# Patient Record
Sex: MALE | Race: BLACK OR AFRICAN AMERICAN | Employment: FULL TIME | URBAN - METROPOLITAN AREA
[De-identification: names, ages, dates, MRNs, and addresses within clinical notes are randomized per-mention and may not be internally consistent; named-entity substitution may affect disease eponyms.]

---

## 2015-04-02 LAB — LDL-C, EXTERNAL: 60

## 2016-08-09 LAB — HBA1C MFR BLD HPLC: 8.4 %

## 2017-01-10 ENCOUNTER — TELEPHONE (OUTPATIENT)
Dept: FAMILY MEDICINE CLINIC | Age: 47
End: 2017-01-10

## 2017-01-10 ENCOUNTER — OFFICE VISIT (OUTPATIENT)
Dept: FAMILY MEDICINE CLINIC | Age: 47
End: 2017-01-10

## 2017-01-10 VITALS
WEIGHT: 315 LBS | BODY MASS INDEX: 40.43 KG/M2 | DIASTOLIC BLOOD PRESSURE: 78 MMHG | HEIGHT: 74 IN | OXYGEN SATURATION: 98 % | HEART RATE: 87 BPM | RESPIRATION RATE: 16 BRPM | SYSTOLIC BLOOD PRESSURE: 130 MMHG | TEMPERATURE: 98.1 F

## 2017-01-10 DIAGNOSIS — Z79.4 TYPE 2 DIABETES MELLITUS WITHOUT COMPLICATION, WITH LONG-TERM CURRENT USE OF INSULIN (HCC): ICD-10-CM

## 2017-01-10 DIAGNOSIS — Z79.01 WARFARIN ANTICOAGULATION: ICD-10-CM

## 2017-01-10 DIAGNOSIS — I10 HTN (HYPERTENSION), BENIGN: Primary | ICD-10-CM

## 2017-01-10 DIAGNOSIS — E11.9 TYPE 2 DIABETES MELLITUS WITHOUT COMPLICATION, WITH LONG-TERM CURRENT USE OF INSULIN (HCC): ICD-10-CM

## 2017-01-10 DIAGNOSIS — I27.82 OTHER CHRONIC PULMONARY EMBOLISM: ICD-10-CM

## 2017-01-10 PROBLEM — J81.1 CHRONIC PULMONARY EDEMA: Status: ACTIVE | Noted: 2017-01-10

## 2017-01-10 LAB
INR PPP: 2.4 (ref 0.89–1.29)
PROTHROMBIN TIME: 24.8 SEC (ref 9–13)

## 2017-01-10 RX ORDER — CLONIDINE HYDROCHLORIDE 0.1 MG/1
TABLET ORAL
Refills: 0 | COMMUNITY
Start: 2016-12-17 | End: 2017-01-10 | Stop reason: SDUPTHER

## 2017-01-10 RX ORDER — ATENOLOL 100 MG/1
100 TABLET ORAL DAILY
Qty: 30 TAB | Refills: 4 | Status: SHIPPED | OUTPATIENT
Start: 2017-01-10 | End: 2017-06-15 | Stop reason: SDUPTHER

## 2017-01-10 RX ORDER — FUROSEMIDE 20 MG/1
20 TABLET ORAL DAILY
Qty: 30 TAB | Refills: 4 | Status: SHIPPED | OUTPATIENT
Start: 2017-01-10 | End: 2017-07-14 | Stop reason: SDUPTHER

## 2017-01-10 RX ORDER — AMLODIPINE BESYLATE 10 MG/1
10 TABLET ORAL DAILY
Qty: 30 TAB | Refills: 4 | Status: SHIPPED | OUTPATIENT
Start: 2017-01-10 | End: 2017-06-15 | Stop reason: SDUPTHER

## 2017-01-10 RX ORDER — POTASSIUM CHLORIDE 20 MEQ/1
TABLET, EXTENDED RELEASE ORAL
Qty: 30 TAB | Refills: 4 | Status: SHIPPED | OUTPATIENT
Start: 2017-01-10 | End: 2017-06-15 | Stop reason: SDUPTHER

## 2017-01-10 RX ORDER — INSULIN ASPART 100 [IU]/ML
INJECTION, SOLUTION INTRAVENOUS; SUBCUTANEOUS
Refills: 3 | COMMUNITY
Start: 2016-12-17

## 2017-01-10 RX ORDER — WARFARIN 1 MG/1
TABLET ORAL
Qty: 30 TAB | Refills: 4 | Status: SHIPPED | OUTPATIENT
Start: 2017-01-10 | End: 2017-07-14 | Stop reason: SDUPTHER

## 2017-01-10 RX ORDER — LIRAGLUTIDE 6 MG/ML
INJECTION SUBCUTANEOUS
Refills: 6 | COMMUNITY
Start: 2016-12-17 | End: 2020-08-06 | Stop reason: ALTCHOICE

## 2017-01-10 RX ORDER — LISINOPRIL AND HYDROCHLOROTHIAZIDE 12.5; 2 MG/1; MG/1
TABLET ORAL
Qty: 60 TAB | Refills: 4 | Status: SHIPPED | OUTPATIENT
Start: 2017-01-10 | End: 2017-06-15 | Stop reason: SDUPTHER

## 2017-01-10 RX ORDER — BUPRENORPHINE 10 UG/H
PATCH, EXTENDED RELEASE TRANSDERMAL
Refills: 0 | COMMUNITY
Start: 2016-12-21 | End: 2020-03-26 | Stop reason: ALTCHOICE

## 2017-01-10 RX ORDER — WARFARIN 10 MG/1
TABLET ORAL
Qty: 30 TAB | Refills: 4 | Status: SHIPPED | OUTPATIENT
Start: 2017-01-10 | End: 2017-07-14 | Stop reason: SDUPTHER

## 2017-01-10 RX ORDER — CLONIDINE HYDROCHLORIDE 0.1 MG/1
TABLET ORAL
Qty: 60 TAB | Refills: 4 | Status: SHIPPED | OUTPATIENT
Start: 2017-01-10 | End: 2017-06-15 | Stop reason: SDUPTHER

## 2017-01-10 NOTE — PROGRESS NOTES
Subjective:   Luis Rae is a 42 y.o. male with hypertension new patient to practice (former patient of Dr. Estes Husbands - physician relocated)  Patient has dm type 2 insulin long term and under care of Dr. El Cho. Patient problem list reviewed  Patient on long term warfarin therapy for past medical history of pulmonary emboli and has had a cristobal filter placed, full hematologic work up for provocation none found. He is currently  On 12 mg warfarin daily. Patient has appointment with Dr. El Cho today and this provider asked about signing release of records today and patient commented that he was not sure he was going to stay with this practice because of a scheduling confusion yesterday. This provider asked is he wanted to proceed with visit and he reports for now and that he would like refills for all hypertension medications. BMI discussed and he reports that he is dieting and has lost over 110 pounds  . bspraised  ROS: denies chest pain, dyspnea, denies complaints today, reports taking all prescribed medications, not sure of INR  SH: ,   Nonsmoker  No alcohol   Current Outpatient Prescriptions   Medication Sig Dispense Refill    BUTRANS 10 mcg/hour LUCA 1 PATCH EVERY WEEK  0    cloNIDine HCl (CATAPRES) 0.1 mg tablet TK 1 T PO  BID  0    NOVOLOG FLEXPEN 100 unit/mL inpn INJECT 20 UNITS SUBCUTANEOUSLY 3 TIMES A DAY BEFORE MEALS  3    VICTOZA 3-ZANDRA 0.6 mg/0.1 mL (18 mg/3 mL) sub-q pen INJ 1.8 MG SC QD  6    CETIRIZINE HCL (ZYRTEC PO) Take  by mouth daily.  potassium chloride (K-DUR, KLOR-CON) 20 mEq tablet TAKE 1 TABLET BY MOUTH DAILY (GENERIC FOR KLOR-CON) 30 Tab 6    atenolol (TENORMIN) 100 mg tablet Take 1 Tab by mouth daily. 30 Tab 3    insulin syringe-needle U-100 by Does Not Apply route.  Dispense ultrafine 1 mL syringes with 29 gauge needle 100 Syringe 11    lisinopril-hydrochlorothiazide (PRINZIDE, ZESTORETIC) 20-12.5 mg per tablet 2QD - TAKE TWO TABLETS BY MOUTH EVERY DAY 60 Each 3    amlodipine (NORVASC) 10 mg tablet Take 10 mg by mouth daily.  furosemide (LASIX) 20 mg tablet Take 20 mg by mouth daily.  aspirin delayed-release 81 mg tablet Take 81 mg by mouth daily.  WARFARIN SODIUM (COUMADIN PO) Take  by mouth. Dose as instructed by Dr. Fozia Lopez insulin glargine (LANTUS) 100 unit/mL injection 80 Units by SubCUTAneous route daily.  fenofibrate nanocrystallized (TRICOR) 145 mg tablet TAKE 1 TABLET BY MOUTH EVERY DAY 30 Each 3    triamcinolone acetonide (KENALOG) 0.1 % ointment Apply  to affected area two (2) times a day. use thin layer 30 g 0    valsartan (DIOVAN) 160 mg tablet Take 1 Tab by mouth daily. 3    insulin regular human CONCENTRATED (HUMULIN R) 500 unit/mL Soln 10 Units by SubCUTAneous route three (3) times daily as needed. Wt Readings from Last 3 Encounters:   01/10/17 (!) 350 lb 12.8 oz (159.1 kg)   04/19/10 (!) 358 lb (162.4 kg)     BP Readings from Last 3 Encounters:   01/10/17 130/78   04/19/10 156/81     PMH: reviewed medications and allergy lists and medical and family history. OBJECTIVE:  Awake and alert in no acute distress  Obese  Well dressed  Neck supple without lymphadenopathy, no carotid artery bruits auscultated bilaterally. Lungs clear throughout  S1 S2 RRR without ectopy or murmur auscultated. Extremities: no clubbing, cyanosis, peripheral edema  Visit Vitals    /78 (BP 1 Location: Right arm, BP Patient Position: Sitting)    Pulse 87    Temp 98.1 °F (36.7 °C) (Oral)    Resp 16    Ht 6' 2\" (1.88 m)    Wt (!) 350 lb 12.8 oz (159.1 kg)    SpO2 98%    BMI 45.04 kg/m2     Attempted POC INR maching not operational  Ely Bobby was seen today for hypertension, diabetes and other.     Diagnoses and all orders for this visit:    HTN (hypertension), benign  -     cloNIDine HCl (CATAPRES) 0.1 mg tablet; TK 1 T PO  BID  -     potassium chloride (K-DUR, KLOR-CON) 20 mEq tablet; TAKE 1 TABLET BY MOUTH DAILY (GENERIC FOR KLOR-CON)  -     atenolol (TENORMIN) 100 mg tablet; Take 1 Tab by mouth daily. -     lisinopril-hydroCHLOROthiazide (PRINZIDE, ZESTORETIC) 20-12.5 mg per tablet; 2QD - TAKE TWO TABLETS BY MOUTH EVERY DAY  -     amLODIPine (NORVASC) 10 mg tablet; Take 1 Tab by mouth daily. -     furosemide (LASIX) 20 mg tablet; Take 1 Tab by mouth daily. Type 2 diabetes mellitus without complication, with long-term current use of insulin (HCC)    Warfarin anticoagulation  -     AMB POC PT/INR  -     warfarin (COUMADIN) 10 mg tablet; Take by mouth daily as instructed  by health care provider  -     warfarin (COUMADIN) 1 mg tablet; Take by mouth daily as directed by health care provider  -     PROTHROMBIN TIME + INR; Future  -     PROTHROMBIN TIME + INR    Other chronic pulmonary embolism (HCC)  -     AMB POC PT/INR  -     warfarin (COUMADIN) 10 mg tablet; Take by mouth daily as instructed  by health care provider  -     warfarin (COUMADIN) 1 mg tablet; Take by mouth daily as directed by health care provider  -     PROTHROMBIN TIME + INR; Future  -     PROTHROMBIN TIME + INR      Will telephone patient of INR when available  Patient verbalizes understanding. I have discussed the diagnosis with the patient and the intended plan as seen in the above orders. The patient has received an after-visit summary and questions were answered concerning future plans. I have discussed medication side effects and warnings with the patient as well. Follow-up Disposition:  Return in about 1 month (around 2/10/2017) for INR.

## 2017-01-10 NOTE — TELEPHONE ENCOUNTER
Spoke with patient to inform that as per [facility], patient's INR is therapeutic 2.40 and to continue current dose of warfarin 12 mg daily. Patient verbalized understanding.

## 2017-01-10 NOTE — PATIENT INSTRUCTIONS
Warfarin (By mouth)   Warfarin (WAR-far-in)  Prevents and treats blood clots. May lower the risk of serious complications after a heart attack. This medicine is a blood thinner. Brand Name(s):Joi Parsons   There may be other brand names for this medicine. When This Medicine Should Not Be Used: This medicine is not right for everyone. Do not use it if you had an allergic reaction to warfarin, if you are pregnant, or if you have health problems that could cause bleeding. How to Use This Medicine:   Tablet  · Take your medicine as directed. Your dose may need to be changed several times to find what works best for you. · This medicine should come with a Medication Guide. Ask your pharmacist for a copy if you do not have one. · Missed dose: Take a dose as soon as you remember. If it is almost time for your next dose, wait until then and take a regular dose. Do not take extra medicine to make up for a missed dose. · Store the medicine in a closed container at room temperature, away from heat, moisture, and direct light. Drugs and Foods to Avoid:   Ask your doctor or pharmacist before using any other medicine, including over-the-counter medicines, vitamins, and herbal products. · Many medicines and foods can affect how warfarin works and may affect the PT/INR test results.  Tell your doctor before you start or stop any medicine, especially the following:   ¨ Ginkgo, echinacea, goldenseal, or Coreen's wort  ¨ Another blood thinner, including apixaban, argatroban, bivalirudin, cilostazol, clopidogrel, dabigatran, desirudin, dipyridamole, heparin, lepirudin, prasugrel, rivaroxaban, ticlopidine  ¨ Medicine to treat depression or anxiety, including citalopram, desvenlafaxine, duloxetine, escitalopram, fluoxetine, fluvoxamine, milnacipran, paroxetine, sertraline, venlafaxine, vilazodone  ¨ Medicine to treat an infection  ¨ NSAID pain or arthritis medicine, including aspirin, celecoxib, diclofenac, diflunisal, fenoprofen, ibuprofen, indomethacin, ketoprofen, ketorolac, mefenamic acid, naproxen, oxaprozin, piroxicam, sulindac. Check labels for over-the-counter medicines to find out if they contain an NSAID. ¨ Steroid medicine, including dexamethasone, hydrocortisone, methylprednisolone, prednisolone, prednisone  · Warfarin works best if you eat about the same amount of vitamin K every day. Foods high in vitamin K include asparagus, broccoli, brussels sprouts, cabbage, green leafy vegetables, plums, rhubarb, and canola oil. Talk to your doctor before you make changes to your normal diet. Warnings While Using This Medicine:   · It is not safe to take this medicine during pregnancy. It could harm an unborn baby. Tell your doctor right away if you become pregnant. Use an effective form of birth control to keep from getting pregnant during treatment and for at least 1 month after your last dose. · Tell your doctor if you are breastfeeding, or if you have kidney disease, liver disease, diabetes, heart disease, heart failure, high blood pressure, an infection, a stomach ulcer, or protein C deficiency. Also tell your doctor if you had recent surgery or an injury, or a history of stroke, anemia, severe bleeding or bruising, or problems caused by heparin use. · This medicine may cause the following problems:   ¨ Bleeding, which may be life-threatening  ¨ Gangrene (skin or tissue damage)  ¨ Purple toes syndrome  · You must have a PT/INR blood test while you use this medicine to check how well your blood is clotting. Your doctor will use the test results to make sure the medicine is working properly. Keep all appointments for the PT/INR blood tests. · You may bleed or bruise more easily with warfarin. To prevent injury or cuts, do not play rough sports, be careful with sharp objects, and brush and floss your teeth gently. [place] your nose gently, and do not pick your nose.   · Carry an ID card or wear a medical alert bracelet to let emergency caregivers know that you use warfarin. · Tell any doctor or dentist who treats you that you are using this medicine. You may need to stop using this medicine several days before you have surgery or medical tests. · Keep all medicine out of the reach of children. Never share your medicine with anyone. Possible Side Effects While Using This Medicine:   Call your doctor right away if you notice any of these side effects:  · Allergic reaction: Itching or hives, swelling in your face or hands, swelling or tingling in your mouth or throat, chest tightness, trouble breathing  · Bleeding from your gums or nose, coughing up blood, heavy monthly periods  · Bleeding that does not stop, bruising, or weakness  · Dizziness, fainting, or lightheadedness  · Pain, brown or black skin, or skin that is cool to the touch  · Purple toes or feet, or new pain in your leg, foot, or toes  · Red or dark brown urine, or red or black, tarry stools  · Vomiting blood or material that looks like coffee grounds  If you notice other side effects that you think are caused by this medicine, tell your doctor. Call your doctor for medical advice about side effects. You may report side effects to FDA at 3-546-FDA-2651  © 2016 [address] is for End User's use only and may not be sold, redistributed or otherwise used for commercial purposes. The above information is an  only. It is not intended as medical advice for individual conditions or treatments. Talk to your doctor, nurse or pharmacist before following any medical regimen to see if it is safe and effective for you. Body Mass Index: Care Instructions  Your Care Instructions    Body mass index (BMI) can help you see if your weight is raising your risk for health problems. It uses a formula to compare how much you weigh with how tall you are. A BMI between 18.5 and 24.9 is considered healthy.  A BMI between 25 and 29.9 is considered overweight. A BMI of 30 or higher is considered obese. If your BMI is in the normal range, it means that you have a lower risk for weight-related health problems. If your BMI is in the overweight or obese range, you may be at increased risk for weight-related health problems, such as high blood pressure, heart disease, stroke, arthritis or joint pain, and diabetes. BMI is just one measure of your risk for weight-related health problems. You may be at higher risk for health problems if you are not active, you eat an unhealthy diet, or you drink too much alcohol or use tobacco products. Follow-up care is a key part of your treatment and safety. Be sure to make and go to all appointments, and call your doctor if you are having problems. It's also a good idea to know your test results and keep a list of the medicines you take. How can you care for yourself at home? · Practice healthy eating habits. This includes eating plenty of fruits, vegetables, whole grains, lean protein, and low-fat dairy. · Get at least 30 minutes of exercise 5 days a week or more. Brisk walking is a good choice. You also may want to do other activities, such as running, swimming, cycling, or playing tennis or team sports. · Do not smoke. Smoking can increase your risk for health problems. If you need help quitting, talk to your doctor about stop-smoking programs and medicines. These can increase your chances of quitting for good. · Limit alcohol to 2 drinks a day for men and 1 drink a day for women. Too much alcohol can cause health problems. If you have a BMI higher than 25  · Your doctor may do other tests to check your risk for weight-related health problems. This may include measuring the distance around your waist. A waist measurement of more than 40 inches in men or 35 inches in women can increase the risk of weight-related health problems. · Talk with your doctor about steps you can take to stay healthy or improve your health. You may need to make lifestyle changes to lose weight and stay healthy, such as changing your diet and getting regular exercise. Where can you learn more? Go to http://anayeli-ever.info/. Enter S176 in the search box to learn more about \"Body Mass Index: Care Instructions. \"  Current as of: February 16, 2016  Content Version: 11.1  © 5913-6491 [employer]. Care instructions adapted under license by [employer] (which disclaims liability or warranty for this information). If you have questions about a medical condition or this instruction, always ask your healthcare professional. [place] [address] any warranty or liability for your use of this information.

## 2017-01-10 NOTE — PROGRESS NOTES
Please call patient and advise that his INR is therapeutic 2.40 and continue current dose of warfarin 12 mg daily.    Troy RENTERIANP

## 2017-01-10 NOTE — PROGRESS NOTES
1. Have you been to the ER, urgent care clinic since your last visit? Hospitalized since your last visit? NO    2. Have you seen or consulted any other health care providers outside of the [address] [place] [address] since your last visit? Include any pap smears or colon screening. Yes, Dr. Madeleine Peña, Dr. Mitchell Castaneda    3. Would you like to have a Flu Shot Today?  NO already had one

## 2017-01-10 NOTE — MR AVS SNAPSHOT
Visit Information Date & Time Provider Department Dept. Phone Encounter #  
 1/10/2017  8:20 AM Car Lomas, [address] [id number] Follow-up Instructions Return in about 1 month (around 2/10/2017) for INR. Upcoming Health Maintenance Date Due DTaP/Tdap/Td series (1 - Tdap) 1/15/1993 INFLUENZA AGE 9 TO ADULT 8/1/2016 EYE EXAM RETINAL OR DILATED Q1 5/10/2017* HEMOGLOBIN A1C Q6M 7/10/2017 FOOT EXAM Q1 1/10/2018 MICROALBUMIN Q1 1/10/2018 LIPID PANEL Q1 1/10/2018 *Topic was postponed. The date shown is not the original due date. Allergies as of 1/10/2017  Review Complete On: 1/10/2017 By: Car Lomas NP No Known Allergies Current Immunizations  Reviewed on 4/19/2010 Name Date Pneumococcal Vaccine (Unspecified Type) 4/19/2010 Not reviewed this visit You Were Diagnosed With   
  
 Codes Comments HTN (hypertension), benign    -  Primary ICD-10-CM: I10 
ICD-9-CM: 401.1 Type 2 diabetes mellitus without complication, with long-term current use of insulin (HCC)     ICD-10-CM: E11.9, Z79.4 ICD-9-CM: 250.00, V58.67 Warfarin anticoagulation     ICD-10-CM: Z79.01 
ICD-9-CM: V58.61 Other chronic pulmonary embolism ([facility]     ICD-10-CM: I27.82 Vitals BP Pulse Temp Resp Height(growth percentile) Weight(growth percentile) 130/78 (BP 1 Location: Right arm, BP Patient Position: Sitting) 87 98.1 °F (36.7 °C) (Oral) 16 6' 2\" (1.88 m) (!) 350 lb 12.8 oz (159.1 kg) SpO2 BMI Smoking Status 98% 45.04 kg/m2 Never Smoker Vitals History BMI and BSA Data Body Mass Index Body Surface Area 45.04 kg/m 2 2.88 m 2 Preferred Pharmacy Pharmacy Name Phone [facility] [address] Your Updated Medication List  
  
   
 This list is accurate as of: 1/10/17  9:47 AM.  Always use your most recent med list. amLODIPine 10 mg tablet Commonly known as:  Anam Alstrom Take 1 Tab by mouth daily. aspirin delayed-release 81 mg tablet Take 81 mg by mouth daily. atenolol 100 mg tablet Commonly known as:  TENORMIN Take 1 Tab by mouth daily. BUTRANS 10 mcg/hour Generic drug:  buprenorphine LUCA 1 PATCH EVERY WEEK  
  
 cloNIDine HCl 0.1 mg tablet Commonly known as:  CATAPRES TK 1 T PO  BID  
  
 furosemide 20 mg tablet Commonly known as:  LASIX Take 1 Tab by mouth daily. HumuLIN R 500 unit/mL Soln Generic drug:  insulin CONCENTRATED regular 10 Units by SubCUTAneous route three (3) times daily as needed. insulin syringe-needle U-100  
by Does Not Apply route. Dispense ultrafine 1 mL syringes with 29 gauge needle LANTUS 100 unit/mL injection Generic drug:  insulin glargine 80 Units by SubCUTAneous route daily. lisinopril-hydroCHLOROthiazide 20-12.5 mg per tablet Commonly known as:  PRINZIDE, ZESTORETIC  
2QD - TAKE TWO TABLETS BY MOUTH EVERY DAY NovoLOG Flexpen 100 unit/mL Inpn Generic drug:  insulin aspart INJECT 20 UNITS SUBCUTANEOUSLY 3 TIMES A DAY BEFORE MEALS  
  
 potassium chloride 20 mEq tablet Commonly known as:  K-DUR, KLOR-CON  
TAKE 1 TABLET BY MOUTH DAILY (GENERIC FOR KLOR-CON)  
  
 triamcinolone acetonide 0.1 % ointment Commonly known as:  KENALOG Apply  to affected area two (2) times a day. use thin layer VICTOZA 3-ZANDRA 0.6 mg/0.1 mL (18 mg/3 mL) sub-q pen Generic drug:  Liraglutide INJ 1.8 MG SC QD  
  
 * warfarin 10 mg tablet Commonly known as:  COUMADIN Take by mouth daily as instructed  by health care provider * warfarin 1 mg tablet Commonly known as:  COUMADIN Take by mouth daily as directed by health care provider ZYRTEC PO Take  by mouth daily. * Notice: This list has 2 medication(s) that are the same as other medications prescribed for you. Read the directions carefully, and ask your doctor or other care provider to review them with you. Prescriptions Sent to Pharmacy Refills  
 cloNIDine HCl (CATAPRES) 0.1 mg tablet 4 Sig: TK 1 T PO  BID Class: Normal  
 Pharmacy: [facility],[facility] Ph #: [phone]  
 potassium chloride (K-DUR, KLOR-CON) 20 mEq tablet 4 Sig: TAKE 1 TABLET BY MOUTH DAILY ([address]) Class: Normal  
 Pharmacy: [facility] [address],[facility] Ph #: [phone]  
 atenolol (TENORMIN) 100 mg tablet 4 Sig: Take 1 Tab by mouth daily. Class: Normal  
 Pharmacy: [facility] [address] [place] [address] Ph #: [phone] Route: Oral  
 lisinopril-hydroCHLOROthiazide (PRINZIDE, ZESTORETIC) 20-12.5 mg per tablet 4 SiQD - TAKE TWO TABLETS BY MOUTH EVERY DAY Class: Normal  
 Pharmacy: [facility] [address],[facility] Ph #: [phone]  
 amLODIPine (NORVASC) 10 mg tablet 4 Sig: Take 1 Tab by mouth daily. Class: Normal  
 Pharmacy: [facility] [address] [place] [address] Ph #: [phone] Route: Oral  
 furosemide (LASIX) 20 mg tablet 4 Sig: Take 1 Tab by mouth daily. Class: Normal  
 Pharmacy: [facility] [address] [place] [address] Ph #: [phone] Route: Oral  
 warfarin (COUMADIN) 10 mg tablet 4 Sig: Take by mouth daily as instructed  by health care provider  Class: Normal  
 Pharmacy: [email or web address] [address] Ph #: [phone]  
 warfarin (COUMADIN) 1 mg tablet 4 Sig: Take by mouth daily as directed by health care provider Class: Normal  
 Pharmacy: [email or web address] [address] [facility] [address] Ph #: [phone] We Performed the Following AMB POC PT/INR [63830 CPT(R)] Follow-up Instructions Return in about 1 month (around 2/10/2017) for INR. To-Do List   
 01/10/2017 Lab:  PROTHROMBIN TIME + INR Patient Instructions Warfarin (By mouth) Warfarin (WAR-far-in) Prevents and treats blood clots. May lower the risk of serious complications after a heart attack. This medicine is a blood thinner. Brand Name(s):Coumadin, [employer] There may be other brand names for this medicine. When This Medicine Should Not Be Used: This medicine is not right for everyone. Do not use it if you had an allergic reaction to warfarin, if you are pregnant, or if you have health problems that could cause bleeding. How to Use This Medicine:  
Tablet · Take your medicine as directed. Your dose may need to be changed several times to find what works best for you. · This medicine should come with a Medication Guide. Ask your pharmacist for a copy if you do not have one. · Missed dose: Take a dose as soon as you remember. If it is almost time for your next dose, wait until then and take a regular dose. Do not take extra medicine to make up for a missed dose. · Store the medicine in a closed container at room temperature, away from heat, moisture, and direct light. Drugs and Foods to Avoid: Ask your doctor or pharmacist before using any other medicine, including over-the-counter medicines, vitamins, and herbal products.  
· Many medicines and foods can affect how warfarin works and may affect the PT/INR test results. Tell your doctor before you start or stop any medicine, especially the following: ¨ Ginkgo, echinacea, goldenseal, or Coreen's wort ¨ Another blood thinner, including apixaban, argatroban, bivalirudin, cilostazol, clopidogrel, dabigatran, desirudin, dipyridamole, heparin, lepirudin, prasugrel, rivaroxaban, ticlopidine ¨ Medicine to treat depression or anxiety, including citalopram, desvenlafaxine, duloxetine, escitalopram, fluoxetine, fluvoxamine, milnacipran, paroxetine, sertraline, venlafaxine, vilazodone ¨ Medicine to treat an infection ¨ NSAID pain or arthritis medicine, including aspirin, celecoxib, diclofenac, diflunisal, fenoprofen, ibuprofen, indomethacin, ketoprofen, ketorolac, mefenamic acid, naproxen, oxaprozin, piroxicam, sulindac. Check labels for over-the-counter medicines to find out if they contain an NSAID. ¨ Steroid medicine, including dexamethasone, hydrocortisone, methylprednisolone, prednisolone, prednisone · Warfarin works best if you eat about the same amount of vitamin K every day. Foods high in vitamin K include asparagus, broccoli, brussels sprouts, cabbage, green leafy vegetables, plums, rhubarb, and canola oil. Talk to your doctor before you make changes to your normal diet. Warnings While Using This Medicine: · It is not safe to take this medicine during pregnancy. It could harm an unborn baby. Tell your doctor right away if you become pregnant. Use an effective form of birth control to keep from getting pregnant during treatment and for at least 1 month after your last dose. · Tell your doctor if you are breastfeeding, or if you have kidney disease, liver disease, diabetes, heart disease, heart failure, high blood pressure, an infection, a stomach ulcer, or protein C deficiency. Also tell your doctor if you had recent surgery or an injury, or a history of stroke, anemia, severe bleeding or bruising, or problems caused by heparin use. · This medicine may cause the following problems: ¨ Bleeding, which may be life-threatening ¨ Gangrene (skin or tissue damage) ¨ Purple toes syndrome · You must have a PT/INR blood test while you use this medicine to check how well your blood is clotting. Your doctor will use the test results to make sure the medicine is working properly. Keep all appointments for the PT/INR blood tests. · You may bleed or bruise more easily with warfarin. To prevent injury or cuts, do not play rough sports, be careful with sharp objects, and brush and floss your teeth gently. Clifton Alvine your nose gently, and do not pick your nose. · Carry an ID card or wear a medical alert bracelet to let emergency caregivers know that you use warfarin. · Tell any doctor or dentist who treats you that you are using this medicine. You may need to stop using this medicine several days before you have surgery or medical tests. · Keep all medicine out of the reach of children. Never share your medicine with anyone. Possible Side Effects While Using This Medicine:  
Call your doctor right away if you notice any of these side effects: · Allergic reaction: Itching or hives, swelling in your face or hands, swelling or tingling in your mouth or throat, chest tightness, trouble breathing · Bleeding from your gums or nose, coughing up blood, heavy monthly periods · Bleeding that does not stop, bruising, or weakness · Dizziness, fainting, or lightheadedness · Pain, brown or black skin, or skin that is cool to the touch · Purple toes or feet, or new pain in your leg, foot, or toes · Red or dark brown urine, or red or black, tarry stools · Vomiting blood or material that looks like coffee grounds If you notice other side effects that you think are caused by this medicine, tell your doctor. Call your doctor for medical advice about side effects. You may report side effects to FDA at 7-068-WID-4297 © 2016 [address] is for End User's use only and may not be sold, redistributed or otherwise used for commercial purposes. The above information is an  only. It is not intended as medical advice for individual conditions or treatments. Talk to your doctor, nurse or pharmacist before following any medical regimen to see if it is safe and effective for you. Body Mass Index: Care Instructions Your Care Instructions Body mass index (BMI) can help you see if your weight is raising your risk for health problems. It uses a formula to compare how much you weigh with how tall you are. A BMI between 18.5 and 24.9 is considered healthy. A BMI between 25 and 29.9 is considered overweight. A BMI of 30 or higher is considered obese. If your BMI is in the normal range, it means that you have a lower risk for weight-related health problems. If your BMI is in the overweight or obese range, you may be at increased risk for weight-related health problems, such as high blood pressure, heart disease, stroke, arthritis or joint pain, and diabetes. BMI is just one measure of your risk for weight-related health problems. You may be at higher risk for health problems if you are not active, you eat an unhealthy diet, or you drink too much alcohol or use tobacco products. Follow-up care is a key part of your treatment and safety. Be sure to make and go to all appointments, and call your doctor if you are having problems. It's also a good idea to know your test results and keep a list of the medicines you take. How can you care for yourself at home? · Practice healthy eating habits. This includes eating plenty of fruits, vegetables, whole grains, lean protein, and low-fat dairy. · Get at least 30 minutes of exercise 5 days a week or more. Brisk walking is a good choice. You also may want to do other activities, such as running, swimming, cycling, or playing tennis or team sports. · Do not smoke. Smoking can increase your risk for health problems. If you need help quitting, talk to your doctor about stop-smoking programs and medicines. These can increase your chances of quitting for good. · Limit alcohol to 2 drinks a day for men and 1 drink a day for women. Too much alcohol can cause health problems. If you have a BMI higher than 25 · Your doctor may do other tests to check your risk for weight-related health problems. This may include measuring the distance around your waist. A waist measurement of more than 40 inches in men or 35 inches in women can increase the risk of weight-related health problems. · Talk with your doctor about steps you can take to stay healthy or improve your health. You may need to make lifestyle changes to lose weight and stay healthy, such as changing your diet and getting regular exercise. Where can you learn more? Go to http://anayeli-ever.info/. Enter S176 in the search box to learn more about \"Body Mass Index: Care Instructions. \" Current as of: February 16, 2016 Content Version: 11.1 © 3027-7604 [employer]. Care instructions adapted under license by [employer] (which disclaims liability or warranty for this information). If you have questions about a medical condition or this instruction, always ask your healthcare professional. [address] any warranty or liability for your use of this information. Introducing [facility]! Jeri Dial introduces [email or web address] patient portal. Now you can access parts of your medical record, email your doctor's office, and request medication refills online. 1. In your internet browser, go to [email or web address] 2. Click on the First Time User? Click Here link in the Sign In box. You will see the New Member Sign Up page. 3. Enter your [email or web address] Access Code exactly as it appears below.  You will not need to use this code after youve completed the sign-up process. If you do not sign up before the expiration date, you must request a new code. · [employer] Access Code: [email or web address] Expires: 4/10/2017  9:47 AM 
 
4. Enter the last four digits of your Social Security Number (xxxx) and Date of Birth (mm/dd/yyyy) as indicated and click Submit. You will be taken to the next sign-up page. 5. Create a [employer] ID. This will be your [employer] login ID and cannot be changed, so think of one that is secure and easy to remember. 6. Create a [employer] password. You can change your password at any time. 7. Enter your Password Reset Question and Answer. This can be used at a later time if you forget your password. 8. Enter your e-mail address. You will receive e-mail notification when new information is available in [address] 9. Click Sign Up. You can now view and download portions of your medical record. 10. Click the Download Summary menu link to download a portable copy of your medical information. If you have questions, please visit the Frequently Asked Questions section of the [employer] website. Remember, [employer] is NOT to be used for urgent needs. For medical emergencies, dial 911. Now available from your iPhone and Android! Please provide this summary of care documentation to your next provider. Your primary care clinician is listed as [address]. If you have any questions after today's visit, please call [phone].

## 2017-01-10 NOTE — TELEPHONE ENCOUNTER
----- Message from Colten Swanson LPN sent at 6/05/5489  1:46 PM EST -----      ----- Message -----     From: Hussain Archibald NP     Sent: 1/10/2017   1:32 PM       To: Colten Swanson LPN    Please call patient and advise that his INR is therapeutic 2.40 and continue current dose of warfarin 12 mg daily.    Hussain RENTERIANP

## 2017-01-11 PROBLEM — Z79.01 ON WARFARIN THERAPY: Status: ACTIVE | Noted: 2017-01-11

## 2017-03-13 ENCOUNTER — CLINICAL SUPPORT (OUTPATIENT)
Dept: FAMILY MEDICINE CLINIC | Age: 47
End: 2017-03-13

## 2017-03-13 DIAGNOSIS — Z79.01 ON WARFARIN THERAPY: Primary | ICD-10-CM

## 2017-03-13 LAB
INR BLD: 2.2
PT POC: 26.2 SEC
VALID INTERNAL CONTROL?: YES

## 2017-03-13 NOTE — PATIENT INSTRUCTIONS
Taking Warfarin Safely: Care Instructions  Your Care Instructions  Warfarin is a medicine that you take to prevent blood clots. It is often called a blood thinner. Doctors give warfarin (such as Coumadin) to reduce the risk of blood clots. You may be at risk for blood clots if you have atrial fibrillation or deep vein thrombosis. Some other health problems may also put you at risk. Warfarin slows the amount of time it takes for your blood to clot. It can cause bleeding problems. Even if you've been taking warfarin for a while, it's important to know how to take it safely. Foods and other medicines can affect the way warfarin works. Some can make warfarin work too well. This can cause bleeding problems. And some can make it work poorly, so that it does not prevent blood clots very well. You will need regular blood tests to check how long it takes for your blood to form a clot. This test is called a PT or prothrombin time test. The result of the test is called an INR level. Depending on the test results, your doctor or anticoagulation clinic may adjust your dose of warfarin. Follow-up care is a key part of your treatment and safety. Be sure to make and go to all appointments, and call your doctor if you are having problems. It's also a good idea to know your test results and keep a list of the medicines you take. How can you care for yourself at home? Take warfarin safely  · Take your warfarin at the same time each day. · If you miss a dose of warfarin, don't take an extra dose to make up for it. Your doctor can tell you exactly what to do so you don't take too much or too little. · Wear medical alert jewelry that lets others know that you take warfarin. You can buy this at most drugstores. · Don't take warfarin if you are pregnant or planning to get pregnant. Talk to your doctor about how you can prevent getting pregnant while you are taking it.   · Don't change your dose or stop taking warfarin unless your doctor tells you to. Effects of medicines and food on warfarin  · Don't start or stop taking any medicines, vitamins, or natural remedies unless you first talk to your doctor. Many medicines can affect how warfarin works. These include aspirin and other pain relievers, over-the-counter medicines, multivitamins, dietary supplements, and herbal products. · Tell all of your doctors and pharmacists that you take warfarin. Some prescription medicines can affect how warfarin works. · Keep the amount of vitamin K in your diet about the same from day to day. Do not suddenly eat a lot more or a lot less food that is rich in vitamin K than you usually do. Vitamin K affects how warfarin works and how your blood clots. Talk with your doctor before making big changes in your diet. Vitamin K is in many foods, such as:  ¨ Leafy greens, such as kale, cabbage, spinach, Swiss chard, and lettuce. ¨ Canola and soybean oils. ¨ Green vegetables, such as asparagus, broccoli, and Laurel sprouts. ¨ Vegetable drinks, green tea leaves, and some dietary supplement drinks. · Avoid cranberry juice and other cranberry products. They can increase the effects of warfarin. · Limit your use of alcohol. Avoid bleeding by preventing falls and injuries  · Wear slippers or shoes with nonskid soles. · Remove throw rugs and clutter. · Rearrange furniture and electrical cords to keep them out of walking paths. · Keep stairways, porches, and outside walkways well lit. Use night-lights in hallways and bathrooms. · Be extra careful when you work with sharp tools or knives. When should you call for help? Call 911 anytime you think you may need emergency care. For example, call if:  · You have a sudden, severe headache that is different from past headaches. Call your doctor now or seek immediate medical care if:  · You have any abnormal bleeding, such as:  ¨ Nosebleeds.   ¨ Vaginal bleeding that is different (heavier, more frequent, at a different time of the month) than what you are used to. ¨ Bloody or black stools, or rectal bleeding. ¨ Bloody or pink urine. Watch closely for changes in your health, and be sure to contact your doctor if you have any problems. Where can you learn more? Go to http://anayeli-ever.info/. Enter G186 in the search box to learn more about \"Taking Warfarin Safely: Care Instructions. \"  Current as of: January 27, 2016  Content Version: 11.1  © 2612-8522 [employer]. Care instructions adapted under license by [employer] (which disclaims liability or warranty for this information). If you have questions about a medical condition or this instruction, always ask your healthcare professional. [address] any warranty or liability for your use of this information.

## 2017-03-13 NOTE — PROGRESS NOTES
SUBJECTIVE:    Gorge Treadwell is a 41y.o. year old male who presents today for Anticoagulation monitoring. Indication: PE  INR Goal: 2.0-3.0. Current dose:  Coumadin 12mg daily. Missed Coumadin Doses:  Over one week ago -  1 dosage  Medication Changes:  no  Dietary Changes:  no  Symptoms: taking coumadin appropriately without any bleeding. Past Medical History:   Diagnosis Date    Diabetes ([address]     Hematuria     right renal cyst    Hypercholesterolemia     Hypertension     Liver disease     Fatty Liver    Pulmonary emboli (HCC)     chronic coumadin therapy managed by Dr. Anisha Mendoza Renal failure acute     following surgery     Current Outpatient Prescriptions   Medication Sig Dispense Refill    BUTRANS 10 mcg/hour LUCA 1 PATCH EVERY WEEK  0    NOVOLOG FLEXPEN 100 unit/mL inpn INJECT 20 UNITS SUBCUTANEOUSLY 3 TIMES A DAY BEFORE MEALS  3    VICTOZA 3-ZANDRA 0.6 mg/0.1 mL (18 mg/3 mL) sub-q pen INJ 1.8 MG SC QD  6    CETIRIZINE HCL (ZYRTEC PO) Take  by mouth daily.  cloNIDine HCl (CATAPRES) 0.1 mg tablet TK 1 T PO  BID 60 Tab 4    potassium chloride (K-DUR, KLOR-CON) 20 mEq tablet TAKE 1 TABLET BY MOUTH DAILY (GENERIC FOR KLOR-CON) 30 Tab 4    atenolol (TENORMIN) 100 mg tablet Take 1 Tab by mouth daily. 30 Tab 4    lisinopril-hydroCHLOROthiazide (PRINZIDE, ZESTORETIC) 20-12.5 mg per tablet 2QD - TAKE TWO TABLETS BY MOUTH EVERY DAY 60 Tab 4    amLODIPine (NORVASC) 10 mg tablet Take 1 Tab by mouth daily. 30 Tab 4    furosemide (LASIX) 20 mg tablet Take 1 Tab by mouth daily. 30 Tab 4    warfarin (COUMADIN) 10 mg tablet Take by mouth daily as instructed  by health care provider 30 Tab 4    warfarin (COUMADIN) 1 mg tablet Take by mouth daily as directed by health care provider 30 Tab 4    insulin syringe-needle U-100 by Does Not Apply route.  Dispense ultrafine 1 mL syringes with 29 gauge needle 100 Syringe 11    triamcinolone acetonide (KENALOG) 0.1 % ointment Apply  to affected area two (2) times a day. use thin layer 30 g 0    aspirin delayed-release 81 mg tablet Take 81 mg by mouth daily.  insulin glargine (LANTUS) 100 unit/mL injection 80 Units by SubCUTAneous route daily.  insulin regular human CONCENTRATED (HUMULIN R) 500 unit/mL Soln 10 Units by SubCUTAneous route three (3) times daily as needed. Lab Results   Component Value Date/Time    INR 2.40 01/10/2017 09:51 AM    INR 1.3 04/09/2014 07:10 AM    INR 1.9 03/24/2014 01:56 PM    INR POC 2.2 03/13/2017 11:29 AM    Prothrombin time 24.8 01/10/2017 09:51 AM    Prothrombin time 15.5 04/09/2014 07:10 AM    Prothrombin time 21.4 03/24/2014 01:56 PM         Assessment/Plan:  New Coumadin dose:.current treatment plan is effective, no change in therapy. Next check to be scheduled for  1 Month. Called left patient message (per patient okay to leave message) secure line that per provider Rasheeda Tejeda he is to continue with current dosage of 12 mg daily and he is to return to the office in one month for his recheck.

## 2017-06-15 DIAGNOSIS — I10 HTN (HYPERTENSION), BENIGN: ICD-10-CM

## 2017-06-15 RX ORDER — ATENOLOL 100 MG/1
TABLET ORAL
Qty: 30 TAB | Refills: 1 | Status: SHIPPED | OUTPATIENT
Start: 2017-06-15 | End: 2017-07-14 | Stop reason: SDUPTHER

## 2017-06-15 RX ORDER — AMLODIPINE BESYLATE 10 MG/1
TABLET ORAL
Qty: 30 TAB | Refills: 1 | Status: SHIPPED | OUTPATIENT
Start: 2017-06-15 | End: 2017-07-14 | Stop reason: SDUPTHER

## 2017-06-15 RX ORDER — LISINOPRIL AND HYDROCHLOROTHIAZIDE 12.5; 2 MG/1; MG/1
TABLET ORAL
Qty: 60 TAB | Refills: 1 | Status: SHIPPED | OUTPATIENT
Start: 2017-06-15 | End: 2017-07-14 | Stop reason: SDUPTHER

## 2017-06-15 RX ORDER — CLONIDINE HYDROCHLORIDE 0.1 MG/1
TABLET ORAL
Qty: 60 TAB | Refills: 1 | Status: SHIPPED | OUTPATIENT
Start: 2017-06-15 | End: 2017-07-14 | Stop reason: SDUPTHER

## 2017-06-15 RX ORDER — POTASSIUM CHLORIDE 20 MEQ/1
TABLET, EXTENDED RELEASE ORAL
Qty: 30 TAB | Refills: 1 | Status: SHIPPED | OUTPATIENT
Start: 2017-06-15 | End: 2017-07-14 | Stop reason: SDUPTHER

## 2017-06-15 NOTE — TELEPHONE ENCOUNTER
Please advise patient that prescriptions are ready with one refill. He has not been seen since January 2017. Moreover, he is on coumadin and no recent INR.   Please advise appointment and ask about INR  Saleem Juarez. Juan Pablo MENDES

## 2017-07-14 ENCOUNTER — OFFICE VISIT (OUTPATIENT)
Dept: FAMILY MEDICINE CLINIC | Age: 47
End: 2017-07-14

## 2017-07-14 VITALS
OXYGEN SATURATION: 97 % | SYSTOLIC BLOOD PRESSURE: 128 MMHG | TEMPERATURE: 98.1 F | DIASTOLIC BLOOD PRESSURE: 88 MMHG | WEIGHT: 315 LBS | RESPIRATION RATE: 16 BRPM | HEIGHT: 74 IN | BODY MASS INDEX: 40.43 KG/M2 | HEART RATE: 78 BPM

## 2017-07-14 DIAGNOSIS — I10 HTN (HYPERTENSION), BENIGN: ICD-10-CM

## 2017-07-14 DIAGNOSIS — Z12.11 SCREENING FOR COLON CANCER: ICD-10-CM

## 2017-07-14 DIAGNOSIS — Z79.01 WARFARIN ANTICOAGULATION: ICD-10-CM

## 2017-07-14 DIAGNOSIS — Z79.01 CHRONIC ANTICOAGULATION: ICD-10-CM

## 2017-07-14 DIAGNOSIS — Z00.00 ROUTINE MEDICAL EXAM: ICD-10-CM

## 2017-07-14 DIAGNOSIS — Z80.0 FAMILY HISTORY OF COLON CANCER: ICD-10-CM

## 2017-07-14 DIAGNOSIS — Z23 ENCOUNTER FOR IMMUNIZATION: Primary | ICD-10-CM

## 2017-07-14 DIAGNOSIS — I27.82 OTHER CHRONIC PULMONARY EMBOLISM: ICD-10-CM

## 2017-07-14 LAB
INR BLD: 2.2
PT POC: NORMAL SECONDS
VALID INTERNAL CONTROL?: YES

## 2017-07-14 RX ORDER — GABAPENTIN 300 MG/1
300 CAPSULE ORAL 2 TIMES DAILY
Refills: 2 | COMMUNITY
Start: 2017-06-15 | End: 2020-02-26 | Stop reason: SDUPTHER

## 2017-07-14 RX ORDER — ATENOLOL 100 MG/1
TABLET ORAL
Qty: 30 TAB | Refills: 1 | Status: SHIPPED | OUTPATIENT
Start: 2017-07-14 | End: 2017-08-16 | Stop reason: SDUPTHER

## 2017-07-14 RX ORDER — CLONIDINE HYDROCHLORIDE 0.1 MG/1
TABLET ORAL
Qty: 60 TAB | Refills: 1 | Status: SHIPPED | OUTPATIENT
Start: 2017-07-14 | End: 2017-09-11 | Stop reason: SDUPTHER

## 2017-07-14 RX ORDER — INSULIN GLARGINE 100 [IU]/ML
INJECTION, SOLUTION SUBCUTANEOUS
Refills: 6 | COMMUNITY
Start: 2017-06-15 | End: 2017-11-08

## 2017-07-14 RX ORDER — PEN NEEDLE, DIABETIC 31 GX5/16"
NEEDLE, DISPOSABLE MISCELLANEOUS
Refills: 3 | COMMUNITY
Start: 2017-07-04

## 2017-07-14 RX ORDER — CYCLOSPORINE 0.5 MG/ML
EMULSION OPHTHALMIC
Refills: 1 | COMMUNITY
Start: 2017-06-22 | End: 2019-04-30 | Stop reason: ALTCHOICE

## 2017-07-14 RX ORDER — WARFARIN 1 MG/1
TABLET ORAL
Qty: 30 TAB | Refills: 4 | Status: SHIPPED | OUTPATIENT
Start: 2017-07-14 | End: 2017-08-10 | Stop reason: SDUPTHER

## 2017-07-14 RX ORDER — LISINOPRIL AND HYDROCHLOROTHIAZIDE 12.5; 2 MG/1; MG/1
TABLET ORAL
Qty: 60 TAB | Refills: 1 | Status: SHIPPED | OUTPATIENT
Start: 2017-07-14 | End: 2017-09-11 | Stop reason: SDUPTHER

## 2017-07-14 RX ORDER — POTASSIUM CHLORIDE 20 MEQ/1
TABLET, EXTENDED RELEASE ORAL
Qty: 30 TAB | Refills: 1 | Status: SHIPPED | OUTPATIENT
Start: 2017-07-14 | End: 2017-09-11 | Stop reason: SDUPTHER

## 2017-07-14 RX ORDER — FUROSEMIDE 20 MG/1
20 TABLET ORAL DAILY
Qty: 30 TAB | Refills: 4 | Status: SHIPPED | OUTPATIENT
Start: 2017-07-14 | End: 2017-12-05 | Stop reason: SDUPTHER

## 2017-07-14 RX ORDER — WARFARIN 10 MG/1
TABLET ORAL
Qty: 30 TAB | Refills: 4 | Status: SHIPPED | OUTPATIENT
Start: 2017-07-14 | End: 2017-10-30 | Stop reason: SDUPTHER

## 2017-07-14 RX ORDER — AMLODIPINE BESYLATE 10 MG/1
TABLET ORAL
Qty: 30 TAB | Refills: 1 | Status: SHIPPED | OUTPATIENT
Start: 2017-07-14 | End: 2017-09-11 | Stop reason: SDUPTHER

## 2017-07-14 NOTE — MR AVS SNAPSHOT
Visit Information Date & Time Provider Department Dept. Phone Encounter #  
 7/14/2017 11:00 AM Екатерина Mills  [facility] [phone] [id number] Follow-up Instructions Return in about 1 year (around 7/14/2018) for annual wellness. Upcoming Health Maintenance Date Due  
 EYE EXAM RETINAL OR DILATED Q1 1/15/1982 INFLUENZA AGE 9 TO ADULT 8/1/2017 FOOT EXAM Q1 1/10/2018 MICROALBUMIN Q1 1/10/2018 LIPID PANEL Q1 1/10/2018 HEMOGLOBIN A1C Q6M 1/14/2018 DTaP/Tdap/Td series (2 - Td) 7/14/2027 Allergies as of 7/14/2017  Review Complete On: 7/14/2017 By: Екатерина Mills NP No Known Allergies Current Immunizations  Reviewed on 7/14/2017 Name Date Pneumococcal Vaccine (Unspecified Type) 4/19/2010 Tdap  Incomplete Reviewed by Екатерина Mills NP on 7/14/2017 at 11:16 AM  
You Were Diagnosed With   
  
 Codes Comments Encounter for immunization    -  Primary ICD-10-CM: X14 ICD-9-CM: V03.89 HTN (hypertension), benign     ICD-10-CM: I10 
ICD-9-CM: 401.1 Warfarin anticoagulation     ICD-10-CM: Z79.01 
ICD-9-CM: V58.61 Other chronic pulmonary embolism (HCC)     ICD-10-CM: I27.82 
ICD-9-CM: 416.2 Routine medical exam     ICD-10-CM: Z00.00 ICD-9-CM: V70.0 Screening for colon cancer     ICD-10-CM: Z12.11 ICD-9-CM: V76.51 Chronic anticoagulation     ICD-10-CM: Z79.01 
ICD-9-CM: V58.61 Family history of colon cancer     ICD-10-CM: Z80.0 ICD-9-CM: V16.0 Vitals BP Pulse Temp Resp Height(growth percentile) Weight(growth percentile) 128/88 (BP 1 Location: Left arm, BP Patient Position: Sitting) 78 98.1 °F (36.7 °C) (Oral) 16 6' 2\" (1.88 m) 340 lb 9.6 oz (154.5 kg) SpO2 BMI Smoking Status 97% 43.73 kg/m2 Never Smoker BMI and BSA Data Body Mass Index Body Surface Area 43.73 kg/m 2 2.84 m 2 Preferred Pharmacy Pharmacy Name Phone [facility] [address] [facility] Your Updated Medication List  
  
   
This list is accurate as of: 7/14/17 11:34 AM.  Always use your most recent med list. amLODIPine 10 mg tablet Commonly known as:  Remonia Grates TAKE 1 TABLET BY MOUTH DAILY  
  
 aspirin delayed-release 81 mg tablet Take 81 mg by mouth daily. atenolol 100 mg tablet Commonly known as:  TENORMIN  
TAKE 1 TABLET BY MOUTH DAILY  
  
 BD INSULIN PEN NEEDLE UF SHORT 31 gauge x 5/16\" Ndle Generic drug:  Insulin Needles (Disposable) USE 1 PEN UTD QID BUTRANS 10 mcg/hour Generic drug:  buprenorphine LUCA 1 PATCH EVERY WEEK  
  
 cloNIDine HCl 0.1 mg tablet Commonly known as:  CATAPRES  
TAKE 1 TABLET BY MOUTH TWICE DAILY  
  
 furosemide 20 mg tablet Commonly known as:  LASIX Take 1 Tab by mouth daily. gabapentin 300 mg capsule Commonly known as:  NEURONTIN HumuLIN R 500 unit/mL Soln Generic drug:  insulin CONCENTRATED regular 10 Units by SubCUTAneous route three (3) times daily as needed. insulin syringe-needle U-100  
by Does Not Apply route. Dispense ultrafine 1 mL syringes with 29 gauge needle * LANTUS 100 unit/mL injection Generic drug:  insulin glargine 80 Units by SubCUTAneous route daily. * BASAGLAR KWIKPEN 100 unit/mL (3 mL) Inpn Generic drug:  insulin glargine INJECT 80  UNITS UNDER THE SKIN QHS  
  
 lisinopril-hydroCHLOROthiazide 20-12.5 mg per tablet Commonly known as:  PRINZIDE, ZESTORETIC  
TAKE 2 TABLETS BY MOUTH EVERY DAY NovoLOG Flexpen 100 unit/mL Inpn Generic drug:  insulin aspart INJECT 20 UNITS SUBCUTANEOUSLY 3 TIMES A DAY BEFORE MEALS  
  
 potassium chloride 20 mEq tablet Commonly known as:  K-DUR, KLOR-CON  
TAKE 1 TABLET BY MOUTH DAILY RESTASIS 0.05 % ophthalmic emulsion Generic drug:  cycloSPORINE  
 INSTILL 1 TO 2 DROPS INTO OU BID  
  
 triamcinolone acetonide 0.1 % ointment Commonly known as:  KENALOG Apply  to affected area two (2) times a day. use thin layer VICTOZA 3-ZANDRA 0.6 mg/0.1 mL (18 mg/3 mL) sub-q pen Generic drug:  Liraglutide INJ 1.8 MG SC QD  
  
 * warfarin 10 mg tablet Commonly known as:  COUMADIN Take by mouth daily as instructed  by health care provider * warfarin 1 mg tablet Commonly known as:  COUMADIN Take by mouth daily as directed by health care provider ZYRTEC PO Take  by mouth daily. * Notice: This list has 4 medication(s) that are the same as other medications prescribed for you. Read the directions carefully, and ask your doctor or other care provider to review them with you. Prescriptions Sent to Pharmacy Refills  
 atenolol (TENORMIN) 100 mg tablet 1 Sig: TAKE 1 TABLET BY MOUTH DAILY Class: Normal  
 Pharmacy: [facility] [address] [place] [address] Ph #: [phone]  
 cloNIDine HCl (CATAPRES) 0.1 mg tablet 1 Sig: TAKE 1 TABLET BY MOUTH TWICE DAILY Class: Normal  
 Pharmacy: [facility] [address] [place] [address] Ph #: [phone]  
 lisinopril-hydroCHLOROthiazide (PRINZIDE, ZESTORETIC) 20-12.5 mg per tablet 1 Sig: TAKE 2 TABLETS BY MOUTH EVERY DAY Class: Normal  
 Pharmacy: [facility] [address] [place] [address] Ph #: [phone]  
 potassium chloride (K-DUR, KLOR-CON) 20 mEq tablet 1 Sig: TAKE 1 TABLET BY MOUTH DAILY Class: Normal  
 Pharmacy: [facility] [address] [place] [address] Ph #: [phone]  
 amLODIPine (NORVASC) 10 mg tablet 1 Sig: TAKE 1 TABLET BY MOUTH DAILY  Class: Normal  
 Pharmacy: [facility] [address] Ph #: [phone]  
 furosemide (LASIX) 20 mg tablet 4 Sig: Take 1 Tab by mouth daily. Class: Normal  
 Pharmacy: [facility] [address] [place] [address] Ph #: [phone] Route: Oral  
 warfarin (COUMADIN) 10 mg tablet 4 Sig: Take by mouth daily as instructed  by health care provider Class: Normal  
 Pharmacy: [facility] [place] [address] [address] Ph #: [phone]  
 warfarin (COUMADIN) 1 mg tablet 4 Sig: Take by mouth daily as directed by health care provider Class: Normal  
 Pharmacy: [facility] [place] [address] Ph #: [phone] We Performed the Following AMB POC BLOOD OCCULT QUAL FECAL HEMGLBN 1-3 [id number] CPT(R)] AMB POC PT/INR [97524 CPT(R)] CBC W/O DIFF [85762 CPT(R)] LIPID PANEL [93772 CPT(R)] METABOLIC PANEL, COMPREHENSIVE [26677 CPT(R)] REFERRAL TO GASTROENTEROLOGY [CSC40 Custom] Comments:  
 Please evaluate patient for colon cancer screening, PGF--colon cancer. TETANUS, DIPHTHERIA TOXOIDS AND ACELLULAR PERTUSSIS VACCINE (TDAP), IN INDIVIDS. >=7, IM [id number] CPT(R)] Follow-up Instructions Return in about 1 year (around 7/14/2018) for annual wellness. Referral Information Referral ID Referred By Referred To  
  
 [id number] Angel Baez [place] [address] [facility], [address] Phone: [phone] Fax: [phone] Visits Status Start Date End Date 1 New Request 7/14/17 7/14/18  If your referral has a status of pending review or denied, additional information will be sent to support the outcome of this decision. Patient Instructions Well Visit, Ages 25 to 48: Care Instructions Your Care Instructions Physical exams can help you stay healthy. Your doctor has checked your overall health and may have suggested ways to take good care of yourself. He or she also may have recommended tests. At home, you can help prevent illness with healthy eating, regular exercise, and other steps. Follow-up care is a key part of your treatment and safety. Be sure to make and go to all appointments, and call your doctor if you are having problems. It's also a good idea to know your test results and keep a list of the medicines you take. How can you care for yourself at home? · Reach and stay at a healthy weight. This will lower your risk for many problems, such as obesity, diabetes, heart disease, and high blood pressure. · Get at least 30 minutes of physical activity on most days of the week. Walking is a good choice. You also may want to do other activities, such as running, swimming, cycling, or playing tennis or team sports. Discuss any changes in your exercise program with your doctor. · Do not smoke or allow others to smoke around you. If you need help quitting, talk to your doctor about stop-smoking programs and medicines. These can increase your chances of quitting for good. · Talk to your doctor about whether you have any risk factors for sexually transmitted infections (STIs). Having one sex partner (who does not have STIs and does not have sex with anyone else) is a good way to avoid these infections. · Use birth control if you do not want to have children at this time. Talk with your doctor about the choices available and what might be best for you. · Protect your skin from too much sun.  When you're outdoors from 10 a.m. to 4 p.m., stay in the shade or cover up with clothing and a hat with a wide brim. Wear sunglasses that block UV rays. Even when it's cloudy, put broad-spectrum sunscreen (SPF 30 or higher) on any exposed skin. · See a dentist one or two times a year for checkups and to have your teeth cleaned. · Wear a seat belt in the car. · Drink alcohol in moderation, if at all. That means no more than 2 drinks a day for men and 1 drink a day for women. Follow your doctor's advice about when to have certain tests. These tests can spot problems early. For everyone · Cholesterol. Have the fat (cholesterol) in your blood tested after age 21. Your doctor will tell you how often to have this done based on your age, family history, or other things that can increase your risk for heart disease. · Blood pressure. Have your blood pressure checked during a routine doctor visit. Your doctor will tell you how often to check your blood pressure based on your age, your blood pressure results, and other factors. · Vision. Talk with your doctor about how often to have a glaucoma test. 
· Diabetes. Ask your doctor whether you should have tests for diabetes. · Colon cancer. Have a test for colon cancer at age 48. You may have one of several tests. If you are younger than 48, you may need a test earlier if you have any risk factors. Risk factors include whether you already had a precancerous polyp removed from your colon or whether your parent, brother, sister, or child has had colon cancer. For women · Breast exam and mammogram. Talk to your doctor about when you should have a clinical breast exam and a mammogram. Medical experts differ on whether and how often women under 50 should have these tests. Your doctor can help you decide what is right for you. · Pap test and pelvic exam. Begin Pap tests at age 24. A Pap test is the best way to find cervical cancer.  The test often is part of a pelvic exam. Ask how often to have this test. 
 · Tests for sexually transmitted infections (STIs). Ask whether you should have tests for STIs. You may be at risk if you have sex with more than one person, especially if your partners do not wear condoms. For men · Tests for sexually transmitted infections (STIs). Ask whether you should have tests for STIs. You may be at risk if you have sex with more than one person, especially if you do not wear a condom. · Testicular cancer exam. Ask your doctor whether you should check your testicles regularly. · Prostate exam. Talk to your doctor about whether you should have a blood test (called a PSA test) for prostate cancer. Experts differ on whether and when men should have this test. Some experts suggest it if you are older than 39 and are -American or have a father or brother who got prostate cancer when he was younger than 72. When should you call for help? Watch closely for changes in your health, and be sure to contact your doctor if you have any problems or symptoms that concern you. Where can you learn more? Go to http://anayeli-ever.info/. Enter P072 in the search box to learn more about \"Well Visit, Ages 25 to 48: Care Instructions. \" Current as of: July 19, 2016 Content Version: 11.3 © 4580-2163 [employer]. Care instructions adapted under license by [employer] (which disclaims liability or warranty for this information). If you have questions about a medical condition or this instruction, always ask your healthcare professional. [place] [address] any warranty or liability for your use of this information. Prothrombin Time: About This Test 
What is it? Prothrombin time (PT) is a blood test that measures how long it takes for blood to clot. Prothrombin is one of several clotting factors your body makes to help your blood form clots when a blood vessel is damaged. A PT test may also be called an INR test (for international normalized ratio). Why is this test done? The test can be used to check for bleeding problems. It is also used to check how well warfarin, a medicine to prevent blood clots, is working. How can you prepare for the test? 
· In general, you don't need to prepare before having this test. Your doctor may give you some specific instructions. What happens during the test? 
· A health professional takes a sample of your blood. What else should you know about the test? 
· Your results will include an explanation of what a \"normal\" result is. This is called a \"reference range. \" It is just a guide. Your doctor will evaluate your results based on your health and other factors. This means that a value that falls outside the normal values listed may still be normal for you. · If you're taking warfarin, regular testing helps your doctor make sure you're taking the right amount. How long does the test take? · The test will take a few minutes. What happens after the test? 
· You will probably be able to go home right away. · You can go back to your usual activities right away. Follow-up care is a key part of your treatment and safety. Be sure to make and go to all appointments, and call your doctor if you are having problems. It's also a good idea to keep a list of the medicines you take. Ask your doctor when you can expect to have your test results. Where can you learn more? Go to http://anayeli-ever.info/. Enter S247 in the search box to learn more about \"Prothrombin Time: About This Test.\" Current as of: July 28, 2016 Content Version: 11.3 © 6544-0166 [employer]. Care instructions adapted under license by [employer] (which disclaims liability or warranty for this information).  If you have questions about a medical condition or this instruction, always ask your healthcare professional. [employer] disclaims any warranty or liability for your use of this information. Body Mass Index: Care Instructions Your Care Instructions Body mass index (BMI) can help you see if your weight is raising your risk for health problems. It uses a formula to compare how much you weigh with how tall you are. · A BMI lower than 18.5 is considered underweight. · A BMI between 18.5 and 24.9 is considered healthy. · A BMI between 25 and 29.9 is considered overweight. A BMI of 30 or higher is considered obese. If your BMI is in the normal range, it means that you have a lower risk for weight-related health problems. If your BMI is in the overweight or obese range, you may be at increased risk for weight-related health problems, such as high blood pressure, heart disease, stroke, arthritis or joint pain, and diabetes. If your BMI is in the underweight range, you may be at increased risk for health problems such as fatigue, lower protection (immunity) against illness, muscle loss, bone loss, hair loss, and hormone problems. BMI is just one measure of your risk for weight-related health problems. You may be at higher risk for health problems if you are not active, you eat an unhealthy diet, or you drink too much alcohol or use tobacco products. Follow-up care is a key part of your treatment and safety. Be sure to make and go to all appointments, and call your doctor if you are having problems. It's also a good idea to know your test results and keep a list of the medicines you take. How can you care for yourself at home? · Practice healthy eating habits. This includes eating plenty of fruits, vegetables, whole grains, lean protein, and low-fat dairy. · If your doctor recommends it, get more exercise. Walking is a good choice. Bit by bit, increase the amount you walk every day. Try for at least 30 minutes on most days of the week. · Do not smoke. Smoking can increase your risk for health problems.  If you need help quitting, talk to your doctor about stop-smoking programs and medicines. These can increase your chances of quitting for good. · Limit alcohol to 2 drinks a day for men and 1 drink a day for women. Too much alcohol can cause health problems. If you have a BMI higher than 25 · Your doctor may do other tests to check your risk for weight-related health problems. This may include measuring the distance around your waist. A waist measurement of more than 40 inches in men or 35 inches in women can increase the risk of weight-related health problems. · Talk with your doctor about steps you can take to stay healthy or improve your health. You may need to make lifestyle changes to lose weight and stay healthy, such as changing your diet and getting regular exercise. If you have a BMI lower than 18.5 · Your doctor may do other tests to check your risk for health problems. · Talk with your doctor about steps you can take to stay healthy or improve your health. You may need to make lifestyle changes to gain or maintain weight and stay healthy, such as getting more healthy foods in your diet and doing exercises to build muscle. Where can you learn more? Go to http://anayeli-ever.info/. Enter S176 in the search box to learn more about \"Body Mass Index: Care Instructions. \" Current as of: January 23, 2017 Content Version: 11.3 © 6936-6581 [employer]igo[employer], [employer]. Care instructions adapted under license by [employer] (which disclaims liability or warranty for this information). If you have questions about a medical condition or this instruction, always ask your healthcare professional. [place] [address] any warranty or liability for your use of this information. Tdap (Tetanus, Diphtheria, Pertussis) Vaccine: What You Need to Know Why get vaccinated? Tetanus, diphtheria, and pertussis are very serious diseases.  Tdap vaccine can protect us from these diseases. And Tdap vaccine given to pregnant women can protect  babies against pertussis. Tetanus (lockjaw) is rare in the [facility] today. It causes painful muscle tightening and stiffness, usually all over the body. · It can lead to tightening of muscles in the head and neck so you can't open your mouth, swallow, or sometimes even breathe. Tetanus kills about 1 out of 10 people who are infected even after receiving the best medical care. Diphtheria is also rare in the [place] today. It can cause a thick coating to form in the back of the throat. · It can lead to breathing problems, heart failure, paralysis, and death. Pertussis (whooping cough) causes severe coughing spells, which can cause difficulty breathing, vomiting, and disturbed sleep. · It can also lead to weight loss, incontinence, and rib fractures. Up to 2 in 100 adolescents and 5 in 100 adults with pertussis are hospitalized or have complications, which could include pneumonia or death. These diseases are caused by bacteria. Diphtheria and pertussis are spread from person to person through secretions from coughing or sneezing. Tetanus enters the body through cuts, scratches, or wounds. Before vaccines, as many as 200,000 cases of diphtheria, 200,000 cases of pertussis, and hundreds of cases of tetanus were reported in the [place] each year. Since vaccination began, reports of cases for tetanus and diphtheria have dropped by about 99% and for pertussis by about 80%. Tdap vaccine The Tdap vaccine can protect adolescents and adults from tetanus, diphtheria, and pertussis. One dose of Tdap is routinely given at age 6 or 15. People who did not get Tdap at that age should get it as soon as possible. Tdap is especially important for health care professionals and anyone having close contact with a baby younger than 12 months.  
Pregnant women should get a dose of Tdap during every pregnancy, to protect the  from pertussis. Infants are most at risk for severe, life-threatening complications from pertussis. Another vaccine, called Td, protects against tetanus and diphtheria, but not pertussis. A Td booster should be given every 10 years. Tdap may be given as one of these boosters if you have never gotten Tdap before. Tdap may also be given after a severe cut or burn to prevent tetanus infection. Your doctor or the person giving you the vaccine can give you more information. Tdap may safely be given at the same time as other vaccines. Some people should not get this vaccine · A person who has ever had a life-threatening allergic reaction after a previous dose of any diphtheria-, tetanus-, or pertussis-containing vaccine, OR has a severe allergy to any part of this vaccine, should not get Tdap vaccine. Tell the person giving the vaccine about any severe allergies. · Anyone who had coma or long repeated seizures within 7 days after a childhood dose of DTP or DTaP, or a previous dose of Tdap, should not get Tdap, unless a cause other than the vaccine was found. They can still get Td. · Talk to your doctor if you: 
¨ Have seizures or another nervous system problem. ¨ Had severe pain or swelling after any vaccine containing diphtheria, tetanus, or pertussis. ¨ Ever had a condition called Guillain-Barré Syndrome (GBS). ¨ Aren't feeling well on the day the shot is scheduled. Risks With any medicine, including vaccines, there is a chance of side effects. These are usually mild and go away on their own. Serious reactions are also possible but are rare. Most people who get Tdap vaccine do not have any problems with it. Mild problems following Tdap 
(Did not interfere with activities) · Pain where the shot was given (about 3 in 4 adolescents or 2 in 3 adults) · Redness or swelling where the shot was given (about 1 person in 5) · Mild fever of at least 100.4°F (up to about 1 in 25 adolescents or 1 in 100 adults) · Headache (about 3 or 4 people in 10) · Tiredness (about 1 person in 3 or 4) · Nausea, vomiting, diarrhea, stomachache (up to 1 in 4 adolescents or 1 in 10 adults) · Chills, sore joints (about 1 person in 10) · Body aches (about 1 person in 3 or 4) · Rash, swollen glands (uncommon) Moderate problems following Tdap (Interfered with activities, but did not require medical attention) · Pain where the shot was given (up to 1 in 5 or 6) · Redness or swelling where the shot was given (up to about 1 in 16 adolescents or 1 in 12 adults) · Fever over 102°F (about 1 in 100 adolescents or 1 in 250 adults) · Headache (about 1 in 7 adolescents or 1 in 10 adults) · Nausea, vomiting, diarrhea, stomachache (up to 1 to 3 people in 100) · Swelling of the entire arm where the shot was given (up to about 1 in 500) Severe problems following Tdap 
(Unable to perform usual activities; required medical attention) · Swelling, severe pain, bleeding and redness in the arm where the shot was given (rare) Problems that could happen after any vaccine: · People sometimes faint after a medical procedure, including vaccination. Sitting or lying down for about 15 minutes can help prevent fainting, and injuries caused by a fall. Tell your doctor if you feel dizzy or have vision changes or ringing in the ears. · Some people get severe pain in the shoulder and have difficulty moving the arm where a shot was given. This happens very rarely. · Any medication can cause a severe allergic reaction. Such reactions from a vaccine are very rare, estimated at fewer than 1 in a million doses, and would happen within a few minutes to a few hours after the vaccination. As with any medicine, there is a very remote chance of a vaccine causing a serious injury or death. The safety of vaccines is always being monitored.  For more information, visit: www.cdc.gov/vaccinesafety. What if there is a serious problem? What should I look for? · Look for anything that concerns you, such as signs of a severe allergic reaction, very high fever, or unusual behavior. Signs of a severe allergic reaction can include hives, swelling of the face and throat, difficulty breathing, a fast heartbeat, dizziness, and weakness. These would usually start a few minutes to a few hours after the vaccination. What should I do? · If you think it is a severe allergic reaction or other emergency that can't wait, call 9-1-1 or get the person to the nearest hospital. Otherwise, call your doctor. · Afterward, the reaction should be reported to the Vaccine Adverse Event Reporting System (VAERS). Your doctor might file this report, or you can do it yourself through the VAERS web site at www.vaers. Bit[employer].gov, or by calling 2-524.621.7326. VAERS does not give medical advice. The National Vaccine Injury Compensation Program 
The National Vaccine Injury Compensation Program (VICP) is a federal program that was created to compensate people who may have been injured by certain vaccines. Persons who believe they may have been injured by a vaccine can learn about the program and about filing a claim by calling 5-852.150.9670 or visiting the [employer] website at www.[facility].gov/vaccinecompensation. There is a time limit to file a claim for compensation. How can I learn more? · Ask your doctor. He or she can give you the vaccine package insert or suggest other sources of information. · Call your local or state health department. · Contact the Centers for Disease Control and Prevention (CDC): 
¨ Call 9-411.791.1086 (1-800-CDC-INFO) or ¨ Visit CDC's website at www.cdc.gov/vaccines Vaccine Information Statement (Interim) Tdap Vaccine 
(2/24/15) [address]. Dinah Apley 585PU-60 Department of Health and [place] Centers for Disease Control and Prevention Many Vaccine Information Statements are available in [place] and other languages. See www.immunize.org/vis. Muchas hojas de información sobre vacunas están disponibles en español y en otros idiomas. Visite www.immunize.org/vis. Care instructions adapted under license by [employer] (which disclaims liability or warranty for this information). If you have questions about a medical condition or this instruction, always ask your healthcare professional. [address] any warranty or liability for your use of this information. Introducing [facility]! Omayra Bailey introduces [employer] patient portal. Now you can access parts of your medical record, email your doctor's office, and request medication refills online. 1. In your internet browser, go to [email or web address] 2. Click on the First Time User? Click Here link in the Sign In box. You will see the New Member Sign Up page. 3. Enter your [employer] Access Code exactly as it appears below. You will not need to use this code after youve completed the sign-up process. If you do not sign up before the expiration date, you must request a new code. · [employer] Access Code: [email or web address] Expires: 10/12/2017 11:34 AM 
 
4. Enter the last four digits of your Social Security Number (xxxx) and Date of Birth (mm/dd/yyyy) as indicated and click Submit. You will be taken to the next sign-up page. 5. Create a [employer] ID. This will be your [employer] login ID and cannot be changed, so think of one that is secure and easy to remember. 6. Create a [employer] password. You can change your password at any time. 7. Enter your Password Reset Question and Answer. This can be used at a later time if you forget your password. 8. Enter your e-mail address. You will receive e-mail notification when new information is available in [address] 9. Click Sign Up. You can now view and download portions of your medical record. 10. Click the Download Summary menu link to download a portable copy of your medical information. If you have questions, please visit the Frequently Asked Questions section of the [employer] website. Remember, [employer] is NOT to be used for urgent needs. For medical emergencies, dial 911. Now available from your iPhone and Android! Please provide this summary of care documentation to your next provider. Your primary care clinician is listed as John Paul Zhang. If you have any questions after today's visit, please call [phone].

## 2017-07-14 NOTE — PROGRESS NOTES
1. Have you been to the ER, urgent care clinic since your last visit? Hospitalized since your last visit? no    2. Have you seen or consulted any other health care providers outside of the [address] [place] [address] since your last visit? Include any pap smears or colon screening. YES, Dr. Kevin Zaman    3. Vaccines? tdap    4. Rx updated yes      Subjective:   Sridevi Gatica is a 41 y.o. male presenting for his annual checkup. ROS:  Feeling well. No dyspnea or chest pain on exertion. No abdominal pain, change in bowel habits, black or bloody stools. No urinary tract or prostatic symptoms. No neurological complaints. Specific concerns today: needs coumadin refilled takes 12 mg daily by mouth---discussed with patient a home INR unit and will set up today  Patient sees endocrine Dr. Tarik Chaves for DM type 2  Sees Dr Janell Rosa podiatry for foot care.     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    Pulmonary hypertension (HCC) I27.2    Neuropathy in diabetes ([address] E11.40    HTN (hypertension), benign I10    DIETER (obstructive sleep apnea) G47.33    CVD (cardiovascular disease) I25.10    Fatty liver K76.0    Morbid obesity (HCC) E66.01    Microscopic hematuria R31.29    Renal mass N28.89    Tendon tear, foot S96.919A    Chronic pulmonary edema J81.1    On warfarin therapy Z79.01     Patient Active Problem List    Diagnosis Date Noted    On warfarin therapy 01/11/2017    Chronic pulmonary edema 01/10/2017    Renal mass 04/19/2010    Tendon tear, foot 04/19/2010    Diabetes mellitus ([address] 04/16/2010    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    Pulmonary hypertension ([address] 04/16/2010    Neuropathy in diabetes ([address] 04/16/2010    HTN (hypertension), benign 04/16/2010    DIETER (obstructive sleep apnea) 04/16/2010    CVD (cardiovascular disease) 04/16/2010    Fatty liver 04/16/2010    Morbid obesity ([facility] [address] 04/16/2010    Microscopic hematuria 04/16/2010     Current Outpatient Prescriptions   Medication Sig Dispense Refill    RESTASIS 0.05 % ophthalmic emulsion INSTILL 1 TO 2 DROPS INTO OU BID  1    gabapentin (NEURONTIN) 300 mg capsule   2    BASAGLAR KWIKPEN 100 unit/mL (3 mL) inpn INJECT 80  UNITS UNDER THE SKIN QHS  6    BD INSULIN PEN NEEDLE UF SHORT 31 gauge x 5/16\" ndle USE 1 PEN UTD QID  3    atenolol (TENORMIN) 100 mg tablet TAKE 1 TABLET BY MOUTH DAILY 30 Tab 1    cloNIDine HCl (CATAPRES) 0.1 mg tablet TAKE 1 TABLET BY MOUTH TWICE DAILY 60 Tab 1    lisinopril-hydroCHLOROthiazide (PRINZIDE, ZESTORETIC) 20-12.5 mg per tablet TAKE 2 TABLETS BY MOUTH EVERY DAY 60 Tab 1    potassium chloride (K-DUR, KLOR-CON) 20 mEq tablet TAKE 1 TABLET BY MOUTH DAILY 30 Tab 1    amLODIPine (NORVASC) 10 mg tablet TAKE 1 TABLET BY MOUTH DAILY 30 Tab 1    furosemide (LASIX) 20 mg tablet Take 1 Tab by mouth daily. 30 Tab 4    warfarin (COUMADIN) 10 mg tablet Take by mouth daily as instructed  by health care provider 30 Tab 4    warfarin (COUMADIN) 1 mg tablet Take by mouth daily as directed by health care provider 30 Tab 4    BUTRANS 10 mcg/hour LUCA 1 PATCH EVERY WEEK  0    NOVOLOG FLEXPEN 100 unit/mL inpn INJECT 20 UNITS SUBCUTANEOUSLY 3 TIMES A DAY BEFORE MEALS  3    VICTOZA 3-ZANDRA 0.6 mg/0.1 mL (18 mg/3 mL) sub-q pen INJ 1.8 MG SC QD  6    CETIRIZINE HCL (ZYRTEC PO) Take  by mouth daily.  insulin syringe-needle U-100 by Does Not Apply route. Dispense ultrafine 1 mL syringes with 29 gauge needle 100 Syringe 11    aspirin delayed-release 81 mg tablet Take 81 mg by mouth daily.  triamcinolone acetonide (KENALOG) 0.1 % ointment Apply  to affected area two (2) times a day. use thin layer 30 g 0    insulin glargine (LANTUS) 100 unit/mL injection 80 Units by SubCUTAneous route daily.       insulin regular human CONCENTRATED (HUMULIN R) 500 unit/mL Soln 10 Units by SubCUTAneous route three (3) times daily as needed. No Known Allergies  Past Medical History:   Diagnosis Date    Diabetes ([address]     Hematuria     right renal cyst    Hypercholesterolemia     Hypertension     Liver disease     Fatty Liver    Pulmonary emboli (HCC)     chronic coumadin therapy managed by Dr. Francisca Ornelas Renal failure acute     following surgery     Past Surgical History:   Procedure Laterality Date    HX APPENDECTOMY  2006    HX ORTHOPAEDIC      foot, Left knee replacement, Right hip replacement    VASCULAR SURGERY PROCEDURE UNLIST      [place] filter     Family History   Problem Relation Age of Onset    Diabetes Father     Hypertension Father     Heart Disease Maternal Grandmother     Cancer Maternal Grandmother     Depression Mother     Cancer Paternal Grandfather      Social History   Substance Use Topics    Smoking status: Never Smoker    Smokeless tobacco: Never Used    Alcohol use No             Objective:     Visit Vitals    /88 (BP 1 Location: Left arm, BP Patient Position: Sitting)    Pulse 78    Temp 98.1 °F (36.7 °C) (Oral)    Resp 16    Ht 6' 2\" (1.88 m)    Wt 340 lb 9.6 oz (154.5 kg)    SpO2 97%    BMI 43.73 kg/m2     The patient appears well, alert, oriented x 3, in no distress. obese  ENT normal.  Neck supple. No adenopathy or thyromegaly. SOMMER. Lungs are clear, good air entry, no wheezes, rhonchi or rales. S1 and S2 normal, no murmurs, regular rate and rhythm. Abdomen is soft without tenderness, guarding, mass or organomegaly.  exam: no penile lesions or discharge, no testicular masses or tenderness, no hernias, RECTAL EXAM: negative without mass, lesions or tenderness, sphincter tone normal, stool guaiac negative, HERNIA EXAM: no hernias found on exam.  Extremities show no edema, normal peripheral pulses. Neurological is normal without focal findings.   Foot examination: right great toe missing (amputation, second toe leaning over callous formation) dry skin both feet No open skin lesions no sensory changes  Gait normal  Musculoskeletal: normal examination upper and lower extremities head and neck, no warmth to joints, no edema to joints, no gait abnormalities, motor strength +5/5 upper and lower extremities head and neck. DTRs brisk +2 bilaterally. Integumentary: no suspicious skin lesions normal skin turgor no skin rashes  Results for orders placed or performed in visit on 07/14/17   AMB POC PT/INR   Result Value Ref Range    VALID INTERNAL CONTROL POC Yes     Prothrombin time (POC)  seconds    INR POC 2.2        Abel Dean was seen today for well male. Diagnoses and all orders for this visit:    Encounter for immunization  -     TETANUS, DIPHTHERIA TOXOIDS AND ACELLULAR PERTUSSIS VACCINE (TDAP), IN INDIVIDS. >=7, IM    HTN (hypertension), benign  -     atenolol (TENORMIN) 100 mg tablet; TAKE 1 TABLET BY MOUTH DAILY  -     cloNIDine HCl (CATAPRES) 0.1 mg tablet; TAKE 1 TABLET BY MOUTH TWICE DAILY  -     lisinopril-hydroCHLOROthiazide (PRINZIDE, ZESTORETIC) 20-12.5 mg per tablet; TAKE 2 TABLETS BY MOUTH EVERY DAY  -     potassium chloride (K-DUR, KLOR-CON) 20 mEq tablet; TAKE 1 TABLET BY MOUTH DAILY  -     amLODIPine (NORVASC) 10 mg tablet; TAKE 1 TABLET BY MOUTH DAILY  -     furosemide (LASIX) 20 mg tablet; Take 1 Tab by mouth daily. Warfarin anticoagulation  -     warfarin (COUMADIN) 10 mg tablet; Take by mouth daily as instructed  by health care provider  -     warfarin (COUMADIN) 1 mg tablet; Take by mouth daily as directed by health care provider    Other chronic pulmonary embolism (HCC)  -     warfarin (COUMADIN) 10 mg tablet; Take by mouth daily as instructed  by health care provider  -     warfarin (COUMADIN) 1 mg tablet;  Take by mouth daily as directed by health care provider    Routine medical exam  -     CBC  -     LIPID PANEL  -     COMP METABOLIC PANEL    Screening for colon cancer  -     AMB POC BLOOD OCCULT QUAL FECAL HEMGLBN 1-3  -     REFERRAL TO GASTROENTEROLOGY    Chronic anticoagulation  -     AMB POC PT/INR    Family history of colon cancer  -     REFERRAL TO GASTROENTEROLOGY    Reviewed risks and benefits and common side effects of immunization reviewed. Patient verbalizes understanding and consents. Written prescription for compression stockings 20-30 compression  Anticipatory guidance regarding health promotion for this age range and patient verbalizes understanding. Portion control and exercise healthy eating general guidelines reviewed with patient to get closer to normal BMI  Will start process for home INR for consistency  Patient verbalizes understanding. I have discussed the diagnosis with the patient and the intended plan as seen in the above orders. The patient has received an after-visit summary and questions were answered concerning future plans. I have discussed medication side effects and warnings with the patient as well. Follow-up Disposition:  Return in about 1 year (around 7/14/2018) for annual wellness.

## 2017-07-14 NOTE — PATIENT INSTRUCTIONS
Well Visit, Ages 25 to 48: Care Instructions  Your Care Instructions  Physical exams can help you stay healthy. Your doctor has checked your overall health and may have suggested ways to take good care of yourself. He or she also may have recommended tests. At home, you can help prevent illness with healthy eating, regular exercise, and other steps. Follow-up care is a key part of your treatment and safety. Be sure to make and go to all appointments, and call your doctor if you are having problems. It's also a good idea to know your test results and keep a list of the medicines you take. How can you care for yourself at home? · Reach and stay at a healthy weight. This will lower your risk for many problems, such as obesity, diabetes, heart disease, and high blood pressure. · Get at least 30 minutes of physical activity on most days of the week. Walking is a good choice. You also may want to do other activities, such as running, swimming, cycling, or playing tennis or team sports. Discuss any changes in your exercise program with your doctor. · Do not smoke or allow others to smoke around you. If you need help quitting, talk to your doctor about stop-smoking programs and medicines. These can increase your chances of quitting for good. · Talk to your doctor about whether you have any risk factors for sexually transmitted infections (STIs). Having one sex partner (who does not have STIs and does not have sex with anyone else) is a good way to avoid these infections. · Use birth control if you do not want to have children at this time. Talk with your doctor about the choices available and what might be best for you. · Protect your skin from too much sun. When you're outdoors from 10 a.m. to 4 p.m., stay in the shade or cover up with clothing and a hat with a wide brim. Wear sunglasses that block UV rays. Even when it's cloudy, put broad-spectrum sunscreen (SPF 30 or higher) on any exposed skin.   · See a dentist one or two times a year for checkups and to have your teeth cleaned. · Wear a seat belt in the car. · Drink alcohol in moderation, if at all. That means no more than 2 drinks a day for men and 1 drink a day for women. Follow your doctor's advice about when to have certain tests. These tests can spot problems early. For everyone  · Cholesterol. Have the fat (cholesterol) in your blood tested after age 21. Your doctor will tell you how often to have this done based on your age, family history, or other things that can increase your risk for heart disease. · Blood pressure. Have your blood pressure checked during a routine doctor visit. Your doctor will tell you how often to check your blood pressure based on your age, your blood pressure results, and other factors. · Vision. Talk with your doctor about how often to have a glaucoma test.  · Diabetes. Ask your doctor whether you should have tests for diabetes. · Colon cancer. Have a test for colon cancer at age 48. You may have one of several tests. If you are younger than 48, you may need a test earlier if you have any risk factors. Risk factors include whether you already had a precancerous polyp removed from your colon or whether your parent, brother, sister, or child has had colon cancer. For women  · Breast exam and mammogram. Talk to your doctor about when you should have a clinical breast exam and a mammogram. Medical experts differ on whether and how often women under 50 should have these tests. Your doctor can help you decide what is right for you. · Pap test and pelvic exam. Begin Pap tests at age 24. A Pap test is the best way to find cervical cancer. The test often is part of a pelvic exam. Ask how often to have this test.  · Tests for sexually transmitted infections (STIs). Ask whether you should have tests for STIs. You may be at risk if you have sex with more than one person, especially if your partners do not wear condoms.   For men  · Tests for sexually transmitted infections (STIs). Ask whether you should have tests for STIs. You may be at risk if you have sex with more than one person, especially if you do not wear a condom. · Testicular cancer exam. Ask your doctor whether you should check your testicles regularly. · Prostate exam. Talk to your doctor about whether you should have a blood test (called a PSA test) for prostate cancer. Experts differ on whether and when men should have this test. Some experts suggest it if you are older than 39 and are -American or have a father or brother who got prostate cancer when he was younger than 72. When should you call for help? Watch closely for changes in your health, and be sure to contact your doctor if you have any problems or symptoms that concern you. Where can you learn more? Go to http://anayeli-ever.info/. Enter P072 in the search box to learn more about \"Well Visit, Ages 25 to 48: Care Instructions. \"  Current as of: July 19, 2016  Content Version: 11.3  © 9390-6501 [employer]. Care instructions adapted under license by [employer] (which disclaims liability or warranty for this information). If you have questions about a medical condition or this instruction, always ask your healthcare professional. [place] [address] any warranty or liability for your use of this information. Prothrombin Time: About This Test  What is it? Prothrombin time (PT) is a blood test that measures how long it takes for blood to clot. Prothrombin is one of several clotting factors your body makes to help your blood form clots when a blood vessel is damaged. A PT test may also be called an INR test (for international normalized ratio). Why is this test done? The test can be used to check for bleeding problems. It is also used to check how well warfarin, a medicine to prevent blood clots, is working.   How can you prepare for the test?  · In general, you don't need to prepare before having this test. Your doctor may give you some specific instructions. What happens during the test?  · A health professional takes a sample of your blood. What else should you know about the test?  · Your results will include an explanation of what a \"normal\" result is. This is called a \"reference range. \" It is just a guide. Your doctor will evaluate your results based on your health and other factors. This means that a value that falls outside the normal values listed may still be normal for you. · If you're taking warfarin, regular testing helps your doctor make sure you're taking the right amount. How long does the test take? · The test will take a few minutes. What happens after the test?  · You will probably be able to go home right away. · You can go back to your usual activities right away. Follow-up care is a key part of your treatment and safety. Be sure to make and go to all appointments, and call your doctor if you are having problems. It's also a good idea to keep a list of the medicines you take. Ask your doctor when you can expect to have your test results. Where can you learn more? Go to http://anayeli-ever.info/. Enter S351 in the search box to learn more about \"Prothrombin Time: About This Test.\"  Current as of: July 28, 2016  Content Version: 11.3  © 5363-4978 [employer]. Care instructions adapted under license by [employer] (which disclaims liability or warranty for this information). If you have questions about a medical condition or this instruction, always ask your healthcare professional. [address] any warranty or liability for your use of this information. Body Mass Index: Care Instructions  Your Care Instructions    Body mass index (BMI) can help you see if your weight is raising your risk for health problems.  It uses a formula to compare how much you weigh with how tall you are. · A BMI lower than 18.5 is considered underweight. · A BMI between 18.5 and 24.9 is considered healthy. · A BMI between 25 and 29.9 is considered overweight. A BMI of 30 or higher is considered obese. If your BMI is in the normal range, it means that you have a lower risk for weight-related health problems. If your BMI is in the overweight or obese range, you may be at increased risk for weight-related health problems, such as high blood pressure, heart disease, stroke, arthritis or joint pain, and diabetes. If your BMI is in the underweight range, you may be at increased risk for health problems such as fatigue, lower protection (immunity) against illness, muscle loss, bone loss, hair loss, and hormone problems. BMI is just one measure of your risk for weight-related health problems. You may be at higher risk for health problems if you are not active, you eat an unhealthy diet, or you drink too much alcohol or use tobacco products. Follow-up care is a key part of your treatment and safety. Be sure to make and go to all appointments, and call your doctor if you are having problems. It's also a good idea to know your test results and keep a list of the medicines you take. How can you care for yourself at home? · Practice healthy eating habits. This includes eating plenty of fruits, vegetables, whole grains, lean protein, and low-fat dairy. · If your doctor recommends it, get more exercise. Walking is a good choice. Bit by bit, increase the amount you walk every day. Try for at least 30 minutes on most days of the week. · Do not smoke. Smoking can increase your risk for health problems. If you need help quitting, talk to your doctor about stop-smoking programs and medicines. These can increase your chances of quitting for good. · Limit alcohol to 2 drinks a day for men and 1 drink a day for women. Too much alcohol can cause health problems.   If you have a BMI higher than 25  · Your doctor may do other tests to check your risk for weight-related health problems. This may include measuring the distance around your waist. A waist measurement of more than 40 inches in men or 35 inches in women can increase the risk of weight-related health problems. · Talk with your doctor about steps you can take to stay healthy or improve your health. You may need to make lifestyle changes to lose weight and stay healthy, such as changing your diet and getting regular exercise. If you have a BMI lower than 18.5  · Your doctor may do other tests to check your risk for health problems. · Talk with your doctor about steps you can take to stay healthy or improve your health. You may need to make lifestyle changes to gain or maintain weight and stay healthy, such as getting more healthy foods in your diet and doing exercises to build muscle. Where can you learn more? Go to http://anayeli-ever.info/. Enter S176 in the search box to learn more about \"Body Mass Index: Care Instructions. \"  Current as of: 2017  Content Version: 11.3  © 3331-2939 [employer]. Care instructions adapted under license by [employer] (which disclaims liability or warranty for this information). If you have questions about a medical condition or this instruction, always ask your healthcare professional. [address] any warranty or liability for your use of this information. Tdap (Tetanus, Diphtheria, Pertussis) Vaccine: What You Need to Know  Why get vaccinated? Tetanus, diphtheria, and pertussis are very serious diseases. Tdap vaccine can protect us from these diseases. And Tdap vaccine given to pregnant women can protect  babies against pertussis. Tetanus (lockjaw) is rare in the [facility] today. It causes painful muscle tightening and stiffness, usually all over the body.   · It can lead to tightening of muscles in the head and neck so you can't open your mouth, swallow, or sometimes even breathe. Tetanus kills about 1 out of 10 people who are infected even after receiving the best medical care. Diphtheria is also rare in the [place] today. It can cause a thick coating to form in the back of the throat. · It can lead to breathing problems, heart failure, paralysis, and death. Pertussis (whooping cough) causes severe coughing spells, which can cause difficulty breathing, vomiting, and disturbed sleep. · It can also lead to weight loss, incontinence, and rib fractures. Up to 2 in 100 adolescents and 5 in 100 adults with pertussis are hospitalized or have complications, which could include pneumonia or death. These diseases are caused by bacteria. Diphtheria and pertussis are spread from person to person through secretions from coughing or sneezing. Tetanus enters the body through cuts, scratches, or wounds. Before vaccines, as many as 200,000 cases of diphtheria, 200,000 cases of pertussis, and hundreds of cases of tetanus were reported in the [place] each year. Since vaccination began, reports of cases for tetanus and diphtheria have dropped by about 99% and for pertussis by about 80%. Tdap vaccine  The Tdap vaccine can protect adolescents and adults from tetanus, diphtheria, and pertussis. One dose of Tdap is routinely given at age 6 or 15. People who did not get Tdap at that age should get it as soon as possible. Tdap is especially important for health care professionals and anyone having close contact with a baby younger than 12 months. Pregnant women should get a dose of Tdap during every pregnancy, to protect the  from pertussis. Infants are most at risk for severe, life-threatening complications from pertussis. Another vaccine, called Td, protects against tetanus and diphtheria, but not pertussis. A Td booster should be given every 10 years.  Tdap may be given as one of these boosters if you have never gotten Tdap before. Tdap may also be given after a severe cut or burn to prevent tetanus infection. Your doctor or the person giving you the vaccine can give you more information. Tdap may safely be given at the same time as other vaccines. Some people should not get this vaccine  · A person who has ever had a life-threatening allergic reaction after a previous dose of any diphtheria-, tetanus-, or pertussis-containing vaccine, OR has a severe allergy to any part of this vaccine, should not get Tdap vaccine. Tell the person giving the vaccine about any severe allergies. · Anyone who had coma or long repeated seizures within 7 days after a childhood dose of DTP or DTaP, or a previous dose of Tdap, should not get Tdap, unless a cause other than the vaccine was found. They can still get Td. · Talk to your doctor if you:  ¨ Have seizures or another nervous system problem. ¨ Had severe pain or swelling after any vaccine containing diphtheria, tetanus, or pertussis. ¨ Ever had a condition called Guillain-Barré Syndrome (GBS). ¨ Aren't feeling well on the day the shot is scheduled. Risks  With any medicine, including vaccines, there is a chance of side effects. These are usually mild and go away on their own. Serious reactions are also possible but are rare. Most people who get Tdap vaccine do not have any problems with it.   Mild problems following Tdap  (Did not interfere with activities)  · Pain where the shot was given (about 3 in 4 adolescents or 2 in 3 adults)  · Redness or swelling where the shot was given (about 1 person in 5)  · Mild fever of at least 100.4°F (up to about 1 in 25 adolescents or 1 in 100 adults)  · Headache (about 3 or 4 people in 10)  · Tiredness (about 1 person in 3 or 4)  · Nausea, vomiting, diarrhea, stomachache (up to 1 in 4 adolescents or 1 in 10 adults)  · Chills, sore joints (about 1 person in 10)  · Body aches (about 1 person in 3 or 4)  · Rash, swollen glands (uncommon)  Moderate problems following Tdap  (Interfered with activities, but did not require medical attention)  · Pain where the shot was given (up to 1 in 5 or 6)  · Redness or swelling where the shot was given (up to about 1 in 16 adolescents or 1 in 12 adults)  · Fever over 102°F (about 1 in 100 adolescents or 1 in 250 adults)  · Headache (about 1 in 7 adolescents or 1 in 10 adults)  · Nausea, vomiting, diarrhea, stomachache (up to 1 to 3 people in 100)  · Swelling of the entire arm where the shot was given (up to about 1 in 500)  Severe problems following Tdap  (Unable to perform usual activities; required medical attention)  · Swelling, severe pain, bleeding and redness in the arm where the shot was given (rare)  Problems that could happen after any vaccine:  · People sometimes faint after a medical procedure, including vaccination. Sitting or lying down for about 15 minutes can help prevent fainting, and injuries caused by a fall. Tell your doctor if you feel dizzy or have vision changes or ringing in the ears. · Some people get severe pain in the shoulder and have difficulty moving the arm where a shot was given. This happens very rarely. · Any medication can cause a severe allergic reaction. Such reactions from a vaccine are very rare, estimated at fewer than 1 in a million doses, and would happen within a few minutes to a few hours after the vaccination. As with any medicine, there is a very remote chance of a vaccine causing a serious injury or death. The safety of vaccines is always being monitored. For more information, visit: www.cdc.gov/vaccinesafety. What if there is a serious problem? What should I look for? · Look for anything that concerns you, such as signs of a severe allergic reaction, very high fever, or unusual behavior. Signs of a severe allergic reaction can include hives, swelling of the face and throat, difficulty breathing, a fast heartbeat, dizziness, and weakness.  These would usually start a few minutes to a few hours after the vaccination. What should I do? · If you think it is a severe allergic reaction or other emergency that can't wait, call 9-1-1 or get the person to the nearest hospital. Otherwise, call your doctor. · Afterward, the reaction should be reported to the Vaccine Adverse Event Reporting System (VAERS). Your doctor might file this report, or you can do it yourself through the VAERS web site at www.vaers. [facility].gov, or by calling 4-698.718.5453. VAERS does not give medical advice. The National Vaccine Injury Compensation Program  The National Vaccine Injury Compensation Program (VICP) is a federal program that was created to compensate people who may have been injured by certain vaccines. Persons who believe they may have been injured by a vaccine can learn about the program and about filing a claim by calling 5-107.920.1699 or visiting the [employer] website at www.[facility].gov/vaccinecompensation. There is a time limit to file a claim for compensation. How can I learn more? · Ask your doctor. He or she can give you the vaccine package insert or suggest other sources of information. · Call your local or state health department. · Contact the Centers for Disease Control and Prevention (CDC):  ¨ Call 6-920.814.4658 (1-800-CDC-INFO) or  ¨ Visit CDC's website at www.cdc.gov/vaccines  Vaccine Information Statement (Interim)  Tdap Vaccine  (2/24/15)  42 CHARLEY Lazar [id number]  Department of Health and Human Services  Centers for Disease Control and Prevention  Many Vaccine Information Statements are available in [place] and other languages. See www.immunize.org/vis. Muchas hojas de información sobre vacunas están disponibles en español y en otros idiomas. Visite www.immunize.org/vis. Care instructions adapted under license by [employer] (which disclaims liability or warranty for this information).  If you have questions about a medical condition or this instruction, always ask your healthcare professional. [address] any warranty or liability for your use of this information.

## 2017-08-10 ENCOUNTER — TELEPHONE (OUTPATIENT)
Dept: FAMILY MEDICINE CLINIC | Age: 47
End: 2017-08-10

## 2017-08-10 DIAGNOSIS — I27.82 OTHER CHRONIC PULMONARY EMBOLISM: ICD-10-CM

## 2017-08-10 DIAGNOSIS — Z79.01 WARFARIN ANTICOAGULATION: ICD-10-CM

## 2017-08-10 RX ORDER — WARFARIN 1 MG/1
TABLET ORAL
Qty: 60 TAB | Refills: 4 | Status: SHIPPED | OUTPATIENT
Start: 2017-08-10 | End: 2018-11-13 | Stop reason: SDUPTHER

## 2017-08-10 NOTE — TELEPHONE ENCOUNTER
Patient is requesting a new script for his warafin 1 mg. He is supposed to be taking a total of 12mg. He has 1mg script but it is only written to take 1 and not 2. He is requesting to have 60 called in. [phone].

## 2017-09-11 DIAGNOSIS — I10 HTN (HYPERTENSION), BENIGN: ICD-10-CM

## 2017-09-12 RX ORDER — POTASSIUM CHLORIDE 20 MEQ/1
TABLET, EXTENDED RELEASE ORAL
Qty: 30 TAB | Refills: 0 | Status: SHIPPED | OUTPATIENT
Start: 2017-09-12 | End: 2017-10-07 | Stop reason: SDUPTHER

## 2017-09-12 RX ORDER — LISINOPRIL AND HYDROCHLOROTHIAZIDE 12.5; 2 MG/1; MG/1
TABLET ORAL
Qty: 60 TAB | Refills: 0 | Status: SHIPPED | OUTPATIENT
Start: 2017-09-12 | End: 2017-10-07 | Stop reason: SDUPTHER

## 2017-09-12 RX ORDER — AMLODIPINE BESYLATE 10 MG/1
TABLET ORAL
Qty: 30 TAB | Refills: 0 | Status: SHIPPED | OUTPATIENT
Start: 2017-09-12 | End: 2017-11-17 | Stop reason: SDUPTHER

## 2017-09-12 RX ORDER — CLONIDINE HYDROCHLORIDE 0.1 MG/1
TABLET ORAL
Qty: 60 TAB | Refills: 0 | Status: SHIPPED | OUTPATIENT
Start: 2017-09-12 | End: 2017-10-07 | Stop reason: SDUPTHER

## 2017-10-07 DIAGNOSIS — I10 HTN (HYPERTENSION), BENIGN: ICD-10-CM

## 2017-10-10 RX ORDER — LISINOPRIL AND HYDROCHLOROTHIAZIDE 12.5; 2 MG/1; MG/1
TABLET ORAL
Qty: 60 TAB | Refills: 0 | Status: SHIPPED | OUTPATIENT
Start: 2017-10-10 | End: 2017-11-13 | Stop reason: SDUPTHER

## 2017-10-10 RX ORDER — POTASSIUM CHLORIDE 20 MEQ/1
TABLET, EXTENDED RELEASE ORAL
Qty: 30 TAB | Refills: 0 | Status: SHIPPED | OUTPATIENT
Start: 2017-10-10 | End: 2017-11-09 | Stop reason: SDUPTHER

## 2017-10-10 RX ORDER — CLONIDINE HYDROCHLORIDE 0.1 MG/1
TABLET ORAL
Qty: 60 TAB | Refills: 0 | Status: SHIPPED | OUTPATIENT
Start: 2017-10-10 | End: 2017-11-09 | Stop reason: SDUPTHER

## 2017-10-30 DIAGNOSIS — Z79.01 WARFARIN ANTICOAGULATION: ICD-10-CM

## 2017-10-30 RX ORDER — WARFARIN 10 MG/1
TABLET ORAL
Qty: 30 TAB | Refills: 0 | Status: SHIPPED | OUTPATIENT
Start: 2017-10-30 | End: 2017-11-29 | Stop reason: SDUPTHER

## 2017-11-08 RX ORDER — INSULIN GLARGINE 100 [IU]/ML
INJECTION, SOLUTION SUBCUTANEOUS
COMMUNITY

## 2017-11-09 DIAGNOSIS — I10 HTN (HYPERTENSION), BENIGN: ICD-10-CM

## 2017-11-09 RX ORDER — POTASSIUM CHLORIDE 20 MEQ/1
TABLET, EXTENDED RELEASE ORAL
Qty: 30 TAB | Refills: 1 | Status: SHIPPED | OUTPATIENT
Start: 2017-11-09 | End: 2018-01-06 | Stop reason: SDUPTHER

## 2017-11-09 RX ORDER — CLONIDINE HYDROCHLORIDE 0.1 MG/1
TABLET ORAL
Qty: 60 TAB | Refills: 1 | Status: SHIPPED | OUTPATIENT
Start: 2017-11-09 | End: 2018-01-06 | Stop reason: SDUPTHER

## 2017-11-13 ENCOUNTER — ANESTHESIA EVENT (OUTPATIENT)
Dept: ENDOSCOPY | Age: 47
End: 2017-11-13
Payer: COMMERCIAL

## 2017-11-13 DIAGNOSIS — I10 HTN (HYPERTENSION), BENIGN: ICD-10-CM

## 2017-11-13 RX ORDER — LISINOPRIL AND HYDROCHLOROTHIAZIDE 12.5; 2 MG/1; MG/1
TABLET ORAL
Qty: 60 TAB | Refills: 0 | Status: SHIPPED | OUTPATIENT
Start: 2017-11-13 | End: 2017-12-15 | Stop reason: SDUPTHER

## 2017-11-14 ENCOUNTER — HOSPITAL ENCOUNTER (OUTPATIENT)
Age: 47
Setting detail: OUTPATIENT SURGERY
Discharge: HOME | End: 2017-11-14
Attending: INTERNAL MEDICINE | Admitting: INTERNAL MEDICINE
Payer: COMMERCIAL

## 2017-11-14 ENCOUNTER — ANESTHESIA (OUTPATIENT)
Dept: ENDOSCOPY | Age: 47
End: 2017-11-14
Payer: COMMERCIAL

## 2017-11-14 VITALS
HEIGHT: 75 IN | DIASTOLIC BLOOD PRESSURE: 75 MMHG | OXYGEN SATURATION: 100 % | RESPIRATION RATE: 15 BRPM | BODY MASS INDEX: 39.17 KG/M2 | WEIGHT: 315 LBS | HEART RATE: 70 BPM | TEMPERATURE: 97.9 F | SYSTOLIC BLOOD PRESSURE: 143 MMHG

## 2017-11-14 LAB — GLUCOSE BLD STRIP.AUTO-MCNC: 116 MG/DL (ref 70–110)

## 2017-11-14 PROCEDURE — 74011250636 HC RX REV CODE- 250/636

## 2017-11-14 PROCEDURE — 76040000019: Performed by: INTERNAL MEDICINE

## 2017-11-14 PROCEDURE — 74011250636 HC RX REV CODE- 250/636: Performed by: ANESTHESIOLOGY

## 2017-11-14 PROCEDURE — 76060000031 HC ANESTHESIA FIRST 0.5 HR: Performed by: INTERNAL MEDICINE

## 2017-11-14 PROCEDURE — 74011000250 HC RX REV CODE- 250: Performed by: ANESTHESIOLOGY

## 2017-11-14 PROCEDURE — 82962 GLUCOSE BLOOD TEST: CPT

## 2017-11-14 RX ORDER — SODIUM CHLORIDE 0.9 % (FLUSH) 0.9 %
5-10 SYRINGE (ML) INJECTION AS NEEDED
Status: DISCONTINUED | OUTPATIENT
Start: 2017-11-14 | End: 2017-11-14 | Stop reason: HOSPADM

## 2017-11-14 RX ORDER — MAGNESIUM SULFATE 100 %
4 CRYSTALS MISCELLANEOUS AS NEEDED
Status: CANCELLED | OUTPATIENT
Start: 2017-11-14

## 2017-11-14 RX ORDER — SODIUM CHLORIDE 0.9 % (FLUSH) 0.9 %
5-10 SYRINGE (ML) INJECTION AS NEEDED
Status: CANCELLED | OUTPATIENT
Start: 2017-11-14 | End: 2017-11-14

## 2017-11-14 RX ORDER — PROPOFOL 10 MG/ML
INJECTION, EMULSION INTRAVENOUS
Status: DISCONTINUED | OUTPATIENT
Start: 2017-11-14 | End: 2017-11-14 | Stop reason: HOSPADM

## 2017-11-14 RX ORDER — ATROPINE SULFATE 0.1 MG/ML
0.5 INJECTION INTRAVENOUS
Status: CANCELLED | OUTPATIENT
Start: 2017-11-14 | End: 2017-11-14

## 2017-11-14 RX ORDER — SODIUM CHLORIDE, SODIUM LACTATE, POTASSIUM CHLORIDE, CALCIUM CHLORIDE 600; 310; 30; 20 MG/100ML; MG/100ML; MG/100ML; MG/100ML
125 INJECTION, SOLUTION INTRAVENOUS CONTINUOUS
Status: CANCELLED | OUTPATIENT
Start: 2017-11-14

## 2017-11-14 RX ORDER — NALOXONE HYDROCHLORIDE 0.4 MG/ML
0.2 INJECTION, SOLUTION INTRAMUSCULAR; INTRAVENOUS; SUBCUTANEOUS AS NEEDED
Status: CANCELLED | OUTPATIENT
Start: 2017-11-14

## 2017-11-14 RX ORDER — INSULIN LISPRO 100 [IU]/ML
INJECTION, SOLUTION INTRAVENOUS; SUBCUTANEOUS ONCE
Status: DISCONTINUED | OUTPATIENT
Start: 2017-11-14 | End: 2017-11-14 | Stop reason: HOSPADM

## 2017-11-14 RX ORDER — DEXTROMETHORPHAN/PSEUDOEPHED 2.5-7.5/.8
1.2 DROPS ORAL
Status: CANCELLED | OUTPATIENT
Start: 2017-11-14

## 2017-11-14 RX ORDER — SODIUM CHLORIDE 0.9 % (FLUSH) 0.9 %
5-10 SYRINGE (ML) INJECTION EVERY 8 HOURS
Status: DISCONTINUED | OUTPATIENT
Start: 2017-11-14 | End: 2017-11-14 | Stop reason: HOSPADM

## 2017-11-14 RX ORDER — NALOXONE HYDROCHLORIDE 0.4 MG/ML
0.4 INJECTION, SOLUTION INTRAMUSCULAR; INTRAVENOUS; SUBCUTANEOUS
Status: CANCELLED | OUTPATIENT
Start: 2017-11-14 | End: 2017-11-14

## 2017-11-14 RX ORDER — FLUMAZENIL 0.1 MG/ML
0.2 INJECTION INTRAVENOUS
Status: CANCELLED | OUTPATIENT
Start: 2017-11-14 | End: 2017-11-14

## 2017-11-14 RX ORDER — SODIUM CHLORIDE 0.9 % (FLUSH) 0.9 %
5-10 SYRINGE (ML) INJECTION EVERY 8 HOURS
Status: CANCELLED | OUTPATIENT
Start: 2017-11-14 | End: 2017-11-14

## 2017-11-14 RX ORDER — FAMOTIDINE 20 MG/1
20 TABLET, FILM COATED ORAL ONCE
Status: DISCONTINUED | OUTPATIENT
Start: 2017-11-14 | End: 2017-11-14 | Stop reason: HOSPADM

## 2017-11-14 RX ORDER — HYDROCODONE BITARTRATE AND ACETAMINOPHEN 5; 325 MG/1; MG/1
1 TABLET ORAL
Status: CANCELLED | OUTPATIENT
Start: 2017-11-14 | End: 2017-11-14

## 2017-11-14 RX ORDER — PROPOFOL 10 MG/ML
INJECTION, EMULSION INTRAVENOUS AS NEEDED
Status: DISCONTINUED | OUTPATIENT
Start: 2017-11-14 | End: 2017-11-14 | Stop reason: HOSPADM

## 2017-11-14 RX ORDER — EPINEPHRINE 0.1 MG/ML
1 INJECTION INTRACARDIAC; INTRAVENOUS
Status: CANCELLED | OUTPATIENT
Start: 2017-11-14 | End: 2017-11-14

## 2017-11-14 RX ORDER — SODIUM CHLORIDE, SODIUM LACTATE, POTASSIUM CHLORIDE, CALCIUM CHLORIDE 600; 310; 30; 20 MG/100ML; MG/100ML; MG/100ML; MG/100ML
50 INJECTION, SOLUTION INTRAVENOUS CONTINUOUS
Status: DISCONTINUED | OUTPATIENT
Start: 2017-11-14 | End: 2017-11-14 | Stop reason: HOSPADM

## 2017-11-14 RX ORDER — DEXTROSE 50 % IN WATER (D50W) INTRAVENOUS SYRINGE
25-50 AS NEEDED
Status: CANCELLED | OUTPATIENT
Start: 2017-11-14

## 2017-11-14 RX ORDER — FENTANYL CITRATE 50 UG/ML
25 INJECTION, SOLUTION INTRAMUSCULAR; INTRAVENOUS
Status: CANCELLED | OUTPATIENT
Start: 2017-11-14 | End: 2017-11-14

## 2017-11-14 RX ADMIN — PROPOFOL 10 MCG/KG/MIN: 10 INJECTION, EMULSION INTRAVENOUS at 07:50

## 2017-11-14 RX ADMIN — SODIUM CHLORIDE, SODIUM LACTATE, POTASSIUM CHLORIDE, AND CALCIUM CHLORIDE 50 ML/HR: 600; 310; 30; 20 INJECTION, SOLUTION INTRAVENOUS at 07:28

## 2017-11-14 RX ADMIN — FAMOTIDINE 20 MG: 10 INJECTION, SOLUTION INTRAVENOUS at 07:29

## 2017-11-14 RX ADMIN — PROPOFOL 50 MG: 10 INJECTION, EMULSION INTRAVENOUS at 07:50

## 2017-11-14 NOTE — IP AVS SNAPSHOT
[address] [facility] 177 [address] [address] [address] [phone] Patient: Stephanie Page MRN: [id number] VBV:[birth year] About your hospitalization You were admitted on:  November 14, 2017 You last received care in the:  [facility] You were discharged on:  November 14, 2017 Why you were hospitalized Your primary diagnosis was:  Not on File Things You Need To Do (next 8 weeks) Follow up with Bailee Villar NP Phone:  [phone] Where:  [address] [facility], [address] [place] [id number] Discharge Orders None A check jennifer indicates which time of day the medication should be taken. My Medications TAKE these medications as instructed Instructions Each Dose to Equal  
 Morning Noon Evening Bedtime  
 amLODIPine 10 mg tablet Commonly known as:  Nghia Chafe Your last dose was: Your next dose is: TAKE 1 TABLET BY MOUTH DAILY  
     
   
   
   
  
 aspirin delayed-release 81 mg tablet Your last dose was: Your next dose is: Take 81 mg by mouth daily. 81 mg  
    
   
   
   
  
 atenolol 100 mg tablet Commonly known as:  TENORMIN Your last dose was: Your next dose is: TAKE 1 TABLET BY MOUTH EVERY DAY  
     
   
   
   
  
 BASAGLAR KWIKPEN 100 unit/mL (3 mL) Inpn Generic drug:  insulin glargine Your last dose was: Your next dose is:    
   
   
 80 Units by SubCUTAneous route. 80 Units BD INSULIN PEN NEEDLE UF SHORT 31 gauge x 5/16\" Ndle Generic drug:  Insulin Needles (Disposable) Your last dose was: Your next dose is:    
   
   
 USE 1 PEN UTD QID BUTRANS 10 mcg/hour Generic drug:  buprenorphine Your last dose was: Your next dose is:  LUCA 1 PATCH EVERY WEEK  
     
   
   
   
  
 cloNIDine HCl 0.1 mg tablet Commonly known as:  CATAPRES Your last dose was: Your next dose is: TAKE 1 TABLET BY MOUTH TWICE DAILY  
     
   
   
   
  
 furosemide 20 mg tablet Commonly known as:  LASIX Your last dose was: Your next dose is: Take 1 Tab by mouth daily. 20 mg  
    
   
   
   
  
 gabapentin 300 mg capsule Commonly known as:  NEURONTIN Your last dose was: Your next dose is:    
   
   
      
   
   
   
  
 insulin syringe-needle U-100 Your last dose was: Your next dose is:    
   
   
 by Does Not Apply route. Dispense ultrafine 1 mL syringes with 29 gauge needle  
     
   
   
   
  
 lisinopril-hydroCHLOROthiazide 20-12.5 mg per tablet Commonly known as:  Anna Davalos Your last dose was: Your next dose is: TAKE 2 TABLETS BY MOUTH EVERY DAY NovoLOG Flexpen 100 unit/mL Inpn Generic drug:  insulin aspart Your last dose was: Your next dose is: INJECT 20 UNITS SUBCUTANEOUSLY 3 TIMES A DAY BEFORE MEALS  
     
   
   
   
  
 potassium chloride 20 mEq tablet Commonly known as:  K-DUR, KLOR-CON Your last dose was: Your next dose is: TAKE 1 TABLET BY MOUTH DAILY RESTASIS 0.05 % ophthalmic emulsion Generic drug:  cycloSPORINE Your last dose was: Your next dose is:    
   
   
 INSTILL 1 TO 2 DROPS INTO OU BID  
     
   
   
   
  
 triamcinolone acetonide 0.1 % ointment Commonly known as:  KENALOG Your last dose was: Your next dose is:    
   
   
 Apply  to affected area two (2) times a day. use thin layer VICTOZA 3-ZANDRA 0.6 mg/0.1 mL (18 mg/3 mL) Pnij Generic drug:  Liraglutide Your last dose was: Your next dose is: INJ 1.8 MG SC QD  
     
   
   
   
  
 * warfarin 1 mg tablet Commonly known as:  COUMADIN Your last dose was: Your next dose is: Take by mouth daily as directed by health care provider * warfarin 10 mg tablet Commonly known as:  COUMADIN Your last dose was: Your next dose is: TAKE BY MOUTH DAILY AS DIRECTED BY HEALTH CARE PROVIDER  
     
   
   
   
  
 ZYRTEC PO Your last dose was: Your next dose is: Take  by mouth daily. * Notice: This list has 2 medication(s) that are the same as other medications prescribed for you. Read the directions carefully, and ask your doctor or other care provider to review them with you. Discharge Instructions Colonoscopy: What to Expect at [facility] Your Recovery After you have a colonoscopy, you will stay at the clinic for 1 to 2 hours until the medicines wear off. Then you can go home. But you will need to arrange for a ride. Your doctor will tell you when you can eat and do your other usual activities. Your doctor will talk to you about when you will need your next colonoscopy. Your doctor can help you decide how often you need to be checked. This will depend on the results of your test and your risk for colorectal cancer. After the test, you may be bloated or have gas pains. You may need to pass gas. If a biopsy was done or a polyp was removed, you may have streaks of blood in your stool (feces) for a few days. This care sheet gives you a general idea about how long it will take for you to recover. But each person recovers at a different pace. Follow the steps below to get better as quickly as possible. How can you care for yourself at home? Activity · Rest when you feel tired. · You can do your normal activities when it feels okay to do so. Diet · Follow your doctor's directions for eating. · Unless your doctor has told you not to, drink plenty of fluids.  This helps to replace the fluids that were lost during the colon prep. · Do not drink alcohol. Medicines · If polyps were removed or a biopsy was done during the test, your doctor may tell you not to take aspirin or other anti-inflammatory medicines for a few days. These include ibuprofen (Advil, Motrin) and naproxen (Aleve). Other instructions · For your safety, do not drive or operate machinery until the medicine wears off and you can think clearly. Your doctor may tell you not to drive or operate machinery until the day after your test. 
· Do not sign legal documents or make major decisions until the medicine wears off and you can think clearly. The anesthesia can make it hard for you to fully understand what you are agreeing to. Follow-up care is a key part of your treatment and safety. Be sure to make and go to all appointments, and call your doctor if you are having problems. It's also a good idea to know your test results and keep a list of the medicines you take. When should you call for help? Call 911 anytime you think you may need emergency care. For example, call if: 
· You passed out (lost consciousness). · You pass maroon or bloody stools. · You have severe belly pain. Call your doctor now or seek immediate medical care if: 
· Your stools are black and tarlike. · Your stools have streaks of blood, but you did not have a biopsy or any polyps removed. · You have belly pain, or your belly is swollen and firm. · You vomit. · You have a fever. · You are very dizzy. Watch closely for changes in your health, and be sure to contact your doctor if you have any problems. Where can you learn more? Go to Online Dealer.be Enter E264 in the search box to learn more about \"Colonoscopy: What to Expect at Home. \"  
© 0536-6252 Healthwise, Incorporated.  Care instructions adapted under license by [employer] (which disclaims liability or warranty for this information). This care instruction is for use with your licensed healthcare professional. If you have questions about a medical condition or this instruction, always ask your healthcare professional. [address] any warranty or liability for your use of this information. Content Version: 92.1.097778; Current as of: November 14, 2014 DISCHARGE SUMMARY from Nurse POST-PROCEDURE INSTRUCTIONS: 
 
Call your Physician if you: 
? Observe any excess bleeding. ? Develop a temperature over 100.5o F. 
? Experience abdominal, shoulder or chest pain. ? Notice any signs of decreased circulation or nerve impairment to an extremity such as a change in color, persistent numbness, tingling, coldness or increase in pain. ? Vomit blood or you have nausea and vomiting lasting longer than 4 hours. ? Are unable to take medications. ? Are unable to urinate within 8 hours after discharge following general anesthesia or intravenous sedation. For the next 24 hours after receiving general anesthesia or intravenous sedation, or while taking prescription Narcotics, limit your activities: 
? Do NOT drive a motor vehicle, operate hazard machinery or power tools, or perform tasks that require coordination. The medication you received during your procedure may have some effect on your mental awareness. ? Do NOT make important personal or business decisions. The medication you received during your procedure may have some effect on your mental awareness. ? Do NOT drink alcoholic beverages. These drinks do not mix well with the medications that have been given to you. ? Upon discharge from the hospital, you must be accompanied by a responsible adult. ? Resume your diet as directed by your physician. ? Resume medications as your physician has prescribed. ? Please give a list of your current medications to your Primary Care Provider. ? Please update this list whenever your medications are discontinued, doses are changed, or new medications (including over-the-counter products) are added. ? Please carry medication information at all times in case of emergency situations. These are general instructions for a healthy lifestyle: No smoking/ No tobacco products/ Avoid exposure to second hand smoke. ? Surgeon General's Warning:  Quitting smoking now greatly reduces serious risk to your health. Obesity, smoking, and a sedentary lifestyle greatly increase your risk for illness. ? A healthy diet, regular physical exercise & weight monitoring are important for maintaining a healthy lifestyle ? You may be retaining fluid if you have a history of heart failure or if you experience any of the following symptoms:  Weight gain of 3 pounds or more overnight or 5 pounds in a week, increased swelling in our hands or feet or shortness of breath while lying flat in bed. Please call your doctor as soon as you notice any of these symptoms; do not wait until your next office visit. Recognize signs and symptoms of STROKE: 
F  -  Face looks uneven A  -  Arms unable to move or move unevenly S  -  Speech slurred or non-existent T  -  Time to call 911 - as soon as signs and symptoms begin - DO NOT go back to bed or wait to see If you get better - TIME IS BRAIN. Colorectal Screening ? Colorectal cancer almost always develops from precancerous polyps (abnormal growths) in the colon or rectum. Screening tests can find precancerous polyps, so that they can be removed before they turn into cancer. Screening tests can also find colorectal cancer early, when treatment works best. 
? Speak with your physician about when you should begin screening and how often you should be tested. Additional Information If you have questions, please call [phone]. Remember, MyChart is NOT to be used for urgent needs. For medical emergencies, dial 911. Discharge information has been reviewed with the patient. The patient verbalized understanding. Introducing [facility]! [employer] introduces Updox patient portal. Now you can access parts of your medical record, email your doctor's office, and request medication refills online. 1. In your internet browser, go to [email or web address] 2. Click on the First Time User? Click Here link in the Sign In box. You will see the New Member Sign Up page. 3. Enter your Updox Access Code exactly as it appears below. You will not need to use this code after youve completed the sign-up process. If you do not sign up before the expiration date, you must request a new code. · Updox Access Code: [email or web address] Expires: 2/11/2018  4:02 PM 
 
4. Enter the last four digits of your Social Security Number (xxxx) and Date of Birth (mm/dd/yyyy) as indicated and click Submit. You will be taken to the next sign-up page. 5. Create a Updox ID. This will be your Updox login ID and cannot be changed, so think of one that is secure and easy to remember. 6. Create a Updox password. You can change your password at any time. 7. Enter your Password Reset Question and Answer. This can be used at a later time if you forget your password. 8. Enter your e-mail address. You will receive e-mail notification when new information is available in [address] 9. Click Sign Up. You can now view and download portions of your medical record. 10. Click the Download Summary menu link to download a portable copy of your medical information. If you have questions, please visit the Frequently Asked Questions section of the Updox website. Remember, Updox is NOT to be used for urgent needs. For medical emergencies, dial 911. Now available from your iPhone and Android! Providers Seen During Your Hospitalization Provider Specialty Primary office phone Nancy Waite MD Gastroenterology [phone] Your Primary Care Physician (PCP) Primary Care Physician Office Phone Office Fax Annmarie Viera [phone] You are allergic to the following Allergen Reactions Tape (Adhesive) Contact Dermatitis ALLERGIC TO SILK TAPE ONLY Recent Documentation Height Weight BMI Smoking Status 1.892 m 144.2 kg 40.28 kg/m2 Never Smoker Emergency Contacts Name Discharge Info Relation Home Work Mobile [address] [facility] CAREGIVER [3] Spouse [3] [phone] Patient Belongings The following personal items are in your possession at time of discharge: 
  Dental Appliances: None  Visual Aid: Glasses, With patient Please provide this summary of care documentation to your next provider. Signatures-by signing, you are acknowledging that this After Visit Summary has been reviewed with you and you have received a copy. Patient Signature:  ____________________________________________________________ Date:  ____________________________________________________________  
  
[facility] Provider Signature:  ____________________________________________________________ Date:  ____________________________________________________________

## 2017-11-14 NOTE — ANESTHESIA POSTPROCEDURE EVALUATION
Post-Anesthesia Evaluation and Assessment    Patient: Maryanne Jonas MRN: [id number]  SSN: [id number]    YOB: 1970  Age: 41 y.o. Sex: male      Data from PACU flowsheet    Cardiovascular Function/Vital Signs  Visit Vitals    /80    Pulse 73    Temp 36.6 °C (97.9 °F)    Resp 11    Ht 6' 2.5\" (1.892 m)    Wt 144.2 kg (318 lb)    SpO2 100%    BMI 40.28 kg/m2       Patient is status post MAC anesthesia for Procedure(s):  COLONOSCOPY. Nausea/Vomiting: controlled    Postoperative hydration reviewed and adequate. Pain:  Pain Scale 1: Numeric (0 - 10) (11/14/17 0711)  Pain Intensity 1: 0 (11/14/17 0711)   Managed      Mental Status and Level of Consciousness: Alert and oriented     Pulmonary Status:   O2 Device: CO2 nasal cannula (11/14/17 0809)   Adequate oxygenation and airway patent    Complications related to anesthesia: None    Post-anesthesia assessment completed.  No concerns    Signed By: Luciana Marrero MD     November 14, 2017

## 2017-11-14 NOTE — DISCHARGE INSTRUCTIONS
Colonoscopy: What to Expect at [address] [place] [address]  After you have a colonoscopy, you will stay at the clinic for 1 to 2 hours until the medicines wear off. Then you can go home. But you will need to arrange for a ride. Your doctor will tell you when you can eat and do your other usual activities. Your doctor will talk to you about when you will need your next colonoscopy. Your doctor can help you decide how often you need to be checked. This will depend on the results of your test and your risk for colorectal cancer. After the test, you may be bloated or have gas pains. You may need to pass gas. If a biopsy was done or a polyp was removed, you may have streaks of blood in your stool (feces) for a few days. This care sheet gives you a general idea about how long it will take for you to recover. But each person recovers at a different pace. Follow the steps below to get better as quickly as possible. How can you care for yourself at home? Activity  · Rest when you feel tired. · You can do your normal activities when it feels okay to do so. Diet  · Follow your doctor's directions for eating. · Unless your doctor has told you not to, drink plenty of fluids. This helps to replace the fluids that were lost during the colon prep. · Do not drink alcohol. Medicines  · If polyps were removed or a biopsy was done during the test, your doctor may tell you not to take aspirin or other anti-inflammatory medicines for a few days. These include ibuprofen (Advil, Motrin) and naproxen (Aleve). Other instructions  · For your safety, do not drive or operate machinery until the medicine wears off and you can think clearly. Your doctor may tell you not to drive or operate machinery until the day after your test.  · Do not sign legal documents or make major decisions until the medicine wears off and you can think clearly. The anesthesia can make it hard for you to fully understand what you are agreeing to.   Follow-up care is a key part of your treatment and safety. Be sure to make and go to all appointments, and call your doctor if you are having problems. It's also a good idea to know your test results and keep a list of the medicines you take. When should you call for help? Call 911 anytime you think you may need emergency care. For example, call if:  · You passed out (lost consciousness). · You pass maroon or bloody stools. · You have severe belly pain. Call your doctor now or seek immediate medical care if:  · Your stools are black and tarlike. · Your stools have streaks of blood, but you did not have a biopsy or any polyps removed. · You have belly pain, or your belly is swollen and firm. · You vomit. · You have a fever. · You are very dizzy. Watch closely for changes in your health, and be sure to contact your doctor if you have any problems. Where can you learn more? Go to Bubbl.be  Enter E264 in the search box to learn more about \"Colonoscopy: What to Expect at Home. \"   © 0763-2169 Healthwise, Incorporated. Care instructions adapted under license by [employer] (which disclaims liability or warranty for this information). This care instruction is for use with your licensed healthcare professional. If you have questions about a medical condition or this instruction, always ask your healthcare professional. [place] [address] any warranty or liability for your use of this information. Content Version: 12.3.339579; Current as of: November 14, 2014      DISCHARGE SUMMARY from Nurse     POST-PROCEDURE INSTRUCTIONS:    Call your Physician if you:  ? Observe any excess bleeding. ? Develop a temperature over 100.5o F.  ? Experience abdominal, shoulder or chest pain. ? Notice any signs of decreased circulation or nerve impairment to an extremity such as a change in color, persistent numbness, tingling, coldness or increase in pain. ?  Vomit blood or you have nausea and vomiting lasting longer than 4 hours. ? Are unable to take medications. ? Are unable to urinate within 8 hours after discharge following general anesthesia or intravenous sedation. For the next 24 hours after receiving general anesthesia or intravenous sedation, or while taking prescription Narcotics, limit your activities:  ? Do NOT drive a motor vehicle, operate hazard machinery or power tools, or perform tasks that require coordination. The medication you received during your procedure may have some effect on your mental awareness. ? Do NOT make important personal or business decisions. The medication you received during your procedure may have some effect on your mental awareness. ? Do NOT drink alcoholic beverages. These drinks do not mix well with the medications that have been given to you. ? Upon discharge from the hospital, you must be accompanied by a responsible adult. ? Resume your diet as directed by your physician. ? Resume medications as your physician has prescribed. ? Please give a list of your current medications to your Primary Care Provider. ? Please update this list whenever your medications are discontinued, doses are changed, or new medications (including over-the-counter products) are added. ? Please carry medication information at all times in case of emergency situations. These are general instructions for a healthy lifestyle:    No smoking/ No tobacco products/ Avoid exposure to second hand smoke.  Surgeon General's Warning:  Quitting smoking now greatly reduces serious risk to your health. Obesity, smoking, and a sedentary lifestyle greatly increase your risk for illness.    A healthy diet, regular physical exercise & weight monitoring are important for maintaining a healthy lifestyle   You may be retaining fluid if you have a history of heart failure or if you experience any of the following symptoms:  Weight gain of 3 pounds or more overnight or 5 pounds in a week, increased swelling in our hands or feet or shortness of breath while lying flat in bed. Please call your doctor as soon as you notice any of these symptoms; do not wait until your next office visit. Recognize signs and symptoms of STROKE:  F  -  Face looks uneven  A  -  Arms unable to move or move unevenly  S  -  Speech slurred or non-existent  T  -  Time to call 911 - as soon as signs and symptoms begin - DO NOT go back to bed or wait to see If you get better - TIME IS BRAIN. Colorectal Screening   Colorectal cancer almost always develops from precancerous polyps (abnormal growths) in the colon or rectum. Screening tests can find precancerous polyps, so that they can be removed before they turn into cancer. Screening tests can also find colorectal cancer early, when treatment works best.  [facility] Speak with your physician about when you should begin screening and how often you should be tested. Additional Information    If you have questions, please call [phone]. Remember, [employer] is NOT to be used for urgent needs. For medical emergencies, dial 911. Discharge information has been reviewed with the patient. The patient verbalized understanding.

## 2017-11-14 NOTE — H&P
History and Physical reviewed; I have examined the patient and there are no pertinent changes. Charisse Ledbetter MD, MD   7:35 AM 11/14/2017  [facility], [address] [place] [address]  [email or web address]

## 2017-11-14 NOTE — PROCEDURES
Bharath  [facility], [address]      Brief Procedure Note    Richard Alexander  1970  [id number]    Date of Procedure: 11/14/2017    Preoperative diagnosis: V76.51- Z12.11,  Colon cancer Screening  250.00 - E11.9,  Diabetes mellitus  401.9 - I10,  Hypertension  415.19 - I26.99,  Pulmonary embolism  564.6 - K59.4,  Proctalgia fugax    Postoperative diagnosis:  Normal Colon exam    Type of Anesthesia: MAC (monitered anesthesia care)    Description of Findings: same as post op dx    Procedure: Procedure(s):  COLONOSCOPY    :  Dr. Shawn Gamez MD    Assistant(s): [unfilled]    Type of Anesthesia:MAC     EBL:None    Specimens: * No specimens in log *    Findings: See printed and scanned procedure note    Complications: None    Dr. Shawn Gamez MD  11/14/2017  8:09 AM

## 2017-11-14 NOTE — ANESTHESIA PREPROCEDURE EVALUATION
Anesthetic History   No history of anesthetic complications            Review of Systems / Medical History  Patient summary reviewed and pertinent labs reviewed    Pulmonary        Sleep apnea: CPAP           Neuro/Psych   Within defined limits           Cardiovascular    Hypertension                   GI/Hepatic/Renal         Renal disease: ARF  Liver disease     Endo/Other    Diabetes: well controlled, type 2    Morbid obesity     Other Findings   Comments:   Risk Factors for Postoperative nausea/vomiting:       History of postoperative nausea/vomiting? NO       Female? NO       Motion sickness? NO       Intended opioid administration for postoperative analgesia? NO      Smoking Abstinence  Current Smoker? NO  Elective Surgery? YES  Seen preoperatively by anesthesiologist or proxy prior to day of surgery? YES  Pt abstained from smoking 24 hours prior to anesthesia?  N/A           Physical Exam    Airway  Mallampati: II  TM Distance: 4 - 6 cm  Neck ROM: normal range of motion   Mouth opening: Normal     Cardiovascular               Dental         Pulmonary                 Abdominal  GI exam deferred       Other Findings            Anesthetic Plan    ASA: 3  Anesthesia type: MAC            Anesthetic plan and risks discussed with: Patient

## 2017-11-17 DIAGNOSIS — I10 HTN (HYPERTENSION), BENIGN: ICD-10-CM

## 2017-11-17 RX ORDER — AMLODIPINE BESYLATE 10 MG/1
TABLET ORAL
Qty: 30 TAB | Refills: 0 | Status: SHIPPED | OUTPATIENT
Start: 2017-11-17 | End: 2017-12-15 | Stop reason: SDUPTHER

## 2017-11-29 DIAGNOSIS — Z79.01 WARFARIN ANTICOAGULATION: ICD-10-CM

## 2017-11-30 RX ORDER — WARFARIN 10 MG/1
TABLET ORAL
Qty: 30 TAB | Refills: 0 | Status: SHIPPED | OUTPATIENT
Start: 2017-11-30 | End: 2018-01-25 | Stop reason: SDUPTHER

## 2017-12-05 DIAGNOSIS — I10 HTN (HYPERTENSION), BENIGN: ICD-10-CM

## 2017-12-05 RX ORDER — FUROSEMIDE 20 MG/1
TABLET ORAL
Qty: 30 TAB | Refills: 1 | Status: SHIPPED | OUTPATIENT
Start: 2017-12-05 | End: 2018-01-06 | Stop reason: SDUPTHER

## 2017-12-12 DIAGNOSIS — I10 HTN (HYPERTENSION), BENIGN: ICD-10-CM

## 2017-12-12 RX ORDER — ATENOLOL 100 MG/1
TABLET ORAL
Qty: 30 TAB | Refills: 3 | Status: SHIPPED | OUTPATIENT
Start: 2017-12-12 | End: 2018-03-30 | Stop reason: SDUPTHER

## 2018-01-06 DIAGNOSIS — I10 HTN (HYPERTENSION), BENIGN: ICD-10-CM

## 2018-01-08 RX ORDER — CLONIDINE HYDROCHLORIDE 0.1 MG/1
TABLET ORAL
Qty: 60 TAB | Refills: 3 | Status: SHIPPED | OUTPATIENT
Start: 2018-01-08 | End: 2018-02-06 | Stop reason: SDUPTHER

## 2018-01-08 RX ORDER — FUROSEMIDE 20 MG/1
TABLET ORAL
Qty: 30 TAB | Refills: 3 | Status: SHIPPED | OUTPATIENT
Start: 2018-01-08 | End: 2018-06-04 | Stop reason: SDUPTHER

## 2018-01-08 RX ORDER — POTASSIUM CHLORIDE 20 MEQ/1
TABLET, EXTENDED RELEASE ORAL
Qty: 30 TAB | Refills: 3 | Status: SHIPPED | OUTPATIENT
Start: 2018-01-08 | End: 2018-02-06 | Stop reason: SDUPTHER

## 2018-01-12 NOTE — TELEPHONE ENCOUNTER
Left non detailed message to return call per message below:    Please call and advise follow up appt needed refill request granted   [facility]

## 2018-01-22 DIAGNOSIS — Z79.01 WARFARIN ANTICOAGULATION: ICD-10-CM

## 2018-01-25 RX ORDER — WARFARIN 10 MG/1
TABLET ORAL
Qty: 30 TAB | Refills: 0 | Status: SHIPPED | OUTPATIENT
Start: 2018-01-25 | End: 2018-03-09 | Stop reason: SDUPTHER

## 2018-01-26 ENCOUNTER — TELEPHONE (OUTPATIENT)
Dept: FAMILY MEDICINE CLINIC | Age: 48
End: 2018-01-26

## 2018-01-26 NOTE — TELEPHONE ENCOUNTER
----- Message from Nena Reyes NP sent at 1/25/2018  8:14 PM EST -----  Patient needs INR none on chart just refilled warfarin request.   Alexa RENTERIANP

## 2018-02-06 DIAGNOSIS — I10 HTN (HYPERTENSION), BENIGN: ICD-10-CM

## 2018-02-06 RX ORDER — CLONIDINE HYDROCHLORIDE 0.1 MG/1
TABLET ORAL
Qty: 60 TAB | Refills: 0 | OUTPATIENT
Start: 2018-02-06

## 2018-02-06 RX ORDER — POTASSIUM CHLORIDE 20 MEQ/1
TABLET, EXTENDED RELEASE ORAL
Qty: 30 TAB | Refills: 3 | Status: SHIPPED | OUTPATIENT
Start: 2018-02-06 | End: 2018-06-02 | Stop reason: SDUPTHER

## 2018-02-06 RX ORDER — CLONIDINE HYDROCHLORIDE 0.1 MG/1
TABLET ORAL
Qty: 60 TAB | Refills: 3 | Status: SHIPPED | OUTPATIENT
Start: 2018-02-06 | End: 2018-09-17 | Stop reason: SDUPTHER

## 2018-02-06 RX ORDER — POTASSIUM CHLORIDE 20 MEQ/1
TABLET, EXTENDED RELEASE ORAL
Qty: 30 TAB | Refills: 0 | OUTPATIENT
Start: 2018-02-06

## 2018-02-13 ENCOUNTER — CLINICAL SUPPORT (OUTPATIENT)
Dept: FAMILY MEDICINE CLINIC | Age: 48
End: 2018-02-13

## 2018-02-13 DIAGNOSIS — I25.10 CVD (CARDIOVASCULAR DISEASE): ICD-10-CM

## 2018-02-13 LAB
INR BLD: 2.8
PT POC: NORMAL SECONDS
VALID INTERNAL CONTROL?: YES

## 2018-02-13 NOTE — PROGRESS NOTES
INR= 2.8     Patient has no complaints of bleeding at this time.      Coumadin 12mg Daily    Last INR = 2.3

## 2018-03-09 DIAGNOSIS — Z79.01 WARFARIN ANTICOAGULATION: ICD-10-CM

## 2018-03-12 RX ORDER — WARFARIN 10 MG/1
TABLET ORAL
Qty: 30 TAB | Refills: 0 | Status: SHIPPED | OUTPATIENT
Start: 2018-03-12 | End: 2018-07-26 | Stop reason: SDUPTHER

## 2018-03-13 DIAGNOSIS — I10 HTN (HYPERTENSION), BENIGN: ICD-10-CM

## 2018-03-13 RX ORDER — LISINOPRIL AND HYDROCHLOROTHIAZIDE 12.5; 2 MG/1; MG/1
TABLET ORAL
Qty: 60 TAB | Refills: 0 | Status: SHIPPED | OUTPATIENT
Start: 2018-03-13 | End: 2018-04-06 | Stop reason: SDUPTHER

## 2018-06-04 DIAGNOSIS — I10 HTN (HYPERTENSION), BENIGN: ICD-10-CM

## 2018-06-05 RX ORDER — FUROSEMIDE 20 MG/1
TABLET ORAL
Qty: 30 TAB | Refills: 0 | Status: SHIPPED | OUTPATIENT
Start: 2018-06-05 | End: 2018-06-28 | Stop reason: SDUPTHER

## 2018-06-05 NOTE — TELEPHONE ENCOUNTER
Please call and advise follow up appt needed refill request granted   [address] [facility]

## 2018-06-14 ENCOUNTER — OFFICE VISIT (OUTPATIENT)
Dept: FAMILY MEDICINE CLINIC | Age: 48
End: 2018-06-14

## 2018-06-14 VITALS
RESPIRATION RATE: 20 BRPM | HEART RATE: 86 BPM | HEIGHT: 75 IN | SYSTOLIC BLOOD PRESSURE: 142 MMHG | OXYGEN SATURATION: 96 % | WEIGHT: 315 LBS | TEMPERATURE: 98.3 F | DIASTOLIC BLOOD PRESSURE: 84 MMHG | BODY MASS INDEX: 39.17 KG/M2

## 2018-06-14 DIAGNOSIS — Z79.01 WARFARIN ANTICOAGULATION: ICD-10-CM

## 2018-06-14 DIAGNOSIS — Z01.818 PREOPERATIVE GENERAL PHYSICAL EXAMINATION: ICD-10-CM

## 2018-06-14 DIAGNOSIS — E11.42 TYPE 2 DIABETES MELLITUS WITH DIABETIC POLYNEUROPATHY, UNSPECIFIED WHETHER LONG TERM INSULIN USE (HCC): Primary | ICD-10-CM

## 2018-06-14 DIAGNOSIS — E66.01 MORBID OBESITY (HCC): ICD-10-CM

## 2018-06-14 DIAGNOSIS — I10 HTN (HYPERTENSION), BENIGN: ICD-10-CM

## 2018-06-14 DIAGNOSIS — I27.20 PULMONARY HYPERTENSION (HCC): ICD-10-CM

## 2018-06-14 DIAGNOSIS — Z86.711 HISTORY OF PULMONARY EMBOLISM: ICD-10-CM

## 2018-06-14 DIAGNOSIS — I25.10 CVD (CARDIOVASCULAR DISEASE): ICD-10-CM

## 2018-06-14 DIAGNOSIS — Z91.199 PATIENT NONADHERENCE: ICD-10-CM

## 2018-06-14 LAB
HBA1C MFR BLD HPLC: 9.3 %
INR BLD: 4.1
PT POC: NORMAL SECONDS
VALID INTERNAL CONTROL?: YES

## 2018-06-14 RX ORDER — WARFARIN 1 MG/1
TABLET ORAL
Qty: 60 TAB | Refills: 4 | Status: CANCELLED | OUTPATIENT
Start: 2018-06-14

## 2018-06-14 NOTE — MR AVS SNAPSHOT
Supa Clement 
 
 
 [address], [place] [address] [facility] [address] 
[phone] Patient: Sridevi Gatica MRN: [id number] NUA:[id number] Visit Information Date & Time Provider Department Dept. Phone Encounter #  
 6/14/2018  9:20 AM ANDREZ Churchill [address] [facility] [phone] [id number] Follow-up Instructions Return in about 1 year (around 6/21/2019) for follow up INR. Your Appointments 4/30/2019 10:45 AM  
ESTABLISHED PATIENT with Dina Levy MD  
[facility]. [place] [address] [place] [address]) Appt Note: 1 yr for ED  
 [address] [address]  
[phone]  
  
   
 [address] [address] Upcoming Health Maintenance Date Due Influenza Age 5 to Adult 8/1/2018 EYE EXAM RETINAL OR DILATED Q1 11/8/2018 HEMOGLOBIN A1C Q6M 12/14/2018 FOOT EXAM Q1 6/14/2019 MICROALBUMIN Q1 6/14/2019 LIPID PANEL Q1 6/14/2019 DTaP/Tdap/Td series (2 - Td) 7/14/2027 Allergies as of 6/14/2018  Review Complete On: 4/30/2018 By: Dina Levy MD  
  
 Severity Noted Reaction Type Reactions Tape [Adhesive]  11/08/2017    Contact Dermatitis ALLERGIC TO SILK TAPE ONLY Current Immunizations  Reviewed on 7/14/2017 Name Date Tdap 7/14/2017 ZZZ-RETIRED (DO NOT USE) Pneumococcal Vaccine (Unspecified Type) 4/19/2010 Not reviewed this visit You Were Diagnosed With   
  
 Codes Comments Type 2 diabetes mellitus with diabetic polyneuropathy, unspecified whether long term insulin use ([facility]    -  Primary ICD-10-CM: E11.42 
ICD-9-CM: 250.60, 357.2 Warfarin anticoagulation     ICD-10-CM: Z79.01 
ICD-9-CM: V58.61 Preoperative general physical examination     ICD-10-CM: B89.516 ICD-9-CM: V72.83 HTN (hypertension), benign     ICD-10-CM: I10 
ICD-9-CM: 401.1 CVD (cardiovascular disease)     ICD-10-CM: I25.10 ICD-9-CM: 429.2 Morbid obesity (HCC)     ICD-10-CM: E66.01 
ICD-9-CM: 278.01   
 Pulmonary hypertension ([facility]     ICD-10-CM: I27.20 ICD-9-CM: 416.8 Patient nonadherence     ICD-10-CM: Z91.19 ICD-9-CM: V15.81 History of pulmonary embolism     ICD-10-CM: Z86.711 ICD-9-CM: V12.55 Vitals BP Pulse Temp Resp Height(growth percentile) Weight(growth percentile) 142/84 (BP 1 Location: Left arm, BP Patient Position: Sitting) 86 98.3 °F (36.8 °C) (Oral) 20 6' 2.5\" (1.892 m) (!) 362 lb (164.2 kg) SpO2 BMI Smoking Status 96% 45.86 kg/m2 Never Smoker BMI and BSA Data Body Mass Index Body Surface Area 45.86 kg/m 2 2.94 m 2 Preferred Pharmacy Pharmacy Name Phone Aimee [phone] [facility] Your Updated Medication List  
  
   
This list is accurate as of 6/14/18 11:54 AM.  Always use your most recent med list. amLODIPine 10 mg tablet Commonly known as:  [place] Miranda TAKE 1 TABLET BY MOUTH DAILY  
  
 aspirin delayed-release 81 mg tablet Take 81 mg by mouth daily. atenolol 100 mg tablet Commonly known as:  TENORMIN  
TAKE 1 TABLET BY MOUTH DAILY BASAGLAR KWIKPEN U-100 INSULIN 100 unit/mL (3 mL) Inpn Generic drug:  insulin glargine 80 Units by SubCUTAneous route. BD ULTRA-FINE SHORT PEN NEEDLE 31 gauge x 5/16\" Ndle Generic drug:  Insulin Needles (Disposable) USE 1 PEN UTD QID BUTRANS 10 mcg/hour Generic drug:  buprenorphine LUCA 1 PATCH EVERY WEEK  
  
 cloNIDine HCl 0.1 mg tablet Commonly known as:  CATAPRES  
TAKE 1 TABLET BY MOUTH TWICE DAILY  
  
 furosemide 20 mg tablet Commonly known as:  LASIX TAKE 1 TABLET BY MOUTH DAILY  
  
 gabapentin 300 mg capsule Commonly known as:  NEURONTIN  
  
 insulin syringe-needle U-100  
by Does Not Apply route. Dispense ultrafine 1 mL syringes with 29 gauge needle lisinopril-hydroCHLOROthiazide 20-12.5 mg per tablet Commonly known as:  PRINZIDE, ZESTORETIC  
TAKE 2 TABLETS BY MOUTH EVERY DAY NovoLOG Flexpen U-100 Insulin 100 unit/mL Inpn Generic drug:  insulin aspart U-100 INJECT 20 UNITS SUBCUTANEOUSLY 3 TIMES A DAY BEFORE MEALS  
  
 potassium chloride 20 mEq tablet Commonly known as:  K-DUR, KLOR-CON  
TAKE 1 TABLET BY MOUTH DAILY RESTASIS 0.05 % ophthalmic emulsion Generic drug:  cycloSPORINE INSTILL 1 TO 2 DROPS INTO OU BID  
  
 tadalafil 20 mg tablet Commonly known as:  CIALIS Take 1 Tab by mouth daily as needed. triamcinolone acetonide 0.1 % ointment Commonly known as:  KENALOG Apply  to affected area two (2) times a day. use thin layer VICTOZA 3-ZANDRA 0.6 mg/0.1 mL (18 mg/3 mL) Pnij Generic drug:  Liraglutide INJ 1.8 MG SC QD  
  
 * warfarin 1 mg tablet Commonly known as:  COUMADIN Take by mouth daily as directed by health care provider * warfarin 10 mg tablet Commonly known as:  COUMADIN  
TAKE 1 TABLET BY MOUTH DAILY AS DIRECTED BY HEALTH CARE PROVIDER * Notice: This list has 2 medication(s) that are the same as other medications prescribed for you. Read the directions carefully, and ask your doctor or other care provider to review them with you. We Performed the Following AMB POC HEMOGLOBIN A1C [25466 CPT(R)] AMB POC PT/INR [17729 CPT(R)]  DIABETES FOOT EXAM [HM7 Custom] REFERRAL TO CARDIOLOGY [XXM58 Custom] Comments:  
 Pre-op physical dm type 2, pulmonary hypertension, history of pulmonary embolus-coumadin. Follow-up Instructions Return in about 1 year (around 6/21/2019) for follow up INR. To-Do List   
 06/14/2018 Lab:  CBC W/O DIFF   
  
 06/14/2018 ECG:  EKG, 12 LEAD, INITIAL   
  
 06/14/2018 Lab:  LIPID PANEL   
  
 06/14/2018 Lab:  METABOLIC PANEL, COMPREHENSIVE   
  
 06/14/2018 Lab: MICROALBUMIN, UR, RAND W/ MICROALB/CREAT RATIO   
  
 06/14/2018 Lab:  URINALYSIS W/ RFLX MICROSCOPIC   
  
 06/14/2018 Imaging:  XR CHEST PA LAT Referral Information Referral ID Referred By Referred To  
  
 [id number], [facility], [address], [address] Phone: [phone] Fax: [phone] Visits Status Start Date End Date 1 New Request 6/14/18 6/14/19 If your referral has a status of pending review or denied, additional information will be sent to support the outcome of this decision. Introducing [facility]! [employer] introduces [facility] patient portal. Now you can access parts of your medical record, email your doctor's office, and request medication refills online. 1. In your internet browser, go to [email or web address] 2. Click on the First Time User? Click Here link in the Sign In box. You will see the New Member Sign Up page. 3. Enter your [facility] Access Code exactly as it appears below. You will not need to use this code after youve completed the sign-up process. If you do not sign up before the expiration date, you must request a new code. · [facility] Access Code: [email or web address] Expires: 7/29/2018  3:15 PM 
 
4. Enter the last four digits of your Social Security Number (xxxx) and Date of Birth (mm/dd/yyyy) as indicated and click Submit. You will be taken to the next sign-up page. 5. Create a [employer] ID. This will be your [facility] login ID and cannot be changed, so think of one that is secure and easy to remember. 6. Create a [facility] password. You can change your password at any time. 7. Enter your Password Reset Question and Answer. This can be used at a later time if you forget your password. 8. Enter your e-mail address. You will receive e-mail notification when new information is available in [address] 9. Click Sign Up. You can now view and download portions of your medical record. 10. Click the Download Summary menu link to download a portable copy of your medical information. If you have questions, please visit the Frequently Asked Questions section of the HelloFax website. Remember, HelloFax is NOT to be used for urgent needs. For medical emergencies, dial 911. Now available from your iPhone and Android! Please provide this summary of care documentation to your next provider. Your primary care clinician is listed as Bhumi Zhang. If you have any questions after today's visit, please call [phone].

## 2018-06-14 NOTE — PROGRESS NOTES
Preoperative Evaluation    Date of Exam: 2018    Heidy Miranda is a 57 y.o. male (:1970) who presents for preoperative evaluation. Procedure/Surgery: Partial Amputation of the second toe right foot   Date of Procedure/Surgery: 7/3/2018   Surgeon: Dr. Carissa Palomino: Anupam Cutler. Primary Physician: Francis Thompson,  [facility]    Latex Allergy: no  Note patient is managed for DM type 2 by endocrinology  Vickie Valverde  Chronic coumadin for history of PE     Problem List:     Patient Active Problem List    Diagnosis Date Noted    Chronic pulmonary edema 01/10/2017    Renal mass 2010    Tendon tear, foot 2010    Slipped capital femoral epiphysis 2010    Pulmonary hypertension ([address] 2010    HTN (hypertension), benign 2010    DIETER (obstructive sleep apnea) 2010    CVD (cardiovascular disease) 2010    Fatty liver 2010    Morbid obesity ([address] 2010    Microscopic hematuria 2010     Medical History:     Past Medical History:   Diagnosis Date    Diabetes ([address]     Hematuria     right renal cyst    Hypercholesterolemia     Hypertension     Liver disease     Fatty Liver    Pulmonary emboli ([address]  &     chronic coumadin therapy managed by Dr. Jamel Carbajal Renal failure acute     following surgery    Sleep apnea     does not use cpap machine     Allergies: Allergies   Allergen Reactions    Tape [Adhesive] Contact Dermatitis     ALLERGIC TO SILK TAPE ONLY          Medications:     Current Outpatient Prescriptions   Medication Sig    furosemide (LASIX) 20 mg tablet TAKE 1 TABLET BY MOUTH DAILY    potassium chloride (K-DUR, KLOR-CON) 20 mEq tablet TAKE 1 TABLET BY MOUTH DAILY    tadalafil (CIALIS) 20 mg tablet Take 1 Tab by mouth daily as needed.     lisinopril-hydroCHLOROthiazide (PRINZIDE, ZESTORETIC) 20-12.5 mg per tablet TAKE 2 TABLETS BY MOUTH EVERY DAY    amLODIPine (NORVASC) 10 mg tablet TAKE 1 TABLET BY MOUTH DAILY    atenolol (TENORMIN) 100 mg tablet TAKE 1 TABLET BY MOUTH DAILY    warfarin (COUMADIN) 10 mg tablet TAKE 1 TABLET BY MOUTH DAILY AS DIRECTED BY HEALTH CARE PROVIDER    cloNIDine HCl (CATAPRES) 0.1 mg tablet TAKE 1 TABLET BY MOUTH TWICE DAILY    insulin glargine (BASAGLAR KWIKPEN) 100 unit/mL (3 mL) inpn 80 Units by SubCUTAneous route.  warfarin (COUMADIN) 1 mg tablet Take by mouth daily as directed by health care provider    RESTASIS 0.05 % ophthalmic emulsion INSTILL 1 TO 2 DROPS INTO OU BID    gabapentin (NEURONTIN) 300 mg capsule     BD INSULIN PEN NEEDLE UF SHORT 31 gauge x 5/16\" ndle USE 1 PEN UTD QID    BUTRANS 10 mcg/hour LUCA 1 PATCH EVERY WEEK    NOVOLOG FLEXPEN 100 unit/mL inpn INJECT 20 UNITS SUBCUTANEOUSLY 3 TIMES A DAY BEFORE MEALS    VICTOZA 3-ZANDRA 0.6 mg/0.1 mL (18 mg/3 mL) sub-q pen INJ 1.8 MG SC QD    insulin syringe-needle U-100 by Does Not Apply route. Dispense ultrafine 1 mL syringes with 29 gauge needle    triamcinolone acetonide (KENALOG) 0.1 % ointment Apply  to affected area two (2) times a day. use thin layer    aspirin delayed-release 81 mg tablet Take 81 mg by mouth daily. No current facility-administered medications for this visit.       Surgical History:     Past Surgical History:   Procedure Laterality Date    COLONOSCOPY N/A 11/14/2017    COLONOS[facility] performed by Milan Biswas MD at [facility] HX APPENDECTOMY  2006    HX CARPAL TUNNEL RELEASE Right     HX HIP REPLACEMENT Right     HX KNEE REPLACEMENT Left     HX ORTHOPAEDIC      Right toe amputation, pins and screws in foot    VASCULAR SURGERY PROCEDURE UNLIST      [place] filter     Social History:     Social History     Social History    Marital status:      Spouse name: N/A    Number of children: N/A    Years of education: N/A     Social History Main Topics    Smoking status: Never Smoker    Smokeless tobacco: Never Used    Alcohol use No    Drug use: No    Sexual activity: Yes     Partners: Female     Birth control/ protection: None     Other Topics Concern     Service No    Caffeine Concern No    Occupational Exposure No    Hobby Hazards No    Sleep Concern No    Stress Concern No    Weight Concern Yes    Special Diet Yes    Exercise Yes     Social History Narrative       Recent use of: Warfarin and note patient has not been compliant with INR last INR reading in this office was 2-13-18---- 2.8  Patient reports taking 12 mg of coumadin daily    Tetanus up to date: tetanus status unknown to the patient      Anesthesia Complications: None  History of abnormal bleeding : None  History of Blood Transfusions: no  Health Care Directive or Living Will: no    REVIEW OF SYSTEMS:  A comprehensive review of systems was negative except for that written in the HPI. EXAM:   Awake and alert in no acute distress  Obese   Lungs clear throughout  S1 S2 RRR without ectopy or murmur auscultated. Abdomen: normoactive bowel sounds all quadrants, no tenderness to abdomen upper and lower quadrants. No hepatosplenomegaly  Extremities: no clubbing, cyanosis, peripheral edema  Integumentary: right second toe with infection per patient. Patient very angry about his appointment did not want to stay. Stated he had to leave to go out of town and would not be back until Saturday June 23, 2018. He also stated that his podiatrist has him on Doxycycline with a refill while he is out of town.   EKG not working will have to send to [facility]   Visit Vitals    /84 (BP 1 Location: Left arm, BP Patient Position: Sitting)    Pulse 86    Temp 98.3 °F (36.8 °C) (Oral)    Resp 20    Ht 6' 2.5\" (1.892 m)    Wt (!) 362 lb (164.2 kg)    SpO2 96%    BMI 45.86 kg/m2     6/14/2018 12:03 PM - Balaji Ku LPN   Component Results   Component Value Flag Ref Range Units Status   Hemoglobin A1c (POC) 9.3 6/14/2018 11:15 AM - Denise Jules LPN   Component Results   Component Value Flag Ref Range Units Status   VALID INTERNAL CONTROL POC Yes    Final   Prothrombin time (POC)    seconds    INR POC 4.1    Final   Lab and Collection   Diagnoses and all orders for this visit:    1. Type 2 diabetes mellitus with diabetic polyneuropathy, unspecified whether long term insulin use (HCC)  -     CBC W/O DIFF; Future  -     METABOLIC PANEL, COMPREHENSIVE; Future  -     AMB POC HEMOGLOBIN A1C  -     MICROALBUMIN, UR, RAND W/ MICROALB/CREAT RATIO; Future  -     HM DIABETES FOOT EXAM  -     LIPID PANEL; Future  -     EKG, 12 LEAD, INITIAL; Future  -     Skillen Cadio ref [facility]    2. Warfarin anticoagulation  -     AMB POC PT/INR    3. Preoperative general physical examination  -     URINALYSIS W/ RFLX MICROSCOPIC; Future  -     XR CHEST PA LAT; Future  -     EKG, 12 LEAD, INITIAL; Future  -     Skillen Cadio ref [facility]    4. HTN (hypertension), benign  -     EKG, 12 LEAD, INITIAL; Future  -     Skillen Cadio ref [facility]    5. CVD (cardiovascular disease)  -     EKG, 12 LEAD, INITIAL; Future  -     Skillen Cadio ref [facility]    6. Morbid obesity ([address]  -     Skillen Cadio ref [facility]    7. Pulmonary hypertension ([facility]  -     Skillen Cadio ref [facility]    8. Patient nonadherence    9. History of pulmonary embolism  -     Skillen Cadio ref [facility]    Other orders  -     URINALYSIS W/MICROSCOPIC      IMPRESSION:     Surgery should be delayed until cardiology assessment  DM better controlled  INR should be within normal limits (2-3)  Advised patient to return to office for repeat in INR in 7 days. He angrily responded that he would not be in town   Advised patient to hold one 12 mg dose of coumadin and to reduce dose 10 mg daily until he returns into town and then come back to office for INR recheck. He agrees and will do this.   Also advised that he needs to go to [facility] today and have EKG and Chest xray  Labs and urine done in office today  Also advised to adhere to ADA diet needs to have hemoglobin A 1 c closer to 8 % for surgical clearance  Better adherence with appointments   Patient visibly angry with office and staff  I have discussed the diagnosis with the patient and the intended plan as seen in the above orders. The patient has received an after-visit summary and questions were answered concerning future plans. I have discussed medication side effects and warnings with the patient as well. Follow-up Disposition:  Return in about 11 days (around 6/25/2018), or if symptoms worsen or fail to improve, for follow up INR.         Екатерина Mills NP   6/14/2018

## 2018-06-15 LAB
A-G RATIO,AGRAT: 1.6 RATIO (ref 1.1–2.6)
ALBUMIN SERPL-MCNC: 4.5 G/DL (ref 3.5–5)
ALP SERPL-CCNC: 86 U/L (ref 25–115)
ALT SERPL-CCNC: 44 U/L (ref 5–40)
ANION GAP SERPL CALC-SCNC: 16 MMOL/L
AST SERPL W P-5'-P-CCNC: 34 U/L (ref 10–37)
BILIRUB SERPL-MCNC: 0.4 MG/DL (ref 0.2–1.2)
BILIRUB UR QL: NEGATIVE
BUN SERPL-MCNC: 19 MG/DL (ref 6–22)
CALCIUM SERPL-MCNC: 9.4 MG/DL (ref 8.4–10.4)
CHLORIDE SERPL-SCNC: 95 MMOL/L (ref 98–110)
CHOLEST SERPL-MCNC: 119 MG/DL (ref 110–200)
CO2 SERPL-SCNC: 28 MMOL/L (ref 20–32)
CREAT SERPL-MCNC: 0.8 MG/DL (ref 0.5–1.2)
CREATININE, URINE: 35 MG/DL
ERYTHROCYTE [DISTWIDTH] IN BLOOD BY AUTOMATED COUNT: 16.1 % (ref 10–15.5)
GFRAA, 66117: >60
GFRNA, 66118: >60
GLOBULIN,GLOB: 2.9 G/DL (ref 2–4)
GLUCOSE SERPL-MCNC: 196 MG/DL (ref 70–99)
GLUCOSE UR QL: NEGATIVE MG/DL
HCT VFR BLD AUTO: 43.3 % (ref 39.3–51.6)
HDLC SERPL-MCNC: 3 MG/DL (ref 0–5)
HDLC SERPL-MCNC: 40 MG/DL (ref 40–59)
HGB BLD-MCNC: 13.9 G/DL (ref 13.1–17.2)
HGB UR QL STRIP: ABNORMAL
KETONES UR QL STRIP.AUTO: NEGATIVE MG/DL
LDLC SERPL CALC-MCNC: 60 MG/DL (ref 50–99)
LEUKOCYTE ESTERASE: NEGATIVE
MCH RBC QN AUTO: 27 PG (ref 26–34)
MCHC RBC AUTO-ENTMCNC: 32 G/DL (ref 31–36)
MCV RBC AUTO: 84 FL (ref 80–95)
MICROALB/CREAT RATIO, 140286: 131.4 MCG/MG OF CREATININE (ref 0–30)
MICROALBUMIN,URINE RANDOM 140054: 46 UG/ML (ref 0.1–17)
NITRITE UR QL STRIP.AUTO: NEGATIVE
PH UR STRIP: 6 PH (ref 5–8)
PLATELET # BLD AUTO: 233 K/UL (ref 140–440)
PMV BLD AUTO: 12.3 FL (ref 9–13)
POTASSIUM SERPL-SCNC: 4.5 MMOL/L (ref 3.5–5.5)
PROT SERPL-MCNC: 7.4 G/DL (ref 6.4–8.3)
PROT UR QL STRIP: NEGATIVE MG/DL
RBC # BLD AUTO: 5.17 M/UL (ref 3.8–5.8)
RBC #/AREA URNS HPF: ABNORMAL /HPF
SODIUM SERPL-SCNC: 139 MMOL/L (ref 133–145)
SP GR UR: 1.01 (ref 1–1.03)
TRIGL SERPL-MCNC: 97 MG/DL (ref 40–149)
UROBILINOGEN UR STRIP-MCNC: <2 MG/DL
VLDLC SERPL CALC-MCNC: 19 MG/DL (ref 8–30)
WBC # BLD AUTO: 6.4 K/UL (ref 4–11)
WBC URNS QL MICRO: NEGATIVE /HPF (ref 0–2)

## 2018-06-18 ENCOUNTER — TELEPHONE (OUTPATIENT)
Dept: FAMILY MEDICINE CLINIC | Age: 48
End: 2018-06-18

## 2018-06-18 NOTE — TELEPHONE ENCOUNTER
Dr Dayana Green office called to check on patients Cardiology appointment in order to be cleared for surgery. Per providers note patient will not be back in town until June 23rd. Appointment will be scheduled once he returns in town. Dr Nelida Salter office will contact patient to determine what he wants to do as he needs this done prior to his surgery. she will call back to let our office know.

## 2018-06-29 ENCOUNTER — TELEPHONE (OUTPATIENT)
Dept: FAMILY MEDICINE CLINIC | Age: 48
End: 2018-06-29

## 2018-06-29 NOTE — TELEPHONE ENCOUNTER
Leonarda with Dr Britt Sears office re cardio note for surgical clearance completion    Request return call. Stated pt had cardio appt today at 10:30 AM with Dr Tony Mcdowell    Surgery is scheduled for 07/03/2018 and cardio appt was needed before surgical clearance was granted.

## 2018-06-29 NOTE — TELEPHONE ENCOUNTER
Leonarda will fax over cardiology note for review. Will check to see if cardiology did EKG for preop clearance. If not, patient will need to have EKG and chest xray done.

## 2018-07-02 NOTE — TELEPHONE ENCOUNTER
Information given to Dr. Park Maxwell for review. Upon reviewing note, patient is not cleared for surgery at this time. Leonarda, surgical scheduler is aware.

## 2018-07-02 NOTE — TELEPHONE ENCOUNTER
Dr Genesis Lima office called to check on the status of pre op. Per nurse Stanley Ambriz the EKG was received, however they need to know if patient had chest xray done. The office states they will call the surgery scheduler to see if chest xray is required. She wants to know if it is not a requirement can the patient be cleared. She will call back once she finds out.  Patient is scheduled for surgery on 7/3/18

## 2018-07-02 NOTE — TELEPHONE ENCOUNTER
Lynn called back from Dr Keely Petit office and states the facility does not require a chest xray. She wants to know if patient can be cleared since it is not required. She can be reacheda t [phone].

## 2018-07-06 ENCOUNTER — ANTI-COAG VISIT (OUTPATIENT)
Dept: FAMILY MEDICINE CLINIC | Age: 48
End: 2018-07-06

## 2018-07-06 DIAGNOSIS — I25.10 CVD (CARDIOVASCULAR DISEASE): Primary | ICD-10-CM

## 2018-07-06 LAB
INR BLD: 1.3
PT POC: 16 SECONDS
VALID INTERNAL CONTROL?: YES

## 2018-07-06 NOTE — PROGRESS NOTES
Chief Complaint   Patient presents with   Lea Nuñez is a 57 y.o. male who presents today for Anticoagulation monitoring. Indication: CVA  INR Goal: 2.0-3.0. Current dose:  Coumadin 10 mg daily. Missed Coumadin Doses:  None  Medication Changes:  no  Dietary Changes:  no    Symptoms: taking coumadin appropriately without any bleeding. Latest INRs:  Lab Results   Component Value Date/Time    INR 2.40 (H) 01/10/2017 09:51 AM    INR 1.3 (H) 04/09/2014 07:10 AM    INR 1.9 (H) 03/24/2014 01:56 PM    INR POC 4.1 06/14/2018 11:15 AM    INR POC 2.8 02/13/2018 10:58 AM    INR POC 2.2 07/14/2017 12:17 PM    Prothrombin time 24.8 (H) 01/10/2017 09:51 AM    Prothrombin time 15.5 (H) 04/09/2014 07:10 AM    Prothrombin time 21.4 (H) 03/24/2014 01:56 PM        New Coumadin dose:.current treatment plan is effective, no change in therapy. Next check to be scheduled for  2 weeks.

## 2018-07-07 ENCOUNTER — TELEPHONE (OUTPATIENT)
Dept: FAMILY MEDICINE CLINIC | Age: 48
End: 2018-07-07

## 2018-07-07 NOTE — PROGRESS NOTES
Please advise patient to take coumadin 15 mg(1 1/2 tabs) today and tomorrow, then resume taking coumadin 10 mg daily, repeat Thursday 7/12/18.   Thanks, DEBI Don

## 2018-07-07 NOTE — TELEPHONE ENCOUNTER
----- Message from Oly Warner NP sent at 7/7/2018  7:57 AM EDT -----  Please advise patient to take coumadin 15 mg(1 1/2 tabs) today and tomorrow, then resume taking coumadin 10 mg daily, repeat Thursday 7/12/18.   Thanks, DEBI Nichole

## 2018-07-07 NOTE — TELEPHONE ENCOUNTER
Attempted to contact the patient however voicemail is full and unable to receive messages. Will attempt later.

## 2018-07-10 NOTE — TELEPHONE ENCOUNTER
Dr. Carolann Stapleton office called to check the status of pre op eddie. Said that he was suppose to come in for INR and then be cleared for surgery on Tuesday.    Requesting a call back at [phone]

## 2018-07-12 NOTE — TELEPHONE ENCOUNTER
Dr Rebekah Alaniz office called to check on the status of patients pre-op. Surgery is scheduled for this Tuesday. Please advise [phone].

## 2018-07-13 NOTE — TELEPHONE ENCOUNTER
Spoke with Dr. Elsi Rodriguez . Beatriz Singer that last office note is needed for preop clearance. She verbalized understanding. States she will fax this over now.

## 2018-07-13 NOTE — TELEPHONE ENCOUNTER
Spoke with Elmo Palma her that the note had been reviewed and the patient is cleared. The preop clearance has been faxed 3 times to insure it is received. Call with any further questions. She verbalized understanding.

## 2018-07-13 NOTE — TELEPHONE ENCOUNTER
Nikki Bolaños NP   You 20 hours ago (1:25 PM)                 Patient has not been cleared.  His hemoglobin A 1 C is very elevated and he has not seen endocrine.  His INR is out of range and patient has not returned the numerous calls placed to him to rectify.  Awaiting patient's adherence to move forward.   [facility] (Routing comment)

## 2018-07-13 NOTE — TELEPHONE ENCOUNTER
Called Dr. Genesis Lima office, spoke with [facility] 9 coordinator that the provider has requested the last office note from endocrinology. After reviewing, preop clearance will be faxed to the office. She verbalized understanding.

## 2018-07-13 NOTE — TELEPHONE ENCOUNTER
Patient returned call to the office. Patient states \"I mean what have you done since the last time we spoke? \" Advised the patient that the nurse has contacted the endocrinology office along with Leonarda who both will be faxing the office notes here now. Upon reviewing, the clearance will be faxed. Patient verbalized understanding. Patient then raises tone and asks again \" So what are you doing to get my clearance done? \" Advised patient, that the office is faxing over the note. Once provider reviews this information, will fax over clearance. Also advised patient that his issue will be discussed with the manager. He verbalized understanding.

## 2018-07-13 NOTE — TELEPHONE ENCOUNTER
Late Entry 7/13/2018 1035am   Patient called the office in regards to preop clearance. Advised patient that he had not been cleared for surgery. Last INR was 1.3 on 7/6/2018. Hemoglobin A1c noted as 9.3. Advised that the nurse tried to contact him in regards to his INR and recommendations given by the provider. Patient denies receiving calls. Advised patient the nurse tried to contact but voicemail is full so no message could be left. Will need to get INR repeated. Patient declined stating that he is currently out of town on vacation. Patient raises voice stating \" I will be cleared for surgery. \" \" I am not cancelling my surgery\". \"I will be having my surgery if I have to file a complaint with all of [facility]\". \"If I dont have my surgery on Tuesday, I will jamie Ruthie Corea. \"  Also states he is not happy with care and the services provided by the office. Patient then states he was advised by Dr. Shahram Camilo office to stop coumadin on 7/11/2018 for surgery scheduled on 7/17/2018. Patient comments \" Concepcion Whitt no need to have it repeated now if Im not even taking it. \" This office had not been advised of this. Patient also comments that he has been to the endocrinology and cardiology. Advised patient will have to get the note from endocrinology as we have received the cardiology note for the provider to review. He verbalized understanding.

## 2018-07-22 DIAGNOSIS — I10 HTN (HYPERTENSION), BENIGN: ICD-10-CM

## 2018-07-22 RX ORDER — LISINOPRIL AND HYDROCHLOROTHIAZIDE 12.5; 2 MG/1; MG/1
TABLET ORAL
Qty: 60 TAB | Refills: 0 | Status: SHIPPED | OUTPATIENT
Start: 2018-07-22 | End: 2018-08-23 | Stop reason: SDUPTHER

## 2018-07-22 RX ORDER — ATENOLOL 100 MG/1
TABLET ORAL
Qty: 30 TAB | Refills: 0 | Status: SHIPPED | OUTPATIENT
Start: 2018-07-22 | End: 2018-08-23 | Stop reason: SDUPTHER

## 2018-07-26 DIAGNOSIS — Z79.01 WARFARIN ANTICOAGULATION: ICD-10-CM

## 2018-07-27 RX ORDER — WARFARIN 10 MG/1
TABLET ORAL
Qty: 30 TAB | Refills: 0 | Status: SHIPPED | OUTPATIENT
Start: 2018-07-27 | End: 2018-07-31 | Stop reason: SDUPTHER

## 2018-07-27 NOTE — TELEPHONE ENCOUNTER
Please call and advise follow up INR with Bolivar Pac required and his RX request was granted.    Yeison MENDES

## 2018-07-31 ENCOUNTER — TELEPHONE (OUTPATIENT)
Dept: FAMILY MEDICINE CLINIC | Age: 48
End: 2018-07-31

## 2018-07-31 DIAGNOSIS — Z79.01 WARFARIN ANTICOAGULATION: ICD-10-CM

## 2018-07-31 RX ORDER — WARFARIN 10 MG/1
TABLET ORAL
Qty: 30 TAB | Refills: 0 | Status: SHIPPED | OUTPATIENT
Start: 2018-07-31 | End: 2018-11-13 | Stop reason: SDUPTHER

## 2018-07-31 NOTE — TELEPHONE ENCOUNTER
Returned call to patient to advise him of provider's note below. Left message for patient to call back office. Connect patient to [phone] upon his call back.

## 2018-07-31 NOTE — TELEPHONE ENCOUNTER
Please call and advise follow up INR with Micah mc and his RX request was granted.    Vietdb Brigido Almeida CFNP        Lab Results   Component Value Date/Time    INR 2.40 (H) 01/10/2017 09:51 AM    INR 1.3 (H) 04/09/2014 07:10 AM    INR 1.9 (H) 03/24/2014 01:56 PM    INR POC 1.3 07/06/2018 12:55 PM    INR POC 4.1 06/14/2018 11:15 AM    INR POC 2.8 02/13/2018 10:58 AM    Prothrombin time 24.8 (H) 01/10/2017 09:51 AM    Prothrombin time 15.5 (H) 04/09/2014 07:10 AM    Prothrombin time 21.4 (H) 03/24/2014 01:56 PM

## 2018-07-31 NOTE — TELEPHONE ENCOUNTER
Patient called in today asking about his coumadin dose. Patient states he is supposed to be on 12mg but his script has him taking 10mg. Per chart review, Jose Antonio Reynoso advised for patient to take coumadin 15 mg(1 1/2 tabs) on 7/7/18 and 7/8/18, then resume taking coumadin 10 mg daily, repeat INR check Thursday 7/12/18. Patient has not had his INR level repeated. Advised patient that he would need to have his INR levels rechecked as advised to ensure he is on the right dose. Patient insisting that his coumadin should be 12mg and not 10mg as prescribed. Patient asking for return call. Please advise.

## 2018-07-31 NOTE — TELEPHONE ENCOUNTER
Patient returned call to office; Patient advised of provider's recommendation to come in to have his INR levels rechecked. Patient was asked what were his post operative instructions from podiatry; patient states podiatry advised him to restart his coumadin at 10mg, which is what was sent in to his pharmacy. Advised patient again that he still needs to come in and have his INR levels checked to see where his bleeding time is now. Patient states he is not driving currently, so he's not sure when he will make it in the office; he will try to coordinate with his wife to bring him in since she works full time.

## 2018-08-23 DIAGNOSIS — I10 HTN (HYPERTENSION), BENIGN: ICD-10-CM

## 2018-08-24 RX ORDER — ATENOLOL 100 MG/1
TABLET ORAL
Qty: 30 TAB | Refills: 0 | Status: SHIPPED | OUTPATIENT
Start: 2018-08-24 | End: 2018-09-17 | Stop reason: SDUPTHER

## 2018-08-24 RX ORDER — LISINOPRIL AND HYDROCHLOROTHIAZIDE 12.5; 2 MG/1; MG/1
TABLET ORAL
Qty: 60 TAB | Refills: 0 | Status: SHIPPED | OUTPATIENT
Start: 2018-08-24 | End: 2018-09-17 | Stop reason: SDUPTHER

## 2018-09-08 DIAGNOSIS — I10 HTN (HYPERTENSION), BENIGN: ICD-10-CM

## 2018-09-10 RX ORDER — FUROSEMIDE 20 MG/1
TABLET ORAL
Qty: 30 TAB | Refills: 0 | Status: SHIPPED | OUTPATIENT
Start: 2018-09-10 | End: 2018-10-07 | Stop reason: SDUPTHER

## 2018-09-10 RX ORDER — AMLODIPINE BESYLATE 10 MG/1
TABLET ORAL
Qty: 30 TAB | Refills: 0 | Status: SHIPPED | OUTPATIENT
Start: 2018-09-10 | End: 2018-10-07 | Stop reason: SDUPTHER

## 2018-09-17 DIAGNOSIS — I10 HTN (HYPERTENSION), BENIGN: ICD-10-CM

## 2018-09-17 RX ORDER — LISINOPRIL AND HYDROCHLOROTHIAZIDE 12.5; 2 MG/1; MG/1
TABLET ORAL
Qty: 60 TAB | Refills: 0 | Status: SHIPPED | OUTPATIENT
Start: 2018-09-17 | End: 2018-10-13 | Stop reason: SDUPTHER

## 2018-09-17 RX ORDER — ATENOLOL 100 MG/1
TABLET ORAL
Qty: 30 TAB | Refills: 0 | Status: SHIPPED | OUTPATIENT
Start: 2018-09-17 | End: 2018-10-13 | Stop reason: SDUPTHER

## 2018-09-17 RX ORDER — CLONIDINE HYDROCHLORIDE 0.1 MG/1
TABLET ORAL
Qty: 60 TAB | Refills: 0 | Status: SHIPPED | OUTPATIENT
Start: 2018-09-17 | End: 2018-10-13 | Stop reason: SDUPTHER

## 2018-10-07 DIAGNOSIS — I10 HTN (HYPERTENSION), BENIGN: ICD-10-CM

## 2018-10-08 RX ORDER — AMLODIPINE BESYLATE 10 MG/1
TABLET ORAL
Qty: 30 TAB | Refills: 0 | Status: SHIPPED | OUTPATIENT
Start: 2018-10-08 | End: 2018-11-03 | Stop reason: SDUPTHER

## 2018-10-08 RX ORDER — FUROSEMIDE 20 MG/1
TABLET ORAL
Qty: 30 TAB | Refills: 0 | Status: SHIPPED | OUTPATIENT
Start: 2018-10-08 | End: 2018-11-03 | Stop reason: SDUPTHER

## 2018-10-10 ENCOUNTER — TELEPHONE (OUTPATIENT)
Dept: FAMILY MEDICINE CLINIC | Age: 48
End: 2018-10-10

## 2018-10-10 NOTE — TELEPHONE ENCOUNTER
Called and left message with patient regarding the medication refill request, instructed patient to make a follow up appointment so that his medications can be refilled.   -Parvin Norris LPN

## 2018-10-13 DIAGNOSIS — I10 HTN (HYPERTENSION), BENIGN: ICD-10-CM

## 2018-10-15 RX ORDER — ATENOLOL 100 MG/1
TABLET ORAL
Qty: 30 TAB | Refills: 0 | Status: SHIPPED | OUTPATIENT
Start: 2018-10-15 | End: 2018-11-08 | Stop reason: SDUPTHER

## 2018-10-15 RX ORDER — LISINOPRIL AND HYDROCHLOROTHIAZIDE 12.5; 2 MG/1; MG/1
TABLET ORAL
Qty: 60 TAB | Refills: 0 | Status: SHIPPED | OUTPATIENT
Start: 2018-10-15 | End: 2018-11-08 | Stop reason: SDUPTHER

## 2018-10-15 RX ORDER — CLONIDINE HYDROCHLORIDE 0.1 MG/1
TABLET ORAL
Qty: 60 TAB | Refills: 0 | Status: SHIPPED | OUTPATIENT
Start: 2018-10-15 | End: 2018-11-08 | Stop reason: SDUPTHER

## 2018-11-08 DIAGNOSIS — I10 HTN (HYPERTENSION), BENIGN: ICD-10-CM

## 2018-11-08 RX ORDER — LISINOPRIL AND HYDROCHLOROTHIAZIDE 12.5; 2 MG/1; MG/1
TABLET ORAL
Qty: 60 TAB | Refills: 0 | Status: SHIPPED | OUTPATIENT
Start: 2018-11-08 | End: 2018-12-10 | Stop reason: SDUPTHER

## 2018-11-08 RX ORDER — CLONIDINE HYDROCHLORIDE 0.1 MG/1
TABLET ORAL
Qty: 60 TAB | Refills: 0 | Status: SHIPPED | OUTPATIENT
Start: 2018-11-08 | End: 2018-12-10 | Stop reason: SDUPTHER

## 2018-11-08 RX ORDER — ATENOLOL 100 MG/1
TABLET ORAL
Qty: 30 TAB | Refills: 0 | Status: SHIPPED | OUTPATIENT
Start: 2018-11-08 | End: 2018-12-10 | Stop reason: SDUPTHER

## 2018-11-09 PROBLEM — Z79.01 ANTICOAGULANT LONG-TERM USE: Status: ACTIVE | Noted: 2018-11-09

## 2018-11-12 ENCOUNTER — ANTI-COAG VISIT (OUTPATIENT)
Dept: FAMILY MEDICINE CLINIC | Age: 48
End: 2018-11-12

## 2018-11-12 DIAGNOSIS — Z79.01 ANTICOAGULANT LONG-TERM USE: ICD-10-CM

## 2018-11-12 DIAGNOSIS — Z79.01 WARFARIN ANTICOAGULATION: ICD-10-CM

## 2018-11-12 NOTE — PROGRESS NOTES
Pharmacy Note - Anticoagulation 11/12/18 Patient name: Juan Daniel Holm (38 y.o., male) YOB: 1970 Referred by: Katherine Barbosa NP for warfarin dosing management Past Medical History:  
Diagnosis Date  Diabetes ([address]  Hematuria   
 right renal cyst  
 Hypercholesterolemia  Hypertension  Liver disease Fatty Liver  Pulmonary emboli [facility]) 2003 & 2004  
 chronic coumadin therapy managed by Dr. Marco Evangelista  Renal failure acute   
 following surgery  Sleep apnea   
 does not use cpap machine Allergies: Allergies Allergen Reactions  Tape [Adhesive] Contact Dermatitis ALLERGIC TO SILK TAPE ONLY Subjective / Objective Medications:  
Current Outpatient Medications Medication Sig  
 lisinopril-hydroCHLOROthiazide (PRINZIDE, ZESTORETIC) 20-12.5 mg per tablet TAKE 2 TABLETS BY MOUTH EVERY DAY  cloNIDine HCl (CATAPRES) 0.1 mg tablet TAKE 1 TABLET BY MOUTH TWICE DAILY  atenolol (TENORMIN) 100 mg tablet TAKE 1 TABLET BY MOUTH DAILY  furosemide (LASIX) 20 mg tablet TAKE 1 TABLET BY MOUTH DAILY  amLODIPine (NORVASC) 10 mg tablet TAKE 1 TABLET BY MOUTH DAILY  warfarin (COUMADIN) 10 mg tablet TAKE 1 TABLET BY MOUTH AS DIRECTED  potassium chloride (K-DUR, KLOR-CON) 20 mEq tablet TAKE 1 TABLET BY MOUTH DAILY  tadalafil (CIALIS) 20 mg tablet Take 1 Tab by mouth daily as needed.  insulin glargine (BASAGLAR KWIKPEN) 100 unit/mL (3 mL) inpn 80 Units by SubCUTAneous route.  warfarin (COUMADIN) 1 mg tablet Take by mouth daily as directed by health care provider  RESTASIS 0.05 % ophthalmic emulsion INSTILL 1 TO 2 DROPS INTO OU BID  
 gabapentin (NEURONTIN) 300 mg capsule  BD INSULIN PEN NEEDLE UF SHORT 31 gauge x 5/16\" ndle USE 1 PEN UTD QID  BUTRANS 10 mcg/hour LUCA 1 PATCH EVERY WEEK  
 NOVOLOG FLEXPEN 100 unit/mL inpn INJECT 20 UNITS SUBCUTANEOUSLY 3 TIMES A DAY BEFORE MEALS  
  VICTOZA 3-ZANDRA 0.6 mg/0.1 mL (18 mg/3 mL) sub-q pen INJ 1.8 MG SC QD  
 insulin syringe-needle U-100 by Does Not Apply route. Dispense ultrafine 1 mL syringes with 29 gauge needle  triamcinolone acetonide (KENALOG) 0.1 % ointment Apply  to affected area two (2) times a day. use thin layer  aspirin delayed-release 81 mg tablet Take 81 mg by mouth daily. No current facility-administered medications for this visit. Patient denies adherence with his medications. Indication for warfarin treatment: CVA, DVT and PE 
 
INR Goal Range: 2.0-3.0 Any new medications and/or changes to medications? : no Lab Results Component Value Date/Time INR 2.40 (H) 01/10/2017 09:51 AM  
 INR 1.3 (H) 04/09/2014 07:10 AM  
 INR 1.9 (H) 03/24/2014 01:56 PM  
 INR POC 1.3 07/06/2018 12:55 PM  
 INR POC 4.1 06/14/2018 11:15 AM  
 INR POC 2.8 02/13/2018 10:58 AM  
 INR POC 2.2 07/14/2017 12:17 PM  
 INR POC 2.2 03/13/2017 11:29 AM  
 Prothrombin time 24.8 (H) 01/10/2017 09:51 AM  
 Prothrombin time 15.5 (H) 04/09/2014 07:10 AM  
 Prothrombin time 21.4 (H) 03/24/2014 01:56 PM  
 
 
Assessment / Plan Date Dose INR 
11/12/18 None  1.0 Considering Mr. Turcios's past history, todays findings, and per Anticoagulation Collaborative Practice Agreement/Protocol, patient was instructed to take Coumadin as follows, resume previous dose of 12 mg daily. Patient noted that he has been out of his warfarin for the past month and noted no difficulty breathing, unilateral swelling in extremities or difficulty with thought. Will fax orders for patient to have INR done at a [facility] facility closer to the patient's home. Mr. Sotero Leo was instructed to call [phone] if there are any signs of abnormal bleeding or clotting. Patient verbalized understanding of the information presented and all of the patients questions were answered. AVS was handed to the patient and information reviewed. Notification of recommendations will be sent to Luci Duong NP for review. Thank you for the consult, 
Kelly Rice, PharmD, BCPS, BCACP, BC-ADM

## 2018-11-13 LAB
INR PPP: 1 (ref 0.89–1.29)
INR, EXTERNAL: 1
PROTHROMBIN TIME: 10.7 SEC (ref 9–13)

## 2018-11-13 RX ORDER — WARFARIN 10 MG/1
TABLET ORAL
Qty: 90 TAB | Refills: 1 | Status: SHIPPED | OUTPATIENT
Start: 2018-11-13 | End: 2019-06-25 | Stop reason: SDUPTHER

## 2018-11-13 RX ORDER — WARFARIN 1 MG/1
TABLET ORAL
Qty: 180 TAB | Refills: 1 | Status: SHIPPED | OUTPATIENT
Start: 2018-11-13 | End: 2020-02-14 | Stop reason: SDUPTHER

## 2018-11-15 ENCOUNTER — DOCUMENTATION ONLY (OUTPATIENT)
Dept: FAMILY MEDICINE CLINIC | Age: 48
End: 2018-11-15

## 2018-11-15 NOTE — PROGRESS NOTES
Orders faxed to [facility] for patient to have venipuncture INR done. One time order sent at this this time. Request sent to Patient Registration at [phone].      Antwan Dale, PharmD, BCPS, BCACP, BC-ADM

## 2018-11-20 DIAGNOSIS — Z79.01 WARFARIN ANTICOAGULATION: ICD-10-CM

## 2018-11-20 DIAGNOSIS — Z79.01 ANTICOAGULANT LONG-TERM USE: ICD-10-CM

## 2018-12-07 DIAGNOSIS — I10 HTN (HYPERTENSION), BENIGN: ICD-10-CM

## 2018-12-07 RX ORDER — FUROSEMIDE 20 MG/1
TABLET ORAL
Qty: 30 TAB | Refills: 0 | Status: SHIPPED | OUTPATIENT
Start: 2018-12-07 | End: 2019-01-03 | Stop reason: SDUPTHER

## 2018-12-10 DIAGNOSIS — I10 HTN (HYPERTENSION), BENIGN: ICD-10-CM

## 2018-12-10 RX ORDER — AMLODIPINE BESYLATE 10 MG/1
TABLET ORAL
Qty: 30 TAB | Refills: 0 | Status: SHIPPED | OUTPATIENT
Start: 2018-12-10 | End: 2019-01-03 | Stop reason: SDUPTHER

## 2018-12-10 RX ORDER — LISINOPRIL AND HYDROCHLOROTHIAZIDE 12.5; 2 MG/1; MG/1
TABLET ORAL
Qty: 60 TAB | Refills: 0 | Status: SHIPPED | OUTPATIENT
Start: 2018-12-10 | End: 2019-01-03 | Stop reason: SDUPTHER

## 2018-12-10 RX ORDER — ATENOLOL 100 MG/1
TABLET ORAL
Qty: 30 TAB | Refills: 0 | Status: SHIPPED | OUTPATIENT
Start: 2018-12-10 | End: 2018-12-27 | Stop reason: SDUPTHER

## 2018-12-10 RX ORDER — CLONIDINE HYDROCHLORIDE 0.1 MG/1
TABLET ORAL
Qty: 60 TAB | Refills: 0 | Status: SHIPPED | OUTPATIENT
Start: 2018-12-10 | End: 2019-01-03 | Stop reason: SDUPTHER

## 2018-12-27 DIAGNOSIS — I10 HTN (HYPERTENSION), BENIGN: ICD-10-CM

## 2018-12-27 RX ORDER — ATENOLOL 100 MG/1
TABLET ORAL
Qty: 30 TAB | Refills: 0 | Status: SHIPPED | OUTPATIENT
Start: 2018-12-27 | End: 2019-01-19 | Stop reason: SDUPTHER

## 2019-01-19 DIAGNOSIS — I10 HTN (HYPERTENSION), BENIGN: ICD-10-CM

## 2019-01-21 RX ORDER — ATENOLOL 100 MG/1
TABLET ORAL
Qty: 30 TAB | Refills: 0 | Status: SHIPPED | OUTPATIENT
Start: 2019-01-21 | End: 2019-02-18 | Stop reason: SDUPTHER

## 2019-01-24 ENCOUNTER — OFFICE VISIT (OUTPATIENT)
Dept: FAMILY MEDICINE CLINIC | Age: 49
End: 2019-01-24

## 2019-01-24 VITALS
DIASTOLIC BLOOD PRESSURE: 80 MMHG | TEMPERATURE: 98 F | SYSTOLIC BLOOD PRESSURE: 138 MMHG | BODY MASS INDEX: 39.17 KG/M2 | OXYGEN SATURATION: 96 % | WEIGHT: 315 LBS | HEIGHT: 75 IN | RESPIRATION RATE: 16 BRPM | HEART RATE: 87 BPM

## 2019-01-24 DIAGNOSIS — I10 ESSENTIAL HYPERTENSION: ICD-10-CM

## 2019-01-24 DIAGNOSIS — Z89.429 HISTORY OF AMPUTATION OF TOE (HCC): ICD-10-CM

## 2019-01-24 DIAGNOSIS — E78.00 HYPERCHOLESTEROLEMIA: ICD-10-CM

## 2019-01-24 DIAGNOSIS — Z23 ENCOUNTER FOR IMMUNIZATION: ICD-10-CM

## 2019-01-24 DIAGNOSIS — I27.20 PULMONARY HYPERTENSION (HCC): ICD-10-CM

## 2019-01-24 DIAGNOSIS — Z91.199 NONADHERENCE TO MEDICAL TREATMENT: ICD-10-CM

## 2019-01-24 DIAGNOSIS — Z91.119 NONADHERENCE WITH DIETARY RESTRICTION: ICD-10-CM

## 2019-01-24 DIAGNOSIS — I10 ELEVATED BLOOD PRESSURE READING IN OFFICE WITH DIAGNOSIS OF HYPERTENSION: ICD-10-CM

## 2019-01-24 DIAGNOSIS — E11.65 TYPE 2 DIABETES MELLITUS WITH HYPERGLYCEMIA, UNSPECIFIED WHETHER LONG TERM INSULIN USE (HCC): ICD-10-CM

## 2019-01-24 DIAGNOSIS — G47.33 OSA (OBSTRUCTIVE SLEEP APNEA): ICD-10-CM

## 2019-01-24 DIAGNOSIS — Z86.711 HISTORY OF PULMONARY EMBOLISM: ICD-10-CM

## 2019-01-24 DIAGNOSIS — E11.42 TYPE 2 DIABETES MELLITUS WITH DIABETIC POLYNEUROPATHY, UNSPECIFIED WHETHER LONG TERM INSULIN USE (HCC): Primary | ICD-10-CM

## 2019-01-24 DIAGNOSIS — J30.1 ALLERGIC RHINITIS DUE TO POLLEN, UNSPECIFIED SEASONALITY: ICD-10-CM

## 2019-01-24 LAB
HBA1C MFR BLD HPLC: 8.4 %
INR BLD: 1.1
PT POC: NORMAL SECONDS
VALID INTERNAL CONTROL?: YES

## 2019-01-24 RX ORDER — FLUTICASONE PROPIONATE 50 MCG
2 SPRAY, SUSPENSION (ML) NASAL
Qty: 1 BOTTLE | Refills: 3 | Status: SHIPPED | OUTPATIENT
Start: 2019-01-24 | End: 2020-04-23 | Stop reason: SDUPTHER

## 2019-01-24 NOTE — PROGRESS NOTES
SUBJECTIVE: 
54 y.o. male for follow up of diabetes (managed by endocrine). Diabetic Review of Systems - medication compliance: compliant all of the time, diabetic diet compliance: noncompliant some of the time, home glucose monitoring: Sincere Blake and is performing blood glucose monitoring as directed by endocrine but he does not have any readings, further diabetic ROS: no polyuria or polydipsia, no chest pain, dyspnea or TIA's, no numbness, tingling or pain in extremities, last eye exam approximately up to date. acute symptoms are none. Other symptoms and concerns: has not had INR done. Taking 11 mg of coumadin. Lives in [facility] and would like to have a standing order to have INR done at a local lab . Last visit with endocrine Aamir 1-16-19 Orthopedics Brian  primary osteoarthritis right knee pain 1-17-19 he has follow up appointment with specialist 
He did have a steroid injection and he noticed an increase in his blood sugars up to 250 for several days. He also reports that he has not been able to walk daily because of knee pain. Taking Advil prn over the counter for pain. Warned of additive effect with coumadin. Patient verbalizes understanding. Patient is on chronic coumadin therapy for history of pulmonary embolus Has DIETER but not using CPAP since he lost approximately 50 pounds He also has a history of pulmonary hypertension. He reports that he takes coumadin everyday but missed one dose over the past week. Lab Results Component Value Date/Time INR 1.00 11/12/2018 10:31 AM  
 INR 2.40 (H) 01/10/2017 09:51 AM  
 INR 1.3 (H) 04/09/2014 07:10 AM  
 INR, External 1.0 11/12/2018 INR POC 1.3 07/06/2018 12:55 PM  
 INR POC 4.1 06/14/2018 11:15 AM  
 INR POC 2.8 02/13/2018 10:58 AM  
 Prothrombin time 10.7 11/12/2018 10:31 AM  
 Prothrombin time 24.8 (H) 01/10/2017 09:51 AM  
 Prothrombin time 15.5 (H) 04/09/2014 07:10 AM  
 
 
Current Outpatient Medications Medication Sig Dispense Refill  atenolol (TENORMIN) 100 mg tablet TAKE 1 TABLET BY MOUTH DAILY 30 Tab 0  
 furosemide (LASIX) 20 mg tablet TAKE 1 TABLET BY MOUTH DAILY 20 Tab 0  
 amLODIPine (NORVASC) 10 mg tablet TAKE 1 TABLET BY MOUTH DAILY 20 Tab 0  cloNIDine HCl (CATAPRES) 0.1 mg tablet TAKE 1 TABLET BY MOUTH TWICE DAILY 40 Tab 0  
 lisinopril-hydroCHLOROthiazide (PRINZIDE, ZESTORETIC) 20-12.5 mg per tablet TAKE 2 TABLETS BY MOUTH EVERY DAY 40 Tab 0  
 warfarin (COUMADIN) 10 mg tablet Take with 1 mg tablets for a total of 12 mg daily or as directed by provider. 90 Tab 1  
 warfarin (COUMADIN) 1 mg tablet Take by 2 tablets daily by mouth with 10 mg tablet for a total of 12 mg daily or as directed by healthcare provider 180 Tab 1  potassium chloride (K-DUR, KLOR-CON) 20 mEq tablet TAKE 1 TABLET BY MOUTH DAILY 30 Tab 3  
 tadalafil (CIALIS) 20 mg tablet Take 1 Tab by mouth daily as needed. 6 Tab 11  
 insulin glargine (BASAGLAR KWIKPEN) 100 unit/mL (3 mL) inpn 80 Units by SubCUTAneous route.  gabapentin (NEURONTIN) 300 mg capsule   2  
 BD INSULIN PEN NEEDLE UF SHORT 31 gauge x 5/16\" ndle USE 1 PEN UTD QID  3  
 BUTRANS 10 mcg/hour LUCA 1 PATCH EVERY WEEK  0  
 NOVOLOG FLEXPEN 100 unit/mL inpn INJECT 20 UNITS SUBCUTANEOUSLY 3 TIMES A DAY BEFORE MEALS  3  
 VICTOZA 3-ZANDRA 0.6 mg/0.1 mL (18 mg/3 mL) sub-q pen INJ 1.8 MG SC QD  6  
 insulin syringe-needle U-100 by Does Not Apply route. Dispense ultrafine 1 mL syringes with 29 gauge needle 100 Syringe 11  
 aspirin delayed-release 81 mg tablet Take 81 mg by mouth daily.  RESTASIS 0.05 % ophthalmic emulsion INSTILL 1 TO 2 DROPS INTO OU BID  1  
 triamcinolone acetonide (KENALOG) 0.1 % ointment Apply  to affected area two (2) times a day. use thin layer 30 g 0 Wt Readings from Last 3 Encounters:  
01/24/19 (!) 366 lb 3.2 oz (166.1 kg) 06/14/18 (!) 362 lb (164.2 kg) 04/30/18 319 lb (144.7 kg) BP Readings from Last 3 Encounters: 01/24/19 (!) 156/98  
06/14/18 142/84  
04/30/18 148/70 OBJECTIVE: 
Awake and alert in no acute distress Obese well dressed Lungs clear throughout S1 S2 RRR without ectopy or murmur auscultated. Extremities: no clubbing, cyanosis, peripheral edema Visit Vitals /80 (BP 1 Location: Left arm, BP Patient Position: Sitting) Pulse 87 Temp 98 °F (36.7 °C) (Oral) Resp 16 Ht 6' 2.5\" (1.892 m) Wt (!) 366 lb 3.2 oz (166.1 kg) SpO2 96% BMI 46.39 kg/m² Results for orders placed or performed in visit on 01/24/19 AMB POC HEMOGLOBIN A1C Result Value Ref Range Hemoglobin A1c (POC) 8.4 % AMB POC PT/INR Result Value Ref Range VALID INTERNAL CONTROL POC Yes Prothrombin time (POC)  seconds INR POC 1.1 Diagnoses and all orders for this visit: 
 
Type 2 diabetes mellitus with diabetic polyneuropathy, unspecified whether long term insulin use ([facility] Encounter for immunization 
-     INFLUENZA VIRUS VAC QUAD,SPLIT,PRESV FREE SYRINGE IM 
 
BMI 45.0-49.9, adult ([facility] Type 2 diabetes mellitus with hyperglycemia, unspecified whether long term insulin use ([facility] -     AMB POC HEMOGLOBIN A1C History of pulmonary embolism -     AMB POC PT/INR Essential hypertension History of amputation of toe ([facility] Hypercholesterolemia Pulmonary hypertension ([facility] DIETER (obstructive sleep apnea) Elevated blood pressure reading in office with diagnosis of hypertension Nonadherence with dietary restriction Increase coumadin to 12.5 mg repeat INR in one week and have lab call for instructions Standing order written for monthly INR One time order INR repeat 1-31-19 Patient agrees with plan and verbalizes understanding. I have reviewed/discussed the above normal BMI with the patient. I have recommended the following interventions: dietary management education, guidance, and counseling, encourage exercise, monitor weight and prescribed dietary intake . Antonio Almendarez I have discussed the diagnosis with the patient and the intended plan as seen in the above orders. The patient has received an after-visit summary and questions were answered concerning future plans. I have discussed medication side effects and warnings with the patient as well.  
 
Follow-up Disposition: 
Return in about 4 months (around 5/24/2019) for dm, htn, bmi. 
 
 
I

## 2019-01-24 NOTE — PROGRESS NOTES
Chief Complaint Patient presents with  Hypertension 1. Have you been to the ER, urgent care clinic since your last visit? Hospitalized since your last visit? No  
 
2. Have you seen or consulted any other health care providers outside of the [address] [place] [address] since your last visit? Include any pap smears or colon screening.  No

## 2019-01-30 LAB — HBA1C MFR BLD HPLC: 8.3 %

## 2019-02-04 DIAGNOSIS — I10 HTN (HYPERTENSION), BENIGN: ICD-10-CM

## 2019-02-04 RX ORDER — FUROSEMIDE 20 MG/1
TABLET ORAL
Qty: 30 TAB | Refills: 0 | Status: SHIPPED | OUTPATIENT
Start: 2019-02-04 | End: 2019-03-04 | Stop reason: SDUPTHER

## 2019-02-06 LAB
INR PPP: 2.11 (ref 0.89–1.29)
PROTHROMBIN TIME: 19.9 SEC (ref 9–13)

## 2019-02-07 ENCOUNTER — TELEPHONE (OUTPATIENT)
Dept: FAMILY MEDICINE CLINIC | Age: 49
End: 2019-02-07

## 2019-02-07 NOTE — TELEPHONE ENCOUNTER
Spoke with the patient today. Advised that the provider has received INR results. Resulted as 2.1 will need to continue 12.5 mg daily and repeat INR in one month. He verbalized understanding.

## 2019-02-12 DIAGNOSIS — I10 HTN (HYPERTENSION), BENIGN: ICD-10-CM

## 2019-02-12 RX ORDER — AMLODIPINE BESYLATE 10 MG/1
TABLET ORAL
Qty: 20 TAB | Refills: 0 | Status: CANCELLED | OUTPATIENT
Start: 2019-02-12

## 2019-02-12 NOTE — TELEPHONE ENCOUNTER
Requested Prescriptions     Pending Prescriptions Disp Refills    amLODIPine (NORVASC) 10 mg tablet 20 Tab 0      Patient states he is out of his medication

## 2019-02-13 RX ORDER — AMLODIPINE BESYLATE 10 MG/1
TABLET ORAL
Qty: 30 TAB | Refills: 1 | Status: SHIPPED | OUTPATIENT
Start: 2019-02-13 | End: 2019-04-22 | Stop reason: SDUPTHER

## 2019-02-18 DIAGNOSIS — I10 HTN (HYPERTENSION), BENIGN: ICD-10-CM

## 2019-02-18 RX ORDER — ATENOLOL 100 MG/1
TABLET ORAL
Qty: 30 TAB | Refills: 2 | Status: SHIPPED | OUTPATIENT
Start: 2019-02-18 | End: 2019-05-21 | Stop reason: SDUPTHER

## 2019-03-20 ENCOUNTER — OFFICE VISIT (OUTPATIENT)
Dept: FAMILY MEDICINE CLINIC | Age: 49
End: 2019-03-20

## 2019-03-20 VITALS
TEMPERATURE: 97.8 F | HEIGHT: 74 IN | BODY MASS INDEX: 40.43 KG/M2 | WEIGHT: 315 LBS | HEART RATE: 80 BPM | SYSTOLIC BLOOD PRESSURE: 150 MMHG | DIASTOLIC BLOOD PRESSURE: 90 MMHG | OXYGEN SATURATION: 97 % | RESPIRATION RATE: 16 BRPM

## 2019-03-20 DIAGNOSIS — E88.81 METABOLIC SYNDROME: ICD-10-CM

## 2019-03-20 DIAGNOSIS — J06.9 ACUTE URI: Primary | ICD-10-CM

## 2019-03-20 RX ORDER — DOXYCYCLINE 100 MG/1
100 TABLET ORAL 2 TIMES DAILY
Qty: 20 TAB | Refills: 0 | Status: SHIPPED | OUTPATIENT
Start: 2019-03-20 | End: 2019-03-30

## 2019-03-20 NOTE — PROGRESS NOTES
Chief Complaint Patient presents with  
[facility] Pt preferred language for health care discussion is english. Is someone accompanying this pt? no 
 
Is the patient using any DME equipment during [address]? no 
 
Depression Screening: 
3 most recent PHQ Screens 1/10/2017 Little interest or pleasure in doing things Not at all Feeling down, depressed, irritable, or hopeless Not at all Total Score PHQ 2 0 Learning Assessment: 
Learning Assessment 7/14/2017 PRIMARY LEARNER Patient PRIMARY LANGUAGE ENGLISH  
LEARNER PREFERENCE PRIMARY LISTENING  
ANSWERED BY patient RELATIONSHIP SELF Abuse Screening: No flowsheet data found. Fall Risk No flowsheet data found. Advance Directive: 1. Do you have an advance directive in place? Patient Reply:yes 2. If not, would you like material regarding how to put one in place? Patient Reply: no Jt Norris Coordination of Care: 1. Have you been to the ER, urgent care clinic since your last visit? Hospitalized since your last visit? no 
 
2. Have you seen or consulted any other health care providers outside of the [facility] [place] since your last visit? Include any pap smears or colon screening. no 
 
Provider prefers to do his own med reconciliation

## 2019-03-20 NOTE — PROGRESS NOTES
HPI:  
Multiple problems including metabolic syndrome presents with 7 days of head congestion, facial discomfort, NP cough, hoarseness w/o fever, dyspnea, CP, or N 
 
 
ROS is otherwise negative. Past Medical History:  
Diagnosis Date  Diabetes ([address]  Hematuria   
 right renal cyst  
 Hypercholesterolemia  Hypertension  Liver disease Fatty Liver  Pulmonary emboli [facility]) 2003 & 2004  
 chronic coumadin therapy managed by Dr. Clotilde Hartley  Renal failure acute   
 following surgery  Sleep apnea   
 does not use cpap machine Past Surgical History:  
Procedure Laterality Date  COLONOSCOPY N/A 11/14/2017 COLONOSCOPY performed by Pati Jansen MD at [address] HX APPENDECTOMY  2006  HX CARPAL TUNNEL RELEASE Right  HX HIP REPLACEMENT Right  HX KNEE REPLACEMENT Left  HX ORTHOPAEDIC Right toe amputation, pins and screws in foot  VASCULAR SURGERY PROCEDURE UNLIST Jocelin filter Social History Socioeconomic History  Marital status:  Spouse name: Not on file  Number of children: Not on file  Years of education: Not on file  Highest education level: Not on file Occupational History  Not on file Social Needs  Financial resource strain: Not on file  Food insecurity:  
  Worry: Not on file Inability: Not on file  Transportation needs:  
  Medical: Not on file Non-medical: Not on file Tobacco Use  Smoking status: Never Smoker  Smokeless tobacco: Never Used Substance and Sexual Activity  Alcohol use: No  
 Drug use: No  
 Sexual activity: Yes  
  Partners: Female Birth control/protection: None Lifestyle  Physical activity:  
  Days per week: Not on file Minutes per session: Not on file  Stress: Not on file Relationships  Social connections:  
  Talks on phone: Not on file Gets together: Not on file Attends Hindu service: Not on file Active member of club or organization: Not on file Attends meetings of clubs or organizations: Not on file Relationship status: Not on file  Intimate partner violence:  
  Fear of current or ex partner: Not on file Emotionally abused: Not on file Physically abused: Not on file Forced sexual activity: Not on file Other Topics Concern   Service No  
 Blood Transfusions Not Asked  Caffeine Concern No  
 Occupational Exposure No  
 Hobby Hazards No  
 Sleep Concern No  
 Stress Concern No  
 Weight Concern Yes  Special Diet Yes  Back Care Not Asked  Exercise Yes  Bike Helmet Not Asked  Seat Belt Not Asked  Self-Exams Not Asked Social History Narrative  Not on file Allergies Allergen Reactions  Tape [Adhesive] Contact Dermatitis ALLERGIC TO SILK TAPE ONLY Family History Problem Relation Age of Onset  Diabetes Father  Hypertension Father  Heart Disease Maternal Grandmother  Cancer Maternal Grandmother  Depression Mother  Cancer Paternal Grandfather Current Outpatient Medications Medication Sig Dispense Refill  lisinopril-hydroCHLOROthiazide (PRINZIDE, ZESTORETIC) 20-12.5 mg per tablet TAKE 2 TABLETS BY MOUTH EVERY DAY 60 Tab 4  furosemide (LASIX) 20 mg tablet TAKE 1 TABLET BY MOUTH DAILY 30 Tab 2  
 atenolol (TENORMIN) 100 mg tablet TAKE 1 TABLET BY MOUTH DAILY 30 Tab 2  
 amLODIPine (NORVASC) 10 mg tablet TAKE 1 TABLET BY MOUTH DAILY 30 Tab 1  potassium chloride (K-DUR, KLOR-CON) 20 mEq tablet TAKE 1 TABLET BY MOUTH DAILY 30 Tab 3  
 fluticasone (FLONASE) 50 mcg/actuation nasal spray 2 Sprays by Both Nostrils route daily as needed for Rhinitis. 1 Bottle 3  cloNIDine HCl (CATAPRES) 0.1 mg tablet TAKE 1 TABLET BY MOUTH TWICE DAILY 40 Tab 0  
 warfarin (COUMADIN) 10 mg tablet Take with 1 mg tablets for a total of 12 mg daily or as directed by provider.  90 Tab 1  
  warfarin (COUMADIN) 1 mg tablet Take by 2 tablets daily by mouth with 10 mg tablet for a total of 12 mg daily or as directed by healthcare provider 180 Tab 1  
 tadalafil (CIALIS) 20 mg tablet Take 1 Tab by mouth daily as needed. 6 Tab 11  
 insulin glargine (BASAGLAR KWIKPEN) 100 unit/mL (3 mL) inpn 80 Units by SubCUTAneous route.  RESTASIS 0.05 % ophthalmic emulsion INSTILL 1 TO 2 DROPS INTO OU BID  1  
 gabapentin (NEURONTIN) 300 mg capsule   2  
 BD INSULIN PEN NEEDLE UF SHORT 31 gauge x 5/16\" ndle USE 1 PEN UTD QID  3  
 BUTRANS 10 mcg/hour LUCA 1 PATCH EVERY WEEK  0  
 NOVOLOG FLEXPEN 100 unit/mL inpn INJECT 20 UNITS SUBCUTANEOUSLY 3 TIMES A DAY BEFORE MEALS  3  
 VICTOZA 3-ZANDRA 0.6 mg/0.1 mL (18 mg/3 mL) sub-q pen INJ 1.8 MG SC QD  6  
 insulin syringe-needle U-100 by Does Not Apply route. Dispense ultrafine 1 mL syringes with 29 gauge needle 100 Syringe 11  
 triamcinolone acetonide (KENALOG) 0.1 % ointment Apply  to affected area two (2) times a day. use thin layer 30 g 0  
 aspirin delayed-release 81 mg tablet Take 81 mg by mouth daily. Visit Vitals /90 (BP 1 Location: Left arm, BP Patient Position: Sitting) Pulse 80 Temp 97.8 °F (36.6 °C) (Tympanic) Resp 16 Ht 6' 2\" (1.88 m) Wt (!) 365 lb (165.6 kg) SpO2 97% BMI 46.86 kg/m² PE Well nourished in NAD HEENT:   OP: clear. Neck: supple w/o mass Chest: clear. CV: RRR w/o m,r,g 
Abd: soft, NT, w/o HSM or mass. Ext: tr edema. Neuro: NF. Assessment and Plan Encounter Diagnoses Name Primary?  Acute URI Yes  Metabolic syndrome URI/sinusitis: doxycycline 100 mg bid for 10 days; zyrtec prn 
F/U worsening or unimproved 7 days Metabolic syndrome/hypercoagulabe state - per PCP 
OV 6 weeks or prn I have explained plan to patient and the patient verbalizes understanding

## 2019-04-13 DIAGNOSIS — I10 HTN (HYPERTENSION), BENIGN: ICD-10-CM

## 2019-04-15 RX ORDER — POTASSIUM CHLORIDE 20 MEQ/1
TABLET, EXTENDED RELEASE ORAL
Qty: 30 TAB | Refills: 0 | Status: SHIPPED | OUTPATIENT
Start: 2019-04-15 | End: 2019-05-07

## 2019-04-22 DIAGNOSIS — I10 HTN (HYPERTENSION), BENIGN: ICD-10-CM

## 2019-04-22 NOTE — TELEPHONE ENCOUNTER
Requested Prescriptions     Pending Prescriptions Disp Refills    amLODIPine (NORVASC) 10 mg tablet 30 Tab 1

## 2019-04-23 RX ORDER — AMLODIPINE BESYLATE 10 MG/1
TABLET ORAL
Qty: 30 TAB | Refills: 4 | Status: SHIPPED | OUTPATIENT
Start: 2019-04-23 | End: 2019-07-30 | Stop reason: SDUPTHER

## 2019-04-30 PROBLEM — E78.00 PURE HYPERCHOLESTEROLEMIA: Status: ACTIVE | Noted: 2017-01-10

## 2019-05-06 ENCOUNTER — OFFICE VISIT (OUTPATIENT)
Dept: FAMILY MEDICINE CLINIC | Age: 49
End: 2019-05-06

## 2019-05-06 VITALS
BODY MASS INDEX: 40.43 KG/M2 | RESPIRATION RATE: 17 BRPM | OXYGEN SATURATION: 96 % | HEART RATE: 80 BPM | TEMPERATURE: 97.8 F | SYSTOLIC BLOOD PRESSURE: 134 MMHG | HEIGHT: 74 IN | WEIGHT: 315 LBS | DIASTOLIC BLOOD PRESSURE: 76 MMHG

## 2019-05-06 DIAGNOSIS — G47.33 OSA (OBSTRUCTIVE SLEEP APNEA): ICD-10-CM

## 2019-05-06 DIAGNOSIS — Z79.01 CHRONIC ANTICOAGULATION: ICD-10-CM

## 2019-05-06 DIAGNOSIS — Z01.818 PREOPERATIVE GENERAL PHYSICAL EXAMINATION: ICD-10-CM

## 2019-05-06 DIAGNOSIS — Z01.818 PRE-OP EXAMINATION: ICD-10-CM

## 2019-05-06 DIAGNOSIS — R94.31 ABNORMAL EKG: ICD-10-CM

## 2019-05-06 DIAGNOSIS — E11.8 TYPE 2 DIABETES MELLITUS WITH COMPLICATION, UNSPECIFIED WHETHER LONG TERM INSULIN USE: ICD-10-CM

## 2019-05-06 DIAGNOSIS — E87.6 HYPOKALEMIA: ICD-10-CM

## 2019-05-06 DIAGNOSIS — I27.20 PULMONARY HYPERTENSION (HCC): ICD-10-CM

## 2019-05-06 DIAGNOSIS — E11.42 TYPE 2 DIABETES MELLITUS WITH DIABETIC POLYNEUROPATHY, UNSPECIFIED WHETHER LONG TERM INSULIN USE (HCC): Primary | ICD-10-CM

## 2019-05-06 DIAGNOSIS — I10 HTN (HYPERTENSION), BENIGN: ICD-10-CM

## 2019-05-06 DIAGNOSIS — M17.11 PRIMARY OSTEOARTHRITIS OF RIGHT KNEE: ICD-10-CM

## 2019-05-06 DIAGNOSIS — Z86.711 HISTORY OF PULMONARY EMBOLISM: ICD-10-CM

## 2019-05-06 LAB — HBA1C MFR BLD HPLC: 8.4 %

## 2019-05-06 NOTE — PROGRESS NOTES
Chief Complaint   Patient presents with    Pre-op Exam     1. Have you been to the ER, urgent care clinic since your last visit? Hospitalized since your last visit? No     2. Have you seen or consulted any other health care providers outside of the [address] [place] [address] since your last visit? Include any pap smears or colon screening. No       Preoperative Evaluation    Date of Exam: 2019    Ashia Humphrey is a 54 y.o. male (:1970) who presents for preoperative evaluation.    Procedure/Surgery: Arthroplasty, right total knee   Date of Procedure/Surgery: 19  Surgeon: Dr. Mari Vasquez: [facility]     Latex Allergy: no    Problem List:     Patient Active Problem List    Diagnosis Date Noted    Anticoagulant long-term use 2018    Chronic pulmonary edema 01/10/2017    Pure hypercholesterolemia 01/10/2017    Primary osteoarthritis of left knee 10/28/2016    Diabetic foot infection ([address] 10/30/2015    Frontal headache 10/29/2015    Sepsis due to methicillin susceptible Staphylococcus aureus ([address] 10/29/2015    SIRS (systemic inflammatory response syndrome) ([address] 10/29/2015    History of MRSA infection 10/01/2015    HNP (herniated nucleus pulposus), lumbar 2013    Lumbar radiculopathy 2013    Carpal tunnel syndrome 2013    Radial styloid tenosynovitis 2013    Hyperlipidemia 2012    Personal history of pulmonary embolism 2012    S/P hip replacement 2012    Renal mass 2010    Tendon tear, foot 2010    Slipped capital femoral epiphysis 2010    Pulmonary hypertension ([address] 2010    HTN (hypertension), benign 2010    DIETER (obstructive sleep apnea) 2010    CVD (cardiovascular disease) 2010    Fatty liver 2010    Morbid obesity ([address] 2010    Microscopic hematuria 2010     Medical History:     Past Medical History:   Diagnosis Date    Diabetes [facility])     Erectile dysfunction due to diseases classified elsewhere     Hematuria     right renal cyst    Hypercholesterolemia     Hypertension     Liver disease     Fatty Liver    Pulmonary emboli ([facility] 2003 & 2004    chronic coumadin therapy managed by Dr. Mejia Hence Renal failure acute     following surgery    Sleep apnea     does not use cpap machine     Allergies: Allergies   Allergen Reactions    Tape [Adhesive] Contact Dermatitis     ALLERGIC TO SILK TAPE ONLY          Medications:     Current Outpatient Medications   Medication Sig    tadalafil (CIALIS) 20 mg tablet Take 1 Tab by mouth as needed for Other (ED).  cloNIDine HCl (CATAPRES) 0.1 mg tablet TAKE 1 TABLET BY MOUTH TWICE DAILY    amLODIPine (NORVASC) 10 mg tablet TAKE 1 TABLET BY MOUTH DAILY    potassium chloride (K-DUR, KLOR-CON) 20 mEq tablet TAKE 1 TABLET BY MOUTH DAILY    lisinopril-hydroCHLOROthiazide (PRINZIDE, ZESTORETIC) 20-12.5 mg per tablet TAKE 2 TABLETS BY MOUTH EVERY DAY    furosemide (LASIX) 20 mg tablet TAKE 1 TABLET BY MOUTH DAILY    atenolol (TENORMIN) 100 mg tablet TAKE 1 TABLET BY MOUTH DAILY    fluticasone (FLONASE) 50 mcg/actuation nasal spray 2 Sprays by Both Nostrils route daily as needed for Rhinitis.  warfarin (COUMADIN) 10 mg tablet Take with 1 mg tablets for a total of 12 mg daily or as directed by provider.  warfarin (COUMADIN) 1 mg tablet Take by 2 tablets daily by mouth with 10 mg tablet for a total of 12 mg daily or as directed by healthcare provider    insulin glargine (BASAGLAR KWIKPEN) 100 unit/mL (3 mL) inpn 80 Units by SubCUTAneous route.     gabapentin (NEURONTIN) 300 mg capsule     BD INSULIN PEN NEEDLE UF SHORT 31 gauge x 5/16\" ndle USE 1 PEN UTD QID    BUTRANS 10 mcg/hour LUCA 1 PATCH EVERY WEEK    NOVOLOG FLEXPEN 100 unit/mL inpn INJECT 20 UNITS SUBCUTANEOUSLY 3 TIMES A DAY BEFORE MEALS    VICTOZA 3-ZANDRA 0.6 mg/0.1 mL (18 mg/3 mL) sub-q pen INJ 1.8 MG SC QD    insulin syringe-needle U-100 by Does Not Apply route. Dispense ultrafine 1 mL syringes with 29 gauge needle    aspirin delayed-release 81 mg tablet Take 81 mg by mouth daily. No current facility-administered medications for this visit. Surgical History:     Past Surgical History:   Procedure Laterality Date    COLONOSCOPY N/A 11/14/2017    COLONOSCOPY performed by Nilsa Rose MD at [facility] ENDOSCOPY    HX APPENDECTOMY  2006    HX CARPAL TUNNEL RELEASE Right     HX HIP REPLACEMENT Right     HX KNEE REPLACEMENT Left     HX ORTHOPAEDIC      Right toe amputation, pins and screws in foot    VASCULAR SURGERY PROCEDURE UNLIST      [employer] filter     Social History:     Social History     Socioeconomic History    Marital status:      Spouse name: Not on file    Number of children: Not on file    Years of education: Not on file    Highest education level: Not on file   Tobacco Use    Smoking status: Never Smoker    Smokeless tobacco: Never Used   Substance and Sexual Activity    Alcohol use: No    Drug use: No    Sexual activity: Yes     Partners: Female     Birth control/protection: None   Other Topics Concern     Service No    Caffeine Concern No    Occupational Exposure No    Hobby Hazards No    Sleep Concern No    Stress Concern No    Weight Concern Yes    Special Diet Yes    Exercise Yes       Recent use of: chronic use of coumadin    Tetanus up to date: tetanus status 7-      Anesthesia Complications: None  History of abnormal bleeding : None  History of Blood Transfusions: no  Health Care Directive or Living Will: no    Review of Systems   Constitutional: Negative. HENT: Negative. Eyes: Negative. Respiratory: Negative. Cardiovascular: Negative. Gastrointestinal: Negative. Genitourinary: Negative. Musculoskeletal: Negative. Skin: Negative. Neurological: Negative. Endo/Heme/Allergies: Negative. Psychiatric/Behavioral: Negative. OBJECTIVE:  Awake and alert in no acute distress  HEENT:  Head normocephalic atraumatic, Eyes PERRLA, Ears: TMS bilaterally pearly grey, Nares patent without erythema or edema, Pharynx without erythema. Dentition fair  Neck supple without lymphadenopathy, no carotid artery bruits auscultated bilaterally. Lungs clear throughout  S1 S2 RRR without ectopy or murmur auscultated. Abdomen: normoactive bowel sounds all quadrants, no tenderness to abdomen upper and lower quadrants. No hepatosplenomegaly  Neuro: cranial nerves II-XII tested and intact. No gait abnormalities. Musculoskeletal: normal examination upper and lower extremities head and neck, no warmth to joints, no edema to joints, no gait abnormalities, motor strength +5/5 upper and lower extremities head and neck. DTRs brisk +2 bilaterally. Integumentary: no rashes  No suspicious skin lesions   Normal skin turgor  Results for orders placed or performed in visit on 05/06/19   AMB POC HEMOGLOBIN A1C   Result Value Ref Range    Hemoglobin A1c (POC) 8.4 %     Visit Vitals  /76   Pulse 80   Temp 97.8 °F (36.6 °C) (Oral)   Resp 17   Ht 6' 2\" (1.88 m)   Wt (!) 351 lb 6.4 oz (159.4 kg)   SpO2 96%   BMI 45.12 kg/m²         DIAGNOSTICS:   1. EKG: EKG FINDINGS -4-  SR-Sinus rhythm-normal P axis, V-rate 50-99      Impression REPIN-Nonspecific repol abnormality, inferior leads-ST dep, T neg, II III aVF     Impression BSTEL-Borderline ST elevation, lateral leads-ST >0.06mV, I aVL V5 V6      Compared with recent EKG:   Patient was seen by Dr. Zohra Sesay with [facility] on 6- for pre-op for right 2nd partial toe amputation and 12 lead EKG reveals sinus rhythm with slight left axis, nonspecific ST changes, basically unchanged since 2012  2.  Labs: Units                   4/25/2019                                                                                                         12:31 PM                Potassium                                        3.5 - 5.5 mmol/L                         3.2 (L)                 Sodium                                           133 - 145 mmol/L                         143                     Chloride                                         98 - 110 mmol/L                          101                     Glucose                                          70 - 99 mg/dL                            144 (H)                 Calcium                                          8.4 - 10.4 mg/dL                         9.3                     BUN                                              6 - 22 mg/dL                             17                      CO2                                              20 - 32 mmol/L                           31                      CREATININE                                       0.5 - 1.2 mg/dL                          0.9                     eGFR                             >60.0                                    >60.0                   eGFR Non                         >60.0                                    >60.0                   ANION GAP                                        mmol/L                                   10. 6                  Protime 21.0, INR2.23    Diagnoses and all orders for this visit:    Type 2 diabetes mellitus with diabetic polyneuropathy, unspecified whether long term insulin use (HCC)  -     AMB POC HEMOGLOBIN A1C    Preoperative general physical examination    Type 2 diabetes mellitus with complication, unspecified whether long term insulin use (HCC)    Abnormal EKG  -     REFERRAL TO CARDIOLOGY    Pre-op examination  -     REFERRAL TO CARDIOLOGY    Primary osteoarthritis of right knee    HTN (hypertension), benign    Pulmonary hypertension (HCC)    DIETER (obstructive sleep apnea)    History of pulmonary embolism    Chronic anticoagulation    Hypokalemia  -     potassium chloride (K-DUR, KLOR-CON) 20 mEq tablet;  Take 1 Tab by mouth two (2) times a day for 10 days. , Normal, Disp-20 Tab, R-0    will discontinue coumadin 5 days prior to right total knee replacement but will check with with orthopedics regarding input. I have discussed the diagnosis with the patient and the intended plan as seen in the above orders. The patient has received an after-visit summary and questions were answered concerning future plans. I have discussed medication side effects and warnings with the patient as well. Follow-up and Dispositions    · Return in about 1 month (around 6/3/2019), or if symptoms worsen or fail to improve, for follow up hypokalemia. IMPRESSION:   Surgery pending until cardiology clears based upon EKG findings on pre-op    Wale Mcdowell NP   5/6/2019    Rufina Elizabeth.  Ariana Green MD

## 2019-05-07 ENCOUNTER — TELEPHONE (OUTPATIENT)
Dept: FAMILY MEDICINE CLINIC | Age: 49
End: 2019-05-07

## 2019-05-07 RX ORDER — POTASSIUM CHLORIDE 20 MEQ/1
20 TABLET, EXTENDED RELEASE ORAL 2 TIMES DAILY
Qty: 20 TAB | Refills: 0 | Status: SHIPPED | OUTPATIENT
Start: 2019-05-07 | End: 2019-05-17

## 2019-05-07 NOTE — TELEPHONE ENCOUNTER
Called and confirmed with patient if he needed a new referral to Dr. Ana Luisa Salguero, patient stated he had seen Ana Luisa Salguero last year and questioned if he needed a referral if he already has an appointment scheduled. Patient was told that no referral was needed and that if he should need notes or any other documentation from [facility] to contact the office. Reviewed abnormal potassium labs with patient, also. Per Bailee Villar patient needs to start taking the potassium sent over to his pharmacy today, he will also stop coumadin therapy 5 days before scheduled surgery unless Dr. Benitez from [facility] tells him otherwise, patient verbalized understanding and stated he would direct any information or questions to nursing.

## 2019-05-07 NOTE — TELEPHONE ENCOUNTER
Patient calling stated he would rather see Shawanda Valverde instead of . Patient stated he saw Blue Garcia last year and would like for the office to try and get him in with [facility] office for Friday or sooner. If he can not get in by then he will keep appt for .     pls advise

## 2019-06-10 ENCOUNTER — TELEPHONE (OUTPATIENT)
Dept: FAMILY MEDICINE CLINIC | Age: 49
End: 2019-06-10

## 2019-06-10 DIAGNOSIS — I10 HTN (HYPERTENSION), BENIGN: ICD-10-CM

## 2019-06-10 DIAGNOSIS — Z79.01 CHRONIC ANTICOAGULATION: Primary | ICD-10-CM

## 2019-06-10 RX ORDER — FUROSEMIDE 20 MG/1
TABLET ORAL
Qty: 30 TAB | Refills: 0 | Status: SHIPPED | OUTPATIENT
Start: 2019-06-10 | End: 2019-07-10 | Stop reason: SDUPTHER

## 2019-06-10 NOTE — TELEPHONE ENCOUNTER
Prieto Bergman with [facility] called to give NP Schimming the patients PT/INR readings. PT:  23.9  INR: 2.0    Patient is taking Coumadin 10mg once daily. She states the [facility]in Clinic was following the patients care but due to the patient orders running out with them, the Coumadin clinic is no longer follows his care. Prieto Bergman states if there are any questions for the [facility]in Clinic to contact Jl Weber at [phone]. Prieto Bergman can  Be contacted back at [phone]. Please advise.

## 2019-06-11 ENCOUNTER — TELEPHONE (OUTPATIENT)
Dept: FAMILY MEDICINE CLINIC | Age: 49
End: 2019-06-11

## 2019-06-11 NOTE — TELEPHONE ENCOUNTER
Renay with [facility] called to check on orders faxed over on 5/28, 5/31, and 6/5/19. Said they were faxed to the attn of  Dr. Angeles Singh.

## 2019-06-12 NOTE — TELEPHONE ENCOUNTER
Orders received on 6/11/2019  Per Willow Null Coordinator for [facility] states orders were sent as a FYI but still require a signature by MD. Will give to the provider nurse for review.

## 2019-06-17 NOTE — TELEPHONE ENCOUNTER
Spoke with Shani Merritt, [facility], to inquire if paperwork was received via fax. Ms. Lucyiza Chahal verbalized understanding, stated paperwork was received, and order has been addressed.

## 2019-06-18 ENCOUNTER — TELEPHONE (OUTPATIENT)
Dept: FAMILY MEDICINE CLINIC | Age: 49
End: 2019-06-18

## 2019-06-18 NOTE — TELEPHONE ENCOUNTER
[facility] nurse called with INR result of 3.1     Per nurse he is currently on 10mg of warfarin daily.

## 2019-06-19 NOTE — TELEPHONE ENCOUNTER
Receive call from [facility], states she did not receive a return call yesterday in regards to INR 3.1    Ant Noonan to have patient take 1/2 tablet of Coumadin tonight then resume 10 mg nightly. Will need to repeat INR on 6/22/2019. She verbalized understanding.       Above orders given verbally by Tammy HURDC

## 2019-06-25 NOTE — TELEPHONE ENCOUNTER
Please write the order for standing INR and fax to the facility. Call patient and advise that he is to continue coumadin 10 mg daily   Repeat INRs monthly and have them faxed to us.    Troy MENDES

## 2019-07-10 DIAGNOSIS — I10 HTN (HYPERTENSION), BENIGN: ICD-10-CM

## 2019-07-11 RX ORDER — FUROSEMIDE 20 MG/1
TABLET ORAL
Qty: 30 TAB | Refills: 0 | Status: SHIPPED | OUTPATIENT
Start: 2019-07-11 | End: 2019-07-30 | Stop reason: SDUPTHER

## 2019-07-21 DIAGNOSIS — E87.6 HYPOKALEMIA: ICD-10-CM

## 2019-07-22 RX ORDER — POTASSIUM CHLORIDE 20 MEQ/1
TABLET, EXTENDED RELEASE ORAL
Qty: 20 TAB | Refills: 0 | OUTPATIENT
Start: 2019-07-22

## 2019-07-22 NOTE — TELEPHONE ENCOUNTER
Patient was admitted for sepsis s/p knee replacement.   He needs hospitalization f/u and his INR was subtherapeutic he needs appointment  Michelle Ferrari

## 2019-07-23 DIAGNOSIS — E87.6 HYPOKALEMIA: Primary | ICD-10-CM

## 2019-07-23 RX ORDER — POTASSIUM CHLORIDE 20 MEQ/1
TABLET, EXTENDED RELEASE ORAL
Qty: 30 TAB | Refills: 0 | Status: SHIPPED | OUTPATIENT
Start: 2019-07-23 | End: 2019-07-30 | Stop reason: SDUPTHER

## 2019-07-24 NOTE — TELEPHONE ENCOUNTER
Left non detailed message requesting a return call to the office. appt needed per provider.  See note below

## 2019-07-30 ENCOUNTER — OFFICE VISIT (OUTPATIENT)
Dept: FAMILY MEDICINE CLINIC | Age: 49
End: 2019-07-30

## 2019-07-30 VITALS
HEART RATE: 75 BPM | WEIGHT: 315 LBS | SYSTOLIC BLOOD PRESSURE: 140 MMHG | HEIGHT: 74 IN | OXYGEN SATURATION: 98 % | DIASTOLIC BLOOD PRESSURE: 74 MMHG | TEMPERATURE: 98 F | RESPIRATION RATE: 16 BRPM | BODY MASS INDEX: 40.43 KG/M2

## 2019-07-30 DIAGNOSIS — Z86.711 HISTORY OF PULMONARY EMBOLISM: ICD-10-CM

## 2019-07-30 DIAGNOSIS — E11.8 TYPE 2 DIABETES MELLITUS WITH COMPLICATION, UNSPECIFIED WHETHER LONG TERM INSULIN USE: ICD-10-CM

## 2019-07-30 DIAGNOSIS — Z79.01 CHRONIC ANTICOAGULATION: ICD-10-CM

## 2019-07-30 DIAGNOSIS — E11.40 TYPE 2 DIABETES MELLITUS WITH DIABETIC NEUROPATHY, UNSPECIFIED WHETHER LONG TERM INSULIN USE (HCC): ICD-10-CM

## 2019-07-30 DIAGNOSIS — E87.6 HYPOKALEMIA: ICD-10-CM

## 2019-07-30 DIAGNOSIS — E78.00 HYPERCHOLESTEROLEMIA: ICD-10-CM

## 2019-07-30 DIAGNOSIS — I10 HTN (HYPERTENSION), BENIGN: Primary | ICD-10-CM

## 2019-07-30 LAB
HBA1C MFR BLD HPLC: 7.9 %
INR BLD: 2.7
PT POC: 31.8 SECONDS
VALID INTERNAL CONTROL?: YES

## 2019-07-30 RX ORDER — LISINOPRIL AND HYDROCHLOROTHIAZIDE 12.5; 2 MG/1; MG/1
TABLET ORAL
Qty: 60 TAB | Refills: 3 | Status: SHIPPED | OUTPATIENT
Start: 2019-07-30 | End: 2019-12-14 | Stop reason: SDUPTHER

## 2019-07-30 RX ORDER — CLONIDINE HYDROCHLORIDE 0.1 MG/1
TABLET ORAL
Qty: 60 TAB | Refills: 3 | Status: SHIPPED | OUTPATIENT
Start: 2019-07-30 | End: 2020-01-02

## 2019-07-30 RX ORDER — AMLODIPINE BESYLATE 10 MG/1
TABLET ORAL
Qty: 30 TAB | Refills: 4 | Status: SHIPPED | OUTPATIENT
Start: 2019-07-30 | End: 2019-10-16 | Stop reason: SDUPTHER

## 2019-07-30 RX ORDER — FUROSEMIDE 20 MG/1
TABLET ORAL
Qty: 30 TAB | Refills: 3 | Status: SHIPPED | OUTPATIENT
Start: 2019-07-30 | End: 2019-12-07 | Stop reason: SDUPTHER

## 2019-07-30 RX ORDER — FLASH GLUCOSE SENSOR
KIT MISCELLANEOUS
Refills: 6 | COMMUNITY
Start: 2019-05-03 | End: 2020-03-26 | Stop reason: ALTCHOICE

## 2019-07-30 RX ORDER — POTASSIUM CHLORIDE 20 MEQ/1
TABLET, EXTENDED RELEASE ORAL
Qty: 30 TAB | Refills: 3 | Status: SHIPPED | OUTPATIENT
Start: 2019-07-30 | End: 2019-12-07 | Stop reason: SDUPTHER

## 2019-07-30 NOTE — PROGRESS NOTES
Chief Complaint   Patient presents with    Diabetes     1. Have you been to the ER, urgent care clinic since your last visit? Hospitalized since your last visit? Admitted to [facility] 6/3/19 for postoperative sepsis     2. Have you seen or consulted any other health care providers outside of the [address] [place] [address] since your last visit? Include any pap smears or colon screening.  Follow with ortho and infectious disease

## 2019-07-30 NOTE — PROGRESS NOTES
SUBJECTIVE:  54 y.o. male for follow up for hypokalemia, hypertension, diabetes. Patient verbally displeased because of questioning about his most recent refill for potassium chloride 20 meq. Advised patient that potassium was refilled but he was asked to come in for lab work, moreover, he has not had recent INR and a standing order was faxed to [facility] and he reported that he has not had time. Home coumadin system has not been able to be obtained for patient because of insurance per patient. Patient is managed for diabetes by endocrinology  Patient is managed by podiatry   Patient recently had total knee replacement on 5- and was admitted to hospital on 6-3-2019 for sepsis to [facility]. Patient reports no knee pain or fever. He again verbally reiterates that he is not pleased about communication with this office and that he is displeased with care rendered. This provider advises acknowledgement of his concerns. Patient is not certain if he will continue with this provider and asks for refills   Review of Systems   Constitutional: Negative. Respiratory: Negative. Cardiovascular: Negative. Musculoskeletal: Negative for joint pain. Current Outpatient Medications   Medication Sig Dispense Refill    potassium chloride (K-DUR, KLOR-CON) 20 mEq tablet One pill po daily, Needs updated potassium lab work and follow up for further refills 30 Tab 0    furosemide (LASIX) 20 mg tablet TAKE 1 TABLET BY MOUTH DAILY 30 Tab 0    warfarin (COUMADIN) 10 mg tablet TAKE 1 TABLET BY MOUTH DAILY OR AS DIRECTED BY PROVIDER 90 Tab 0    atenolol (TENORMIN) 100 mg tablet TAKE 1 TABLET BY MOUTH DAILY 30 Tab 4    tadalafil (CIALIS) 20 mg tablet Take 1 Tab by mouth as needed for Other (ED).  6 Tab 11    cloNIDine HCl (CATAPRES) 0.1 mg tablet TAKE 1 TABLET BY MOUTH TWICE DAILY 60 Tab 3    amLODIPine (NORVASC) 10 mg tablet TAKE 1 TABLET BY MOUTH DAILY 30 Tab 4    lisinopril-hydroCHLOROthiazide (PRINZIDE, ZESTORETIC) 20-12.5 mg per tablet TAKE 2 TABLETS BY MOUTH EVERY DAY 60 Tab 4    fluticasone (FLONASE) 50 mcg/actuation nasal spray 2 Sprays by Both Nostrils route daily as needed for Rhinitis. 1 Bottle 3    warfarin (COUMADIN) 1 mg tablet Take by 2 tablets daily by mouth with 10 mg tablet for a total of 12 mg daily or as directed by healthcare provider 180 Tab 1    insulin glargine (BASAGLAR KWIKPEN) 100 unit/mL (3 mL) inpn 80 Units by SubCUTAneous route.  gabapentin (NEURONTIN) 300 mg capsule   2    BD INSULIN PEN NEEDLE UF SHORT 31 gauge x 5/16\" ndle USE 1 PEN UTD QID  3    BUTRANS 10 mcg/hour LUCA 1 PATCH EVERY WEEK  0    NOVOLOG FLEXPEN 100 unit/mL inpn INJECT 20 UNITS SUBCUTANEOUSLY 3 TIMES A DAY BEFORE MEALS  3    VICTOZA 3-ZANDRA 0.6 mg/0.1 mL (18 mg/3 mL) sub-q pen INJ 1.8 MG SC QD  6    insulin syringe-needle U-100 by Does Not Apply route. Dispense ultrafine 1 mL syringes with 29 gauge needle 100 Syringe 11    aspirin delayed-release 81 mg tablet Take 81 mg by mouth daily.        Wt Readings from Last 3 Encounters:   05/06/19 (!) 351 lb 6.4 oz (159.4 kg)   04/30/19 (!) 365 lb (165.6 kg)   03/20/19 (!) 365 lb (165.6 kg)     BP Readings from Last 3 Encounters:   05/06/19 134/76   04/30/19 134/88   03/20/19 150/90     Lab Results   Component Value Date/Time    Hemoglobin A1c (POC) 8.4 05/06/2019 12:41 PM    Hemoglobin A1c, External 8.3 01/30/2019     Lab Results   Component Value Date/Time    INR 2.11 (H) 02/06/2019 12:59 PM    INR 1.00 11/12/2018 10:31 AM    INR 2.40 (H) 01/10/2017 09:51 AM    INR, External 1.0 11/12/2018    INR POC 1.1 01/24/2019 11:20 AM    INR POC 1.3 07/06/2018 12:55 PM    INR POC 4.1 06/14/2018 11:15 AM    Prothrombin time 19.9 (H) 02/06/2019 12:59 PM    Prothrombin time 10.7 11/12/2018 10:31 AM    Prothrombin time 24.8 (H) 01/10/2017 09:51 AM       OBJECTIVE:  Awake and alert in no acute distress  Lungs clear throughout  S1 S2 RRR without ectopy or murmur auscultated. Extremities: no clubbing, cyanosis, peripheral edema  Integumentary: no rashes  Reviewed vital signs   Visit Vitals  /74 (BP 1 Location: Left arm, BP Patient Position: Sitting)   Pulse 75   Temp 98 °F (36.7 °C) (Oral)   Resp 16   Ht 6' 2\" (1.88 m)   Wt 345 lb (156.5 kg)   SpO2 98%   BMI 44.30 kg/m²     Results for orders placed or performed in visit on 07/30/19   AMB POC PT/INR   Result Value Ref Range    VALID INTERNAL CONTROL POC Yes     Prothrombin time (POC) 31.8 seconds    INR POC 2.7        Diagnoses and all orders for this visit:    HTN (hypertension), benign  -     METABOLIC PANEL, COMPREHENSIVE; Future  -     CBC W/O DIFF; Future  -     furosemide (LASIX) 20 mg tablet; TAKE 1 TABLET BY MOUTH DAILY, Normal, Disp-30 Tab, R-3  -     cloNIDine HCl (CATAPRES) 0.1 mg tablet; TAKE 1 TABLET BY MOUTH TWICE DAILY, Normal, Disp-60 Tab, R-3  -     amLODIPine (NORVASC) 10 mg tablet; TAKE 1 TABLET BY MOUTH DAILY, Normal, Disp-30 Tab, R-4  -     lisinopril-hydroCHLOROthiazide (PRINZIDE, ZESTORETIC) 20-12.5 mg per tablet; TAKE 2 TABLETS BY MOUTH EVERY DAY, Normal, Disp-60 Tab, R-3  -     CBC W/O DIFF  -     METABOLIC PANEL, COMPREHENSIVE    Hypokalemia  -     METABOLIC PANEL, COMPREHENSIVE; Future  -     potassium chloride (K-DUR, KLOR-CON) 20 mEq tablet; One pill po daily, Needs updated potassium lab work and follow up for further refills, Normal, Disp-30 Tab, R-3  -     METABOLIC PANEL, COMPREHENSIVE    Chronic anticoagulation  -     AMB POC PT/INR    History of pulmonary embolism  -     AMB POC PT/INR    Hypercholesterolemia  -     LIPID PANEL; Future  -     LIPID PANEL    Type 2 diabetes mellitus with complication, unspecified whether long term insulin use (HCC)  -     MICROALBUMIN, UR, RAND W/ MICROALB/CREAT RATIO; Future  -     HEMOGLOBIN A1C WITH EAG; Future  -     LIPID PANEL;  Future  -     LIPID PANEL  -     HEMOGLOBIN A1C WITH EAG  -     MICROALBUMIN, UR, RAND W/ MICROALB/CREAT RATIO    Type 2 diabetes mellitus with diabetic neuropathy, unspecified whether long term insulin use ([facility]    advised patient to continue current dosage of warfarin 10 mg daily  Repeat [facility] at [facility] (standing order)  Patient agrees with plan and verbalizes understanding. I have discussed the diagnosis with the patient and the intended plan as seen in the above orders. The patient has received an after-visit summary and questions were answered concerning future plans. I have discussed medication side effects and warnings with the patient as well. Follow-up and Dispositions    · Return in about 4 months (around 11/30/2019) for dm type 2, htn, INR monthly through [facility] lab .

## 2019-07-31 LAB
A-G RATIO,AGRAT: 1.3 RATIO (ref 1.1–2.6)
ALBUMIN SERPL-MCNC: 4.3 G/DL (ref 3.5–5)
ALP SERPL-CCNC: 89 U/L (ref 25–115)
ALT SERPL-CCNC: 20 U/L (ref 5–40)
ANION GAP SERPL CALC-SCNC: 15 MMOL/L
AST SERPL W P-5'-P-CCNC: 22 U/L (ref 10–37)
AVG GLU, 10930: 181 MG/DL (ref 91–123)
BILIRUB SERPL-MCNC: 0.4 MG/DL (ref 0.2–1.2)
BUN SERPL-MCNC: 16 MG/DL (ref 6–22)
CALCIUM SERPL-MCNC: 9.2 MG/DL (ref 8.4–10.4)
CHLORIDE SERPL-SCNC: 96 MMOL/L (ref 98–110)
CHOLEST SERPL-MCNC: 105 MG/DL (ref 110–200)
CO2 SERPL-SCNC: 27 MMOL/L (ref 20–32)
CREAT SERPL-MCNC: 0.9 MG/DL (ref 0.5–1.2)
CREATININE, URINE: 38 MG/DL
ERYTHROCYTE [DISTWIDTH] IN BLOOD BY AUTOMATED COUNT: 15.4 % (ref 10–15.5)
GFRAA, 66117: >60
GFRNA, 66118: >60
GLOBULIN,GLOB: 3.4 G/DL (ref 2–4)
GLUCOSE SERPL-MCNC: 172 MG/DL (ref 70–99)
HBA1C MFR BLD HPLC: 7.9 % (ref 4.8–5.9)
HCT VFR BLD AUTO: 37.4 % (ref 39.3–51.6)
HDLC SERPL-MCNC: 29 MG/DL (ref 40–59)
HDLC SERPL-MCNC: 3.6 MG/DL (ref 0–5)
HGB BLD-MCNC: 11.6 G/DL (ref 13.1–17.2)
LDLC SERPL CALC-MCNC: 50 MG/DL (ref 50–99)
MCH RBC QN AUTO: 26 PG (ref 26–34)
MCHC RBC AUTO-ENTMCNC: 31 G/DL (ref 31–36)
MCV RBC AUTO: 84 FL (ref 80–95)
MICROALB/CREAT RATIO, 140286: 98.2 MCG/MG OF CREATININE (ref 0–30)
MICROALBUMIN,URINE RANDOM 140054: 37.3 MCG/ML (ref 0.1–17)
PLATELET # BLD AUTO: 263 K/UL (ref 140–440)
PMV BLD AUTO: 11.1 FL (ref 9–13)
POTASSIUM SERPL-SCNC: 3.8 MMOL/L (ref 3.5–5.5)
PROT SERPL-MCNC: 7.7 G/DL (ref 6.4–8.3)
RBC # BLD AUTO: 4.45 M/UL (ref 3.8–5.8)
SODIUM SERPL-SCNC: 138 MMOL/L (ref 133–145)
TRIGL SERPL-MCNC: 130 MG/DL (ref 40–149)
VLDLC SERPL CALC-MCNC: 26 MG/DL (ref 8–30)
WBC # BLD AUTO: 5.4 K/UL (ref 4–11)

## 2019-08-12 DIAGNOSIS — I10 HTN (HYPERTENSION), BENIGN: ICD-10-CM

## 2019-08-13 RX ORDER — FUROSEMIDE 20 MG/1
TABLET ORAL
Qty: 30 TAB | Refills: 0 | OUTPATIENT
Start: 2019-08-13

## 2019-09-13 ENCOUNTER — DOCUMENTATION ONLY (OUTPATIENT)
Dept: FAMILY MEDICINE CLINIC | Age: 49
End: 2019-09-13

## 2019-09-13 NOTE — PROGRESS NOTES
Attempted to request home PT/INR monitoring for the patient however this patient  has monthly INR and MD/INR is for weekly INR monitoring and not monthly INR monitoring. Provider made aware.

## 2019-10-04 ENCOUNTER — OFFICE VISIT (OUTPATIENT)
Dept: FAMILY MEDICINE CLINIC | Age: 49
End: 2019-10-04

## 2019-10-04 ENCOUNTER — DOCUMENTATION ONLY (OUTPATIENT)
Dept: FAMILY MEDICINE CLINIC | Age: 49
End: 2019-10-04

## 2019-10-04 VITALS
WEIGHT: 315 LBS | DIASTOLIC BLOOD PRESSURE: 62 MMHG | RESPIRATION RATE: 17 BRPM | HEIGHT: 74 IN | SYSTOLIC BLOOD PRESSURE: 136 MMHG | HEART RATE: 89 BPM | BODY MASS INDEX: 40.43 KG/M2 | OXYGEN SATURATION: 99 % | TEMPERATURE: 98.2 F

## 2019-10-04 DIAGNOSIS — I27.82 CHRONIC PULMONARY EMBOLISM, UNSPECIFIED PULMONARY EMBOLISM TYPE, UNSPECIFIED WHETHER ACUTE COR PULMONALE PRESENT (HCC): ICD-10-CM

## 2019-10-04 DIAGNOSIS — Z79.01 CHRONIC ANTICOAGULATION: ICD-10-CM

## 2019-10-04 DIAGNOSIS — L03.032 CELLULITIS OF TOE OF LEFT FOOT: Primary | ICD-10-CM

## 2019-10-04 LAB
INR BLD: 2.6
PT POC: NORMAL
VALID INTERNAL CONTROL?: YES

## 2019-10-04 RX ORDER — DOXYCYCLINE 100 MG/1
100 TABLET ORAL 2 TIMES DAILY
Qty: 20 TAB | Refills: 0 | Status: SHIPPED | OUTPATIENT
Start: 2019-10-04 | End: 2019-10-14

## 2019-10-04 NOTE — PROGRESS NOTES
Chief Complaint   Patient presents with    Other     open, draining skin on left second toe        1. Have you been to the ER, urgent care clinic since your last visit? Hospitalized since your last visit? No    2. Have you seen or consulted any other health care providers outside of the [address] [place] [address] since your last visit? Include any pap smears or colon screening.  No

## 2019-10-04 NOTE — PROGRESS NOTES
HISTORY OF PRESENT ILLNESS    Lonnie Norman is a 54y.o. year old male here today for:  Left toe blister ruptured in a DM type 2 patient several weeks ago  Draining serosanguinous drainage mild tenderness  No malodor to drainage  Patient also is chronically anticoagulated for chronic pulmonary embolism last INR was 7- 5.62   Uncertain of INR today currently taking coumadin 10 mg daily   Lab Results   Component Value Date/Time    INR 2.11 (H) 02/06/2019 12:59 PM    INR 1.00 11/12/2018 10:31 AM    INR 2.40 (H) 01/10/2017 09:51 AM    INR, External 1.0 11/12/2018    INR POC 2.7 07/30/2019 09:23 AM    INR POC 1.1 01/24/2019 11:20 AM    INR POC 1.3 07/06/2018 12:55 PM    Prothrombin time 19.9 (H) 02/06/2019 12:59 PM    Prothrombin time 10.7 11/12/2018 10:31 AM    Prothrombin time 24.8 (H) 01/10/2017 09:51 AM     Review of Systems   Constitutional: Negative for chills and fever. Cardiovascular: Negative for chest pain. Skin: Negative for itching and rash. Current Outpatient Medications   Medication Sig Dispense Refill    potassium chloride (K-DUR, KLOR-CON) 20 mEq tablet One pill po daily, Needs updated potassium lab work and follow up for further refills 30 Tab 3    FREESTYLE CASPER 14 DAY SENSOR kit USE AS DIRECTED  6    furosemide (LASIX) 20 mg tablet TAKE 1 TABLET BY MOUTH DAILY 30 Tab 3    cloNIDine HCl (CATAPRES) 0.1 mg tablet TAKE 1 TABLET BY MOUTH TWICE DAILY 60 Tab 3    amLODIPine (NORVASC) 10 mg tablet TAKE 1 TABLET BY MOUTH DAILY 30 Tab 4    lisinopril-hydroCHLOROthiazide (PRINZIDE, ZESTORETIC) 20-12.5 mg per tablet TAKE 2 TABLETS BY MOUTH EVERY DAY 60 Tab 3    warfarin (COUMADIN) 10 mg tablet TAKE 1 TABLET BY MOUTH DAILY OR AS DIRECTED BY PROVIDER 90 Tab 0    atenolol (TENORMIN) 100 mg tablet TAKE 1 TABLET BY MOUTH DAILY 30 Tab 4    tadalafil (CIALIS) 20 mg tablet Take 1 Tab by mouth as needed for Other (ED).  6 Tab 11    fluticasone (FLONASE) 50 mcg/actuation nasal spray 2 Sprays by Both Nostrils route daily as needed for Rhinitis. 1 Bottle 3    warfarin (COUMADIN) 1 mg tablet Take by 2 tablets daily by mouth with 10 mg tablet for a total of 12 mg daily or as directed by healthcare provider 180 Tab 1    insulin glargine (BASAGLAR KWIKPEN) 100 unit/mL (3 mL) inpn 80 Units by SubCUTAneous route.  gabapentin (NEURONTIN) 300 mg capsule   2    BD INSULIN PEN NEEDLE UF SHORT 31 gauge x 5/16\" ndle USE 1 PEN UTD QID  3    BUTRANS 10 mcg/hour LUCA 1 PATCH EVERY WEEK  0    NOVOLOG FLEXPEN 100 unit/mL inpn INJECT 20 UNITS SUBCUTANEOUSLY 3 TIMES A DAY BEFORE MEALS  3    VICTOZA 3-ZANDRA 0.6 mg/0.1 mL (18 mg/3 mL) sub-q pen INJ 1.8 MG SC QD  6    insulin syringe-needle U-100 by Does Not Apply route. Dispense ultrafine 1 mL syringes with 29 gauge needle 100 Syringe 11    aspirin delayed-release 81 mg tablet Take 81 mg by mouth daily.        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    Pulmonary hypertension (HCC) I27.20    HTN (hypertension), benign I10    DIETER (obstructive sleep apnea) G47.33    CVD (cardiovascular disease) I25.10    Fatty liver K76.0    Morbid obesity ([facility] [address] E66.01    Microscopic hematuria R31.29    Renal mass N28.89    Tendon tear, foot S96.919A    Chronic pulmonary edema J81.1    Anticoagulant long-term use Z79.01    Carpal tunnel syndrome G56.00    Frontal headache R51    History of MRSA infection Z86.14    HNP (herniated nucleus pulposus), lumbar M51.26    Hyperlipidemia E78.5    Lumbar radiculopathy M54.16    Diabetic foot infection ([facility] [address] E11.628, L08.9    Personal history of pulmonary embolism Z86.711    Primary osteoarthritis of left knee M17.12    Pure hypercholesterolemia E78.00    Radial styloid tenosynovitis M65.4    S/P hip replacement Z96.649    Sepsis due to methicillin susceptible Staphylococcus aureus (HCC) A41.01    SIRS (systemic inflammatory response syndrome) ([facility]) R65.10    Type 2 diabetes mellitus with diabetic neuropathy (HCC) E11.40     Reviewed past medical, family and social history    OBJECTIVE:  Awake and alert in no acute distress  Lungs clear throughout  S1 S2 RRR without ectopy or murmur auscultated. Integumentary: left second toe plantar aspect, DIP region serosanguinous drainage with 1 cm superficial wound   Mild TTP   Wound culture obtained   DP and PT +2 left foot   Applied bacitracin to wound and covered with band aid   Visit Vitals  /62 (BP 1 Location: Right arm, BP Patient Position: Sitting)   Pulse 89   Temp 98.2 °F (36.8 °C) (Oral)   Resp 17   Ht 6' 2\" (1.88 m)   Wt 336 lb 3.2 oz (152.5 kg)   SpO2 99%   BMI 43.17 kg/m²     Results for orders placed or performed in visit on 10/04/19   AMB POC PT/INR   Result Value Ref Range    VALID INTERNAL CONTROL POC Yes     Prothrombin time (POC)      INR POC 2.6        Diagnoses and all orders for this visit:    Cellulitis of toe of left foot  -     CULTURE, WOUND W GRAM STAIN; Future  -     doxycycline (ADOXA) 100 mg tablet; Take 1 Tab by mouth two (2) times a day for 10 days. , Normal, Disp-20 Tab, R-0    Chronic pulmonary embolism, unspecified pulmonary embolism type, unspecified whether acute cor pulmonale present (HCC)  -     AMB POC PT/INR    Chronic anticoagulation  -     AMB POC PT/INR    Reviewed risks and benefits and common side effects of new medication  General comfort measures  Continue current dose of coumadin   INR in one week once he receives the INR  Anticipatory guidance given  Patient agrees with plan and verbalizes understanding. Home INR unit will be sent to his home for weekly INRs   Patient will receive a telephone call regarding delivery  Patient agrees with plan and verbalizes understanding. I have discussed the diagnosis with the patient and the intended plan as seen in the above orders. The patient has received an after-visit summary and questions were answered concerning future plans.   I have discussed medication side effects and warnings with the patient as well. Follow-up and Dispositions    · Return if symptoms worsen or fail to improve.

## 2019-10-05 LAB — RESULT: ABNORMAL

## 2019-10-06 LAB — RESULT: ABNORMAL

## 2019-10-08 ENCOUNTER — TELEPHONE (OUTPATIENT)
Dept: FAMILY MEDICINE CLINIC | Age: 49
End: 2019-10-08

## 2019-10-08 DIAGNOSIS — L03.032 CELLULITIS OF TOE OF LEFT FOOT: Primary | ICD-10-CM

## 2019-10-08 RX ORDER — LEVOFLOXACIN 500 MG/1
500 TABLET, FILM COATED ORAL DAILY
Qty: 10 TAB | Refills: 0 | Status: SHIPPED | OUTPATIENT
Start: 2019-10-08 | End: 2019-10-18

## 2019-10-08 NOTE — PROGRESS NOTES
Called and spoke with patient regarding wound culture results, patient verbalized understanding that he would discontinue the doxycycline and start Levaquin and would recheck his INR within 7 days of starting antibiotic therapy.

## 2019-10-08 NOTE — PROGRESS NOTES
Please call patient and let him know that his culture results came back and the doxycycline that he is on is not strong enough to cover the three bacteria growing. No MRSA detected. As a result Levaquin 500 mg to be taken daily for the next 10 days has been sent to his pharmacy. Please stop the doxycycline and start the Levaquin. Please perform INR within 7 days and let me know what the reading is while on the antibiotics.   Alonzo Shelton, DNP, FNP-BC

## 2019-10-08 NOTE — TELEPHONE ENCOUNTER
Left patient a detailed voicemail message informing him that [facility] has received his application and they are currently in the insurance verification process. He should hear from them within the next 2 days and if further assistance is needed to call me at the office.

## 2019-10-11 ENCOUNTER — TELEPHONE (OUTPATIENT)
Dept: FAMILY MEDICINE CLINIC | Age: 49
End: 2019-10-11

## 2019-10-11 NOTE — TELEPHONE ENCOUNTER
Called and spoke with Bladimir from [facility] outpatient lab regarding standing order that had been faxed over and was not able to be read. New order for standing PT and INR labs was faxed over to the lab.

## 2019-10-11 NOTE — TELEPHONE ENCOUNTER
Spoke to patient and informed him that he has been approved for home PT/INR monitoring, however they are waiting on a return call from him to confirm the shipment of the equipment. Patient verbalized understanding.

## 2019-10-11 NOTE — TELEPHONE ENCOUNTER
Per provider request patient was contacted and advised to have his PT/INR performed today at [facility] since he does not yet have the equipment for the home monitoring. Patient verbalized understanding and reports that he was on his way there now.

## 2019-10-15 ENCOUNTER — TELEPHONE (OUTPATIENT)
Dept: FAMILY MEDICINE CLINIC | Age: 49
End: 2019-10-15

## 2019-10-15 NOTE — PROGRESS NOTES
Please call patient and advise that we do have his INR from [facility] 1.4 and I need for him to increase his coumadin to 11 mg daily and then repeat INR in 7 days and let me know.   Jean Petit, DNP, FNP-BC

## 2019-10-15 NOTE — TELEPHONE ENCOUNTER
----- Message from Domenico Carrillo NP sent at 10/15/2019  5:30 PM EDT -----  Please call patient and advise that we do have his INR from [facility] and I need for him to increase his coumadin to 11 mg daily and then repeat INR in 7 days and let me know.   Laith Bonner DNP, FNP-BC

## 2019-10-21 ENCOUNTER — TELEPHONE (OUTPATIENT)
Dept: FAMILY MEDICINE CLINIC | Age: 49
End: 2019-10-21

## 2019-10-21 NOTE — TELEPHONE ENCOUNTER
Spoke to patient to ensure he has received the equipment and training for home pt/inr monitoring. Patient reports that he received the monitor and when they came out to do the training the testing strips did not work. He states that they ordered new strips and will be coming back out to complete the training. Patient was advised that if he did not have the supplies and training by tomorrow to report to [facility] to have his PT/INR completed. Patient verbalized understanding.

## 2019-10-24 DIAGNOSIS — I10 HTN (HYPERTENSION), BENIGN: ICD-10-CM

## 2019-10-24 RX ORDER — ATENOLOL 100 MG/1
TABLET ORAL
Qty: 30 TAB | Refills: 0 | Status: SHIPPED | OUTPATIENT
Start: 2019-10-24 | End: 2019-11-18 | Stop reason: SDUPTHER

## 2019-11-03 DIAGNOSIS — Z79.01 WARFARIN ANTICOAGULATION: ICD-10-CM

## 2019-11-04 ENCOUNTER — TELEPHONE ANTICOAG (OUTPATIENT)
Dept: FAMILY MEDICINE CLINIC | Age: 49
End: 2019-11-04

## 2019-11-04 ENCOUNTER — TELEPHONE (OUTPATIENT)
Dept: FAMILY MEDICINE CLINIC | Age: 49
End: 2019-11-04

## 2019-11-04 DIAGNOSIS — Z79.01 ANTICOAGULANT LONG-TERM USE: ICD-10-CM

## 2019-11-04 RX ORDER — WARFARIN 10 MG/1
TABLET ORAL
Qty: 90 TAB | Refills: 0 | Status: SHIPPED | OUTPATIENT
Start: 2019-11-04 | End: 2020-01-30

## 2019-11-04 NOTE — TELEPHONE ENCOUNTER
Called and spoke with patient advised him to increase coumadin to 11 mg daily and repeat in 7 days. Patient verbalized understanding.

## 2019-11-14 LAB — INR, EXTERNAL: 2.2

## 2019-11-21 ENCOUNTER — TELEPHONE (OUTPATIENT)
Dept: FAMILY MEDICINE CLINIC | Age: 49
End: 2019-11-21

## 2019-11-21 LAB — INR, EXTERNAL: 3

## 2019-11-21 NOTE — TELEPHONE ENCOUNTER
Called and spoke with patient to let patient know that home INR results were received and to continue current coumadin dose, then repeat in one month, patient verbalized understanding.

## 2019-11-25 DIAGNOSIS — Z79.01 ANTICOAGULANT LONG-TERM USE: Primary | ICD-10-CM

## 2019-11-29 ENCOUNTER — TELEPHONE (OUTPATIENT)
Dept: FAMILY MEDICINE CLINIC | Age: 49
End: 2019-11-29

## 2019-11-29 ENCOUNTER — TELEPHONE ANTICOAG (OUTPATIENT)
Dept: FAMILY MEDICINE CLINIC | Age: 49
End: 2019-11-29

## 2019-11-29 LAB — INR, EXTERNAL: 1.6

## 2019-11-29 NOTE — TELEPHONE ENCOUNTER
Lab at [facility] called in regards to out of range INR, they hung up while speaking with nurse Traci Quinones to transfer.

## 2019-12-04 ENCOUNTER — TELEPHONE (OUTPATIENT)
Dept: FAMILY MEDICINE CLINIC | Age: 49
End: 2019-12-04

## 2019-12-04 DIAGNOSIS — Z79.01 ANTICOAGULANT LONG-TERM USE: ICD-10-CM

## 2019-12-04 LAB — INR, EXTERNAL: 1.5

## 2019-12-04 NOTE — TELEPHONE ENCOUNTER
Pharmacy Note - Anticoagulation 12/04/19 S/O:  Mr. Mike Werner ,54 y.o. male, referred by Dr. Lyly Figueroa, NP, was contacted via an outbound telephone call today for telephonic anticoagulation management for the diagnosis of CVA, DVT, and PE. Patient has home POC/PT INR machine. The most recent INR was 1.6 on 11/29. Patient stated he called INR into INR machine company and never received call back. Had patient re-check INR today using home INR machine - per patient INR was 1.5 and PT 18.3 Patient states on 11/21 INR was 3.0 (was on 11 mg daily); states on 11/22 he self-changed dose to 10 mg daily then re-checked on 11/29 HPI:  
 
Interim History: · INR Goal:  2.0-3.0 · Current warfarin regimen: 10 mg daily (was instructed on 11/21 to continue 11 mg daily per note in EMR, patient self changed dose to 10 mg daily) · Warfarin tablet strength:  1 mg, 10 mg · Total weekly dose (mg): 70 mg daily 
 
-States that on 11/22, he missed his warfarin dose Today's pertinent positives includes: 
Change in diet/appetite  
-Has had justina greens almost every day - states prior he didn't usually have too many justina greens 
-Denies any changes in alcohol intake 
-Denies any bruising/bleeding/chest pain/SOB · Adherence: · Able to recall regimen? YES 
· Miss/extra dose? YES 
· Need refill? NO Past Medical History:  
Diagnosis Date  Diabetes ([facility] [address]  Erectile dysfunction due to diseases classified elsewhere  Hematuria   
 right renal cyst  
 Hypercholesterolemia  Hypertension  Liver disease Fatty Liver  Pulmonary emboli [facility]) 2003 & 2004  
 chronic coumadin therapy managed by Dr. Tacey Runner  Renal failure acute   
 following surgery  Sleep apnea   
 does not use cpap machine Allergies Allergen Reactions  Tape [Adhesive] Contact Dermatitis ALLERGIC TO SILK TAPE ONLY Current Outpatient Medications Medication Sig  
  atenolol (TENORMIN) 100 mg tablet TAKE 1 TABLET BY MOUTH DAILY  warfarin (COUMADIN) 10 mg tablet TAKE 1 TABLET BY MOUTH DAILY AS DIRECTED BY PROVIDER  
 amLODIPine (NORVASC) 10 mg tablet TAKE 1 TABLET BY MOUTH DAILY  potassium chloride (K-DUR, KLOR-CON) 20 mEq tablet One pill po daily, Needs updated potassium lab work and follow up for further refills  FREESTYLE CASPER 14 DAY SENSOR kit USE AS DIRECTED  furosemide (LASIX) 20 mg tablet TAKE 1 TABLET BY MOUTH DAILY  cloNIDine HCl (CATAPRES) 0.1 mg tablet TAKE 1 TABLET BY MOUTH TWICE DAILY  lisinopril-hydroCHLOROthiazide (PRINZIDE, ZESTORETIC) 20-12.5 mg per tablet TAKE 2 TABLETS BY MOUTH EVERY DAY  tadalafil (CIALIS) 20 mg tablet Take 1 Tab by mouth as needed for Other (ED).  fluticasone (FLONASE) 50 mcg/actuation nasal spray 2 Sprays by Both Nostrils route daily as needed for Rhinitis.  warfarin (COUMADIN) 1 mg tablet Take by 2 tablets daily by mouth with 10 mg tablet for a total of 12 mg daily or as directed by healthcare provider  insulin glargine (BASAGLAR KWIKPEN) 100 unit/mL (3 mL) inpn 80 Units by SubCUTAneous route.  gabapentin (NEURONTIN) 300 mg capsule  BD INSULIN PEN NEEDLE UF SHORT 31 gauge x 5/16\" ndle USE 1 PEN UTD QID  BUTRANS 10 mcg/hour LUCA 1 PATCH EVERY WEEK  
 NOVOLOG FLEXPEN 100 unit/mL inpn INJECT 20 UNITS SUBCUTANEOUSLY 3 TIMES A DAY BEFORE MEALS  VICTOZA 3-ZANDRA 0.6 mg/0.1 mL (18 mg/3 mL) sub-q pen INJ 1.8 MG SC QD  
 insulin syringe-needle U-100 by Does Not Apply route. Dispense ultrafine 1 mL syringes with 29 gauge needle  aspirin delayed-release 81 mg tablet Take 81 mg by mouth daily. No current facility-administered medications for this visit. Wt Readings from Last 3 Encounters:  
10/04/19 336 lb 3.2 oz (152.5 kg) 07/30/19 345 lb (156.5 kg) 05/06/19 (!) 351 lb 6.4 oz (159.4 kg) BP Readings from Last 3 Encounters:  
10/04/19 136/62  
07/30/19 140/74  
05/06/19 134/76 Lab Results Component Value Date/Time Sodium 138 07/30/2019 09:53 AM  
 Potassium 3.8 07/30/2019 09:53 AM  
 Chloride 96 (L) 07/30/2019 09:53 AM  
 CO2 27 07/30/2019 09:53 AM  
 Anion gap 15.0 07/30/2019 09:53 AM  
 Glucose 172 (H) 07/30/2019 09:53 AM  
 BUN 16 07/30/2019 09:53 AM  
 Creatinine 0.9 07/30/2019 09:53 AM  
 GFR est AA >60 03/24/2014 01:56 PM  
 GFR est non-AA >60 03/24/2014 01:56 PM  
 Calcium 9.2 07/30/2019 09:53 AM  
 
 
Lab Results Component Value Date/Time WBC 5.4 07/30/2019 09:53 AM  
 HGB 11.6 (L) 07/30/2019 09:53 AM  
 HCT 37.4 (L) 07/30/2019 09:53 AM  
 PLATELET 154 69/46/2258 09:53 AM  
 MCV 84 07/30/2019 09:53 AM  
 
 
Lab Results Component Value Date/Time Protein, total 7.7 07/30/2019 09:53 AM  
 Albumin 4.3 07/30/2019 09:53 AM  
 
 
Estimated Creatinine Clearance: 158.3 mL/min (by C-G formula based on SCr of 0.9 mg/dL). INR History:   (normal INR range 0.8-1.2) Date   INR  Dose/Comments · Medications that can increase  INR: 
· Medications that can decrease INR:  
 
A/P:    
 
Anticoagulation: 
Considering Mr. Turcios's past history, todays findings, and per Anticoagulation Collaborative Practice Agreement/Protocol: 1. POC INR (1.5) is Subtherapeutic for INR goal today. 2.  Change warfarin - Take 13 mg tonight then resume 11 mg daily until INR check on 12/11/19 
-Had patient read back regimen to verify he wrote down correct instructions for warfarin dose 3. Next INR check will be in 1 week. -Instructed patient to call PharmD on 12/11/19 with results A full discussion of the nature of anticoagulants has been carried out. A full discussion of the need for frequent and regular monitoring, precise dosage adjustment and compliance was stressed.   Side effects of potential bleeding were discussed and Mr. Andrez Blake was instructed to call [phone] if there are any signs of abnormal bleeding or symptoms of clotting (e.g. chest pain, pain in legs, SOB). Instructed patient to go to emergency department for care if he experiences urgent symptoms including fall with hit to head, bleeding that does not stop, chest pain, or SOB. Mr. Roopa Holder was instructed to avoid any major changes in his general diet and to avoid alcohol consumption. Mr. Roopa Holder verbalized his understanding of all instructions and will call the office with any questions, concerns, or signs of abnormal bleeding or blood clot. Notifications of recommendations will be sent to Dr. Fidencio Sheridan, ANDREZ for review. Thank you, Doris López, PharmD Clinical Pharmacist Specialist

## 2019-12-07 DIAGNOSIS — I10 HTN (HYPERTENSION), BENIGN: ICD-10-CM

## 2019-12-07 DIAGNOSIS — E87.6 HYPOKALEMIA: ICD-10-CM

## 2019-12-09 RX ORDER — FUROSEMIDE 20 MG/1
TABLET ORAL
Qty: 30 TAB | Refills: 0 | Status: SHIPPED | OUTPATIENT
Start: 2019-12-09 | End: 2020-01-02

## 2019-12-09 RX ORDER — POTASSIUM CHLORIDE 20 MEQ/1
TABLET, EXTENDED RELEASE ORAL
Qty: 30 TAB | Refills: 0 | Status: SHIPPED | OUTPATIENT
Start: 2019-12-09 | End: 2020-01-02

## 2019-12-11 ENCOUNTER — TELEPHONE (OUTPATIENT)
Dept: FAMILY MEDICINE CLINIC | Age: 49
End: 2019-12-11

## 2019-12-11 DIAGNOSIS — Z79.01 ANTICOAGULANT LONG-TERM USE: ICD-10-CM

## 2019-12-11 LAB — INR, EXTERNAL: 2.1

## 2019-12-11 NOTE — TELEPHONE ENCOUNTER
Pharmacy Note - Anticoagulation 12/11/19 S/O:  Mr. Milena Caldwell ,54 y.o. male, referred by Dr. Tati Garcia, ANDREZ, was contacted via an outbound telephone call today for telephonic anticoagulation management for the diagnosis of CVA, DVT, and PE. Patient checks POC/PT INR using home INR machine. Patient reports INR today when he checked using home machine was 2.1. Patient states he needs to order new INR test strips as he is running low. HPI:  
 
Interim History: · INR Goal: 2.0-3.0 · Current warfarin regimen: 11 mg daily · Confirmed took 13 mg on 12/4 as instructed · Warfarin tablet strength: 1 mg, 10 mg · Total weekly dose (mg): 70 mg daily Today's pertinent positives includes: No significant changes since last visit -Reports decreased intake of greens - went from justina greens every day to now only 4 times per week 
-Denies any change in alcohol intake 
-Denies any medication changes 
-Denies any bruising/bleeding/chest pain/SOB · Adherence: · Able to recall regimen? YES 
· Miss/extra dose? NO 
· Need refill? NO Upcoming procedure(s):  NO 
 
Past Medical History:  
Diagnosis Date  Diabetes ([facility] [address]  Erectile dysfunction due to diseases classified elsewhere  Hematuria   
 right renal cyst  
 Hypercholesterolemia  Hypertension  Liver disease Fatty Liver  Pulmonary emboli [facility]) 2003 & 2004  
 chronic coumadin therapy managed by Dr. Shane Martino  Renal failure acute   
 following surgery  Sleep apnea   
 does not use cpap machine Allergies Allergen Reactions  Tape [Adhesive] Contact Dermatitis ALLERGIC TO SILK TAPE ONLY Current Outpatient Medications Medication Sig  furosemide (LASIX) 20 mg tablet TAKE 1 TABLET BY MOUTH EVERY DAY  potassium chloride (K-DUR, KLOR-CON) 20 mEq tablet TAKE 1 TABLET BY MOUTH DAILY. FOLLOW UP FOR FURTHER REFILLS  atenolol (TENORMIN) 100 mg tablet TAKE 1 TABLET BY MOUTH DAILY  warfarin (COUMADIN) 10 mg tablet TAKE 1 TABLET BY MOUTH DAILY AS DIRECTED BY PROVIDER (Patient taking differently: Taking 11 mg of warfarin daily)  amLODIPine (NORVASC) 10 mg tablet TAKE 1 TABLET BY MOUTH DAILY  cloNIDine HCl (CATAPRES) 0.1 mg tablet TAKE 1 TABLET BY MOUTH TWICE DAILY (Patient taking differently: Take 0.2 mg by mouth every evening.)  lisinopril-hydroCHLOROthiazide (PRINZIDE, ZESTORETIC) 20-12.5 mg per tablet TAKE 2 TABLETS BY MOUTH EVERY DAY  tadalafil (CIALIS) 20 mg tablet Take 1 Tab by mouth as needed for Other (ED).  fluticasone (FLONASE) 50 mcg/actuation nasal spray 2 Sprays by Both Nostrils route daily as needed for Rhinitis.  warfarin (COUMADIN) 1 mg tablet Take by 2 tablets daily by mouth with 10 mg tablet for a total of 12 mg daily or as directed by healthcare provider (Patient taking differently: Taking 11 mg of warfarin daily)  insulin glargine (BASAGLAR KWIKPEN) 100 unit/mL (3 mL) inpn 80 Units by SubCUTAneous route. Takes 40 units twice a day.  gabapentin (NEURONTIN) 300 mg capsule Take 300 mg by mouth two (2) times a day.  BD INSULIN PEN NEEDLE UF SHORT 31 gauge x 5/16\" ndle USE 1 PEN UTD QID  BUTRANS 10 mcg/hour LUCA 1 PATCH EVERY WEEK  
 NOVOLOG FLEXPEN 100 unit/mL inpn Takes 14-24 units per meal.  
 VICTOZA 3-ZANDRA 0.6 mg/0.1 mL (18 mg/3 mL) sub-q pen INJ 1.8 MG SC QD  
 insulin syringe-needle U-100 by Does Not Apply route. Dispense ultrafine 1 mL syringes with 29 gauge needle  aspirin delayed-release 81 mg tablet Take 81 mg by mouth daily.  FREESTYLE CASPER 14 DAY SENSOR kit USE AS DIRECTED No current facility-administered medications for this visit. Wt Readings from Last 3 Encounters:  
10/04/19 336 lb 3.2 oz (152.5 kg) 07/30/19 345 lb (156.5 kg) 05/06/19 (!) 351 lb 6.4 oz (159.4 kg) BP Readings from Last 3 Encounters:  
10/04/19 136/62  
07/30/19 140/74 05/06/19 134/76 Lab Results Component Value Date/Time Sodium 138 07/30/2019 09:53 AM  
 Potassium 3.8 07/30/2019 09:53 AM  
 Chloride 96 (L) 07/30/2019 09:53 AM  
 CO2 27 07/30/2019 09:53 AM  
 Anion gap 15.0 07/30/2019 09:53 AM  
 Glucose 172 (H) 07/30/2019 09:53 AM  
 BUN 16 07/30/2019 09:53 AM  
 Creatinine 0.9 07/30/2019 09:53 AM  
 GFR est AA >60 03/24/2014 01:56 PM  
 GFR est non-AA >60 03/24/2014 01:56 PM  
 Calcium 9.2 07/30/2019 09:53 AM  
 
 
Lab Results Component Value Date/Time WBC 5.4 07/30/2019 09:53 AM  
 HGB 11.6 (L) 07/30/2019 09:53 AM  
 HCT 37.4 (L) 07/30/2019 09:53 AM  
 PLATELET 579 42/90/4691 09:53 AM  
 MCV 84 07/30/2019 09:53 AM  
 
 
Lab Results Component Value Date/Time Protein, total 7.7 07/30/2019 09:53 AM  
 Albumin 4.3 07/30/2019 09:53 AM  
 
 
Estimated Creatinine Clearance: 158.3 mL/min (by C-G formula based on SCr of 0.9 mg/dL). INR History:  (normal INR range 0.8-1.2) Date   INR  Dose/Comments 12/11/19 2.1  11 mg daily (confirmed took 13 mg on 12/4 as instructed) 12/4/19 1.5  10 mg daily 11/29/19 1.6  10 mg daily A/P:    
 
Anticoagulation: 
Considering Mr. Turcios's past history, todays findings, and per Anticoagulation Collaborative Practice Agreement/Protocol: 1. POC INR (2.1 today) is Therapeutic for INR goal today. 2.  Continue warfarin - 11 mg daily 3. Next INR check will be in 3 week(s) - January 2, 2020 Medication reconciliation was completed during the visit. There are no discontinued medications. A full discussion of the nature of anticoagulants has been carried out. A full discussion of the need for frequent and regular monitoring, precise dosage adjustment and compliance was stressed. Side effects of potential bleeding were discussed and Mr. Charleen Sweeney was instructed to call [phone] if there are any signs of abnormal bleeding.   Mr. Charleen Sweeney was instructed to avoid any OTC items containing aspirin or ibuprofen and prior to starting any new OTC products to consult with his physician or pharmacist to ensure no drug interactions are present. Mr. Min Ewing was instructed to avoid any major changes in his general diet and to avoid alcohol consumption. Mr. Min Ewing verbalized his understanding of all instructions and will call the office with any questions, concerns, or signs of abnormal bleeding or blood clot. Notifications of recommendations will be sent to Dr. Paula Puentes NP for review. Thank you, Maryam King, PharmD Clinical Pharmacist Specialist

## 2019-12-11 NOTE — TELEPHONE ENCOUNTER
Called patient to follow up on message about INR result using home machine. No answer, left voicemail requesting patient return call to PharmD. Will continue to attempt to reach patient. Sydnie Samuel, TerrenceD Clinical Pharmacist Specialist

## 2019-12-31 ENCOUNTER — TELEPHONE (OUTPATIENT)
Dept: FAMILY MEDICINE CLINIC | Age: 49
End: 2019-12-31

## 2019-12-31 DIAGNOSIS — E87.6 HYPOKALEMIA: ICD-10-CM

## 2019-12-31 DIAGNOSIS — I10 HTN (HYPERTENSION), BENIGN: ICD-10-CM

## 2019-12-31 NOTE — TELEPHONE ENCOUNTER
Called and left message with patient regarding recent INR readings. Patient needs to verify what dosage of coumadin he is taking,  the frequency of the dose and if the patient has had any missed doses.

## 2019-12-31 NOTE — TELEPHONE ENCOUNTER
Patient called back and confirmed he was taking 11mg of coumadin daily, and had not missed any doses. Patient was advised to continue at current dose and check INR weekly. Patient verbalized understanding.

## 2020-01-02 RX ORDER — FUROSEMIDE 20 MG/1
TABLET ORAL
Qty: 30 TAB | Refills: 2 | Status: SHIPPED | OUTPATIENT
Start: 2020-01-02 | End: 2020-04-02 | Stop reason: SDUPTHER

## 2020-01-02 RX ORDER — POTASSIUM CHLORIDE 20 MEQ/1
TABLET, EXTENDED RELEASE ORAL
Qty: 30 TAB | Refills: 2 | Status: SHIPPED | OUTPATIENT
Start: 2020-01-02 | End: 2020-04-02 | Stop reason: SDUPTHER

## 2020-01-02 RX ORDER — CLONIDINE HYDROCHLORIDE 0.1 MG/1
TABLET ORAL
Qty: 60 TAB | Refills: 2 | Status: SHIPPED | OUTPATIENT
Start: 2020-01-02 | End: 2020-04-02 | Stop reason: SDUPTHER

## 2020-01-02 NOTE — TELEPHONE ENCOUNTER
I approved all medication refills but I need updated lab work ([facility]). If he wants I can order and we can fax it to the [facility] facility closest to his residence.  Talia Ford DNP, FNP-BC

## 2020-01-09 ENCOUNTER — TELEPHONE (OUTPATIENT)
Dept: FAMILY MEDICINE CLINIC | Age: 50
End: 2020-01-09

## 2020-01-09 DIAGNOSIS — Z79.01 ANTICOAGULANT LONG-TERM USE: ICD-10-CM

## 2020-01-09 LAB — INR, EXTERNAL: 2.7

## 2020-01-09 RX ORDER — CEPHALEXIN 500 MG/1
500 CAPSULE ORAL 3 TIMES DAILY
COMMUNITY
End: 2020-01-22 | Stop reason: SDUPTHER

## 2020-01-09 RX ORDER — SULFAMETHOXAZOLE AND TRIMETHOPRIM 800; 160 MG/1; MG/1
1 TABLET ORAL 2 TIMES DAILY
COMMUNITY
End: 2020-01-22 | Stop reason: ALTCHOICE

## 2020-01-09 NOTE — TELEPHONE ENCOUNTER
Pharmacy Note - Anticoagulation    01/09/20       S/O:  Mr. Gorge Treadwell ,54 y.o. male, referred by Naveed Barajas NP, was contacted via an outbound telephone call today for telephonic anticoagulation management for the diagnosis of CVA, DVT, and PE. Patient checks POC/PT INR using home INR machine. The most recent INR was 2.7 when he checked today. HPI:     Interim History:    · INR Goal:  2.0-3.0  · Current warfarin regimen: 11 mg daily                      · Warfarin tablet strength: 1 mg, 10 mg   · Total weekly dose (mg): 77 mg    Today's pertinent positives includes:  Medication change, Antibiotic use and Change in medical status   -Denies any missed doses of warfarin  -States he is taking 2 antibiotics currently - started 2 weeks ago   -Bactrim DS 1 tab BID   -Cephalexin 500 mg 3 times daily  -States antibiotics were prescribed by Dr. Bobby Valerio for infection in his toe   -Scheduled for outpatient surgery to remove top portion of toe on 1/21/2020   -Following up with Dr. Bobby Valerio on Tuesday 1/14/2020  -Denies any changes in diet or alcohol intake  -Denies any bruising/bleeding, SOB, chest pain    · Adherence:  · Able to recall regimen? YES  · Miss/extra dose? NO  · Need refill?  NO    Upcoming procedure(s):  YES   -Scheduled for outpatient surgery to remove top portion of toe on 1/21/2020    Past Medical History:   Diagnosis Date    Diabetes ([facility] [address]     Erectile dysfunction due to diseases classified elsewhere     Hematuria     right renal cyst    Hypercholesterolemia     Hypertension     Liver disease     Fatty Liver    Pulmonary emboli ([facility] [address] 2003 & 2004    chronic coumadin therapy managed by Dr. Anisha Mendoza Renal failure acute     following surgery    Sleep apnea     does not use cpap machine       Allergies   Allergen Reactions    Tape [Adhesive] Contact Dermatitis     ALLERGIC TO SILK TAPE ONLY           Current Outpatient Medications   Medication Sig    trimethoprim-sulfamethoxazole (BACTRIM DS) 160-800 mg per tablet Take 1 Tab by mouth two (2) times a day.  cephALEXin (KEFLEX) 500 mg capsule Take 500 mg by mouth three (3) times daily.  potassium chloride (K-DUR, KLOR-CON) 20 mEq tablet TAKE 1 TABLET BY MOUTH DAILY. FOLLOW UP FOR FURTHER REFILLS    furosemide (LASIX) 20 mg tablet TAKE 1 TABLET BY MOUTH EVERY DAY    cloNIDine HCl (CATAPRES) 0.1 mg tablet TAKE 1 TABLET BY MOUTH TWICE DAILY    lisinopril-hydroCHLOROthiazide (PRINZIDE, ZESTORETIC) 20-12.5 mg per tablet TAKE 2 TABLETS BY MOUTH EVERY DAY    atenolol (TENORMIN) 100 mg tablet TAKE 1 TABLET BY MOUTH DAILY    warfarin (COUMADIN) 10 mg tablet TAKE 1 TABLET BY MOUTH DAILY AS DIRECTED BY PROVIDER (Patient taking differently: Taking 11 mg of warfarin daily)    amLODIPine (NORVASC) 10 mg tablet TAKE 1 TABLET BY MOUTH DAILY    FREESTYLE CASPER 14 DAY SENSOR kit USE AS DIRECTED    tadalafil (CIALIS) 20 mg tablet Take 1 Tab by mouth as needed for Other (ED).  fluticasone (FLONASE) 50 mcg/actuation nasal spray 2 Sprays by Both Nostrils route daily as needed for Rhinitis.  warfarin (COUMADIN) 1 mg tablet Take by 2 tablets daily by mouth with 10 mg tablet for a total of 12 mg daily or as directed by healthcare provider (Patient taking differently: Taking 11 mg of warfarin daily)    insulin glargine (BASAGLAR KWIKPEN) 100 unit/mL (3 mL) inpn 80 Units by SubCUTAneous route. Takes 40 units twice a day.  gabapentin (NEURONTIN) 300 mg capsule Take 300 mg by mouth two (2) times a day.  BD INSULIN PEN NEEDLE UF SHORT 31 gauge x 5/16\" ndle USE 1 PEN UTD QID    BUTRANS 10 mcg/hour LUCA 1 PATCH EVERY WEEK    NOVOLOG FLEXPEN 100 unit/mL inpn Takes 14-24 units per meal.    VICTOZA 3-ZANDRA 0.6 mg/0.1 mL (18 mg/3 mL) sub-q pen INJ 1.8 MG SC QD    insulin syringe-needle U-100 by Does Not Apply route.  Dispense ultrafine 1 mL syringes with 29 gauge needle    aspirin delayed-release 81 mg tablet Take 81 mg by mouth daily. No current facility-administered medications for this visit. Wt Readings from Last 3 Encounters:   10/04/19 336 lb 3.2 oz (152.5 kg)   07/30/19 345 lb (156.5 kg)   05/06/19 (!) 351 lb 6.4 oz (159.4 kg)       BP Readings from Last 3 Encounters:   10/04/19 136/62   07/30/19 140/74   05/06/19 134/76       Lab Results   Component Value Date/Time    Sodium 138 07/30/2019 09:53 AM    Potassium 3.8 07/30/2019 09:53 AM    Chloride 96 (L) 07/30/2019 09:53 AM    CO2 27 07/30/2019 09:53 AM    Anion gap 15.0 07/30/2019 09:53 AM    Glucose 172 (H) 07/30/2019 09:53 AM    BUN 16 07/30/2019 09:53 AM    Creatinine 0.9 07/30/2019 09:53 AM    GFR est AA >60 03/24/2014 01:56 PM    GFR est non-AA >60 03/24/2014 01:56 PM    Calcium 9.2 07/30/2019 09:53 AM       Lab Results   Component Value Date/Time    WBC 5.4 07/30/2019 09:53 AM    HGB 11.6 (L) 07/30/2019 09:53 AM    HCT 37.4 (L) 07/30/2019 09:53 AM    PLATELET 864 00/48/1657 09:53 AM    MCV 84 07/30/2019 09:53 AM       Lab Results   Component Value Date/Time    Protein, total 7.7 07/30/2019 09:53 AM    Albumin 4.3 07/30/2019 09:53 AM       Estimated Creatinine Clearance: 158.3 mL/min (by C-G formula based on SCr of 0.9 mg/dL). INR History:   (normal INR range 0.8-1.2)    Date   INR  Dose/Comments  1/9/2020 2.7  11 mg daily  12/11/19          2.1                   11 mg daily (confirmed took 13 mg on 12/4 as instructed)  12/4/19            1.5                   10 mg daily  11/29/19          1.6                   10 mg daily    A/P:       Anticoagulation:  Considering Mr. Turcios's past history, todays findings, and per Anticoagulation Collaborative Practice Agreement/Protocol:    1. POC INR (2.7) is therapeutic for INR goal today. 2.  Continue warfarin - 11 mg daily  3.  Next INR check - follow up will be determined at patient's next PCP visit on 1/13/2020    -Will discuss plan for warfarin for outpatient surgery with patient's PCP ANDREZ Mcbride Juan Pablo    There are no discontinued medications. A full discussion of the nature of anticoagulants has been carried out. A full discussion of the need for frequent and regular monitoring, precise dosage adjustment and compliance was stressed. Side effects of potential bleeding were discussed and Mr. Aye Macdonald was instructed to call [phone] if there are any signs of abnormal bleeding. Mr. Aye Macdonald was instructed to avoid any OTC items containing aspirin or ibuprofen and prior to starting any new OTC products to consult with his physician or pharmacist to ensure no drug interactions are present. Mr. Aye Macdonald was instructed to avoid any major changes in his general diet and to avoid alcohol consumption. Mr. Aye Macdonald verbalized his understanding of all instructions and will call the office with any questions, concerns, or signs of abnormal bleeding or blood clot. Notifications of recommendations will be sent to Dr. Jamey Cabrera, ANDREZ for review.     Thank you,    Yane Lake, PharmD  Clinical Pharmacist Specialist

## 2020-01-10 ENCOUNTER — DOCUMENTATION ONLY (OUTPATIENT)
Dept: FAMILY MEDICINE CLINIC | Age: 50
End: 2020-01-10

## 2020-01-10 NOTE — PROGRESS NOTES
Called Dr. Shayy Clarke office to confirm information about patient's scheduled surgery on 1/21/2020 and plan for warfarin. Spoke with Dr. Raúl Whitley, who states she will fax authorization for surgery and most recent patient podiatry notes to [facility] office for PCP. Of note, patient's warfarin was previously stopped last year on 7/11/2018 for his partial toe amputation surgery with Dr. Marisel Leon on 7/17/18 in the past.    Will discuss with PCP Jenny Sim. Patient is scheduled for PCP follow up on 1/13/2020 and Dr. Marisel Leon followup on 1/14/2020. Thank you for allowing me to participate in the care of this patient.     Barb Warner, PharmD  Clinical Pharmacist Specialist

## 2020-01-13 ENCOUNTER — OFFICE VISIT (OUTPATIENT)
Dept: FAMILY MEDICINE CLINIC | Age: 50
End: 2020-01-13

## 2020-01-13 VITALS
TEMPERATURE: 97.9 F | HEART RATE: 83 BPM | SYSTOLIC BLOOD PRESSURE: 130 MMHG | WEIGHT: 315 LBS | BODY MASS INDEX: 40.43 KG/M2 | DIASTOLIC BLOOD PRESSURE: 68 MMHG | HEIGHT: 74 IN | RESPIRATION RATE: 17 BRPM | OXYGEN SATURATION: 99 %

## 2020-01-13 DIAGNOSIS — Z01.818 PREOPERATIVE CLEARANCE: ICD-10-CM

## 2020-01-13 DIAGNOSIS — E78.00 HYPERCHOLESTEROLEMIA: ICD-10-CM

## 2020-01-13 DIAGNOSIS — E11.40 TYPE 2 DIABETES MELLITUS WITH DIABETIC NEUROPATHY, UNSPECIFIED WHETHER LONG TERM INSULIN USE (HCC): ICD-10-CM

## 2020-01-13 DIAGNOSIS — Z01.818 PREOPERATIVE GENERAL PHYSICAL EXAMINATION: ICD-10-CM

## 2020-01-13 DIAGNOSIS — I10 HTN (HYPERTENSION), BENIGN: Primary | ICD-10-CM

## 2020-01-13 DIAGNOSIS — Z79.01 WARFARIN ANTICOAGULATION: ICD-10-CM

## 2020-01-13 DIAGNOSIS — Z79.01 ANTICOAGULANT LONG-TERM USE: ICD-10-CM

## 2020-01-13 DIAGNOSIS — L03.032 CELLULITIS OF TOE OF LEFT FOOT: ICD-10-CM

## 2020-01-13 DIAGNOSIS — I27.82 CHRONIC PULMONARY EMBOLISM, UNSPECIFIED PULMONARY EMBOLISM TYPE, UNSPECIFIED WHETHER ACUTE COR PULMONALE PRESENT (HCC): ICD-10-CM

## 2020-01-13 PROBLEM — G89.4 CHRONIC PAIN SYNDROME: Status: ACTIVE | Noted: 2019-05-17

## 2020-01-13 LAB
BILIRUB UR QL STRIP: NEGATIVE
GLUCOSE UR-MCNC: NORMAL MG/DL
HBA1C MFR BLD HPLC: 8.5 %
KETONES P FAST UR STRIP-MCNC: NEGATIVE MG/DL
PH UR STRIP: 5.5 [PH] (ref 4.6–8)
PROT UR QL STRIP: NEGATIVE
SP GR UR STRIP: 1.01 (ref 1–1.03)
UA UROBILINOGEN AMB POC: NORMAL (ref 0.2–1)
URINALYSIS CLARITY POC: NORMAL
URINALYSIS COLOR POC: NORMAL
URINE BLOOD POC: NORMAL
URINE LEUKOCYTES POC: NEGATIVE
URINE NITRITES POC: NEGATIVE

## 2020-01-13 NOTE — PROGRESS NOTES
Chief Complaint   Patient presents with    Pre-op Exam     1. Have you been to the ER, urgent care clinic since your last visit? Hospitalized since your last visit? No   2. Have you seen or consulted any other health care providers outside of the [address] [place] [address] since your last visit? Include any pap smears or colon screening. Follow with podiatry          Preoperative Evaluation    Date of Exam: 2020    Shira Farfan is a 54 y.o. male (:1970) who presents for preoperative evaluation. Procedure/Surgery:  Second Toe Amputation of Left Foot   Date of Procedure/Surgery: 2020  Surgeon: Rachel Benitez DPM  [facility]: [address] [place] [address]  MD Angel Bishop, [address], FNP-BC   Latex Allergy: no    Problem List:     Patient Active Problem List    Diagnosis Date Noted    Type 2 diabetes mellitus with diabetic neuropathy ([address] 2019    Chronic pain syndrome 2019    Anticoagulant long-term use 2018    Chronic pulmonary edema 01/10/2017    Pure hypercholesterolemia 01/10/2017    Primary osteoarthritis of left knee 10/28/2016    Diabetic foot infection ([address] 10/30/2015    Frontal headache 10/29/2015    Sepsis due to methicillin susceptible Staphylococcus aureus ([address] 10/29/2015    SIRS (systemic inflammatory response syndrome) ([address] 10/29/2015    History of MRSA infection 10/01/2015    HNP (herniated nucleus pulposus), lumbar 2013    Lumbar radiculopathy 2013    Carpal tunnel syndrome 2013    Radial styloid tenosynovitis 2013    Hyperlipidemia 2012    Personal history of pulmonary embolism 2012    S/P hip replacement 2012    Renal mass 2010    Tendon tear, foot 2010    Slipped capital femoral epiphysis 2010    Pulmonary hypertension ([address] 2010    HTN (hypertension), benign 2010    DIETER (obstructive sleep apnea) 04/16/2010    CVD (cardiovascular disease) 04/16/2010    Fatty liver 04/16/2010    Morbid obesity ([facility] [address] 04/16/2010    Microscopic hematuria 04/16/2010     Medical History:     Past Medical History:   Diagnosis Date    Diabetes ([facility] [address]     Erectile dysfunction due to diseases classified elsewhere     Hematuria     right renal cyst    Hypercholesterolemia     Hypertension     Liver disease     Fatty Liver    Pulmonary emboli ([facility] [address] 2003 & 2004    chronic coumadin therapy managed by Dr. Pimentel [facility] Renal failure acute     following surgery    Sleep apnea     does not use cpap machine     Allergies: Allergies   Allergen Reactions    Tape [Adhesive] Contact Dermatitis     ALLERGIC TO SILK TAPE ONLY          Medications:     Current Outpatient Medications   Medication Sig    trimethoprim-sulfamethoxazole (BACTRIM DS) 160-800 mg per tablet Take 1 Tab by mouth two (2) times a day.  cephALEXin (KEFLEX) 500 mg capsule Take 500 mg by mouth three (3) times daily.  potassium chloride (K-DUR, KLOR-CON) 20 mEq tablet TAKE 1 TABLET BY MOUTH DAILY. FOLLOW UP FOR FURTHER REFILLS    furosemide (LASIX) 20 mg tablet TAKE 1 TABLET BY MOUTH EVERY DAY    cloNIDine HCl (CATAPRES) 0.1 mg tablet TAKE 1 TABLET BY MOUTH TWICE DAILY    lisinopril-hydroCHLOROthiazide (PRINZIDE, ZESTORETIC) 20-12.5 mg per tablet TAKE 2 TABLETS BY MOUTH EVERY DAY    atenolol (TENORMIN) 100 mg tablet TAKE 1 TABLET BY MOUTH DAILY    warfarin (COUMADIN) 10 mg tablet TAKE 1 TABLET BY MOUTH DAILY AS DIRECTED BY PROVIDER (Patient taking differently: Taking 11 mg of warfarin daily)    amLODIPine (NORVASC) 10 mg tablet TAKE 1 TABLET BY MOUTH DAILY    FREESTYLE CASPER 14 DAY SENSOR kit USE AS DIRECTED    tadalafil (CIALIS) 20 mg tablet Take 1 Tab by mouth as needed for Other (ED).  fluticasone (FLONASE) 50 mcg/actuation nasal spray 2 Sprays by Both Nostrils route daily as needed for Rhinitis.     warfarin (COUMADIN) 1 mg tablet Take by 2 tablets daily by mouth with 10 mg tablet for a total of 12 mg daily or as directed by healthcare provider (Patient taking differently: Taking 11 mg of warfarin daily)    insulin glargine (BASAGLAR KWIKPEN) 100 unit/mL (3 mL) inpn 80 Units by SubCUTAneous route. Takes 40 units twice a day.  gabapentin (NEURONTIN) 300 mg capsule Take 300 mg by mouth two (2) times a day.  BD INSULIN PEN NEEDLE UF SHORT 31 gauge x 5/16\" ndle USE 1 PEN UTD QID    BUTRANS 10 mcg/hour LUCA 1 PATCH EVERY WEEK    NOVOLOG FLEXPEN 100 unit/mL inpn Takes 14-24 units per meal.    VICTOZA 3-ZANDRA 0.6 mg/0.1 mL (18 mg/3 mL) sub-q pen INJ 1.8 MG SC QD    insulin syringe-needle U-100 by Does Not Apply route. Dispense ultrafine 1 mL syringes with 29 gauge needle    aspirin delayed-release 81 mg tablet Take 81 mg by mouth daily. No current facility-administered medications for this visit.       Surgical History:     Past Surgical History:   Procedure Laterality Date    COLONOSCOPY N/A 11/14/2017    COLONOSCOPY performed by Arlinda Snellen, MD at [facility] HX APPENDECTOMY  2006    HX CARPAL TUNNEL RELEASE Right     HX HIP REPLACEMENT Right     HX KNEE REPLACEMENT Left     HX ORTHOPAEDIC      Right toe amputation, pins and screws in foot    VASCULAR SURGERY PROCEDURE UNLIST      [place] filter     Social History:     Social History     Socioeconomic History    Marital status:      Spouse name: Not on file    Number of children: Not on file    Years of education: Not on file    Highest education level: Not on file   Social Needs    Financial resource strain: Not on file    Food insecurity:     Worry: Not on file     Inability: Not on file    Transportation needs:     Medical: No     Non-medical: No   Tobacco Use    Smoking status: Never Smoker    Smokeless tobacco: Never Used   Substance and Sexual Activity    Alcohol use: No    Drug use: No    Sexual activity: Yes     Partners: Female     Birth control/protection: None   Other Topics Concern     Service No    Caffeine Concern No    Occupational Exposure No    Hobby Hazards No    Sleep Concern No    Stress Concern No    Weight Concern Yes    Special Diet Yes    Exercise Yes       Recent use of: patient on chronic coumadin therapy   Tetanus up to date: last tetanus booster within 10 years      Anesthesia Complications: None  History of abnormal bleeding : None  History of Blood Transfusions: no  Health Care Directive or Living Will: no  Review of Systems   Constitutional: Negative. HENT: Negative. Eyes: Negative. Respiratory: Negative. Cardiovascular: Negative. Gastrointestinal: Negative. Genitourinary: Negative. Musculoskeletal: Negative. Neurological: Negative. Endo/Heme/Allergies: Negative. Psychiatric/Behavioral: Negative. OBJECTIVE:  Awake and alert in no acute distress  HEENT:  Head normocephalic atraumatic, Eyes PERRLA, Ears: TMS bilaterally pearly grey, Nares patent without erythema or edema, Pharynx without erythema. Dentition fair  Neck supple without lymphadenopathy, no carotid artery bruits auscultated bilaterally. Lungs clear throughout  S1 S2 RRR without ectopy or murmur auscultated. Abdomen: normoactive bowel sounds all quadrants, no tenderness to abdomen upper and lower quadrants. No hepatosplenomegaly  Neuro: cranial nerves II-XII tested and intact. No gait abnormalities. Musculoskeletal: normal examination upper and lower extremities head and neck, no warmth to joints, no edema to joints, no gait abnormalities, motor strength +5/5 upper and lower extremities head and neck. DTRs brisk +2 bilaterally.    Integumentary: no rashes  Left toe wrapped with Kerlix no drainage no malodor    Normal skin turgor    Visit Vitals  /68 (BP 1 Location: Right arm, BP Patient Position: Sitting)   Pulse 83   Temp 97.9 °F (36.6 °C) (Oral)   Resp 17   Ht 6' 2\" (1.88 m)   Wt 347 lb (157.4 kg)   SpO2 99%   BMI 44.55 kg/m²     Results for orders placed or performed in visit on 01/13/20   AMB POC HEMOGLOBIN A1C   Result Value Ref Range    Hemoglobin A1c (POC) 8.5 %         DIAGNOSTICS:   1. EKG: EKG FINDINGS - normal EKG, normal sinus rhythm, unchanged from previous tracings  2. CBC, CMP, point of care hemoglobin A 1 C, urinalysis and PT/INR    Diagnoses and all orders for this visit:    HTN (hypertension), benign  -     AMB POC EKG ROUTINE W/ 12 LEADS, INTER & REP  -     METABOLIC PANEL, COMPREHENSIVE; Future  -     CBC W/O DIFF; Future    Preoperative general physical examination  -     AMB POC URINALYSIS DIP STICK AUTO W/O MICRO    Preoperative clearance    Warfarin anticoagulation  -     PROTHROMBIN TIME + INR; Future    Anticoagulant long-term use  -     PROTHROMBIN TIME + INR; Future    Chronic pulmonary embolism, unspecified pulmonary embolism type, unspecified whether acute cor pulmonale present (HCC)    Cellulitis of toe of left foot managed by podiatry   Continue antibiotics until completion     Hypercholesterolemia  -     AMB POC EKG ROUTINE W/ 12 LEADS, INTER & REP    Type 2 diabetes mellitus with diabetic neuropathy, unspecified whether long term insulin use (HCC)  -     AMB POC HEMOGLOBIN A1C  -     AMB POC EKG ROUTINE W/ 12 LEADS, INTER & REP    BMI 40.0-44.9, adult ([facility] [address]    patient advised to hold coumadin six days prior to surgical date and patient verbalizes understanding to hold coumadin starting January 16, 2020. Patient verbalizes understanding   Re-Order for compression stocking (20-30 mm) knee high   I have discussed the diagnosis with the patient and the intended plan as seen in the above orders. The patient has received an after-visit summary and questions were answered concerning future plans. I have discussed medication side effects and warnings with the patient as well.     Follow-up and Dispositions    · Return in about 4 months (around 5/13/2020), or if symptoms worsen or fail to improve, for dm type 2, htn. IMPRESSION:     No contraindications to planned surgery    Neo Snell.  [facility], [address], DNP, FNP-BC    1/13/2020

## 2020-01-14 LAB
A-G RATIO,AGRAT: 1.3 RATIO (ref 1.1–2.6)
ALBUMIN SERPL-MCNC: 4.3 G/DL (ref 3.5–5)
ALP SERPL-CCNC: 82 U/L (ref 25–115)
ALT SERPL-CCNC: 28 U/L (ref 5–40)
ANION GAP SERPL CALC-SCNC: 13 MMOL/L
AST SERPL W P-5'-P-CCNC: 28 U/L (ref 10–37)
BILIRUB SERPL-MCNC: 0.3 MG/DL (ref 0.2–1.2)
BUN SERPL-MCNC: 22 MG/DL (ref 6–22)
CALCIUM SERPL-MCNC: 9.3 MG/DL (ref 8.4–10.5)
CHLORIDE SERPL-SCNC: 99 MMOL/L (ref 98–110)
CO2 SERPL-SCNC: 25 MMOL/L (ref 20–32)
CREAT SERPL-MCNC: 0.9 MG/DL (ref 0.5–1.2)
ERYTHROCYTE [DISTWIDTH] IN BLOOD BY AUTOMATED COUNT: 16 % (ref 10–15.5)
GFRAA, 66117: >60
GFRNA, 66118: >60
GLOBULIN,GLOB: 3.4 G/DL (ref 2–4)
GLUCOSE SERPL-MCNC: 261 MG/DL (ref 70–99)
HCT VFR BLD AUTO: 41.5 % (ref 39.3–51.6)
HGB BLD-MCNC: 12.6 G/DL (ref 13.1–17.2)
MCH RBC QN AUTO: 26 PG (ref 26–34)
MCHC RBC AUTO-ENTMCNC: 30 G/DL (ref 31–36)
MCV RBC AUTO: 87 FL (ref 80–95)
PLATELET # BLD AUTO: 215 K/UL (ref 140–440)
PMV BLD AUTO: 11.8 FL (ref 9–13)
POTASSIUM SERPL-SCNC: 4.1 MMOL/L (ref 3.5–5.5)
PROT SERPL-MCNC: 7.7 G/DL (ref 6.4–8.3)
RBC # BLD AUTO: 4.78 M/UL (ref 3.8–5.8)
SODIUM SERPL-SCNC: 137 MMOL/L (ref 133–145)
WBC # BLD AUTO: 5.2 K/UL (ref 4–11)

## 2020-01-15 LAB — INR, EXTERNAL: 3.3

## 2020-01-16 ENCOUNTER — TELEPHONE (OUTPATIENT)
Dept: FAMILY MEDICINE CLINIC | Age: 50
End: 2020-01-16

## 2020-01-16 DIAGNOSIS — Z79.01 ANTICOAGULANT LONG-TERM USE: ICD-10-CM

## 2020-01-16 NOTE — TELEPHONE ENCOUNTER
Pharmacy Note - Anticoagulation 01/16/20 S/O:  Mr. Maryanne Jonas ,52 y.o. male, referred by Dr. Vashti Morel, NP, was contacted via an outbound telephone call today for telephonic anticoagulation management for the diagnosis of CVA, DVT, and PE. Patient checked INR using POC INR machine. The most recent INR was 3.3 on 1/15/2020. HPI:  
 
Interim History: · INR Goal:  2.0-3.0 · Current warfarin regimen: 11 mg daily · Warfarin tablet strength:   1 mg, 10 mg · Total weekly dose (mg): 77 mg Today's pertinent positives includes: Antibiotic use  
-States he completed his second course of antibiotics for his infected toe this past Monday 
-He denies any bleeding/bruising · Adherence: · Able to recall regimen? YES 
· Miss/extra dose? NO 
· Need refill? NO Upcoming procedure(s):  YES 
-Patient is having outpatient toe amputation surgery on 1/21/2020 with Dr. Evan Galindo 
-Saw PCP on 1/13/2020 for pre-op exam; was instructed by PCP to hold warfarin starting 1/16/2020 Past Medical History:  
Diagnosis Date  Diabetes ([facility] [address]  Erectile dysfunction due to diseases classified elsewhere  Hematuria   
 right renal cyst  
 Hypercholesterolemia  Hypertension  Liver disease Fatty Liver  Pulmonary emboli [facility]) 2003 & 2004  
 chronic coumadin therapy managed by Dr. Joe Bun  Renal failure acute   
 following surgery  Sleep apnea   
 does not use cpap machine Allergies Allergen Reactions  Tape [Adhesive] Contact Dermatitis ALLERGIC TO SILK TAPE ONLY Current Outpatient Medications Medication Sig  
 trimethoprim-sulfamethoxazole (BACTRIM DS) 160-800 mg per tablet Take 1 Tab by mouth two (2) times a day.  cephALEXin (KEFLEX) 500 mg capsule Take 500 mg by mouth three (3) times daily.  potassium chloride (K-DUR, KLOR-CON) 20 mEq tablet TAKE 1 TABLET BY MOUTH DAILY. FOLLOW UP FOR FURTHER REFILLS  furosemide (LASIX) 20 mg tablet TAKE 1 TABLET BY MOUTH EVERY DAY  cloNIDine HCl (CATAPRES) 0.1 mg tablet TAKE 1 TABLET BY MOUTH TWICE DAILY  lisinopril-hydroCHLOROthiazide (PRINZIDE, ZESTORETIC) 20-12.5 mg per tablet TAKE 2 TABLETS BY MOUTH EVERY DAY  atenolol (TENORMIN) 100 mg tablet TAKE 1 TABLET BY MOUTH DAILY  warfarin (COUMADIN) 10 mg tablet TAKE 1 TABLET BY MOUTH DAILY AS DIRECTED BY PROVIDER (Patient taking differently: Taking 11 mg of warfarin daily)  amLODIPine (NORVASC) 10 mg tablet TAKE 1 TABLET BY MOUTH DAILY  FREESTYLE CASPER 14 DAY SENSOR kit USE AS DIRECTED  tadalafil (CIALIS) 20 mg tablet Take 1 Tab by mouth as needed for Other (ED).  fluticasone (FLONASE) 50 mcg/actuation nasal spray 2 Sprays by Both Nostrils route daily as needed for Rhinitis.  warfarin (COUMADIN) 1 mg tablet Take by 2 tablets daily by mouth with 10 mg tablet for a total of 12 mg daily or as directed by healthcare provider (Patient taking differently: Taking 11 mg of warfarin daily)  insulin glargine (BASAGLAR KWIKPEN) 100 unit/mL (3 mL) inpn 80 Units by SubCUTAneous route. Takes 40 units twice a day.  gabapentin (NEURONTIN) 300 mg capsule Take 300 mg by mouth two (2) times a day.  BD INSULIN PEN NEEDLE UF SHORT 31 gauge x 5/16\" ndle USE 1 PEN UTD QID  BUTRANS 10 mcg/hour LUCA 1 PATCH EVERY WEEK  
 NOVOLOG FLEXPEN 100 unit/mL inpn Takes 14-24 units per meal.  
 VICTOZA 3-ZANDRA 0.6 mg/0.1 mL (18 mg/3 mL) sub-q pen INJ 1.8 MG SC QD  
 insulin syringe-needle U-100 by Does Not Apply route. Dispense ultrafine 1 mL syringes with 29 gauge needle  aspirin delayed-release 81 mg tablet Take 81 mg by mouth daily. No current facility-administered medications for this visit. Wt Readings from Last 3 Encounters:  
01/13/20 347 lb (157.4 kg) 10/04/19 336 lb 3.2 oz (152.5 kg) 07/30/19 345 lb (156.5 kg) BP Readings from Last 3 Encounters:  
01/13/20 130/68  
10/04/19 136/62 07/30/19 140/74 Lab Results Component Value Date/Time Sodium 137 01/13/2020 11:35 AM  
 Potassium 4.1 01/13/2020 11:35 AM  
 Chloride 99 01/13/2020 11:35 AM  
 CO2 25 01/13/2020 11:35 AM  
 Anion gap 13.0 01/13/2020 11:35 AM  
 Glucose 261 (H) 01/13/2020 11:35 AM  
 BUN 22 01/13/2020 11:35 AM  
 Creatinine 0.9 01/13/2020 11:35 AM  
 GFR est AA >60 03/24/2014 01:56 PM  
 GFR est non-AA >60 03/24/2014 01:56 PM  
 Calcium 9.3 01/13/2020 11:35 AM  
 
 
Lab Results Component Value Date/Time WBC 5.2 01/13/2020 11:35 AM  
 HGB 12.6 (L) 01/13/2020 11:35 AM  
 HCT 41.5 01/13/2020 11:35 AM  
 PLATELET 671 55/24/3660 11:35 AM  
 MCV 87 01/13/2020 11:35 AM  
 
 
Lab Results Component Value Date/Time Protein, total 7.7 01/13/2020 11:35 AM  
 Albumin 4.3 01/13/2020 11:35 AM  
 
 
Estimated Creatinine Clearance: 159.4 mL/min (by C-G formula based on SCr of 0.9 mg/dL). INR History:   (normal INR range 0.8-1.2) Date   INR  Dose/Comments 1/15/2020 3.3  Hold starting today for surgery on 1/21/2020 1/9/2020          2.7                   11 mg daily 12/11/19          2.1                   11 mg daily (confirmed took 13 mg on 12/4 as instructed) 12/4/19            1.5                   10 mg daily 11/29/19          1.6                   10 mg daily A/P:    
 
Anticoagulation: 
Considering Mr. Turcios's past history, todays findings, and per Anticoagulation Collaborative Practice Agreement/Protocol: 1. POC INR (3.3) is Supratherapeutic for INR goal today. 2.  Change warfarin - hold starting tonight 1/16/2020; do not restart warfarin until after toe amputation surgery 3. Next INR check will be on 1/20/2020 - instructed patient to recheck INR the day before toe amputation surgery to ensure INR has decreased before procedure to decrease bleeding risk. Discussed with patient that PharmD will also call him on 1/22/2020, the day after his procedure, to discuss restarting warfarin therapy. A full discussion of the nature of anticoagulants has been carried out. A full discussion of the need for frequent and regular monitoring, precise dosage adjustment and compliance was stressed. Mr. Jake Gonzalez was instructed to call [phone] if there are any signs of abnormal bleeding. Mr. Jake Gonzalez was instructed to avoid any major changes in his general diet and to avoid alcohol consumption. Mr. Jake Gonzalez verbalized his understanding of all instructions and will call the office with any questions, concerns, or signs of abnormal bleeding or blood clot. Notifications of recommendations will be sent to Dr. Josy Lizama NP for review. Thank you, Shanae Lima, PharmD Clinical Pharmacist Specialist

## 2020-01-20 ENCOUNTER — TELEPHONE (OUTPATIENT)
Dept: FAMILY MEDICINE CLINIC | Age: 50
End: 2020-01-20

## 2020-01-20 DIAGNOSIS — Z79.01 ANTICOAGULANT LONG-TERM USE: ICD-10-CM

## 2020-01-20 LAB — INR, EXTERNAL: 1.2

## 2020-01-20 NOTE — TELEPHONE ENCOUNTER
Called patient to follow up on PT/INR. Patient confirmed he has been holding warfarin since 1/16/2020 in anticipation of toe amputation surgery on 1/21/2020 per instructions from PCP. Patient states INR today was 1.2 when he checked with home POC INR machine. Instructed patient to continue holding warfarin for surgery. Will call patient on Wednesday 1/22/2020 (day after surgery) to discuss resuming warfarin dosing. Patient expressed understanding and had no additional concerns. Thank you for allowing me to participate in the care of this patient. Tomi Ren, PharmD Clinical Pharmacist Specialist 
 
ADDENDUM: 
 
Discussed with nurse Alvarez Barba, who states INR company called to report INR 1.6 for Mr. Turcios. Called patient's podiatry office to let them know patient's INR was 1.6 today; podiatry office nurse Erin  states she made Dr. Joseph Koch aware of INR of 1.6, and Dr. Joseph Koch stated to have patient continue to hold warfarin for procedure tomorrow and procedure will continue as planned. Will send note to PCP regarding this. Thank you for allowing me to participate in the care of this patient. Tomi Ren, PharmD Clinical Pharmacist Specialist

## 2020-01-22 ENCOUNTER — TELEPHONE (OUTPATIENT)
Dept: FAMILY MEDICINE CLINIC | Age: 50
End: 2020-01-22

## 2020-01-22 DIAGNOSIS — Z79.01 ANTICOAGULANT LONG-TERM USE: ICD-10-CM

## 2020-01-22 LAB — INR, EXTERNAL: 1

## 2020-01-22 RX ORDER — HYDROCODONE BITARTRATE AND ACETAMINOPHEN 10; 325 MG/1; MG/1
1 TABLET ORAL
COMMUNITY
End: 2020-02-20 | Stop reason: ALTCHOICE

## 2020-01-22 RX ORDER — OXYCODONE AND ACETAMINOPHEN 5; 325 MG/1; MG/1
1 TABLET ORAL
COMMUNITY
End: 2020-03-26 | Stop reason: ALTCHOICE

## 2020-01-22 RX ORDER — CEPHALEXIN 500 MG/1
500 CAPSULE ORAL EVERY 8 HOURS
COMMUNITY
End: 2020-02-20 | Stop reason: ALTCHOICE

## 2020-01-22 NOTE — TELEPHONE ENCOUNTER
Pharmacy Note - Anticoagulation 01/22/20 S/O:  Mr. Josue Alfredo ,52 y.o. male, referred by Seymour Robison NP, was contacted via an outbound telephone call today for telephonic anticoagulation management for the diagnosis of CVA, DVT, and PE. Patient checked INR using POC INR machine today. The most recent INR was 1.0.  
 
HPI: 
 
Interim History: · INR Goal:  2.0-3.0 · Current warfarin regimen: Holding - no warfarin since 1/16 due to toe amputation surgery · Warfarin tablet strength: 1 mg, 10 mg · Total weekly dose (mg): 0 Today's pertinent positives includes: 
Medication change, Antibiotic use and Change in medical status  
-Denies bruising/bleeding, SOB, chest pain 
-Denies any changes in his diet/greens 
-Patient states he is not having much pain in his foot from surgery since he has lost sensation in feet due to neuropathy 
 -States foot is not bleeding excessively after surgery 
-He states he was given percocet 5-325 Rx for pain (take 1 tab Q4-6 hours for pain) and Keflex 500 mg 1 tab Q8hrs for 7 days from Dr. Maggi Khan his podiatrist 
 -Completed full med rec with patient over phone today 
 -Of note, patient states he is also chronically on Norco  mg 1 tab Q6-8 hours for pain, which replaced his Butrans patch which insurance is no longer covering · Adherence: · Able to recall regimen? YES 
· Miss/extra dose? NO 
· Need refill? NO Upcoming procedure(s):  NO 
 
Past Medical History:  
Diagnosis Date  Diabetes ([facility] [address]  Erectile dysfunction due to diseases classified elsewhere  Hematuria   
 right renal cyst  
 Hypercholesterolemia  Hypertension  Liver disease Fatty Liver  Pulmonary emboli [facility]) 2003 & 2004  
 chronic coumadin therapy managed by Dr. Av Isaac  Renal failure acute   
 following surgery  Sleep apnea   
 does not use cpap machine Allergies Allergen Reactions  Tape [Adhesive] Contact Dermatitis ALLERGIC TO SILK TAPE ONLY Current Outpatient Medications Medication Sig  
 oxyCODONE-acetaminophen (PERCOCET) 5-325 mg per tablet Take 1 Tab by mouth every four (4) hours as needed for Pain.  cephALEXin (KEFLEX) 500 mg capsule Take 500 mg by mouth every eight (8) hours.  HYDROcodone-acetaminophen (NORCO)  mg tablet Take 1 Tab by mouth every six (6) hours as needed for Pain.  potassium chloride (K-DUR, KLOR-CON) 20 mEq tablet TAKE 1 TABLET BY MOUTH DAILY. FOLLOW UP FOR FURTHER REFILLS  furosemide (LASIX) 20 mg tablet TAKE 1 TABLET BY MOUTH EVERY DAY  cloNIDine HCl (CATAPRES) 0.1 mg tablet TAKE 1 TABLET BY MOUTH TWICE DAILY (Patient taking differently: Take 0.2 mg by mouth daily.)  lisinopril-hydroCHLOROthiazide (PRINZIDE, ZESTORETIC) 20-12.5 mg per tablet TAKE 2 TABLETS BY MOUTH EVERY DAY  atenolol (TENORMIN) 100 mg tablet TAKE 1 TABLET BY MOUTH DAILY  amLODIPine (NORVASC) 10 mg tablet TAKE 1 TABLET BY MOUTH DAILY  tadalafil (CIALIS) 20 mg tablet Take 1 Tab by mouth as needed for Other (ED).  fluticasone (FLONASE) 50 mcg/actuation nasal spray 2 Sprays by Both Nostrils route daily as needed for Rhinitis.  insulin glargine (BASAGLAR KWIKPEN) 100 unit/mL (3 mL) inpn Takes 50 units twice a day.  gabapentin (NEURONTIN) 300 mg capsule Take 300 mg by mouth two (2) times a day.  BD INSULIN PEN NEEDLE UF SHORT 31 gauge x 5/16\" ndle USE 1 PEN UTD QID  
 NOVOLOG FLEXPEN 100 unit/mL inpn Takes 14-25 units per meal per sliding scale from endocrinology  VICTOZA 3-ZANDRA 0.6 mg/0.1 mL (18 mg/3 mL) sub-q pen INJ 1.8 MG SC QD  
 insulin syringe-needle U-100 by Does Not Apply route. Dispense ultrafine 1 mL syringes with 29 gauge needle  aspirin delayed-release 81 mg tablet Take 81 mg by mouth daily.   
 warfarin (COUMADIN) 10 mg tablet TAKE 1 TABLET BY MOUTH DAILY AS DIRECTED BY PROVIDER (Patient taking differently: Taking 11 mg of warfarin daily)  [place] CASPER 14 DAY SENSOR kit USE AS DIRECTED  warfarin (COUMADIN) 1 mg tablet Take by 2 tablets daily by mouth with 10 mg tablet for a total of 12 mg daily or as directed by healthcare provider (Patient taking differently: Taking 11 mg of warfarin daily)  BUTRANS 10 mcg/hour LUCA 1 PATCH EVERY WEEK No current facility-administered medications for this visit. Wt Readings from Last 3 Encounters:  
01/13/20 347 lb (157.4 kg) 10/04/19 336 lb 3.2 oz (152.5 kg) 07/30/19 345 lb (156.5 kg) BP Readings from Last 3 Encounters:  
01/13/20 130/68  
10/04/19 136/62  
07/30/19 140/74 Lab Results Component Value Date/Time Sodium 137 01/13/2020 11:35 AM  
 Potassium 4.1 01/13/2020 11:35 AM  
 Chloride 99 01/13/2020 11:35 AM  
 CO2 25 01/13/2020 11:35 AM  
 Anion gap 13.0 01/13/2020 11:35 AM  
 Glucose 261 (H) 01/13/2020 11:35 AM  
 BUN 22 01/13/2020 11:35 AM  
 Creatinine 0.9 01/13/2020 11:35 AM  
 GFR est AA >60 03/24/2014 01:56 PM  
 GFR est non-AA >60 03/24/2014 01:56 PM  
 Calcium 9.3 01/13/2020 11:35 AM  
 
 
Lab Results Component Value Date/Time WBC 5.2 01/13/2020 11:35 AM  
 HGB 12.6 (L) 01/13/2020 11:35 AM  
 HCT 41.5 01/13/2020 11:35 AM  
 PLATELET 707 59/18/2694 11:35 AM  
 MCV 87 01/13/2020 11:35 AM  
 
 
Lab Results Component Value Date/Time Protein, total 7.7 01/13/2020 11:35 AM  
 Albumin 4.3 01/13/2020 11:35 AM  
 
 
Estimated Creatinine Clearance: 159.4 mL/min (by C-G formula based on SCr of 0.9 mg/dL). INR History:   (normal INR range 0.8-1.2) Date   INR   Dose/Comments 1/22/2020 1.0   Held warfarin for surgery on 1/21/2020 1/20/2020 1.6 per INR company [employer] for surgery on 1/21/2020 
1/15/2020        3.3   Hold starting today for surgery on 1/21/2020 1/9/2020          2.7   11 mg daily 12/11/19          2.1   11 mg daily (confirmed took 13 mg on 12/4 as instructed) 12/4/19            1.5   10 mg daily 11/29/19          1.6   10 mg daily 
  
A/P:    
 
Anticoagulation: 
Considering Mr. Turcios's past history, todays findings, and per Anticoagulation Collaborative Practice Agreement/Protocol: 1. POC INR (1.0) is Subtherapeutic for INR goal today. 2. Spoke with Dr. Vita Garcia office (podiatry surgeon) - per office, okay for patient to resume warfarin today 3. Change warfarin - Take 15 mg tonight, then resume 11 mg daily 4. Next INR check will be in 1 week(s) - 1/29/2020 Medication reconciliation was completed during the visit. Medications Discontinued During This Encounter Medication Reason  cephALEXin (KEFLEX) 743 mg capsule Duplicate Order  trimethoprim-sulfamethoxazole (BACTRIM DS) 160-800 mg per tablet Therapy Completed A full discussion of the nature of anticoagulants has been carried out. A full discussion of the need for frequent and regular monitoring, precise dosage adjustment and compliance was stressed. Mr. Geo Johnson was instructed to call [phone] if there are any signs of abnormal bleeding. Mr. Geo Johnson was instructed to avoid any major changes in his general diet and to avoid alcohol consumption. Counseled patient on risks of being on both Norco and Percocet - discussed that patient should only use Percocet post op when he is having pain. Instructed him to avoid taking Norco and Percocet doses near each other due to risk of additive sedation/respiratory depression. Instructed patient to monitor for dizziness, excessive sedation, and respiratory depression and to call [phone] if any excessive sedation or respiratory depression occurs. Of note, patient states he does not have instranasal Narcan at home, but declines offer of Narcan Rx today since he states \"I am only using the Percocet rarely as needed since I am not having much pain\". Will discuss with PCP for her recommendations. Mr. Madeleine Morse verbalized his understanding of all instructions and will call the office with any questions, concerns, or signs of abnormal bleeding or blood clot. Notifications of recommendations will be sent to Greta Dougherty NP for review. Thank you, Nia Morales, PharmD Clinical Pharmacist Specialist

## 2020-01-29 ENCOUNTER — TELEPHONE (OUTPATIENT)
Dept: FAMILY MEDICINE CLINIC | Age: 50
End: 2020-01-29

## 2020-01-29 DIAGNOSIS — Z79.01 WARFARIN ANTICOAGULATION: ICD-10-CM

## 2020-01-29 NOTE — TELEPHONE ENCOUNTER
Called patient to follow up on home PT/INR today. Patient states he ran out of test strips and that he called the PT/INR machine company [facility]) to order new test strips, but the automated telephone number for the company told him that his test strips were on auto-refill. He requested that PharmD contact [facility] to help get test strip order for patient. Spoke with [employer] customer service ([phone]) to discuss test strip refills for patient per his request. Company  states that patient's last test strip order was shipped out 12/11and that each order contains 6 strips.  states that company has spoken with patient in the past about their policy that patients should call to request refill when they are down to 3 test strips. The  also stated that they have instructed patient to call customer service whenever he makes an error with a test strip so that they can assist with a walk through. They requested that PharmD have patient call [employer] service to confirm his address and what products he needs (e.g. test strips and lancets). Also discussed with  about patient calling in INR of 1.2 to PharmD on 1/20/2020 but reporting an INR of 1.6 to [facility].  advised PharmD to address accuracy of INR reporting with patient as INR faxes contain INR values dialed into service by patient. Spoke with patient and relayed instructions from [employer]. Also discussed that patient had reported INR of 1.2 to PharmD on 1/20/2020 but dialed in INR of 1.6 to [employer]. Patient states he mis-remembered INR when dialing in result to [employer]. Discussed that it is important that patient is always reporting accurate INR result from his machine. Instructed patient to call PharmD when he receives test strips so that he can check INR. Will made PCP aware. Patient expressed understanding and had no further questions.     Thank you for allowing me to participate in the care of this patient.     Tomi Conception, PharmD  Clinical Pharmacist Specialist

## 2020-01-30 ENCOUNTER — TELEPHONE (OUTPATIENT)
Dept: FAMILY MEDICINE CLINIC | Age: 50
End: 2020-01-30

## 2020-01-30 RX ORDER — WARFARIN 10 MG/1
TABLET ORAL
Qty: 90 TAB | Refills: 0 | Status: SHIPPED | OUTPATIENT
Start: 2020-01-30 | End: 2020-02-24

## 2020-01-30 NOTE — TELEPHONE ENCOUNTER
Called patient to discuss having him check PT/INR at local lab near him [facility]) since he doesn't have test strips for home INR machine yet. Patient states that he was told by the test strip company that his test strips would arrive in 2 days by [employer]. He states he cannot go to lab today for INR check since his podiatrist gave him walking restrictions until Monday since he still has stitches in from his foot surgery, per his report. Discussed with patient that PharmD will call him tomorrow to see if his test strips have been delivered and to see if he can check home INR. Will make PCP aware of discussion. Patient expressed understanding and had no further questions. Thank you for allowing me to participate in the care of this patient.     Reese Bocanegra, PharmD  Clinical Pharmacist Specialist

## 2020-01-31 ENCOUNTER — TELEPHONE (OUTPATIENT)
Dept: FAMILY MEDICINE CLINIC | Age: 50
End: 2020-01-31

## 2020-01-31 NOTE — TELEPHONE ENCOUNTER
Called patient to follow up on warfarin and INR. No answer, left voicemail requesting patient return call to PharmD. Will continue to attempt to reach patient.     Bud Nuñez, PharmD  Clinical Pharmacist Specialist

## 2020-02-03 ENCOUNTER — TELEPHONE (OUTPATIENT)
Dept: FAMILY MEDICINE CLINIC | Age: 50
End: 2020-02-03

## 2020-02-03 LAB — INR, EXTERNAL: 1.3

## 2020-02-03 NOTE — TELEPHONE ENCOUNTER
Called patient 2 times today to follow up on warfarin and INR. No answer, left voicemail requesting patient return call to PharmD. Will continue to attempt to reach patient.     Donald Angel, Carmen  Clinical Pharmacist Specialist

## 2020-02-04 ENCOUNTER — TELEPHONE (OUTPATIENT)
Dept: FAMILY MEDICINE CLINIC | Age: 50
End: 2020-02-04

## 2020-02-04 NOTE — TELEPHONE ENCOUNTER
Got a call from Chintan Jose from [facility]. Pt's INR is 1.3. Pt is on 11mg coumadin daily. Pt states that he skipped his Friday dose of coumadin and only took 1/2 of his dose on Saturday. Took a full dose ( 11mg)  on Sunday and plans to take a full dose tonight. Advised pt to take his full dose of med daily and recheck INR on Thursday. He is to call the result in to PCP. Pt voiced understanding. Aside from feeling cold last week , he voiced no other new complaints.

## 2020-02-06 ENCOUNTER — TELEPHONE (OUTPATIENT)
Dept: FAMILY MEDICINE CLINIC | Age: 50
End: 2020-02-06

## 2020-02-06 DIAGNOSIS — Z79.01 ANTICOAGULANT LONG-TERM USE: ICD-10-CM

## 2020-02-06 LAB — INR, EXTERNAL: 1.8

## 2020-02-06 NOTE — TELEPHONE ENCOUNTER
Called patient to follow up on INR. Patient states he has not checked INR yet today. States he will check INR when he gets home and call back with result. Donald Angel, PharmD Clinical Pharmacist Specialist

## 2020-02-06 NOTE — TELEPHONE ENCOUNTER
Pharmacy Note - Anticoagulation 02/06/20 S/O:  Mr. Rosa Collazo ,52 y.o. male, referred by Dr. Kemper Gowers, ANDREZ, was contacted via an outbound telephone call today for telephonic anticoagulation management for the diagnosis of CVA, DVT, and PE. Patient checked INR using POC INR machine today. The most recent INR was 1.8. Of note, patient had INR of 1.3 on 2/3/2020. HPI: 
 
Interim History: · INR Goal:  2.0-3.0 · Current warfarin regimen: 11 mg daily · Confirmed took 15 mg on 1/22/2020 as instructed · States he missed doses on 1/30 and 1/31 · Warfarin tablet strength:   1 mg, 10 mg · Total weekly dose (mg): 77 mg daily Today's pertinent positives includes: No significant changes since last visit  
-States he hasn't had any medication changes since last visit 
-Denies any bruising/bleeding, SOB, or chest pain · Adherence: · Able to recall regimen? YES 
· Miss/extra dose? YES - missed doses on 1/30 and 1/31 · Need refill? NO Upcoming procedure(s):  NO 
 
 
Past Medical History:  
Diagnosis Date  Diabetes ([facility] [address]  Erectile dysfunction due to diseases classified elsewhere  Hematuria   
 right renal cyst  
 Hypercholesterolemia  Hypertension  Liver disease Fatty Liver  Pulmonary emboli [facility]) 2003 & 2004  
 chronic coumadin therapy managed by Dr. Gibson Landau  Renal failure acute   
 following surgery  Sleep apnea   
 does not use cpap machine Allergies Allergen Reactions  Tape [Adhesive] Contact Dermatitis ALLERGIC TO SILK TAPE ONLY Current Outpatient Medications Medication Sig  warfarin (COUMADIN) 10 mg tablet Taking 11 mg of warfarin daily  oxyCODONE-acetaminophen (PERCOCET) 5-325 mg per tablet Take 1 Tab by mouth every four (4) hours as needed for Pain.  cephALEXin (KEFLEX) 500 mg capsule Take 500 mg by mouth every eight (8) hours.  HYDROcodone-acetaminophen (NORCO)  mg tablet Take 1 Tab by mouth every six (6) hours as needed for Pain.  potassium chloride (K-DUR, KLOR-CON) 20 mEq tablet TAKE 1 TABLET BY MOUTH DAILY. FOLLOW UP FOR FURTHER REFILLS  furosemide (LASIX) 20 mg tablet TAKE 1 TABLET BY MOUTH EVERY DAY  cloNIDine HCl (CATAPRES) 0.1 mg tablet TAKE 1 TABLET BY MOUTH TWICE DAILY (Patient taking differently: Take 0.2 mg by mouth daily.)  lisinopril-hydroCHLOROthiazide (PRINZIDE, ZESTORETIC) 20-12.5 mg per tablet TAKE 2 TABLETS BY MOUTH EVERY DAY  atenolol (TENORMIN) 100 mg tablet TAKE 1 TABLET BY MOUTH DAILY  amLODIPine (NORVASC) 10 mg tablet TAKE 1 TABLET BY MOUTH DAILY  FREESTYLE CASPER 14 DAY SENSOR kit USE AS DIRECTED  tadalafil (CIALIS) 20 mg tablet Take 1 Tab by mouth as needed for Other (ED).  fluticasone (FLONASE) 50 mcg/actuation nasal spray 2 Sprays by Both Nostrils route daily as needed for Rhinitis.  warfarin (COUMADIN) 1 mg tablet Take by 2 tablets daily by mouth with 10 mg tablet for a total of 12 mg daily or as directed by healthcare provider (Patient taking differently: Taking 11 mg of warfarin daily)  insulin glargine (BASAGLAR KWIKPEN) 100 unit/mL (3 mL) inpn Takes 50 units twice a day.  gabapentin (NEURONTIN) 300 mg capsule Take 300 mg by mouth two (2) times a day.  BD INSULIN PEN NEEDLE UF SHORT 31 gauge x 5/16\" ndle USE 1 PEN UTD QID  BUTRANS 10 mcg/hour LUCA 1 PATCH EVERY WEEK  
 NOVOLOG FLEXPEN 100 unit/mL inpn Takes 14-25 units per meal per sliding scale from endocrinology  VICTOZA 3-ZANDRA 0.6 mg/0.1 mL (18 mg/3 mL) sub-q pen INJ 1.8 MG SC QD  
 insulin syringe-needle U-100 by Does Not Apply route. Dispense ultrafine 1 mL syringes with 29 gauge needle  aspirin delayed-release 81 mg tablet Take 81 mg by mouth daily. No current facility-administered medications for this visit. Wt Readings from Last 3 Encounters:  
01/13/20 347 lb (157.4 kg) 10/04/19 336 lb 3.2 oz (152.5 kg) 07/30/19 345 lb (156.5 kg) BP Readings from Last 3 Encounters:  
01/13/20 130/68  
10/04/19 136/62  
07/30/19 140/74 Lab Results Component Value Date/Time Sodium 137 01/13/2020 11:35 AM  
 Potassium 4.1 01/13/2020 11:35 AM  
 Chloride 99 01/13/2020 11:35 AM  
 CO2 25 01/13/2020 11:35 AM  
 Anion gap 13.0 01/13/2020 11:35 AM  
 Glucose 261 (H) 01/13/2020 11:35 AM  
 BUN 22 01/13/2020 11:35 AM  
 Creatinine 0.9 01/13/2020 11:35 AM  
 GFR est AA >60 03/24/2014 01:56 PM  
 GFR est non-AA >60 03/24/2014 01:56 PM  
 Calcium 9.3 01/13/2020 11:35 AM  
 
 
Lab Results Component Value Date/Time WBC 5.2 01/13/2020 11:35 AM  
 HGB 12.6 (L) 01/13/2020 11:35 AM  
 HCT 41.5 01/13/2020 11:35 AM  
 PLATELET 243 55/12/6421 11:35 AM  
 MCV 87 01/13/2020 11:35 AM  
 
 
Lab Results Component Value Date/Time Protein, total 7.7 01/13/2020 11:35 AM  
 Albumin 4.3 01/13/2020 11:35 AM  
 
 
Estimated Creatinine Clearance: 159.4 mL/min (by C-G formula based on SCr of 0.9 mg/dL). INR History:   (normal INR range 0.8-1.2) Date   INR   Dose/Comments 2/6/2020 1.8   11 mg daily (missed 2 doses) 
1/22/2020        1.0                               Held warfarin for surgery on 1/21/2020 1/20/2020        1.6 per INR company  Holding for surgery on 1/21/2020 
1/15/2020        3.3   Hold starting today for surgery on 1/21/2020 1/9/2020          2.7                               11 mg daily 12/11/19          2.1                               11 mg daily (confirmed took 13 mg on 12/4 as instructed) 12/4/19            1.5                               10 mg daily 11/29/19          1.6                               10 mg daily A/P:    
 
Anticoagulation: 
Considering Mr. Turcios's past history, todays findings, and per Anticoagulation Collaborative Practice Agreement/Protocol: 1. POC INR (1.8) is Subtherapeutic for INR goal today. 2.  Change warfarin - take 13 mg tonight, then resume 11 mg daily 3. Next INR check will be in 1 week(s). A full discussion of the nature of anticoagulants has been carried out. A full discussion of the need for frequent and regular monitoring, precise dosage adjustment and compliance was stressed. Mr. Geo Johnson was instructed to call [phone] if there are any signs of abnormal bleeding. Mr. Geo Johnson was instructed to avoid any major changes in his general diet and to avoid alcohol consumption. Mr. Geo Johnson verbalized his understanding of all instructions and will call the office with any questions, concerns, or signs of abnormal bleeding or blood clot. Notifications of recommendations will be sent to Dr. Jackson Meeks, NP for review. Thank you, Ankit Moore, PharmD Clinical Pharmacist Specialist

## 2020-02-13 ENCOUNTER — TELEPHONE (OUTPATIENT)
Dept: FAMILY MEDICINE CLINIC | Age: 50
End: 2020-02-13

## 2020-02-13 LAB — INR, EXTERNAL: 2.3

## 2020-02-13 NOTE — TELEPHONE ENCOUNTER
Called patient twice to follow up on warfarin and INR. No answer, left voicemail requesting patient return call to PharmD. Will continue to attempt to reach patient.     Mynor Vilchis, TerrenceD  Clinical Pharmacist Specialist

## 2020-02-14 ENCOUNTER — TELEPHONE (OUTPATIENT)
Dept: FAMILY MEDICINE CLINIC | Age: 50
End: 2020-02-14

## 2020-02-14 DIAGNOSIS — Z79.01 WARFARIN ANTICOAGULATION: ICD-10-CM

## 2020-02-14 DIAGNOSIS — Z79.01 ANTICOAGULANT LONG-TERM USE: ICD-10-CM

## 2020-02-14 RX ORDER — WARFARIN 1 MG/1
TABLET ORAL
Qty: 90 TAB | Refills: 1 | Status: SHIPPED | OUTPATIENT
Start: 2020-02-14 | End: 2020-06-02 | Stop reason: SDUPTHER

## 2020-02-14 NOTE — TELEPHONE ENCOUNTER
Pharmacy Note - Anticoagulation 02/14/20 S/O:  Mr. Henrique Walden ,52 y.o. male, referred by Dr. Carolyn Ames, ANDREZ, was contacted via an outbound telephone call today for telephonic anticoagulation management for the diagnosis of CVA, DVT, and PE. Patient checked INR using POC INR machine today. The most recent INR was 2.3 yesterday 2/13/2020. HPI:  
 
Interim History: · INR Goal:  2.0-3.0 · Current warfarin regimen: 11 mg daily · Confirmed he took 13 mg on 2/6/2020 as instructed · Warfarin tablet strength:   1 mg, 10 mg · Total weekly dose (mg): 77 mg Today's pertinent positives includes: No significant changes since last visit  
-Denies any bruising/bleeding, SOB, chest pain 
-States he finished his whole course of antibiotic from his surgeon after his foot surgery 
-States no recent changes in medications or greens 
 -States he is no longer having to take the Percocet he was given for pain after surgery · Adherence: · Able to recall regimen? YES 
· Miss/extra dose? NO 
· Need refill? YES - sent prescription for warfarin 1 mg tablet per his request 
 
Upcoming procedure(s):  NO 
 
Past Medical History:  
Diagnosis Date  Diabetes ([facility] [address]  Erectile dysfunction due to diseases classified elsewhere  Hematuria   
 right renal cyst  
 Hypercholesterolemia  Hypertension  Liver disease Fatty Liver  Pulmonary emboli [facility]) 2003 & 2004  
 chronic coumadin therapy managed by Dr. Alayna Chou  Renal failure acute   
 following surgery  Sleep apnea   
 does not use cpap machine Allergies Allergen Reactions  Tape [Adhesive] Contact Dermatitis ALLERGIC TO SILK TAPE ONLY Current Outpatient Medications Medication Sig  warfarin (COUMADIN) 10 mg tablet Taking 11 mg of warfarin daily  oxyCODONE-acetaminophen (PERCOCET) 5-325 mg per tablet Take 1 Tab by mouth every four (4) hours as needed for Pain.  cephALEXin (KEFLEX) 500 mg capsule Take 500 mg by mouth every eight (8) hours.  HYDROcodone-acetaminophen (NORCO)  mg tablet Take 1 Tab by mouth every six (6) hours as needed for Pain.  potassium chloride (K-DUR, KLOR-CON) 20 mEq tablet TAKE 1 TABLET BY MOUTH DAILY. FOLLOW UP FOR FURTHER REFILLS  furosemide (LASIX) 20 mg tablet TAKE 1 TABLET BY MOUTH EVERY DAY  cloNIDine HCl (CATAPRES) 0.1 mg tablet TAKE 1 TABLET BY MOUTH TWICE DAILY (Patient taking differently: Take 0.2 mg by mouth daily.)  lisinopril-hydroCHLOROthiazide (PRINZIDE, ZESTORETIC) 20-12.5 mg per tablet TAKE 2 TABLETS BY MOUTH EVERY DAY  atenolol (TENORMIN) 100 mg tablet TAKE 1 TABLET BY MOUTH DAILY  amLODIPine (NORVASC) 10 mg tablet TAKE 1 TABLET BY MOUTH DAILY  FREESTYLE CASPER 14 DAY SENSOR kit USE AS DIRECTED  tadalafil (CIALIS) 20 mg tablet Take 1 Tab by mouth as needed for Other (ED).  fluticasone (FLONASE) 50 mcg/actuation nasal spray 2 Sprays by Both Nostrils route daily as needed for Rhinitis.  warfarin (COUMADIN) 1 mg tablet Take by 2 tablets daily by mouth with 10 mg tablet for a total of 12 mg daily or as directed by healthcare provider (Patient taking differently: Taking 11 mg of warfarin daily)  insulin glargine (BASAGLAR KWIKPEN) 100 unit/mL (3 mL) inpn Takes 50 units twice a day.  gabapentin (NEURONTIN) 300 mg capsule Take 300 mg by mouth two (2) times a day.  BD INSULIN PEN NEEDLE UF SHORT 31 gauge x 5/16\" ndle USE 1 PEN UTD QID  BUTRANS 10 mcg/hour LUCA 1 PATCH EVERY WEEK  
 NOVOLOG FLEXPEN 100 unit/mL inpn Takes 14-25 units per meal per sliding scale from endocrinology  VICTOZA 3-ZANDRA 0.6 mg/0.1 mL (18 mg/3 mL) sub-q pen INJ 1.8 MG SC QD  
 insulin syringe-needle U-100 by Does Not Apply route. Dispense ultrafine 1 mL syringes with 29 gauge needle  aspirin delayed-release 81 mg tablet Take 81 mg by mouth daily. No current facility-administered medications for this visit. Wt Readings from Last 3 Encounters:  
01/13/20 347 lb (157.4 kg) 10/04/19 336 lb 3.2 oz (152.5 kg) 07/30/19 345 lb (156.5 kg) BP Readings from Last 3 Encounters:  
01/13/20 130/68  
10/04/19 136/62  
07/30/19 140/74 Lab Results Component Value Date/Time Sodium 137 01/13/2020 11:35 AM  
 Potassium 4.1 01/13/2020 11:35 AM  
 Chloride 99 01/13/2020 11:35 AM  
 CO2 25 01/13/2020 11:35 AM  
 Anion gap 13.0 01/13/2020 11:35 AM  
 Glucose 261 (H) 01/13/2020 11:35 AM  
 BUN 22 01/13/2020 11:35 AM  
 Creatinine 0.9 01/13/2020 11:35 AM  
 GFR est AA >60 03/24/2014 01:56 PM  
 GFR est non-AA >60 03/24/2014 01:56 PM  
 Calcium 9.3 01/13/2020 11:35 AM  
 
 
Lab Results Component Value Date/Time WBC 5.2 01/13/2020 11:35 AM  
 HGB 12.6 (L) 01/13/2020 11:35 AM  
 HCT 41.5 01/13/2020 11:35 AM  
 PLATELET 125 95/47/3964 11:35 AM  
 MCV 87 01/13/2020 11:35 AM  
 
 
Lab Results Component Value Date/Time Protein, total 7.7 01/13/2020 11:35 AM  
 Albumin 4.3 01/13/2020 11:35 AM  
 
 
Estimated Creatinine Clearance: 159.4 mL/min (by C-G formula based on SCr of 0.9 mg/dL). INR History:   (normal INR range 0.8-1.2) Date   INR   Dose/Comments 2/13/2020 2.3   11 mg daily (took 13 mg on 2/6/2020) 
2/6/2020          1.8                               11 mg daily (missed 2 doses) 
1/22/2020        1.0                               Held warfarin for surgery on 1/21/2020 1/20/2020        1.6 per INR company  Holding for surgery on 1/21/2020 
1/15/2020        3.3                               Hold starting today for surgery on 1/21/2020 1/9/2020          2.7                               11 mg daily 12/11/19          2.1                               11 mg daily (confirmed took 13 mg on 12/4 as instructed) 12/4/19            1.5                               10 mg daily 11/29/19          1.6                               10 mg daily A/P:    
 
Anticoagulation: Considering Mr. Turcios's past history, todays findings, and per Anticoagulation Collaborative Practice Agreement/Protocol: 1. POC INR (2.3) is Therapeutic for INR goal today. 2.  Continue warfarin - 11 mg daily; reiterated importance of adherence 3. Next INR check will be in 1 week. A full discussion of the nature of anticoagulants has been carried out. A full discussion of the need for frequent and regular monitoring, precise dosage adjustment and compliance was stressed. Side effects of potential bleeding were discussed and Mr. Sridevi Rivera was instructed to call [phone] if there are any signs of abnormal bleeding. Mr. Sridevi Rivera was instructed to avoid any OTC items containing aspirin or ibuprofen and prior to starting any new OTC products to consult with his physician or pharmacist to ensure no drug interactions are present. Mr. Sridevi Rivera was instructed to avoid any major changes in his general diet and to avoid alcohol consumption. Mr. Sridevi Rivera verbalized his understanding of all instructions and will call the office with any questions, concerns, or signs of abnormal bleeding or blood clot. Notifications of recommendations will be sent to Dr. Kayla Go, ANDREZ for review. Thank you, Matt Keyes, PharmD Clinical Pharmacist Specialist

## 2020-02-20 ENCOUNTER — TELEPHONE (OUTPATIENT)
Dept: FAMILY MEDICINE CLINIC | Age: 50
End: 2020-02-20

## 2020-02-20 DIAGNOSIS — Z79.01 ANTICOAGULANT LONG-TERM USE: ICD-10-CM

## 2020-02-20 LAB — INR, EXTERNAL: 2.2

## 2020-02-20 RX ORDER — HYDROCODONE BITARTRATE AND ACETAMINOPHEN 10; 325 MG/1; MG/1
1 TABLET ORAL
COMMUNITY
End: 2020-11-20 | Stop reason: SDUPTHER

## 2020-02-20 RX ORDER — METHOCARBAMOL 500 MG/1
1000 TABLET, FILM COATED ORAL
COMMUNITY
Start: 2020-02-18 | End: 2020-02-23

## 2020-02-20 RX ORDER — VALACYCLOVIR HYDROCHLORIDE 1 G/1
1 TABLET, FILM COATED ORAL 3 TIMES DAILY
COMMUNITY
Start: 2020-02-18 | End: 2020-02-25

## 2020-02-20 NOTE — TELEPHONE ENCOUNTER
Pharmacy Note - Anticoagulation 02/21/20 S/O:  Mr. Mitali Vogel ,52 y.o. male, referred by Dr. Jackson Meeks, NP, was contacted via an outbound telephone call today for telephonic anticoagulation management for the diagnosis of CVA, DVT, and PE. Patient checked POC INR using home POC INR machine. The most recent INR was 2.2 today per his report. HPI: 
 
Interim History: · INR Goal:  2.0-3.0 · Current warfarin regimen: 11 mg daily · Warfarin tablet strength:   1 mg, 10 mg · Total weekly dose (mg): 77 mg daily Today's pertinent positives includes: 
Medication change and Change in medical status 
-States he is \"dealing with bout of shingles\" - states symptoms started Saturday or Sunday 
 -States he \"almost had a fall\" at home due to his shingles symptoms 
 -States he went to the emergency room in [address] they started Valacyclovir 1 gram TID for 7 days and Methocarbamol 1,000 mg 4 times a day for 5 days 
-Denies any bruising/bleeding, SOB, chest pain 
-Denies any change in the greens/vitamin K in his diet · Adherence: · Able to recall regimen? YES 
· Miss/extra dose? NO 
· Need refill? NO Upcoming procedure(s):  NO 
 
 
Past Medical History:  
Diagnosis Date  Diabetes ([address]  Erectile dysfunction due to diseases classified elsewhere  Hematuria   
 right renal cyst  
 Hypercholesterolemia  Hypertension  Liver disease Fatty Liver  Pulmonary emboli [facility]) 2003 & 2004  
 chronic coumadin therapy managed by Dr. Yamil Freeman  Renal failure acute   
 following surgery  Sleep apnea   
 does not use cpap machine Allergies Allergen Reactions  Tape [Adhesive] Contact Dermatitis ALLERGIC TO SILK TAPE ONLY Current Outpatient Medications Medication Sig  
 valACYclovir (VALTREX) 1 gram tablet Take 1 g by mouth three (3) times daily. For 7 days.  methocarbamol (ROBAXIN) 500 mg tablet Take 1,000 mg by mouth. 4 times daily for 5 days.  HYDROcodone-acetaminophen (NORCO)  mg tablet Take 1 Tab by mouth every six (6) hours as needed for Pain.  warfarin (COUMADIN) 1 mg tablet Take 1 tablet daily by mouth with 10 mg tablet for a total of 11 mg daily or as directed by healthcare provider.  warfarin (COUMADIN) 10 mg tablet Taking 11 mg of warfarin daily  potassium chloride (K-DUR, KLOR-CON) 20 mEq tablet TAKE 1 TABLET BY MOUTH DAILY. FOLLOW UP FOR FURTHER REFILLS  furosemide (LASIX) 20 mg tablet TAKE 1 TABLET BY MOUTH EVERY DAY  cloNIDine HCl (CATAPRES) 0.1 mg tablet TAKE 1 TABLET BY MOUTH TWICE DAILY (Patient taking differently: Take 0.2 mg by mouth daily.)  lisinopril-hydroCHLOROthiazide (PRINZIDE, ZESTORETIC) 20-12.5 mg per tablet TAKE 2 TABLETS BY MOUTH EVERY DAY  atenolol (TENORMIN) 100 mg tablet TAKE 1 TABLET BY MOUTH DAILY  amLODIPine (NORVASC) 10 mg tablet TAKE 1 TABLET BY MOUTH DAILY  FREESTYLE CASPER 14 DAY SENSOR kit USE AS DIRECTED  tadalafil (CIALIS) 20 mg tablet Take 1 Tab by mouth as needed for Other (ED).  fluticasone (FLONASE) 50 mcg/actuation nasal spray 2 Sprays by Both Nostrils route daily as needed for Rhinitis.  insulin glargine (BASAGLAR KWIKPEN) 100 unit/mL (3 mL) inpn Takes 50 units twice a day.  gabapentin (NEURONTIN) 300 mg capsule Take 300 mg by mouth two (2) times a day.  BD INSULIN PEN NEEDLE UF SHORT 31 gauge x 5/16\" ndle USE 1 PEN UTD QID  
 NOVOLOG FLEXPEN 100 unit/mL inpn Takes 14-25 units per meal per sliding scale from endocrinology  VICTOZA 3-ZANDRA 0.6 mg/0.1 mL (18 mg/3 mL) sub-q pen INJ 1.8 MG SC QD  
 insulin syringe-needle U-100 by Does Not Apply route. Dispense ultrafine 1 mL syringes with 29 gauge needle  aspirin delayed-release 81 mg tablet Take 81 mg by mouth daily.   
 oxyCODONE-acetaminophen (PERCOCET) 5-325 mg per tablet Take 1 Tab by mouth every four (4) hours as needed for Pain.  BUTRANS 10 mcg/hour LUCA 1 PATCH EVERY WEEK No current facility-administered medications for this visit. Wt Readings from Last 3 Encounters:  
01/13/20 347 lb (157.4 kg) 10/04/19 336 lb 3.2 oz (152.5 kg) 07/30/19 345 lb (156.5 kg) BP Readings from Last 3 Encounters:  
01/13/20 130/68  
10/04/19 136/62  
07/30/19 140/74 Lab Results Component Value Date/Time Sodium 137 01/13/2020 11:35 AM  
 Potassium 4.1 01/13/2020 11:35 AM  
 Chloride 99 01/13/2020 11:35 AM  
 CO2 25 01/13/2020 11:35 AM  
 Anion gap 13.0 01/13/2020 11:35 AM  
 Glucose 261 (H) 01/13/2020 11:35 AM  
 BUN 22 01/13/2020 11:35 AM  
 Creatinine 0.9 01/13/2020 11:35 AM  
 GFR est AA >60 03/24/2014 01:56 PM  
 GFR est non-AA >60 03/24/2014 01:56 PM  
 Calcium 9.3 01/13/2020 11:35 AM  
 
 
Lab Results Component Value Date/Time WBC 5.2 01/13/2020 11:35 AM  
 HGB 12.6 (L) 01/13/2020 11:35 AM  
 HCT 41.5 01/13/2020 11:35 AM  
 PLATELET 091 81/19/6461 11:35 AM  
 MCV 87 01/13/2020 11:35 AM  
 
 
Lab Results Component Value Date/Time Protein, total 7.7 01/13/2020 11:35 AM  
 Albumin 4.3 01/13/2020 11:35 AM  
 
 
Estimated Creatinine Clearance: 159.4 mL/min (by C-G formula based on SCr of 0.9 mg/dL). INR History:   (normal INR range 0.8-1.2) Date   INR   Dose/Comments 2/20/2020 2.2   11 mg daily 2/13/2020        2.3                               11 mg daily (took 13 mg on 2/6/2020) 
2/6/2020          1.8                               11 mg daily (missed 2 doses) 
1/22/2020        1.0                               Held warfarin for surgery on 1/21/2020 1/20/2020        1.6 per INR company  Holding for surgery on 1/21/2020 
1/15/2020        3.3                               Hold starting today for surgery on 1/21/2020 1/9/2020          2.7                               11 mg daily 12/11/19          2.1                               11 mg daily (confirmed took 13 mg on 12/4 as instructed) 12/4/19            1.5                               10 mg daily 11/29/19          1.6                               10 mg daily A/P:    
 
Anticoagulation: 
Considering Mr. Turcios's past history, todays findings, and per Anticoagulation Collaborative Practice Agreement/Protocol: 1. POC INR (2.2) is Therapeutic for INR goal today. 2.  Continue warfarin - 11 mg daily 3. Next INR check will be in 1 week(s) - 2/27/2020. 
-Reviewed that patient should use things for support when ambulating at home and lay down if not feeling well to avoid risk of falls Medication reconciliation was completed during the visit. Assessed medication regimen for interactions with valacyclovir and methocarbamol. Discussed that methocarbamol can cause sedation and patient should avoid taking methocarbamol doses at the same time as his Norco.Of note, patient states he is no longer taking Percocet that was prescribed to him after foot surgery. Discussed that PharmD and PCP recommend patient having Naloxone nasal spray prescription filled in case he experiences excessive sedation or any respiratory depression. Patient declines Naloxone nasal spray prescription during call, stating \"I will discuss that with my wife\". Medications Discontinued During This Encounter Medication Reason  cephALEXin (KEFLEX) 500 mg capsule Therapy Completed  HYDROcodone-acetaminophen (NORCO)  mg tablet Alternate Therapy A full discussion of the nature of anticoagulants has been carried out. A full discussion of the need for frequent and regular monitoring, precise dosage adjustment and compliance was stressed. Side effects of potential bleeding were discussed and Mr. Tyler Cash was instructed to call [phone] if there are any signs of abnormal bleeding.   Mr. Tyler Cash was instructed to avoid any OTC items containing aspirin or ibuprofen and prior to starting any new OTC products to consult with his physician or pharmacist to ensure no drug interactions are present. Mr. Greta Griffin was instructed to avoid any major changes in his general diet and to avoid alcohol consumption. Mr. Greta Griffin verbalized his understanding of all instructions and will call the office with any questions, concerns, or signs of abnormal bleeding or blood clot. Notifications of recommendations will be sent to Dr. Sidney Baird, ANDREZ for review. Thank you, Franky Stafford, PharmD Clinical Pharmacist Specialist

## 2020-02-24 DIAGNOSIS — Z79.01 WARFARIN ANTICOAGULATION: ICD-10-CM

## 2020-02-24 RX ORDER — WARFARIN 10 MG/1
TABLET ORAL
Qty: 30 TAB | Refills: 2 | Status: SHIPPED | OUTPATIENT
Start: 2020-02-24 | End: 2020-06-02 | Stop reason: SDUPTHER

## 2020-02-25 NOTE — PROGRESS NOTES
HPI:   ER f/u  Given valtrex 2/18/20 for zoster (mid back)  No fever; pain persists  No cough/exertional CP    ROS is otherwise negative.     Past Medical History:   Diagnosis Date    Diabetes [facility])     Erectile dysfunction due to diseases classified elsewhere     Hematuria     right renal cyst    Hypercholesterolemia     Hypertension     Liver disease     Fatty Liver    Pulmonary emboli ([facility] [address] 2003 & 2004    chronic coumadin therapy managed by Dr. Head Saliva Renal failure acute     following surgery    Sleep apnea     does not use cpap machine       Past Surgical History:   Procedure Laterality Date    COLONOSCOPY N/A 11/14/2017    COLONOSCOPY performed by Shelbie Fraire MD at [facility] HX APPENDECTOMY  2006    HX CARPAL TUNNEL RELEASE Right     HX HIP REPLACEMENT Right     HX KNEE REPLACEMENT Left     HX ORTHOPAEDIC      Right toe amputation, pins and screws in foot    VASCULAR SURGERY PROCEDURE UNLIST      Jocelin filter       Social History     Socioeconomic History    Marital status:      Spouse name: Not on file    Number of children: Not on file    Years of education: Not on file    Highest education level: Not on file   Occupational History    Not on file   Social Needs    Financial resource strain: Not on file    Food insecurity:     Worry: Not on file     Inability: Not on file    Transportation needs:     Medical: No     Non-medical: No   Tobacco Use    Smoking status: Never Smoker    Smokeless tobacco: Never Used   Substance and Sexual Activity    Alcohol use: No    Drug use: No    Sexual activity: Yes     Partners: Female     Birth control/protection: None   Lifestyle    Physical activity:     Days per week: Not on file     Minutes per session: Not on file    Stress: Not on file   Relationships    Social connections:     Talks on phone: Not on file     Gets together: Not on file     Attends Judaism service: Not on file     Active member of club or organization: Not on file     Attends meetings of clubs or organizations: Not on file     Relationship status: Not on file    Intimate partner violence:     Fear of current or ex partner: Not on file     Emotionally abused: Not on file     Physically abused: Not on file     Forced sexual activity: Not on file   Other Topics Concern     Service No    Blood Transfusions Not Asked    Caffeine Concern No    Occupational Exposure No    Hobby Hazards No    Sleep Concern No    Stress Concern No    Weight Concern Yes    Special Diet Yes    Back Care Not Asked    Exercise Yes    Bike Helmet Not Asked    Seat Belt Not Asked    Self-Exams Not Asked   Social History Narrative    Not on file       Allergies   Allergen Reactions    Tape [Adhesive] Contact Dermatitis     ALLERGIC TO SILK TAPE ONLY           Family History   Problem Relation Age of Onset    Diabetes Father     Hypertension Father     Heart Disease Maternal Grandmother     Cancer Maternal Grandmother     Depression Mother     Cancer Paternal Grandfather        Current Outpatient Medications   Medication Sig Dispense Refill    warfarin (COUMADIN) 10 mg tablet TAKE 1 TABLET BY MOUTH AS DIRECTED 30 Tab 2    HYDROcodone-acetaminophen (NORCO)  mg tablet Take 1 Tab by mouth every six (6) hours as needed for Pain.  warfarin (COUMADIN) 1 mg tablet Take 1 tablet daily by mouth with 10 mg tablet for a total of 11 mg daily or as directed by healthcare provider. 90 Tab 1    oxyCODONE-acetaminophen (PERCOCET) 5-325 mg per tablet Take 1 Tab by mouth every four (4) hours as needed for Pain.  potassium chloride (K-DUR, KLOR-CON) 20 mEq tablet TAKE 1 TABLET BY MOUTH DAILY.  FOLLOW UP FOR FURTHER REFILLS 30 Tab 2    furosemide (LASIX) 20 mg tablet TAKE 1 TABLET BY MOUTH EVERY DAY 30 Tab 2    cloNIDine HCl (CATAPRES) 0.1 mg tablet TAKE 1 TABLET BY MOUTH TWICE DAILY (Patient taking differently: Take 0.2 mg by mouth daily.) 60 Tab 2    lisinopril-hydroCHLOROthiazide (PRINZIDE, ZESTORETIC) 20-12.5 mg per tablet TAKE 2 TABLETS BY MOUTH EVERY DAY 60 Tab 3    atenolol (TENORMIN) 100 mg tablet TAKE 1 TABLET BY MOUTH DAILY 30 Tab 3    amLODIPine (NORVASC) 10 mg tablet TAKE 1 TABLET BY MOUTH DAILY 90 Tab 1    FREESTYLE CASPER 14 DAY SENSOR kit USE AS DIRECTED  6    tadalafil (CIALIS) 20 mg tablet Take 1 Tab by mouth as needed for Other (ED). 6 Tab 11    fluticasone (FLONASE) 50 mcg/actuation nasal spray 2 Sprays by Both Nostrils route daily as needed for Rhinitis. 1 Bottle 3    insulin glargine (BASAGLAR KWIKPEN) 100 unit/mL (3 mL) inpn Takes 50 units twice a day.  gabapentin (NEURONTIN) 300 mg capsule Take 300 mg by mouth two (2) times a day. 2    BD INSULIN PEN NEEDLE UF SHORT 31 gauge x 5/16\" ndle USE 1 PEN UTD QID  3    BUTRANS 10 mcg/hour LUCA 1 PATCH EVERY WEEK  0    NOVOLOG FLEXPEN 100 unit/mL inpn Takes 14-25 units per meal per sliding scale from endocrinology  3    VICTOZA 3-ZANDRA 0.6 mg/0.1 mL (18 mg/3 mL) sub-q pen INJ 1.8 MG SC QD  6    insulin syringe-needle U-100 by Does Not Apply route. Dispense ultrafine 1 mL syringes with 29 gauge needle 100 Syringe 11    aspirin delayed-release 81 mg tablet Take 81 mg by mouth daily. Visit Vitals  /80   Pulse 80   Temp 98 °F (36.7 °C)   Resp 15   Ht 6' 2\" (1.88 m)   Wt 347 lb (157.4 kg)   BMI 44.55 kg/m²       PE  obese in NAD  HEENT:  OP: clear. Neck: supple  Chest: clear. CV: RRR w/o m,r,g  Abd: soft, NT  Ext: tr edema. Skin: vesicles/excoriations mid (L) thoracic area     Assessment and Plan    Encounter Diagnoses   Name Primary?     Herpes zoster without complication Yes     Zoster  Explained that rash can take several weeks to resolve and pain can last possibly several months or longer  Increase neurontin to 300 mg tid for next 30 days and then reduce to usual bid dose  OV 6 weeks or prn  I have explained plan to patient and the patient verbalizes understanding

## 2020-02-26 ENCOUNTER — OFFICE VISIT (OUTPATIENT)
Dept: FAMILY MEDICINE CLINIC | Age: 50
End: 2020-02-26

## 2020-02-26 VITALS
HEART RATE: 80 BPM | DIASTOLIC BLOOD PRESSURE: 80 MMHG | WEIGHT: 315 LBS | BODY MASS INDEX: 40.43 KG/M2 | SYSTOLIC BLOOD PRESSURE: 130 MMHG | RESPIRATION RATE: 15 BRPM | HEIGHT: 74 IN | TEMPERATURE: 98 F

## 2020-02-26 DIAGNOSIS — B02.9 HERPES ZOSTER WITHOUT COMPLICATION: Primary | ICD-10-CM

## 2020-02-26 RX ORDER — GABAPENTIN 300 MG/1
CAPSULE ORAL
Qty: 90 CAP | Refills: 0 | Status: SHIPPED | OUTPATIENT
Start: 2020-02-26 | End: 2020-05-06 | Stop reason: ALTCHOICE

## 2020-02-27 ENCOUNTER — TELEPHONE (OUTPATIENT)
Dept: FAMILY MEDICINE CLINIC | Age: 50
End: 2020-02-27

## 2020-02-27 DIAGNOSIS — Z79.01 ANTICOAGULANT LONG-TERM USE: ICD-10-CM

## 2020-02-27 LAB — INR, EXTERNAL: 2.1

## 2020-02-27 NOTE — TELEPHONE ENCOUNTER
Called patient to follow up on warfarin and INR. No answer, left voicemail requesting patient return call to PharmD. Will continue to attempt to reach patient. Hailey Jose, TerrenceD Clinical Pharmacist Specialist

## 2020-02-27 NOTE — TELEPHONE ENCOUNTER
Pharmacy Note - Anticoagulation 02/27/20 S/O:  Mr. Bay Fofana ,52 y.o. male, referred by Dr. Sho Thomas, NP, was contacted via an outbound telephone call today for telephonic anticoagulation management for the diagnosis of CVA, DVT, and PE.  
  
Patient checked POC INR using home POC INR machine. The most recent INR was 2.1 today per his report. HPI:  
 
Interim History: · INR Goal:  2.0-3.0 · Current warfarin regimen: 11 mg daily · Warfarin tablet strength:   1 mg, 10 mg · Total weekly dose (mg): 77 mg daily Today's pertinent positives includes: 
Medication change 
-States he finished methocarbamol and valacyclovir for his shingles 
 -States shingles rash has cleared but he is still having pain 
-States Dr. Lissa Cardozo increased gabapentin at recent visit - per chart review, gabapentin was increased to 300 mg TID 
 -Patient states he is taking 300 mg 2 tabs AM, 1 tab noon, and 2 tabs PM; states he is taking some gabapentin he had for his sciatica 
-Denies any bruising/bleeding, SOB, or chest pain 
-Denies any changes in diet · Adherence: · Able to recall regimen? YES 
· Miss/extra dose? NO 
· Need refill? NO Upcoming procedure(s):  NO 
 
Past Medical History:  
Diagnosis Date  Diabetes ([facility] [address]  Erectile dysfunction due to diseases classified elsewhere  Hematuria   
 right renal cyst  
 Hypercholesterolemia  Hypertension  Liver disease Fatty Liver  Pulmonary emboli [facility]) 2003 & 2004  
 chronic coumadin therapy managed by Dr. Salas Phlegm  Renal failure acute   
 following surgery  Sleep apnea   
 does not use cpap machine Allergies Allergen Reactions  Tape [Adhesive] Contact Dermatitis ALLERGIC TO SILK TAPE ONLY Current Outpatient Medications Medication Sig  
 gabapentin (NEURONTIN) 300 mg capsule 1 tid  warfarin (COUMADIN) 10 mg tablet TAKE 1 TABLET BY MOUTH AS DIRECTED  
  HYDROcodone-acetaminophen (NORCO)  mg tablet Take 1 Tab by mouth every six (6) hours as needed for Pain.  warfarin (COUMADIN) 1 mg tablet Take 1 tablet daily by mouth with 10 mg tablet for a total of 11 mg daily or as directed by healthcare provider.  oxyCODONE-acetaminophen (PERCOCET) 5-325 mg per tablet Take 1 Tab by mouth every four (4) hours as needed for Pain.  potassium chloride (K-DUR, KLOR-CON) 20 mEq tablet TAKE 1 TABLET BY MOUTH DAILY. FOLLOW UP FOR FURTHER REFILLS  furosemide (LASIX) 20 mg tablet TAKE 1 TABLET BY MOUTH EVERY DAY  cloNIDine HCl (CATAPRES) 0.1 mg tablet TAKE 1 TABLET BY MOUTH TWICE DAILY (Patient taking differently: Take 0.2 mg by mouth daily.)  lisinopril-hydroCHLOROthiazide (PRINZIDE, ZESTORETIC) 20-12.5 mg per tablet TAKE 2 TABLETS BY MOUTH EVERY DAY  atenolol (TENORMIN) 100 mg tablet TAKE 1 TABLET BY MOUTH DAILY  amLODIPine (NORVASC) 10 mg tablet TAKE 1 TABLET BY MOUTH DAILY  FREESTYLE CASPER 14 DAY SENSOR kit USE AS DIRECTED  tadalafil (CIALIS) 20 mg tablet Take 1 Tab by mouth as needed for Other (ED).  fluticasone (FLONASE) 50 mcg/actuation nasal spray 2 Sprays by Both Nostrils route daily as needed for Rhinitis.  insulin glargine (BASAGLAR KWIKPEN) 100 unit/mL (3 mL) inpn Takes 50 units twice a day.  BD INSULIN PEN NEEDLE UF SHORT 31 gauge x 5/16\" ndle USE 1 PEN UTD QID  BUTRANS 10 mcg/hour LUCA 1 PATCH EVERY WEEK  
 NOVOLOG FLEXPEN 100 unit/mL inpn Takes 14-25 units per meal per sliding scale from endocrinology  VICTOZA 3-ZANDRA 0.6 mg/0.1 mL (18 mg/3 mL) sub-q pen INJ 1.8 MG SC QD  
 insulin syringe-needle U-100 by Does Not Apply route. Dispense ultrafine 1 mL syringes with 29 gauge needle  aspirin delayed-release 81 mg tablet Take 81 mg by mouth daily. No current facility-administered medications for this visit. Wt Readings from Last 3 Encounters:  
02/26/20 347 lb (157.4 kg) 01/13/20 347 lb (157.4 kg) 10/04/19 336 lb 3.2 oz (152.5 kg) BP Readings from Last 3 Encounters:  
02/26/20 130/80  
01/13/20 130/68  
10/04/19 136/62 Lab Results Component Value Date/Time Sodium 137 01/13/2020 11:35 AM  
 Potassium 4.1 01/13/2020 11:35 AM  
 Chloride 99 01/13/2020 11:35 AM  
 CO2 25 01/13/2020 11:35 AM  
 Anion gap 13.0 01/13/2020 11:35 AM  
 Glucose 261 (H) 01/13/2020 11:35 AM  
 BUN 22 01/13/2020 11:35 AM  
 Creatinine 0.9 01/13/2020 11:35 AM  
 GFR est AA >60 03/24/2014 01:56 PM  
 GFR est non-AA >60 03/24/2014 01:56 PM  
 Calcium 9.3 01/13/2020 11:35 AM  
 
 
Lab Results Component Value Date/Time WBC 5.2 01/13/2020 11:35 AM  
 HGB 12.6 (L) 01/13/2020 11:35 AM  
 HCT 41.5 01/13/2020 11:35 AM  
 PLATELET 044 40/01/7027 11:35 AM  
 MCV 87 01/13/2020 11:35 AM  
 
 
Lab Results Component Value Date/Time Protein, total 7.7 01/13/2020 11:35 AM  
 Albumin 4.3 01/13/2020 11:35 AM  
 
 
Estimated Creatinine Clearance: 159.4 mL/min (by C-G formula based on SCr of 0.9 mg/dL). INR History:   (normal INR range 0.8-1.2) Date   INR   Dose/Comments 2/27/2020 2.1   11 mg daily 2/20/2020        2.2                               11 mg daily 2/13/2020        2.3                               11 mg daily (took 13 mg on 2/6/2020) 
2/6/2020          1.8                               11 mg daily (missed 2 doses) 
1/22/2020        1.0                               Held warfarin for surgery on 1/21/2020 1/20/2020        1.6 per INR company  Holding for surgery on 1/21/2020 
1/15/2020        3.3                               Hold starting today for surgery on 1/21/2020 1/9/2020          2.7                               11 mg daily 12/11/19          2.1                               11 mg daily (confirmed took 13 mg on 12/4 as instructed) 12/4/19            1.5                               10 mg daily 11/29/19          1.6                               10 mg daily A/P:    
 
Anticoagulation: Considering Mr. Turcios's past history, todays findings, and per Anticoagulation Collaborative Practice Agreement/Protocol: 1. POC INR (2.1) is Therapeutic for INR goal today. 2.  Continue warfarin - 11 mg daily 3. Next INR check will be in 1 week(s). A full discussion of the nature of anticoagulants has been carried out. A full discussion of the need for frequent and regular monitoring, precise dosage adjustment and compliance was stressed. Side effects of potential bleeding were discussed and Mr. Josh Hurtado was instructed to call [phone] if there are any signs of abnormal bleeding. Mr. Josh Hurtado was instructed to avoid any major changes in his general diet and to avoid alcohol consumption. Reiterated that he should only be taking gabapentin 300 mg TID. Patient states he will continue taking it how he has been since \"I am taking it for the shingles pain and my sciatica\". Discussed with patient that increasing or decreasing dose of gabapentin quickly may result in withdrawal symptoms/side effects. PharmD will discuss with patient's PCP. Mr. Josh Hurtado verbalized his understanding of all instructions and will call the office with any questions, concerns, or signs of abnormal bleeding or blood clot. Notifications of recommendations will be sent to Dr. Johnnie Severs, NP for review. Thank you, Jermain Resendiz, PharmD Clinical Pharmacist Specialist

## 2020-03-05 ENCOUNTER — TELEPHONE (OUTPATIENT)
Dept: FAMILY MEDICINE CLINIC | Age: 50
End: 2020-03-05

## 2020-03-05 DIAGNOSIS — Z79.01 ANTICOAGULANT LONG-TERM USE: ICD-10-CM

## 2020-03-05 LAB — INR, EXTERNAL: 2.4

## 2020-03-05 NOTE — TELEPHONE ENCOUNTER
Pharmacy Note - Anticoagulation 03/05/20 S/O:  Mr. Dennis Winkler ,52 y.o. male, referred by Dr. Jen Rowe, NP, was contacted via an outbound telephone call today for telephonic anticoagulation management for the diagnosis of CVA, DVT, and PE. Patient checked POC INR using home POC INR machine. The most recent INR was 2.4 today per his report. HPI:  
 
Interim History: · INR Goal:  2.0-3.0 · Current warfarin regimen: 11 mg daily · Warfarin tablet strength:   1 mg, 10 mg · Total weekly dose (mg): 77 mg Today's pertinent positives includes: No significant changes since last visit 
-Denies any bleeding/bruising, chest pain, SOB 
-States his shingles pain is improving 
-Denies any changes in greens/vitamin K in diet or any medication changes since last INR check · Adherence: · Able to recall regimen? YES 
· Miss/extra dose? NO 
· Need refill? NO Upcoming procedure(s):  NO 
 
 
Past Medical History:  
Diagnosis Date  Diabetes ([facility] [address]  Erectile dysfunction due to diseases classified elsewhere  Hematuria   
 right renal cyst  
 Hypercholesterolemia  Hypertension  Liver disease Fatty Liver  Pulmonary emboli [facility]) 2003 & 2004  
 chronic coumadin therapy managed by Dr. Fred Mcintosh  Renal failure acute   
 following surgery  Sleep apnea   
 does not use cpap machine Allergies Allergen Reactions  Tape [Adhesive] Contact Dermatitis ALLERGIC TO SILK TAPE ONLY Current Outpatient Medications Medication Sig  
 gabapentin (NEURONTIN) 300 mg capsule 1 tid (Patient taking differently: 2 AM, 1 in the afternoon, and 2 in the evening)  warfarin (COUMADIN) 10 mg tablet TAKE 1 TABLET BY MOUTH AS DIRECTED  
 HYDROcodone-acetaminophen (NORCO)  mg tablet Take 1 Tab by mouth every six (6) hours as needed for Pain.   
 warfarin (COUMADIN) 1 mg tablet Take 1 tablet daily by mouth with 10 mg tablet for a total of 11 mg daily or as directed by healthcare provider.  oxyCODONE-acetaminophen (PERCOCET) 5-325 mg per tablet Take 1 Tab by mouth every four (4) hours as needed for Pain.  potassium chloride (K-DUR, KLOR-CON) 20 mEq tablet TAKE 1 TABLET BY MOUTH DAILY. FOLLOW UP FOR FURTHER REFILLS  furosemide (LASIX) 20 mg tablet TAKE 1 TABLET BY MOUTH EVERY DAY  cloNIDine HCl (CATAPRES) 0.1 mg tablet TAKE 1 TABLET BY MOUTH TWICE DAILY (Patient taking differently: Take 0.2 mg by mouth daily.)  lisinopril-hydroCHLOROthiazide (PRINZIDE, ZESTORETIC) 20-12.5 mg per tablet TAKE 2 TABLETS BY MOUTH EVERY DAY  atenolol (TENORMIN) 100 mg tablet TAKE 1 TABLET BY MOUTH DAILY  amLODIPine (NORVASC) 10 mg tablet TAKE 1 TABLET BY MOUTH DAILY  FREESTYLE CASPER 14 DAY SENSOR kit USE AS DIRECTED  tadalafil (CIALIS) 20 mg tablet Take 1 Tab by mouth as needed for Other (ED).  fluticasone (FLONASE) 50 mcg/actuation nasal spray 2 Sprays by Both Nostrils route daily as needed for Rhinitis.  insulin glargine (BASAGLAR KWIKPEN) 100 unit/mL (3 mL) inpn Takes 50 units twice a day.  BD INSULIN PEN NEEDLE UF SHORT 31 gauge x 5/16\" ndle USE 1 PEN UTD QID  BUTRANS 10 mcg/hour LUCA 1 PATCH EVERY WEEK  
 NOVOLOG FLEXPEN 100 unit/mL inpn Takes 14-25 units per meal per sliding scale from endocrinology  VICTOZA 3-ZANDRA 0.6 mg/0.1 mL (18 mg/3 mL) sub-q pen INJ 1.8 MG SC QD  
 insulin syringe-needle U-100 by Does Not Apply route. Dispense ultrafine 1 mL syringes with 29 gauge needle  aspirin delayed-release 81 mg tablet Take 81 mg by mouth daily. No current facility-administered medications for this visit. Wt Readings from Last 3 Encounters:  
02/26/20 347 lb (157.4 kg) 01/13/20 347 lb (157.4 kg) 10/04/19 336 lb 3.2 oz (152.5 kg) BP Readings from Last 3 Encounters:  
02/26/20 130/80  
01/13/20 130/68  
10/04/19 136/62 Lab Results Component Value Date/Time Sodium 137 01/13/2020 11:35 AM  
 Potassium 4.1 01/13/2020 11:35 AM  
 Chloride 99 01/13/2020 11:35 AM  
 CO2 25 01/13/2020 11:35 AM  
 Anion gap 13.0 01/13/2020 11:35 AM  
 Glucose 261 (H) 01/13/2020 11:35 AM  
 BUN 22 01/13/2020 11:35 AM  
 Creatinine 0.9 01/13/2020 11:35 AM  
 GFR est AA >60 03/24/2014 01:56 PM  
 GFR est non-AA >60 03/24/2014 01:56 PM  
 Calcium 9.3 01/13/2020 11:35 AM  
 
 
Lab Results Component Value Date/Time WBC 5.2 01/13/2020 11:35 AM  
 HGB 12.6 (L) 01/13/2020 11:35 AM  
 HCT 41.5 01/13/2020 11:35 AM  
 PLATELET 040 67/85/6514 11:35 AM  
 MCV 87 01/13/2020 11:35 AM  
 
 
Lab Results Component Value Date/Time Protein, total 7.7 01/13/2020 11:35 AM  
 Albumin 4.3 01/13/2020 11:35 AM  
 
 
Estimated Creatinine Clearance: 159.4 mL/min (by C-G formula based on SCr of 0.9 mg/dL). INR History:   (normal INR range 0.8-1.2) Date   INR   Dose/Comments 3/5/2020 2.4   11 mg daily 2/27/2020        2.1                               11 mg daily 2/20/2020        2.2                               11 mg daily 2/13/2020        2.3                               11 mg daily (took 13 mg on 2/6/2020) 
2/6/2020          1.8                               11 mg daily (missed 2 doses) 
1/22/2020        1.0                               Held warfarin for surgery on 1/21/2020 1/20/2020        1.6 per INR company  Holding for surgery on 1/21/2020 
1/15/2020        3.3                               Hold starting today for surgery on 1/21/2020 1/9/2020          2.7                               11 mg daily 12/11/19          2.1                               11 mg daily (confirmed took 13 mg on 12/4 as instructed) 12/4/19            1.5                               10 mg daily 11/29/19          1.6                               10 mg daily A/P:    
 
Anticoagulation: 
Considering Mr. Turcios's past history, todays findings, and per Anticoagulation Collaborative Practice Agreement/Protocol: 1. POC INR (2.4) is Therapeutic for INR goal today 2. Continue warfarin - 11 mg daily 3. Next INR check will be in 1 week(s). Medication reconciliation was completed during the visit. There are no discontinued medications. A full discussion of the nature of anticoagulants has been carried out. A full discussion of the need for frequent and regular monitoring, precise dosage adjustment and compliance was stressed. Mr. Lolly Wagoner was instructed to call [phone] if there are any signs of abnormal bleeding. Mr. Lolly Wagoner was instructed to avoid any major changes in his general diet and to avoid alcohol consumption. Mr. Lolly Wagoner verbalized his understanding of all instructions and will call the office with any questions, concerns, or signs of abnormal bleeding or blood clot. Notifications of recommendations will be sent to Dr. Christine Hernandez, ANDERZ for review. Thank you, Chet Khan, PharmD Clinical Pharmacist Specialist

## 2020-03-12 ENCOUNTER — TELEPHONE (OUTPATIENT)
Dept: FAMILY MEDICINE CLINIC | Age: 50
End: 2020-03-12

## 2020-03-12 DIAGNOSIS — Z79.01 ANTICOAGULANT LONG-TERM USE: ICD-10-CM

## 2020-03-12 LAB — INR, EXTERNAL: 2.8

## 2020-03-12 NOTE — TELEPHONE ENCOUNTER
Pharmacy Note - Anticoagulation 03/12/20 S/O:  Mr. Ashia Humphrey ,52 y.o. male, referred by Dr. Ned Jo, ANDREZ, was contacted via an outbound telephone call today for telephonic anticoagulation management for the diagnosis of CVA, DVT, and PE. Patient checked POC INR using home POC INR machine. The most recent INR was 2.8 today per his report. HPI:  
 
Interim History: · INR Goal:  2.0-3.0 · Current warfarin regimen: 11 mg daily · Warfarin tablet strength: 1 mg, 10 mg · Total weekly dose (mg): 77 mg Today's pertinent positives includes: 
Medication change 
-Denies bleeding/bruising, SOB, chest pain  
-Denies any change in diet or prescription medications 
-States he had to take Aleve for shingles pain - states he has been taking at least 1 tablet every day · Adherence: · Able to recall regimen? YES 
· Miss/extra dose? NO 
· Need refill? NO Upcoming procedure(s):  NO 
 
 
Past Medical History:  
Diagnosis Date  Diabetes ([facility] [address]  Erectile dysfunction due to diseases classified elsewhere  Hematuria   
 right renal cyst  
 Hypercholesterolemia  Hypertension  Liver disease Fatty Liver  Pulmonary emboli [facility]) 2003 & 2004  
 chronic coumadin therapy managed by Dr. Charanjit Ruiz  Renal failure acute   
 following surgery  Sleep apnea   
 does not use cpap machine Allergies Allergen Reactions  Tape [Adhesive] Contact Dermatitis ALLERGIC TO SILK TAPE ONLY Current Outpatient Medications Medication Sig  
 gabapentin (NEURONTIN) 300 mg capsule 1 tid (Patient taking differently: 2 AM, 1 in the afternoon, and 2 in the evening)  warfarin (COUMADIN) 10 mg tablet TAKE 1 TABLET BY MOUTH AS DIRECTED  
 HYDROcodone-acetaminophen (NORCO)  mg tablet Take 1 Tab by mouth every six (6) hours as needed for Pain.   
 warfarin (COUMADIN) 1 mg tablet Take 1 tablet daily by mouth with 10 mg tablet for a total of 11 mg daily or as directed by healthcare provider.  potassium chloride (K-DUR, KLOR-CON) 20 mEq tablet TAKE 1 TABLET BY MOUTH DAILY. FOLLOW UP FOR FURTHER REFILLS  furosemide (LASIX) 20 mg tablet TAKE 1 TABLET BY MOUTH EVERY DAY  cloNIDine HCl (CATAPRES) 0.1 mg tablet TAKE 1 TABLET BY MOUTH TWICE DAILY (Patient taking differently: Take 0.2 mg by mouth daily.)  lisinopril-hydroCHLOROthiazide (PRINZIDE, ZESTORETIC) 20-12.5 mg per tablet TAKE 2 TABLETS BY MOUTH EVERY DAY  atenolol (TENORMIN) 100 mg tablet TAKE 1 TABLET BY MOUTH DAILY  amLODIPine (NORVASC) 10 mg tablet TAKE 1 TABLET BY MOUTH DAILY  FREESTYLE CASPER 14 DAY SENSOR kit USE AS DIRECTED  tadalafil (CIALIS) 20 mg tablet Take 1 Tab by mouth as needed for Other (ED).  fluticasone (FLONASE) 50 mcg/actuation nasal spray 2 Sprays by Both Nostrils route daily as needed for Rhinitis.  insulin glargine (BASAGLAR KWIKPEN) 100 unit/mL (3 mL) inpn Takes 50 units twice a day.  BD INSULIN PEN NEEDLE UF SHORT 31 gauge x 5/16\" ndle USE 1 PEN UTD QID  
 NOVOLOG FLEXPEN 100 unit/mL inpn Takes 14-25 units per meal per sliding scale from endocrinology  VICTOZA 3-ZANDRA 0.6 mg/0.1 mL (18 mg/3 mL) sub-q pen INJ 1.8 MG SC QD  
 insulin syringe-needle U-100 by Does Not Apply route. Dispense ultrafine 1 mL syringes with 29 gauge needle  aspirin delayed-release 81 mg tablet Take 81 mg by mouth daily.  oxyCODONE-acetaminophen (PERCOCET) 5-325 mg per tablet Take 1 Tab by mouth every four (4) hours as needed for Pain.  BUTRANS 10 mcg/hour LUCA 1 PATCH EVERY WEEK No current facility-administered medications for this visit. Wt Readings from Last 3 Encounters:  
02/26/20 347 lb (157.4 kg) 01/13/20 347 lb (157.4 kg) 10/04/19 336 lb 3.2 oz (152.5 kg) BP Readings from Last 3 Encounters:  
02/26/20 130/80  
01/13/20 130/68  
10/04/19 136/62 Lab Results Component Value Date/Time Sodium 137 01/13/2020 11:35 AM  
 Potassium 4.1 01/13/2020 11:35 AM  
 Chloride 99 01/13/2020 11:35 AM  
 CO2 25 01/13/2020 11:35 AM  
 Anion gap 13.0 01/13/2020 11:35 AM  
 Glucose 261 (H) 01/13/2020 11:35 AM  
 BUN 22 01/13/2020 11:35 AM  
 Creatinine 0.9 01/13/2020 11:35 AM  
 GFR est AA >60 03/24/2014 01:56 PM  
 GFR est non-AA >60 03/24/2014 01:56 PM  
 Calcium 9.3 01/13/2020 11:35 AM  
 
 
Lab Results Component Value Date/Time WBC 5.2 01/13/2020 11:35 AM  
 HGB 12.6 (L) 01/13/2020 11:35 AM  
 HCT 41.5 01/13/2020 11:35 AM  
 PLATELET 149 15/09/1206 11:35 AM  
 MCV 87 01/13/2020 11:35 AM  
 
 
Lab Results Component Value Date/Time Protein, total 7.7 01/13/2020 11:35 AM  
 Albumin 4.3 01/13/2020 11:35 AM  
 
 
Estimated Creatinine Clearance: 159.4 mL/min (by C-G formula based on SCr of 0.9 mg/dL). INR History:   (normal INR range 0.8-1.2) Date   INR   Dose/Comments 3/12/2020 2.8   11 mg daily 3/5/2020          2.4                               11 mg daily 2/27/2020        2.1                               11 mg daily 2/20/2020        2.2                               11 mg daily 2/13/2020        2.3                               11 mg daily (took 13 mg on 2/6/2020) 
2/6/2020          1.8                               11 mg daily (missed 2 doses) 
1/22/2020        1.0                               Held warfarin for surgery on 1/21/2020 1/20/2020        1.6 per INR [employer] for surgery on 1/21/2020 
1/15/2020        3.3                               Hold starting today for surgery on 1/21/2020 1/9/2020          2.7                               11 mg daily 12/11/19          2.1                               11 mg daily (confirmed took 13 mg on 12/4 as instructed) 12/4/19            1.5                               10 mg daily 11/29/19          1.6                               10 mg daily A/P:    
 
Anticoagulation: Considering Mr. Turcios's past history, todays findings, and per Anticoagulation Collaborative Practice Agreement/Protocol: 1. POC INR (2.8) is Therapeutic for INR goal today. 2.  Continue warfarin - 11 mg daily 3. Next INR check will be in 1 week(s). -Reminded patient of importance of avoiding Aleve and OTC NSAIDs as these increase the risk of bleeding with warfarin 
-Instructed patient to discontinue Aleve use - patient states he will stop Aleve; will make PCP aware Medication reconciliation was completed during the visit. There are no discontinued medications. A full discussion of the nature of anticoagulants has been carried out. A full discussion of the need for frequent and regular monitoring, precise dosage adjustment and compliance was stressed. Side effects of potential bleeding were discussed and Mr. Madeleine Morse was instructed to call [phone] if there are any signs of abnormal bleeding. Mr. Madeleine Morse was instructed to avoid any OTC items containing aspirin or ibuprofen and prior to starting any new OTC products to consult with his physician or pharmacist to ensure no drug interactions are present. Mr. Madeleine Morse was instructed to avoid any major changes in his general diet and to avoid alcohol consumption. Mr. Madeleine Morse verbalized his understanding of all instructions and will call the office with any questions, concerns, or signs of abnormal bleeding or blood clot. Notifications of recommendations will be sent to Dr. Greta Dougherty NP for review. Thank you, Nia Morales, PharmD Clinical Pharmacist Specialist

## 2020-03-12 NOTE — TELEPHONE ENCOUNTER
Called patient to follow up on warfarin and INR. No answer, left voicemail requesting patient return call to PharmD. Will continue to attempt to reach patient. Matt Keyes, PharmD Clinical Pharmacist Specialist

## 2020-03-14 DIAGNOSIS — I10 HTN (HYPERTENSION), BENIGN: ICD-10-CM

## 2020-03-16 RX ORDER — ATENOLOL 100 MG/1
TABLET ORAL
Qty: 30 TAB | Refills: 3 | Status: SHIPPED | OUTPATIENT
Start: 2020-03-16 | End: 2020-07-10 | Stop reason: SDUPTHER

## 2020-03-19 ENCOUNTER — DOCUMENTATION ONLY (OUTPATIENT)
Dept: FAMILY MEDICINE CLINIC | Age: 50
End: 2020-03-19

## 2020-03-19 ENCOUNTER — TELEPHONE (OUTPATIENT)
Dept: FAMILY MEDICINE CLINIC | Age: 50
End: 2020-03-19

## 2020-03-19 DIAGNOSIS — Z79.01 ANTICOAGULANT LONG-TERM USE: Primary | ICD-10-CM

## 2020-03-19 DIAGNOSIS — Z79.01 ANTICOAGULANT LONG-TERM USE: ICD-10-CM

## 2020-03-19 LAB — INR, EXTERNAL: 1.8

## 2020-03-19 NOTE — TELEPHONE ENCOUNTER
Pharmacy Note - Anticoagulation 03/19/20 S/O:  Mr. Edmund Fry ,52 y.o. male, referred by Dr. Shayy Chaves, NP, was contacted via an outbound telephone call today for telephonic anticoagulation management for the diagnosis of CVA, DVT, and PE. Patient checked POC INR using home POC INR machine. The most recent INR was 1.8 per his report. HPI:  
 
Interim History: · INR Goal:  2.0-3.0 · Current warfarin regimen: 11 mg daily · Warfarin tablet strength:   1 mg, 10 mg · Total weekly dose (mg): 77 mg Today's pertinent positives includes: 
Change in diet/appetite 
-Denies any changes in medications 
-States he had more greens this week - had cabbages around 4-5 days this week  
 -States he usually doesn't have many greens 
-Denies any bruising/bleeding, SOB, chest pain · Adherence: · Able to recall regimen? YES 
· Miss/extra dose? NO 
· Need refill? NO Upcoming procedure(s):  NO 
  
Past Medical History:  
Diagnosis Date  Diabetes ([facility] [address]  Erectile dysfunction due to diseases classified elsewhere  Hematuria   
 right renal cyst  
 Hypercholesterolemia  Hypertension  Liver disease Fatty Liver  Pulmonary emboli [facility]) 2003 & 2004  
 chronic coumadin therapy managed by Dr. Merlinda Pun  Renal failure acute   
 following surgery  Sleep apnea   
 does not use cpap machine Allergies Allergen Reactions  Tape [Adhesive] Contact Dermatitis ALLERGIC TO SILK TAPE ONLY Current Outpatient Medications Medication Sig  
 atenoloL (TENORMIN) 100 mg tablet TAKE 1 TABLET BY MOUTH DAILY  gabapentin (NEURONTIN) 300 mg capsule 1 tid (Patient taking differently: 2 AM, 1 in the afternoon, and 2 in the evening)  warfarin (COUMADIN) 10 mg tablet TAKE 1 TABLET BY MOUTH AS DIRECTED  
 HYDROcodone-acetaminophen (NORCO)  mg tablet Take 1 Tab by mouth every six (6) hours as needed for Pain.  warfarin (COUMADIN) 1 mg tablet Take 1 tablet daily by mouth with 10 mg tablet for a total of 11 mg daily or as directed by healthcare provider.  oxyCODONE-acetaminophen (PERCOCET) 5-325 mg per tablet Take 1 Tab by mouth every four (4) hours as needed for Pain.  potassium chloride (K-DUR, KLOR-CON) 20 mEq tablet TAKE 1 TABLET BY MOUTH DAILY. FOLLOW UP FOR FURTHER REFILLS  furosemide (LASIX) 20 mg tablet TAKE 1 TABLET BY MOUTH EVERY DAY  cloNIDine HCl (CATAPRES) 0.1 mg tablet TAKE 1 TABLET BY MOUTH TWICE DAILY (Patient taking differently: Take 0.2 mg by mouth daily.)  lisinopril-hydroCHLOROthiazide (PRINZIDE, ZESTORETIC) 20-12.5 mg per tablet TAKE 2 TABLETS BY MOUTH EVERY DAY  amLODIPine (NORVASC) 10 mg tablet TAKE 1 TABLET BY MOUTH DAILY  FREESTYLE CASPER 14 DAY SENSOR kit USE AS DIRECTED  tadalafil (CIALIS) 20 mg tablet Take 1 Tab by mouth as needed for Other (ED).  fluticasone (FLONASE) 50 mcg/actuation nasal spray 2 Sprays by Both Nostrils route daily as needed for Rhinitis.  insulin glargine (BASAGLAR KWIKPEN) 100 unit/mL (3 mL) inpn Takes 50 units twice a day.  BD INSULIN PEN NEEDLE UF SHORT 31 gauge x 5/16\" ndle USE 1 PEN UTD QID  BUTRANS 10 mcg/hour LUCA 1 PATCH EVERY WEEK  
 NOVOLOG FLEXPEN 100 unit/mL inpn Takes 14-25 units per meal per sliding scale from endocrinology  VICTOZA 3-ZANDRA 0.6 mg/0.1 mL (18 mg/3 mL) sub-q pen INJ 1.8 MG SC QD  
 insulin syringe-needle U-100 by Does Not Apply route. Dispense ultrafine 1 mL syringes with 29 gauge needle  aspirin delayed-release 81 mg tablet Take 81 mg by mouth daily. No current facility-administered medications for this visit. Wt Readings from Last 3 Encounters:  
02/26/20 347 lb (157.4 kg) 01/13/20 347 lb (157.4 kg) 10/04/19 336 lb 3.2 oz (152.5 kg) BP Readings from Last 3 Encounters:  
02/26/20 130/80  
01/13/20 130/68  
10/04/19 136/62 Lab Results Component Value Date/Time Sodium 137 01/13/2020 11:35 AM  
 Potassium 4.1 01/13/2020 11:35 AM  
 Chloride 99 01/13/2020 11:35 AM  
 CO2 25 01/13/2020 11:35 AM  
 Anion gap 13.0 01/13/2020 11:35 AM  
 Glucose 261 (H) 01/13/2020 11:35 AM  
 BUN 22 01/13/2020 11:35 AM  
 Creatinine 0.9 01/13/2020 11:35 AM  
 GFR est AA >60 03/24/2014 01:56 PM  
 GFR est non-AA >60 03/24/2014 01:56 PM  
 Calcium 9.3 01/13/2020 11:35 AM  
 
 
Lab Results Component Value Date/Time WBC 5.2 01/13/2020 11:35 AM  
 HGB 12.6 (L) 01/13/2020 11:35 AM  
 HCT 41.5 01/13/2020 11:35 AM  
 PLATELET 817 04/10/6134 11:35 AM  
 MCV 87 01/13/2020 11:35 AM  
 
 
Lab Results Component Value Date/Time Protein, total 7.7 01/13/2020 11:35 AM  
 Albumin 4.3 01/13/2020 11:35 AM  
 
 
Estimated Creatinine Clearance: 159.4 mL/min (by C-G formula based on SCr of 0.9 mg/dL). INR History:   (normal INR range 0.8-1.2) Date   INR   Dose/Comments 3/19/2020 1.8   11 mg daily 3/12/2020        2.8                               11 mg daily 3/5/2020          2.4                               11 mg daily 2/27/2020        2.1                               11 mg daily 2/20/2020        2.2                               11 mg daily 2/13/2020        2.3                               11 mg daily (took 13 mg on 2/6/2020) 
2/6/2020          1.8                               11 mg daily (missed 2 doses) A/P:    
 
Anticoagulation: 
Considering Mr. Turcios's past history, todays findings, and per Anticoagulation Collaborative Practice Agreement/Protocol: 1. POC INR (1.8) is Subtherapeutic for INR goal today. 2.  Continue warfarin - 11 mg daily 3. Next INR check will be in 1 week(s). -Instructed patient to decrease intake of greens back down to previous amount he was having each week; reiterated importance of keeping intake of vitamin K/greens in diet consistent in order to avoid INR changes A full discussion of the nature of anticoagulants has been carried out.   A full discussion of the need for frequent and regular monitoring, precise dosage adjustment and compliance was stressed. Mr. Tara Mccoy was instructed to call [phone] if there are any signs of abnormal bleeding. Mr. Tara Mccoy was instructed to avoid any major changes in his general diet and to avoid alcohol consumption. Mr. Tara Mccoy verbalized his understanding of all instructions and will call the office with any questions, concerns, or signs of abnormal bleeding or blood clot. Notifications of recommendations will be sent to Dr. David Tucker, ANDREZ for review. Thank you, Conner Camejo, PharmD Clinical Pharmacist Specialist

## 2020-03-20 ENCOUNTER — TELEPHONE (OUTPATIENT)
Dept: FAMILY MEDICINE CLINIC | Age: 50
End: 2020-03-20

## 2020-03-20 NOTE — PROGRESS NOTES
Effective as of 3/20/2020, patient will be transitioning from Dwaine Oglesby NP to Dr. Roland Kam for primary care management. Patient will be continuing to go to [facility] clinic for their care per their request.    Otf Garcia PharmD will continue to manage patients anticoagulation therapy under Collaborative Practice Agreement with new PCP Dr. Shaq Tavares.

## 2020-03-20 NOTE — TELEPHONE ENCOUNTER
Anne Marie from [employer] called, states patient brought in rx for compression hose back in Jan and they are needing the ICD codes for order.  Request to updated order and fax to [phone]  Please adv [phone]

## 2020-03-23 DIAGNOSIS — I10 HTN (HYPERTENSION), BENIGN: ICD-10-CM

## 2020-03-23 DIAGNOSIS — E87.6 HYPOKALEMIA: ICD-10-CM

## 2020-03-26 ENCOUNTER — TELEPHONE (OUTPATIENT)
Dept: FAMILY MEDICINE CLINIC | Age: 50
End: 2020-03-26

## 2020-03-26 DIAGNOSIS — Z79.01 ANTICOAGULANT LONG-TERM USE: ICD-10-CM

## 2020-03-26 LAB — INR, EXTERNAL: 1.9

## 2020-03-26 NOTE — TELEPHONE ENCOUNTER
Pharmacy Note - Anticoagulation 03/26/20 S/O:  Mr. Patsy Wolfe ,52 y.o. male, referred by Dr. Nereida Vega, NP, was contacted via an outbound telephone call today for telephonic anticoagulation management for the diagnosis of CVA, DVT, and PE. Patient checked POC INR using home POC INR machine. The most recent INR was 1.9 today per his report. HPI:  
 
Interim History: · INR Goal:  2.0-3.0 · Current warfarin regimen: 11 mg daily · Warfarin tablet strength:   1 mg, 10 mg · Total weekly dose (mg): 77 mg Today's pertinent positives includes: No significant changes since last visit and Change in diet/appetite 
-States he had some greens this past week (cabbages) around twice this past week; states he decreased his intake from last week where he had greens almost every day 
-Denies any bruising/bleeding, SOB, chest pain 
-Denies any medication changes · Adherence: · Able to recall regimen? YES 
· Miss/extra dose? NO 
· Need refill? NO Upcoming procedure(s):  NO 
 
 
Past Medical History:  
Diagnosis Date  Diabetes ([facility] [address]  Erectile dysfunction due to diseases classified elsewhere  Hematuria   
 right renal cyst  
 Hypercholesterolemia  Hypertension  Liver disease Fatty Liver  Pulmonary emboli [facility]) 2003 & 2004  
 chronic coumadin therapy managed by Dr. Lonnie Dinh  Renal failure acute   
 following surgery  Sleep apnea   
 does not use cpap machine Allergies Allergen Reactions  Tape [Adhesive] Contact Dermatitis ALLERGIC TO SILK TAPE ONLY Current Outpatient Medications Medication Sig  
 atenoloL (TENORMIN) 100 mg tablet TAKE 1 TABLET BY MOUTH DAILY  gabapentin (NEURONTIN) 300 mg capsule 1 tid (Patient taking differently: 2 AM, 1 in the afternoon, and 2 in the evening)  warfarin (COUMADIN) 10 mg tablet TAKE 1 TABLET BY MOUTH AS DIRECTED  
  HYDROcodone-acetaminophen (NORCO)  mg tablet Take 1 Tab by mouth every six (6) hours as needed for Pain.  warfarin (COUMADIN) 1 mg tablet Take 1 tablet daily by mouth with 10 mg tablet for a total of 11 mg daily or as directed by healthcare provider.  potassium chloride (K-DUR, KLOR-CON) 20 mEq tablet TAKE 1 TABLET BY MOUTH DAILY. FOLLOW UP FOR FURTHER REFILLS  furosemide (LASIX) 20 mg tablet TAKE 1 TABLET BY MOUTH EVERY DAY  cloNIDine HCl (CATAPRES) 0.1 mg tablet TAKE 1 TABLET BY MOUTH TWICE DAILY (Patient taking differently: Take 0.2 mg by mouth daily.)  lisinopril-hydroCHLOROthiazide (PRINZIDE, ZESTORETIC) 20-12.5 mg per tablet TAKE 2 TABLETS BY MOUTH EVERY DAY  amLODIPine (NORVASC) 10 mg tablet TAKE 1 TABLET BY MOUTH DAILY  tadalafil (CIALIS) 20 mg tablet Take 1 Tab by mouth as needed for Other (ED).  fluticasone (FLONASE) 50 mcg/actuation nasal spray 2 Sprays by Both Nostrils route daily as needed for Rhinitis.  insulin glargine (BASAGLAR KWIKPEN) 100 unit/mL (3 mL) inpn Takes 50 units twice a day.  BD INSULIN PEN NEEDLE UF SHORT 31 gauge x 5/16\" ndle USE 1 PEN UTD QID  
 NOVOLOG FLEXPEN 100 unit/mL inpn Takes 14-25 units per meal per sliding scale from endocrinology  VICTOZA 3-ZANDRA 0.6 mg/0.1 mL (18 mg/3 mL) sub-q pen INJ 1.8 MG SC QD  
 insulin syringe-needle U-100 by Does Not Apply route. Dispense ultrafine 1 mL syringes with 29 gauge needle  aspirin delayed-release 81 mg tablet Take 81 mg by mouth daily. No current facility-administered medications for this visit. Wt Readings from Last 3 Encounters:  
02/26/20 347 lb (157.4 kg) 01/13/20 347 lb (157.4 kg) 10/04/19 336 lb 3.2 oz (152.5 kg) BP Readings from Last 3 Encounters:  
02/26/20 130/80  
01/13/20 130/68  
10/04/19 136/62 Lab Results Component Value Date/Time  Sodium 137 01/13/2020 11:35 AM  
 Potassium 4.1 01/13/2020 11:35 AM  
 Chloride 99 01/13/2020 11:35 AM  
 CO2 25 01/13/2020 11:35 AM  
 Anion gap 13.0 01/13/2020 11:35 AM  
 Glucose 261 (H) 01/13/2020 11:35 AM  
 BUN 22 01/13/2020 11:35 AM  
 Creatinine 0.9 01/13/2020 11:35 AM  
 GFR est AA >60 03/24/2014 01:56 PM  
 GFR est non-AA >60 03/24/2014 01:56 PM  
 Calcium 9.3 01/13/2020 11:35 AM  
 
 
Lab Results Component Value Date/Time WBC 5.2 01/13/2020 11:35 AM  
 HGB 12.6 (L) 01/13/2020 11:35 AM  
 HCT 41.5 01/13/2020 11:35 AM  
 PLATELET 288 99/82/1489 11:35 AM  
 MCV 87 01/13/2020 11:35 AM  
 
 
Lab Results Component Value Date/Time Protein, total 7.7 01/13/2020 11:35 AM  
 Albumin 4.3 01/13/2020 11:35 AM  
 
 
Estimated Creatinine Clearance: 159.4 mL/min (by C-G formula based on SCr of 0.9 mg/dL). INR History:   (normal INR range 0.8-1.2) Date   INR  Dose/Comments 3/26/2020 1.9  11 mg daily 3/19/2020        1.8  11 mg daily 3/12/2020        2.8  11 mg daily 3/5/2020          2.4  11 mg daily 2/27/2020        2.1  11 mg daily 2/20/2020        2.2  11 mg daily 2/13/2020        2.3   11 mg daily (took 13 mg on 2/6/2020) 
2/6/2020          1.8   11 mg daily (missed 2 doses) A/P:    
 
Anticoagulation: 
Considering Mr. Turcios's past history, todays findings, and per Anticoagulation Collaborative Practice Agreement/Protocol: 1. POC INR (1.9) is Subtherapeutic for INR goal today. 2.  Change warfarin - take 13 mg tonight, then resume 11 mg daily 3. Next INR check will be in 1 week(s). -Instructed patient to return to his usual intake of greens (around 2 times per week) and stay consistent with this 
-Instructed patient to monitor for any bruising/bleeding Medication reconciliation was completed during the visit. Medications Discontinued During This Encounter Medication Reason  BUTRANS 10 mcg/hour Therapy Completed  FREESTYLE CASPER 14 DAY SENSOR kit Therapy Completed  oxyCODONE-acetaminophen (PERCOCET) 5-325 mg per tablet Therapy Completed A full discussion of the nature of anticoagulants has been carried out. A full discussion of the need for frequent and regular monitoring, precise dosage adjustment and compliance was stressed. Mr. González Mccartney was instructed to call [phone] if there are any signs of abnormal bleeding. Mr. González Mccartney was instructed to avoid any major changes in his general diet and to avoid alcohol consumption. Mr. González Mccartney verbalized his understanding of all instructions and will call the office with any questions, concerns, or signs of abnormal bleeding or blood clot. Notifications of recommendations will be sent to new PCP Dr. Luis Newman for review. Thank you, Papito King, PharmD Clinical Pharmacist Specialist 
 
CLINICAL PHARMACY CONSULT: MED RECONCILIATION/REVIEW ADDENDUM For Pharmacy Admin Tracking Only PHSO: PHSO Patient?: No 
Total # of Interventions Recommended: Count: 2 
- Increased Dose #: 1 
- Maintenance Safety Lab Monitoring #: 1 Total Interventions Accepted: 2 Time Spent (min): 15 WILLIE [facility], PharmD

## 2020-04-02 ENCOUNTER — TELEPHONE (OUTPATIENT)
Dept: FAMILY MEDICINE CLINIC | Age: 50
End: 2020-04-02

## 2020-04-02 DIAGNOSIS — I10 HTN (HYPERTENSION), BENIGN: ICD-10-CM

## 2020-04-02 DIAGNOSIS — Z79.01 ANTICOAGULANT LONG-TERM USE: ICD-10-CM

## 2020-04-02 DIAGNOSIS — E87.6 HYPOKALEMIA: ICD-10-CM

## 2020-04-02 LAB — INR, EXTERNAL: 3

## 2020-04-02 NOTE — TELEPHONE ENCOUNTER
Called patient to follow up on warfarin and INR. No answer, left voicemail requesting patient return call to PharmD. Will continue to attempt to reach patient. Demi Mittal, PharmD Clinical Pharmacist Specialist

## 2020-04-02 NOTE — TELEPHONE ENCOUNTER
Pharmacy Note - Anticoagulation 04/02/20 S/O:  Mr. Roby Mcintosh ,52 y.o. male, referred by Dr. Gerry Antonio, was contacted via an outbound telephone call today for telephonic anticoagulation management for the diagnosis of CVA, DVT, and PE. Patient had POC/PT INR using home POC INR machine. The most recent INR was 3.0 today per his report. HPI:  
 
Interim History: · INR Goal:  2.0-3.0 · Current warfarin regimen: 11 mg daily · Warfarin tablet strength: 1 mg, 10 mg · Total weekly dose (mg): 77 mg Today's pertinent positives includes: 
Change in diet/appetite  
-Denies any bruising/bleeding, SOB, or chest pain 
-Denies any medication changes 
-States he didn't have any greens this past week; usually has greens around 2 times per week · Adherence: · Able to recall regimen? YES 
· Miss/extra dose? NO 
· Need refill? YES - requested refills for blood pressure meds - routed request to PCP Upcoming procedure(s):  NO 
 
Past Medical History:  
Diagnosis Date  Diabetes ([facility] [place] [address]  Erectile dysfunction due to diseases classified elsewhere  Hematuria   
 right renal cyst  
 Hypercholesterolemia  Hypertension  Liver disease Fatty Liver  Pulmonary emboli [facility]) 2003 & 2004  
 chronic coumadin therapy managed by Dr. Daniel Walker  Renal failure acute   
 following surgery  Sleep apnea   
 does not use cpap machine Allergies Allergen Reactions  Tape [Adhesive] Contact Dermatitis ALLERGIC TO SILK TAPE ONLY Current Outpatient Medications Medication Sig  
 atenoloL (TENORMIN) 100 mg tablet TAKE 1 TABLET BY MOUTH DAILY  gabapentin (NEURONTIN) 300 mg capsule 1 tid (Patient taking differently: 2 AM, 1 in the afternoon, and 2 in the evening)  warfarin (COUMADIN) 10 mg tablet TAKE 1 TABLET BY MOUTH AS DIRECTED  
 HYDROcodone-acetaminophen (NORCO)  mg tablet Take 1 Tab by mouth every six (6) hours as needed for Pain.  warfarin (COUMADIN) 1 mg tablet Take 1 tablet daily by mouth with 10 mg tablet for a total of 11 mg daily or as directed by healthcare provider.  potassium chloride (K-DUR, KLOR-CON) 20 mEq tablet TAKE 1 TABLET BY MOUTH DAILY. FOLLOW UP FOR FURTHER REFILLS  furosemide (LASIX) 20 mg tablet TAKE 1 TABLET BY MOUTH EVERY DAY  cloNIDine HCl (CATAPRES) 0.1 mg tablet TAKE 1 TABLET BY MOUTH TWICE DAILY (Patient taking differently: Take 0.2 mg by mouth daily.)  lisinopril-hydroCHLOROthiazide (PRINZIDE, ZESTORETIC) 20-12.5 mg per tablet TAKE 2 TABLETS BY MOUTH EVERY DAY  amLODIPine (NORVASC) 10 mg tablet TAKE 1 TABLET BY MOUTH DAILY  tadalafil (CIALIS) 20 mg tablet Take 1 Tab by mouth as needed for Other (ED).  fluticasone (FLONASE) 50 mcg/actuation nasal spray 2 Sprays by Both Nostrils route daily as needed for Rhinitis.  insulin glargine (BASAGLAR KWIKPEN) 100 unit/mL (3 mL) inpn Takes 50 units twice a day.  BD INSULIN PEN NEEDLE UF SHORT 31 gauge x 5/16\" ndle USE 1 PEN UTD QID  
 NOVOLOG FLEXPEN 100 unit/mL inpn Takes 14-25 units per meal per sliding scale from endocrinology  VICTOZA 3-ZANDRA 0.6 mg/0.1 mL (18 mg/3 mL) sub-q pen INJ 1.8 MG SC QD  
 insulin syringe-needle U-100 by Does Not Apply route. Dispense ultrafine 1 mL syringes with 29 gauge needle  aspirin delayed-release 81 mg tablet Take 81 mg by mouth daily. No current facility-administered medications for this visit. Wt Readings from Last 3 Encounters:  
02/26/20 347 lb (157.4 kg) 01/13/20 347 lb (157.4 kg) 10/04/19 336 lb 3.2 oz (152.5 kg) BP Readings from Last 3 Encounters:  
02/26/20 130/80  
01/13/20 130/68  
10/04/19 136/62 Lab Results Component Value Date/Time  Sodium 137 01/13/2020 11:35 AM  
 Potassium 4.1 01/13/2020 11:35 AM  
 Chloride 99 01/13/2020 11:35 AM  
 CO2 25 01/13/2020 11:35 AM  
 Anion gap 13.0 01/13/2020 11:35 AM  
 Glucose 261 (H) 01/13/2020 11:35 AM  
 BUN 22 01/13/2020 11:35 AM  
 Creatinine 0.9 01/13/2020 11:35 AM  
 GFR est AA >60 03/24/2014 01:56 PM  
 GFR est non-AA >60 03/24/2014 01:56 PM  
 Calcium 9.3 01/13/2020 11:35 AM  
 
 
Lab Results Component Value Date/Time WBC 5.2 01/13/2020 11:35 AM  
 HGB 12.6 (L) 01/13/2020 11:35 AM  
 HCT 41.5 01/13/2020 11:35 AM  
 PLATELET 561 80/48/5105 11:35 AM  
 MCV 87 01/13/2020 11:35 AM  
 
 
Lab Results Component Value Date/Time Protein, total 7.7 01/13/2020 11:35 AM  
 Albumin 4.3 01/13/2020 11:35 AM  
 
 
Estimated Creatinine Clearance: 159.4 mL/min (by C-G formula based on SCr of 0.9 mg/dL). INR History:   (normal INR range 0.8-1.2) Date   INR  Dose/Comments 4/2/2020 3.0  11 mg daily 3/26/2020        1.9                   11 mg daily 3/19/2020        1.8  11 mg daily 3/12/2020        2.8                   11 mg daily 3/5/2020          2.4                   11 mg daily 2/27/2020        2.1                   11 mg daily 2/20/2020        2.2                   11 mg daily 2/13/2020        2.3                   11 mg daily (took 13 mg on 2/6/2020) 
2/6/2020          1.8                   11 mg daily (missed 2 doses) A/P:    
 
Anticoagulation: 
Considering Mr. Turcios's past history, todays findings, and per Anticoagulation Collaborative Practice Agreement/Protocol: 1. POC INR (3.0) is Therapeutic for INR goal today. 2.  Continue warfarin - 11 mg daily 3. Next INR check will be in 1 week(s). Medication reconciliation was completed during the visit. There are no discontinued medications. A full discussion of the nature of anticoagulants has been carried out. A full discussion of the need for frequent and regular monitoring, precise dosage adjustment and compliance was stressed.   Side effects of potential bleeding were discussed and Mr. eJremias Ly was instructed to call [phone] if there are any signs of abnormal bleeding. Mr. Darrel Mccallum was instructed to avoid any OTC items containing aspirin or ibuprofen and prior to starting any new OTC products to consult with his physician or pharmacist to ensure no drug interactions are present. Mr. Darrel Mccallum was instructed to avoid any major changes in his general diet and to avoid alcohol consumption. Mr. Darrel Mccallum verbalized his understanding of all instructions and will call the office with any questions, concerns, or signs of abnormal bleeding or blood clot. Notifications of recommendations will be sent to Dr. Marium Morrow for review. Thank you, Leighton Jo, PharmD Clinical Pharmacist Specialist 
 
CLINICAL PHARMACY CONSULT: MED RECONCILIATION/REVIEW ADDENDUM For Pharmacy Admin Tracking Only PHSO: PHSO Patient?: No 
Total # of Interventions Recommended: Count: 5 
- Refills Provided #: 4 
- Maintenance Safety Lab Monitoring #: 1 Total Interventions Accepted: 5 Time Spent (min): 20 Paola Mcwilliams [facility], PharmD

## 2020-04-02 NOTE — TELEPHONE ENCOUNTER
Patient requested refills for furosemide, potassium, clonidine, and amlodipine. Pended Rxs and routed to new PCP Dr. Alon Simmons for signing.     Danii Parikh, PharmD  Clinical Pharmacist Specialist

## 2020-04-03 RX ORDER — CLONIDINE HYDROCHLORIDE 0.1 MG/1
TABLET ORAL
Qty: 60 TAB | Refills: 5 | Status: SHIPPED | OUTPATIENT
Start: 2020-04-03 | End: 2020-10-08 | Stop reason: SDUPTHER

## 2020-04-03 RX ORDER — AMLODIPINE BESYLATE 10 MG/1
TABLET ORAL
Qty: 30 TAB | Refills: 5 | Status: SHIPPED | OUTPATIENT
Start: 2020-04-03 | End: 2020-10-08 | Stop reason: SDUPTHER

## 2020-04-03 RX ORDER — FUROSEMIDE 20 MG/1
TABLET ORAL
Qty: 30 TAB | Refills: 5 | Status: SHIPPED | OUTPATIENT
Start: 2020-04-03 | End: 2020-10-08 | Stop reason: SDUPTHER

## 2020-04-03 RX ORDER — POTASSIUM CHLORIDE 20 MEQ/1
TABLET, EXTENDED RELEASE ORAL
Qty: 30 TAB | Refills: 5 | Status: SHIPPED | OUTPATIENT
Start: 2020-04-03 | End: 2020-10-08 | Stop reason: SDUPTHER

## 2020-04-08 RX ORDER — POTASSIUM CHLORIDE 20 MEQ/1
TABLET, EXTENDED RELEASE ORAL
Qty: 30 TAB | Refills: 2 | OUTPATIENT
Start: 2020-04-08

## 2020-04-08 RX ORDER — AMLODIPINE BESYLATE 10 MG/1
TABLET ORAL
Qty: 90 TAB | Refills: 1 | OUTPATIENT
Start: 2020-04-08

## 2020-04-08 RX ORDER — CLONIDINE HYDROCHLORIDE 0.1 MG/1
TABLET ORAL
Qty: 60 TAB | Refills: 2 | OUTPATIENT
Start: 2020-04-08

## 2020-04-08 RX ORDER — FUROSEMIDE 20 MG/1
TABLET ORAL
Qty: 30 TAB | Refills: 2 | OUTPATIENT
Start: 2020-04-08

## 2020-04-09 ENCOUNTER — TELEPHONE (OUTPATIENT)
Dept: FAMILY MEDICINE CLINIC | Age: 50
End: 2020-04-09

## 2020-04-09 DIAGNOSIS — Z79.01 ANTICOAGULANT LONG-TERM USE: ICD-10-CM

## 2020-04-09 LAB — INR, EXTERNAL: 2.3

## 2020-04-09 NOTE — TELEPHONE ENCOUNTER
Pharmacy Note - Anticoagulation 04/09/20 S/O:  Mr. Rohan Bledsoe ,52 y.o. male, referred by Dr. Valerie Riggs, was contacted via an outbound telephone call today for telephonic anticoagulation management for the diagnosis of CVA, DVT, and PE. Patient checked POC PT/INR using home POC INR machine. The most recent INR was 2.3 today per patient's report. HPI:  
 
Interim History: · INR Goal:  2.0-3.0 · Current warfarin regimen: 11 mg daily · Warfarin tablet strength:   1 mg, 10 mg · Total weekly dose (mg): 77 mg Today's pertinent positives includes: No significant changes since last visit  
-Denies any bruising/bleeding, SOB, chest pain 
-States he had broccoli over the past week (doesn't usually have greens) -Denies any change in medications · Adherence: · Able to recall regimen? YES 
· Miss/extra dose? NO 
· Need refill? NO Upcoming procedure(s):  NO 
 
Past Medical History:  
Diagnosis Date  Diabetes ([facility] [address]  Erectile dysfunction due to diseases classified elsewhere  Hematuria   
 right renal cyst  
 Hypercholesterolemia  Hypertension  Liver disease Fatty Liver  Pulmonary emboli [facility]) 2003 & 2004  
 chronic coumadin therapy managed by Dr. Jackson Resendiz  Renal failure acute   
 following surgery  Sleep apnea   
 does not use cpap machine Allergies Allergen Reactions  Tape [Adhesive] Contact Dermatitis ALLERGIC TO SILK TAPE ONLY Current Outpatient Medications Medication Sig  
 amLODIPine (NORVASC) 10 mg tablet TAKE 1 TABLET BY MOUTH DAILY.  cloNIDine HCL (CATAPRES) 0.1 mg tablet TAKE 1 TABLET BY MOUTH TWICE DAILY.  furosemide (LASIX) 20 mg tablet TAKE 1 TABLET BY MOUTH EVERY DAY  potassium chloride (K-DUR, KLOR-CON) 20 mEq tablet TAKE 1 TABLET BY MOUTH DAILY.  atenoloL (TENORMIN) 100 mg tablet TAKE 1 TABLET BY MOUTH DAILY  gabapentin (NEURONTIN) 300 mg capsule 1 tid (Patient taking differently: 2 AM, 1 in the afternoon, and 2 in the evening)  warfarin (COUMADIN) 10 mg tablet TAKE 1 TABLET BY MOUTH AS DIRECTED  
 HYDROcodone-acetaminophen (NORCO)  mg tablet Take 1 Tab by mouth every six (6) hours as needed for Pain.  warfarin (COUMADIN) 1 mg tablet Take 1 tablet daily by mouth with 10 mg tablet for a total of 11 mg daily or as directed by healthcare provider.  lisinopril-hydroCHLOROthiazide (PRINZIDE, ZESTORETIC) 20-12.5 mg per tablet TAKE 2 TABLETS BY MOUTH EVERY DAY  tadalafil (CIALIS) 20 mg tablet Take 1 Tab by mouth as needed for Other (ED).  fluticasone (FLONASE) 50 mcg/actuation nasal spray 2 Sprays by Both Nostrils route daily as needed for Rhinitis.  insulin glargine (BASAGLAR KWIKPEN) 100 unit/mL (3 mL) inpn Takes 50 units twice a day.  BD INSULIN PEN NEEDLE UF SHORT 31 gauge x 5/16\" ndle USE 1 PEN UTD QID  
 NOVOLOG FLEXPEN 100 unit/mL inpn Takes 14-25 units per meal per sliding scale from endocrinology  VICTOZA 3-ZANDRA 0.6 mg/0.1 mL (18 mg/3 mL) sub-q pen INJ 1.8 MG SC QD  
 insulin syringe-needle U-100 by Does Not Apply route. Dispense ultrafine 1 mL syringes with 29 gauge needle  aspirin delayed-release 81 mg tablet Take 81 mg by mouth daily. No current facility-administered medications for this visit. Wt Readings from Last 3 Encounters:  
02/26/20 347 lb (157.4 kg) 01/13/20 347 lb (157.4 kg) 10/04/19 336 lb 3.2 oz (152.5 kg) BP Readings from Last 3 Encounters:  
02/26/20 130/80  
01/13/20 130/68  
10/04/19 136/62 Lab Results Component Value Date/Time  Sodium 137 01/13/2020 11:35 AM  
 Potassium 4.1 01/13/2020 11:35 AM  
 Chloride 99 01/13/2020 11:35 AM  
 CO2 25 01/13/2020 11:35 AM  
 Anion gap 13.0 01/13/2020 11:35 AM  
 Glucose 261 (H) 01/13/2020 11:35 AM  
 BUN 22 01/13/2020 11:35 AM  
 Creatinine 0.9 01/13/2020 11:35 AM  
 GFR est AA >60 03/24/2014 01:56 PM  
 GFR est non-AA >60 03/24/2014 01:56 PM  
 Calcium 9.3 01/13/2020 11:35 AM  
 
 
Lab Results Component Value Date/Time WBC 5.2 01/13/2020 11:35 AM  
 HGB 12.6 (L) 01/13/2020 11:35 AM  
 HCT 41.5 01/13/2020 11:35 AM  
 PLATELET 041 97/93/4064 11:35 AM  
 MCV 87 01/13/2020 11:35 AM  
 
 
Lab Results Component Value Date/Time Protein, total 7.7 01/13/2020 11:35 AM  
 Albumin 4.3 01/13/2020 11:35 AM  
 
 
Estimated Creatinine Clearance: 159.4 mL/min (by C-G formula based on SCr of 0.9 mg/dL). INR History:   (normal INR range 0.8-1.2) Date   INR  Dose/Comments 4/9/2020 2.3  11 mg daily 4/2/2020          3.0                   11 mg daily 3/26/2020        1.9                   11 mg daily 3/19/2020        1.8                   11 mg daily 3/12/2020        2.8                   11 mg daily 3/5/2020          2.4                   11 mg daily 2/27/2020        2.1                   11 mg daily 2/20/2020        2.2                   11 mg daily 2/13/2020        2.3                   11 mg daily (took 13 mg on 2/6/2020) 
2/6/2020          1.8                   11 mg daily (missed 2 doses) A/P:    
 
Anticoagulation: 
Considering Mr. Turcios's past history, todays findings, and per Anticoagulation Collaborative Practice Agreement/Protocol: 1. POC INR (2.3) is Therapeutic for INR goal today. 2.  Continue warfarin - 11 mg daily 3. Next INR check will be in 1 week(s). A full discussion of the nature of anticoagulants has been carried out. A full discussion of the need for frequent and regular monitoring, precise dosage adjustment and compliance was stressed. Mr. Vladislav Shen was instructed to call [phone] if there are any signs of abnormal bleeding.   Mr. Vladislav Shen was instructed to avoid any OTC items containing aspirin or ibuprofen and prior to starting any new OTC products to consult with his physician or pharmacist to ensure no drug interactions are present. Mr. Vladislav Shen was instructed to avoid any major changes in his general diet and to avoid alcohol consumption. Mr. Vladislav Shen verbalized his understanding of all instructions and will call the office with any questions, concerns, or signs of abnormal bleeding or blood clot. Notifications of recommendations will be sent to Dr. Janee Babinski for review. Thank you, Travis La, PharmD Clinical Pharmacist Specialist 
 
CLINICAL PHARMACY CONSULT: MED RECONCILIATION/REVIEW ADDENDUM For Pharmacy Admin Tracking Only PHSO: PHSO Patient?: No 
Total # of Interventions Recommended: Count: 1 
- Maintenance Safety Lab Monitoring #: 1 Total Interventions Accepted: 1 Time Spent (min): 15 Pawel Harvey [facility] [place], PharmD

## 2020-04-16 ENCOUNTER — TELEPHONE (OUTPATIENT)
Dept: FAMILY MEDICINE CLINIC | Age: 50
End: 2020-04-16

## 2020-04-16 DIAGNOSIS — Z79.01 ANTICOAGULANT LONG-TERM USE: ICD-10-CM

## 2020-04-16 LAB — INR, EXTERNAL: 2.2

## 2020-04-16 NOTE — TELEPHONE ENCOUNTER
Called patient to follow up on warfarin and INR. No answer, left voicemail requesting patient return call to PharmD. Will continue to attempt to reach patient. Nikki Gr, PharmD Clinical Pharmacist Specialist

## 2020-04-16 NOTE — TELEPHONE ENCOUNTER
Pharmacy Note - Anticoagulation 04/16/20 S/O:  Mr. Triny Rae ,52 y.o. male, referred by Dr. Edilson Vasquez, was contacted via an outbound telephone call today for telephonic anticoagulation management for the diagnosis of CVA, DVT, and PE. Patient checked POC PT/INR using home POC INR machine. The most recent INR was 2.2 today per patient's report. HPI: 
 
Interim History: · INR Goal:  2.0-3.0 · Current warfarin regimen: 11 mg daily · Warfarin tablet strength:   1 mg, 10 mg · Total weekly dose (mg): 77 mg Today's pertinent positives includes: No significant changes since last visit 
-Denies any bleeding/brusing, SOB, chest pain 
-States he has been consistent with intake of broccoli and greens 
-Denies any changes in medications · Adherence: · Able to recall regimen? YES 
· Miss/extra dose? NO 
· Need refill? YES - lisinopril-hctz; fluticasone Upcoming procedure(s):  NO 
 
Past Medical History:  
Diagnosis Date  Diabetes ([facility] [address]  Erectile dysfunction due to diseases classified elsewhere  Hematuria   
 right renal cyst  
 Hypercholesterolemia  Hypertension  Liver disease Fatty Liver  Pulmonary emboli [facility]) 2003 & 2004  
 chronic coumadin therapy managed by Dr. Amelia Mckeon  Renal failure acute   
 following surgery  Sleep apnea   
 does not use cpap machine Allergies Allergen Reactions  Tape [Adhesive] Contact Dermatitis ALLERGIC TO SILK TAPE ONLY Current Outpatient Medications Medication Sig  
 amLODIPine (NORVASC) 10 mg tablet TAKE 1 TABLET BY MOUTH DAILY.  cloNIDine HCL (CATAPRES) 0.1 mg tablet TAKE 1 TABLET BY MOUTH TWICE DAILY.  furosemide (LASIX) 20 mg tablet TAKE 1 TABLET BY MOUTH EVERY DAY  potassium chloride (K-DUR, KLOR-CON) 20 mEq tablet TAKE 1 TABLET BY MOUTH DAILY.  atenoloL (TENORMIN) 100 mg tablet TAKE 1 TABLET BY MOUTH DAILY  gabapentin (NEURONTIN) 300 mg capsule 1 tid (Patient taking differently: 2 AM, 1 in the afternoon, and 2 in the evening)  warfarin (COUMADIN) 10 mg tablet TAKE 1 TABLET BY MOUTH AS DIRECTED  
 HYDROcodone-acetaminophen (NORCO)  mg tablet Take 1 Tab by mouth every six (6) hours as needed for Pain.  warfarin (COUMADIN) 1 mg tablet Take 1 tablet daily by mouth with 10 mg tablet for a total of 11 mg daily or as directed by healthcare provider.  lisinopril-hydroCHLOROthiazide (PRINZIDE, ZESTORETIC) 20-12.5 mg per tablet TAKE 2 TABLETS BY MOUTH EVERY DAY  tadalafil (CIALIS) 20 mg tablet Take 1 Tab by mouth as needed for Other (ED).  fluticasone (FLONASE) 50 mcg/actuation nasal spray 2 Sprays by Both Nostrils route daily as needed for Rhinitis.  insulin glargine (BASAGLAR KWIKPEN) 100 unit/mL (3 mL) inpn Takes 50 units twice a day.  BD INSULIN PEN NEEDLE UF SHORT 31 gauge x 5/16\" ndle USE 1 PEN UTD QID  
 NOVOLOG FLEXPEN 100 unit/mL inpn Takes 14-25 units per meal per sliding scale from endocrinology  VICTOZA 3-ZANDRA 0.6 mg/0.1 mL (18 mg/3 mL) sub-q pen INJ 1.8 MG SC QD  
 insulin syringe-needle U-100 by Does Not Apply route. Dispense ultrafine 1 mL syringes with 29 gauge needle  aspirin delayed-release 81 mg tablet Take 81 mg by mouth daily. No current facility-administered medications for this visit. Wt Readings from Last 3 Encounters:  
02/26/20 347 lb (157.4 kg) 01/13/20 347 lb (157.4 kg) 10/04/19 336 lb 3.2 oz (152.5 kg) BP Readings from Last 3 Encounters:  
02/26/20 130/80  
01/13/20 130/68  
10/04/19 136/62 Lab Results Component Value Date/Time  Sodium 137 01/13/2020 11:35 AM  
 Potassium 4.1 01/13/2020 11:35 AM  
 Chloride 99 01/13/2020 11:35 AM  
 CO2 25 01/13/2020 11:35 AM  
 Anion gap 13.0 01/13/2020 11:35 AM  
 Glucose 261 (H) 01/13/2020 11:35 AM  
 BUN 22 01/13/2020 11:35 AM  
 Creatinine 0.9 01/13/2020 11:35 AM  
 GFR est AA >60 03/24/2014 01:56 PM  
 GFR est non-AA >60 03/24/2014 01:56 PM  
 Calcium 9.3 01/13/2020 11:35 AM  
 
 
Lab Results Component Value Date/Time WBC 5.2 01/13/2020 11:35 AM  
 HGB 12.6 (L) 01/13/2020 11:35 AM  
 HCT 41.5 01/13/2020 11:35 AM  
 PLATELET 559 43/76/8555 11:35 AM  
 MCV 87 01/13/2020 11:35 AM  
 
 
Lab Results Component Value Date/Time Protein, total 7.7 01/13/2020 11:35 AM  
 Albumin 4.3 01/13/2020 11:35 AM  
 
 
Estimated Creatinine Clearance: 159.4 mL/min (by C-G formula based on SCr of 0.9 mg/dL). INR History:   (normal INR range 0.8-1.2) Date   INR  Dose/Comments 4/16/2020 2.2  11 mg daily 4/9/2020          2.3                   11 mg daily 4/2/2020          3.0                   11 mg daily 3/26/2020        1.9                   11 mg daily 3/19/2020        1.8                   11 mg daily 3/12/2020        2.8                   11 mg daily 3/5/2020          2.4                   11 mg daily 2/27/2020        2.1                   11 mg daily 2/20/2020        2.2                   11 mg daily 2/13/2020        2.3                   11 mg daily (took 13 mg on 2/6/2020) 
2/6/2020          1.8                   11 mg daily (missed 2 doses) A/P:    
 
Anticoagulation: 
Considering Mr. Turcios's past history, todays findings, and per Anticoagulation Collaborative Practice Agreement/Protocol: 1. POC INR (2.2) is Therapeutic for INR goal today. 2.  Continue warfarin - 11 mg daily 3. Next INR check will be in 1 week(s). A full discussion of the nature of anticoagulants has been carried out. A full discussion of the need for frequent and regular monitoring, precise dosage adjustment and compliance was stressed. Side effects of potential bleeding were discussed and Mr. Isael Hunt was instructed to call [phone] if there are any signs of abnormal bleeding.   Mr. Isael Hunt was instructed to avoid any OTC items containing aspirin or ibuprofen and prior to starting any new OTC products to consult with his physician or pharmacist to ensure no drug interactions are present. Mr. Michael Liriano was instructed to avoid any major changes in his general diet and to avoid alcohol consumption. Mr. Michael Liriano verbalized his understanding of all instructions and will call the office with any questions, concerns, or signs of abnormal bleeding or blood clot. Notifications of recommendations will be sent to Dr. Edilson Vasquez for review. Thank you, Bull Diaz, PharmD Clinical Pharmacist Specialist 
 
CLINICAL PHARMACY CONSULT: MED RECONCILIATION/REVIEW ADDENDUM For Pharmacy Admin Tracking Only PHSO: PHSO Patient?: No 
Total # of Interventions Recommended: Count: 1 
- Maintenance Safety Lab Monitoring #: 1 Total Interventions Accepted: 1 Time Spent (min): 15 Nia Blake [facility], PharmD

## 2020-04-23 ENCOUNTER — TELEPHONE (OUTPATIENT)
Dept: FAMILY MEDICINE CLINIC | Age: 50
End: 2020-04-23

## 2020-04-23 DIAGNOSIS — Z79.01 ANTICOAGULANT LONG-TERM USE: ICD-10-CM

## 2020-04-23 DIAGNOSIS — J30.1 ALLERGIC RHINITIS DUE TO POLLEN, UNSPECIFIED SEASONALITY: ICD-10-CM

## 2020-04-23 DIAGNOSIS — I10 HTN (HYPERTENSION), BENIGN: ICD-10-CM

## 2020-04-23 LAB — INR, EXTERNAL: 2.9

## 2020-04-23 RX ORDER — LISINOPRIL AND HYDROCHLOROTHIAZIDE 12.5; 2 MG/1; MG/1
TABLET ORAL
Qty: 60 TAB | Refills: 3 | Status: SHIPPED | OUTPATIENT
Start: 2020-04-23 | End: 2020-08-19

## 2020-04-23 RX ORDER — FLUTICASONE PROPIONATE 50 MCG
2 SPRAY, SUSPENSION (ML) NASAL
Qty: 1 BOTTLE | Refills: 3 | Status: SHIPPED | OUTPATIENT
Start: 2020-04-23 | End: 2020-08-19

## 2020-04-23 NOTE — TELEPHONE ENCOUNTER
Patient requested refills for lisinopril-hctz and Flonase. Pended Rxs and routed to PCP Dr. Ale Barbosa.     Heri Painter, PharmD  Clinical Pharmacist Specialist

## 2020-04-23 NOTE — TELEPHONE ENCOUNTER
Pharmacy Note - Anticoagulation 04/23/20 S/O:  Mr. Silke Brooke ,52 y.o. male, referred by Dr. Angel Camarillo, was contacted via an outbound telephone call today for telephonic anticoagulation management for the diagnosis of CVA, DVT, and PE. Patient checked POC PT/INR using home POC INR machine. The most recent INR was 2.9 today per patient's report. HPI:  
 
Interim History: · INR Goal:  2.0-3.0 · Current warfarin regimen: 11 mg daily · Warfarin tablet strength:  1 mg, 10 mg · Total weekly dose (mg): 77 mg Today's pertinent positives includes: No significant changes since last visit  
-States he didn't have too many greens this week 
-Denies any changes in medications 
-Denies any bleeding/bruising, SOB, chest pain · Adherence: · Able to recall regimen? YES 
· Miss/extra dose? NO 
· Need refill? NO Upcoming procedure(s):  NO 
 
 
Past Medical History:  
Diagnosis Date  Diabetes ([facility] [address]  Erectile dysfunction due to diseases classified elsewhere  Hematuria   
 right renal cyst  
 Hypercholesterolemia  Hypertension  Liver disease Fatty Liver  Pulmonary emboli [facility]) 2003 & 2004  
 chronic coumadin therapy managed by Dr. Abraham Al  Renal failure acute   
 following surgery  Sleep apnea   
 does not use cpap machine Allergies Allergen Reactions  Tape [Adhesive] Contact Dermatitis ALLERGIC TO SILK TAPE ONLY Current Outpatient Medications Medication Sig  
 amLODIPine (NORVASC) 10 mg tablet TAKE 1 TABLET BY MOUTH DAILY.  cloNIDine HCL (CATAPRES) 0.1 mg tablet TAKE 1 TABLET BY MOUTH TWICE DAILY.  furosemide (LASIX) 20 mg tablet TAKE 1 TABLET BY MOUTH EVERY DAY  potassium chloride (K-DUR, KLOR-CON) 20 mEq tablet TAKE 1 TABLET BY MOUTH DAILY.  atenoloL (TENORMIN) 100 mg tablet TAKE 1 TABLET BY MOUTH DAILY  gabapentin (NEURONTIN) 300 mg capsule 1 tid (Patient taking differently: 2 AM, 1 in the afternoon, and 2 in the evening)  warfarin (COUMADIN) 10 mg tablet TAKE 1 TABLET BY MOUTH AS DIRECTED  
 HYDROcodone-acetaminophen (NORCO)  mg tablet Take 1 Tab by mouth every six (6) hours as needed for Pain.  warfarin (COUMADIN) 1 mg tablet Take 1 tablet daily by mouth with 10 mg tablet for a total of 11 mg daily or as directed by healthcare provider.  lisinopril-hydroCHLOROthiazide (PRINZIDE, ZESTORETIC) 20-12.5 mg per tablet TAKE 2 TABLETS BY MOUTH EVERY DAY  tadalafil (CIALIS) 20 mg tablet Take 1 Tab by mouth as needed for Other (ED).  fluticasone (FLONASE) 50 mcg/actuation nasal spray 2 Sprays by Both Nostrils route daily as needed for Rhinitis.  insulin glargine (BASAGLAR KWIKPEN) 100 unit/mL (3 mL) inpn Takes 50 units twice a day.  BD INSULIN PEN NEEDLE UF SHORT 31 gauge x 5/16\" ndle USE 1 PEN UTD QID  
 NOVOLOG FLEXPEN 100 unit/mL inpn Takes 14-25 units per meal per sliding scale from endocrinology  VICTOZA 3-ZANDRA 0.6 mg/0.1 mL (18 mg/3 mL) sub-q pen INJ 1.8 MG SC QD  
 insulin syringe-needle U-100 by Does Not Apply route. Dispense ultrafine 1 mL syringes with 29 gauge needle  aspirin delayed-release 81 mg tablet Take 81 mg by mouth daily. No current facility-administered medications for this visit. Wt Readings from Last 3 Encounters:  
02/26/20 347 lb (157.4 kg) 01/13/20 347 lb (157.4 kg) 10/04/19 336 lb 3.2 oz (152.5 kg) BP Readings from Last 3 Encounters:  
02/26/20 130/80  
01/13/20 130/68  
10/04/19 136/62 Lab Results Component Value Date/Time  Sodium 137 01/13/2020 11:35 AM  
 Potassium 4.1 01/13/2020 11:35 AM  
 Chloride 99 01/13/2020 11:35 AM  
 CO2 25 01/13/2020 11:35 AM  
 Anion gap 13.0 01/13/2020 11:35 AM  
 Glucose 261 (H) 01/13/2020 11:35 AM  
 BUN 22 01/13/2020 11:35 AM  
 Creatinine 0.9 01/13/2020 11:35 AM  
 GFR est AA >60 03/24/2014 01:56 PM  
 GFR est non-AA >60 03/24/2014 01:56 PM  
 Calcium 9.3 01/13/2020 11:35 AM  
 
 
Lab Results Component Value Date/Time WBC 5.2 01/13/2020 11:35 AM  
 HGB 12.6 (L) 01/13/2020 11:35 AM  
 HCT 41.5 01/13/2020 11:35 AM  
 PLATELET 875 61/38/1102 11:35 AM  
 MCV 87 01/13/2020 11:35 AM  
 
 
Lab Results Component Value Date/Time Protein, total 7.7 01/13/2020 11:35 AM  
 Albumin 4.3 01/13/2020 11:35 AM  
 
 
Estimated Creatinine Clearance: 159.4 mL/min (by C-G formula based on SCr of 0.9 mg/dL). INR History:   (normal INR range 0.8-1.2) Date   INR  Dose/Comments 4/23/2020 2.9  11 mg daily 4/16/2020 2.2  11 mg daily 4/9/2020          2.3                   11 mg daily 4/2/2020          3.0                   11 mg daily 3/26/2020        1.9                   11 mg daily 3/19/2020        1.8                   11 mg daily 3/12/2020        2.8                   11 mg daily 3/5/2020          2.4                   11 mg daily 2/27/2020        2.1                   11 mg daily 2/20/2020        2.2                   11 mg daily 2/13/2020        2.3                   11 mg daily (took 13 mg on 2/6/2020) 
2/6/2020          1.8                   11 mg daily (missed 2 doses) A/P:    
 
Anticoagulation: 
Considering Mr. Turcios's past history, todays findings, and per Anticoagulation Collaborative Practice Agreement/Protocol: 1. POC INR (2.9) is Therapeutic for INR goal today. 2.  Continue warfarin - 11 mg daily 3. Next INR check will be in 1 week(s). Medication reconciliation was completed during the visit. There are no discontinued medications. A full discussion of the nature of anticoagulants has been carried out. A full discussion of the need for frequent and regular monitoring, precise dosage adjustment and compliance was stressed. Side effects of potential bleeding were discussed and Mr. Sandra Ferrari was instructed to call [phone] if there are any signs of abnormal bleeding.   Mr. Sandra Ferrari was instructed to avoid any OTC items containing aspirin or ibuprofen and prior to starting any new OTC products to consult with his physician or pharmacist to ensure no drug interactions are present. Mr. Marybeth Geiger was instructed to avoid any major changes in his general diet and to avoid alcohol consumption. Mr. Marybeth Geiger verbalized his understanding of all instructions and will call the office with any questions, concerns, or signs of abnormal bleeding or blood clot. Notifications of recommendations will be sent to Dr. Corona Bhakta for review. Thank you, José Miguel James, PharmD Clinical Pharmacist Specialist 
 
CLINICAL PHARMACY CONSULT: MED RECONCILIATION/REVIEW ADDENDUM For Pharmacy Admin Tracking Only PHSO: PHSO Patient?: No 
Total # of Interventions Recommended: Count: 1 
- Maintenance Safety Lab Monitoring #: 1 Total Interventions Accepted: 1 Time Spent (min): 15 Bernadette Olivarez, [facility], PharmD

## 2020-04-30 ENCOUNTER — TELEPHONE (OUTPATIENT)
Dept: FAMILY MEDICINE CLINIC | Age: 50
End: 2020-04-30

## 2020-04-30 DIAGNOSIS — Z79.01 ANTICOAGULANT LONG-TERM USE: ICD-10-CM

## 2020-04-30 LAB — INR, EXTERNAL: 2.6

## 2020-04-30 NOTE — TELEPHONE ENCOUNTER
Pharmacy Note - Anticoagulation 04/30/20 S/O:  Mr. Claudia Sanchez ,52 y.o. male, referred by Dr. Ant Lyn, was contacted via an outbound telephone call today for telephonic anticoagulation management for the diagnosis of CVA, DVT, and PE. Patient checked POC PT/INR using home POC INR machine. The most recent INR was 2.6 today per patient's report. HPI:  
 
Interim History: · INR Goal:  2.0-3.0 · Current warfarin regimen: 11 mg daily · Warfarin tablet strength:   1 mg, 10 mg · Total weekly dose (mg): 77 mg Today's pertinent positives includes: No significant changes since last visit  
-Denies bleeding/bruising, SOB, chest pain 
-Denies any changes in greens - states he is having greens every other day like usual 
-Denies any changes in alcohol intake 
-Denies any changes in medications · Adherence: · Able to recall regimen? YES 
· Miss/extra dose? NO 
· Need refill? NO Upcoming procedure(s):  NO 
 
 
Past Medical History:  
Diagnosis Date  Diabetes ([facility] [address]  Erectile dysfunction due to diseases classified elsewhere  Hematuria   
 right renal cyst  
 Hypercholesterolemia  Hypertension  Liver disease Fatty Liver  Pulmonary emboli [facility]) 2003 & 2004  
 chronic coumadin therapy managed by Dr. Oriana Lawrence  Renal failure acute   
 following surgery  Sleep apnea   
 does not use cpap machine Allergies Allergen Reactions  Tape [Adhesive] Contact Dermatitis ALLERGIC TO SILK TAPE ONLY Current Outpatient Medications Medication Sig  
 lisinopril-hydroCHLOROthiazide (PRINZIDE, ZESTORETIC) 20-12.5 mg per tablet TAKE 2 TABLETS BY MOUTH EVERY DAY.  fluticasone propionate (FLONASE) 50 mcg/actuation nasal spray 2 Sprays by Both Nostrils route daily as needed for Rhinitis.  amLODIPine (NORVASC) 10 mg tablet TAKE 1 TABLET BY MOUTH DAILY.  cloNIDine HCL (CATAPRES) 0.1 mg tablet TAKE 1 TABLET BY MOUTH TWICE DAILY.  furosemide (LASIX) 20 mg tablet TAKE 1 TABLET BY MOUTH EVERY DAY  potassium chloride (K-DUR, KLOR-CON) 20 mEq tablet TAKE 1 TABLET BY MOUTH DAILY.  atenoloL (TENORMIN) 100 mg tablet TAKE 1 TABLET BY MOUTH DAILY  gabapentin (NEURONTIN) 300 mg capsule 1 tid (Patient taking differently: 2 AM, 1 in the afternoon, and 2 in the evening)  warfarin (COUMADIN) 10 mg tablet TAKE 1 TABLET BY MOUTH AS DIRECTED  
 HYDROcodone-acetaminophen (NORCO)  mg tablet Take 1 Tab by mouth every six (6) hours as needed for Pain.  warfarin (COUMADIN) 1 mg tablet Take 1 tablet daily by mouth with 10 mg tablet for a total of 11 mg daily or as directed by healthcare provider.  tadalafil (CIALIS) 20 mg tablet Take 1 Tab by mouth as needed for Other (ED).  insulin glargine (BASAGLAR KWIKPEN) 100 unit/mL (3 mL) inpn Takes 50 units twice a day.  BD INSULIN PEN NEEDLE UF SHORT 31 gauge x 5/16\" ndle USE 1 PEN UTD QID  
 NOVOLOG FLEXPEN 100 unit/mL inpn Takes 14-25 units per meal per sliding scale from endocrinology  VICTOZA 3-ZANDRA 0.6 mg/0.1 mL (18 mg/3 mL) sub-q pen INJ 1.8 MG SC QD  
 insulin syringe-needle U-100 by Does Not Apply route. Dispense ultrafine 1 mL syringes with 29 gauge needle  aspirin delayed-release 81 mg tablet Take 81 mg by mouth daily. No current facility-administered medications for this visit. Wt Readings from Last 3 Encounters:  
02/26/20 347 lb (157.4 kg) 01/13/20 347 lb (157.4 kg) 10/04/19 336 lb 3.2 oz (152.5 kg) BP Readings from Last 3 Encounters:  
02/26/20 130/80  
01/13/20 130/68  
10/04/19 136/62 Lab Results Component Value Date/Time  Sodium 137 01/13/2020 11:35 AM  
 Potassium 4.1 01/13/2020 11:35 AM  
 Chloride 99 01/13/2020 11:35 AM  
 CO2 25 01/13/2020 11:35 AM  
 Anion gap 13.0 01/13/2020 11:35 AM  
 Glucose 261 (H) 01/13/2020 11:35 AM  
 BUN 22 01/13/2020 11:35 AM  
 Creatinine 0.9 01/13/2020 11:35 AM  
 GFR est AA >60 03/24/2014 01:56 PM  
 GFR est non-AA >60 03/24/2014 01:56 PM  
 Calcium 9.3 01/13/2020 11:35 AM  
 
 
Lab Results Component Value Date/Time WBC 5.2 01/13/2020 11:35 AM  
 HGB 12.6 (L) 01/13/2020 11:35 AM  
 HCT 41.5 01/13/2020 11:35 AM  
 PLATELET 126 52/63/8092 11:35 AM  
 MCV 87 01/13/2020 11:35 AM  
 
 
Lab Results Component Value Date/Time Protein, total 7.7 01/13/2020 11:35 AM  
 Albumin 4.3 01/13/2020 11:35 AM  
 
 
Estimated Creatinine Clearance: 159.4 mL/min (by C-G formula based on SCr of 0.9 mg/dL). INR History:   (normal INR range 0.8-1.2) Date   INR  Dose/Comments 4/30/2020 2.6  11 mg daily 4/23/2020        2.9                   11 mg daily 4/16/2020        2.2                   11 mg daily 4/9/2020          2.3                   11 mg daily 4/2/2020          3.0                   11 mg daily 3/26/2020        1.9                   11 mg daily 3/19/2020        1.8                   11 mg daily 3/12/2020        2.8                   11 mg daily 3/5/2020          2.4                   11 mg daily 2/27/2020        2.1                   11 mg daily 2/20/2020        2.2                   11 mg daily 2/13/2020        2.3                   11 mg daily (took 13 mg on 2/6/2020) 
2/6/2020          1.8                   11 mg daily (missed 2 doses) A/P:    
 
Anticoagulation: 
Considering Mr. Turcios's past history, todays findings, and per Anticoagulation Collaborative Practice Agreement/Protocol: 1. POC INR (2.6) is Therapeutic for INR goal today. 2.  Continue warfarin - 11 mg daily 3. Next INR check will be in 1 week(s). -Reiterated importance of monitoring for bruising/bleeding Medication reconciliation was completed during the visit. There are no discontinued medications. A full discussion of the nature of anticoagulants has been carried out.   A full discussion of the need for frequent and regular monitoring, precise dosage adjustment and compliance was stressed. Side effects of potential bleeding were discussed and Mr. Maury Anderson was instructed to call [phone] if there are any signs of abnormal bleeding. Mr. Maury Anderson was instructed to avoid any OTC items containing aspirin or ibuprofen and prior to starting any new OTC products to consult with his physician or pharmacist to ensure no drug interactions are present. Mr. Maury Anderson was instructed to avoid any major changes in his general diet and to avoid alcohol consumption. Mr. Maury Anderson verbalized his understanding of all instructions and will call the office with any questions, concerns, or signs of abnormal bleeding or blood clot. Notifications of recommendations will be sent to Dr. Toy Babinski for review. Thank you, Shanita Narvaez, PharmD Clinical Pharmacist Specialist 
 
CLINICAL PHARMACY CONSULT: MED RECONCILIATION/REVIEW ADDENDUM For Pharmacy Admin Tracking Only PHSO: PHSO Patient?: No 
Total # of Interventions Recommended: Count: 1 
- Maintenance Safety Lab Monitoring #: 1 Total Interventions Accepted: 1 Time Spent (min): 15 Lindsey Santoyo, [facility], PharmD

## 2020-05-06 ENCOUNTER — VIRTUAL VISIT (OUTPATIENT)
Dept: FAMILY MEDICINE CLINIC | Age: 50
End: 2020-05-06

## 2020-05-06 DIAGNOSIS — I10 HTN (HYPERTENSION), BENIGN: ICD-10-CM

## 2020-05-06 DIAGNOSIS — E11.42 TYPE 2 DIABETES MELLITUS WITH DIABETIC POLYNEUROPATHY, UNSPECIFIED WHETHER LONG TERM INSULIN USE (HCC): ICD-10-CM

## 2020-05-06 DIAGNOSIS — I10 HTN (HYPERTENSION), BENIGN: Primary | ICD-10-CM

## 2020-05-06 NOTE — PATIENT INSTRUCTIONS

## 2020-05-06 NOTE — PROGRESS NOTES
Claudia Sanchez is a 52 y.o. male who was seen by synchronous (real-time) audio-video technology on 5/6/2020. Consent: Claudia Sanchez, who was seen by synchronous (real-time) audio-video technology, and/or his healthcare decision maker, is aware that this patient-initiated, Telehealth encounter on 5/6/2020 is a billable service, with coverage as determined by his insurance carrier. He is aware that he may receive a bill and has provided verbal consent to proceed: Yes. Assessment & Plan:   Diagnoses and all orders for this visit:    1. HTN (hypertension), benign  -     METABOLIC PANEL, BASIC; Future  -     HEMOGLOBIN A1C WITH EAG; Future    2. Type 2 diabetes mellitus with diabetic polyneuropathy, unspecified whether long term insulin use ([facility] [address]  -     LIPID PANEL; Future  -     AST; Future  -     ALT; Future      As above,   above all stable unless otherwise noted   treatment plan as listed below  Orders Placed This Encounter    LIPID PANEL    METABOLIC PANEL, BASIC    AST    ALT    HEMOGLOBIN A1C WITH EAG     Follow-up and Dispositions    · Return in about 6 months (around 11/6/2020) for well exam.       avs is available via Travel Notes  This has been fully explained to the patient, who indicates understanding. [id number]  Subjective:   Claudia Sanchez is a 52 y.o. male who was seen for Hypertension and Diabetes      Pt has had toe amputation; He is recovering well; mistakenly took double dose of meds today. BP was 130/72. Plans to recheck BP some later. NO SOB , no CP. He will have an eye exam on next Tuesday ( a 6 month follow up)      Diabetes is followed by endocrinology Jessa Mendoza)- has follow up next Wednesday. Lab Results   Component Value Date/Time    Hemoglobin A1c 7.9 (H) 07/30/2019 09:53 AM    Hemoglobin A1c (POC) 8.5 01/13/2020 11:19 AM    Hemoglobin A1c, External 7.9 07/30/2019     Blood sugar have been in the 150s.  He may have some dietary indiscretions  Pt is followed by Dr. Noah Cueto for pain management and sciatica pain; He has no new complaints,    Prior to Admission medications    Medication Sig Start Date End Date Taking? Authorizing Provider   lisinopril-hydroCHLOROthiazide (PRINZIDE, ZESTORETIC) 20-12.5 mg per tablet TAKE 2 TABLETS BY MOUTH EVERY DAY. 4/23/20  Yes Ned Temple MD   fluticasone propionate (FLONASE) 50 mcg/actuation nasal spray 2 Sprays by Both Nostrils route daily as needed for Rhinitis. 4/23/20  Yes Ned Temple MD   amLODIPine (NORVASC) 10 mg tablet TAKE 1 TABLET BY MOUTH DAILY. 4/3/20  Yes Ned Temple MD   cloNIDine HCL (CATAPRES) 0.1 mg tablet TAKE 1 TABLET BY MOUTH TWICE DAILY. 4/3/20  Yes Ned Temple MD   furosemide (LASIX) 20 mg tablet TAKE 1 TABLET BY MOUTH EVERY DAY 4/3/20  Yes Ned Temple MD   potassium chloride (K-DUR, KLOR-CON) 20 mEq tablet TAKE 1 TABLET BY MOUTH DAILY. 4/3/20  Yes Ned Temple MD   atenoloL (TENORMIN) 100 mg tablet TAKE 1 TABLET BY MOUTH DAILY 3/16/20  Yes Grace Quiñonez NP   warfarin (COUMADIN) 10 mg tablet TAKE 1 TABLET BY MOUTH AS DIRECTED 2/24/20  Yes Gerardo Almeida NP   HYDROcodone-acetaminophen (NORCO)  mg tablet Take 1 Tab by mouth every six (6) hours as needed for Pain. Yes Provider, Historical   warfarin (COUMADIN) 1 mg tablet Take 1 tablet daily by mouth with 10 mg tablet for a total of 11 mg daily or as directed by healthcare provider. 2/14/20  Yes Gerardo Almeida NP   tadalafil (CIALIS) 20 mg tablet Take 1 Tab by mouth as needed for Other (ED). 4/30/19  Yes Grace Jackson MD   insulin glargine (BASAGLAR KWIKPEN) 100 unit/mL (3 mL) inpn Takes 50 units twice a day.    Yes Provider, Historical   BD INSULIN PEN NEEDLE UF SHORT 31 gauge x 5/16\" ndle USE 1 PEN UTD QID 7/4/17  Yes Provider, Historical   NOVOLOG FLEXPEN 100 unit/mL inpn Takes 14-25 units per meal per sliding scale from endocrinology 12/17/16  Yes Provider, Historical   VICTOZA 3-ZANDRA 0.6 mg/0.1 mL (18 mg/3 mL) sub-q pen INJ 1.8 MG SC QD 12/17/16  Yes Provider, Historical   insulin syringe-needle U-100 by Does Not Apply route. Dispense ultrafine 1 mL syringes with 29 gauge needle 6/15/10  Yes Zee Graham NP   aspirin delayed-release 81 mg tablet Take 81 mg by mouth daily.    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    Pulmonary hypertension (HCC) I27.20    HTN (hypertension), benign I10    DIETER (obstructive sleep apnea) G47.33    CVD (cardiovascular disease) I25.10    Fatty liver K76.0    Morbid obesity ([facility] [address] E66.01    Microscopic hematuria R31.29    Renal mass N28.89    Tendon tear, foot S96.919A    Chronic pulmonary edema J81.1    Anticoagulant long-term use Z79.01    Carpal tunnel syndrome G56.00    Frontal headache R51    History of MRSA infection Z86.14    HNP (herniated nucleus pulposus), lumbar M51.26    Hyperlipidemia E78.5    Lumbar radiculopathy M54.16    Diabetic foot infection ([facility] [address] E11.628, L08.9    Personal history of pulmonary embolism Z86.711    Primary osteoarthritis of left knee M17.12    Pure hypercholesterolemia E78.00    Radial styloid tenosynovitis M65.4    S/P hip replacement Z96.649    Sepsis due to methicillin susceptible Staphylococcus aureus (HCC) A41.01    SIRS (systemic inflammatory response syndrome) (HCC) R65.10    Type 2 diabetes mellitus with diabetic neuropathy (HCC) E11.40    Chronic pain syndrome G89.4    Diabetes ([facility] [address] E11.9    Hypercholesterolemia E78.00    Hypertension I10    Sleep apnea G47.30    Pulmonary emboli (HCC) I26.99    Liver disease K76.9     Allergies   Allergen Reactions    Tape [Adhesive] Contact Dermatitis     ALLERGIC TO SILK TAPE ONLY         Past Medical History:   Diagnosis Date    Diabetes ([facility] [address]     Erectile dysfunction due to diseases classified elsewhere     Hematuria     right renal cyst    Hypercholesterolemia     Hypertension     Liver disease     Fatty Liver    Pulmonary emboli ([facility] [address] 2003 & 2004    chronic coumadin therapy managed by Dr. Jesus Rahman Renal failure acute     following surgery    Sleep apnea     does not use cpap machine     Past Surgical History:   Procedure Laterality Date    COLONOSCOPY N/A 11/14/2017    COLONOSCOPY performed by Karey Read MD at [facility] HX APPENDECTOMY  2006    HX CARPAL TUNNEL RELEASE Right     HX HIP REPLACEMENT Right     HX KNEE REPLACEMENT Left     HX ORTHOPAEDIC      Right toe amputation, pins and screws in foot    VASCULAR SURGERY PROCEDURE UNLIST      Cadott filter     Family History   Problem Relation Age of Onset    Diabetes Father     Hypertension Father     Heart Disease Maternal Grandmother     Cancer Maternal Grandmother     Depression Mother     Cancer Paternal Grandfather      Social History     Tobacco Use    Smoking status: Current Every Day Smoker     Types: Cigars    Smokeless tobacco: Never Used   Substance Use Topics    Alcohol use: No       Review of Systems   Constitutional: Negative for chills and fever. Respiratory: Negative for shortness of breath and wheezing. Cardiovascular: Negative for chest pain and palpitations. Objective: There were no vitals taken for this visit.    General: alert, cooperative, no distress   Mental  status: normal mood, behavior, speech, dress, motor activity, and thought processes, able to follow commands   HENT: NCAT   Neck: no visualized mass   Resp: no respiratory distress   Neuro: no gross deficits   Skin: no discoloration or lesions of concern on visible areas   Psychiatric: normal affect, consistent with stated mood, no evidence of hallucinations     Additional exam findings: none        Lab Results   Component Value Date/Time Cholesterol, total 105 (L) 07/30/2019 09:53 AM    HDL Cholesterol 29 (L) 07/30/2019 09:53 AM    LDL, calculated 50 07/30/2019 09:53 AM    VLDL, calculated 26 07/30/2019 09:53 AM    Triglyceride 130 07/30/2019 09:53 AM       We discussed the expected course, resolution and complications of the diagnosis(es) in detail. Medication risks, benefits, costs, interactions, and alternatives were discussed as indicated. I advised him to contact the office if his condition worsens, changes or fails to improve as anticipated. He expressed understanding with the diagnosis(es) and plan. Rachelle Hodges is a 52 y.o. male who was evaluated by a video visit encounter for concerns as above. Patient identification was verified prior to start of the visit. A caregiver was present when appropriate. Due to this being a TeleHealth encounter (During HUBJI-08 public health emergency), evaluation of the following organ systems was limited: Vitals/Constitutional/EENT/Resp/CV/GI//MS/Neuro/Skin/Heme-Lymph-Imm. Pursuant to the emergency declaration under the [facility], [facility] waiver authority and the [employer] Act, this Virtual  Visit was conducted, with patient's (and/or legal guardian's) consent, to reduce the patient's risk of exposure to COVID-19 and provide necessary medical care. Services were provided through a video synchronous discussion virtually to substitute for in-person clinic visit.    Patient  Was at home and provider was at the office      Gabriella Torres MD

## 2020-05-07 ENCOUNTER — TELEPHONE (OUTPATIENT)
Dept: FAMILY MEDICINE CLINIC | Age: 50
End: 2020-05-07

## 2020-05-07 DIAGNOSIS — Z79.01 ANTICOAGULANT LONG-TERM USE: ICD-10-CM

## 2020-05-07 LAB — INR, EXTERNAL: 3

## 2020-05-07 NOTE — TELEPHONE ENCOUNTER
Pharmacy Note - Anticoagulation 05/07/20 S/O:  Mr. Erika Obrien ,52 y.o. male, referred by Dr. Michael Horton, was contacted via an outbound telephone call today for telephonic anticoagulation management for the diagnosis of CVA, DVT, and PE. Patient checked POC PT/INR using home POC INR machine. The most recent INR was 3.0 today per patient's report. HPI:  
 
Interim History: · INR Goal:  2.0-3.0 · Current warfarin regimen: 11 mg daily · Warfarin tablet strength:   1 mg, 10 mg · Total weekly dose (mg): 77 mg Today's pertinent positives includes: No significant changes since last visit 
-Denies any bruising/bleeding, chest pain, SOB 
-States he may have had a little less greens this past week - had some broccoli 
-Denies any changes in alcohol intake and medications · Adherence: · Able to recall regimen? YES 
· Miss/extra dose? NO 
· Need refill? NO Upcoming procedure(s):  NO 
 
Past Medical History:  
Diagnosis Date  Diabetes ([facility] [address]  Erectile dysfunction due to diseases classified elsewhere  Hematuria   
 right renal cyst  
 Hypercholesterolemia  Hypertension  Liver disease Fatty Liver  Pulmonary emboli [facility]) 2003 & 2004  
 chronic coumadin therapy managed by Dr. Charanjit Allan  Renal failure acute   
 following surgery  Shingles 2020  Sleep apnea   
 does not use cpap machine Allergies Allergen Reactions  Tape [Adhesive] Contact Dermatitis ALLERGIC TO SILK TAPE ONLY Current Outpatient Medications Medication Sig  
 lisinopril-hydroCHLOROthiazide (PRINZIDE, ZESTORETIC) 20-12.5 mg per tablet TAKE 2 TABLETS BY MOUTH EVERY DAY.  fluticasone propionate (FLONASE) 50 mcg/actuation nasal spray 2 Sprays by Both Nostrils route daily as needed for Rhinitis.  amLODIPine (NORVASC) 10 mg tablet TAKE 1 TABLET BY MOUTH DAILY.  cloNIDine HCL (CATAPRES) 0.1 mg tablet TAKE 1 TABLET BY MOUTH TWICE DAILY.  furosemide (LASIX) 20 mg tablet TAKE 1 TABLET BY MOUTH EVERY DAY  potassium chloride (K-DUR, KLOR-CON) 20 mEq tablet TAKE 1 TABLET BY MOUTH DAILY.  atenoloL (TENORMIN) 100 mg tablet TAKE 1 TABLET BY MOUTH DAILY  warfarin (COUMADIN) 10 mg tablet TAKE 1 TABLET BY MOUTH AS DIRECTED  
 HYDROcodone-acetaminophen (NORCO)  mg tablet Take 1 Tab by mouth every six (6) hours as needed for Pain.  warfarin (COUMADIN) 1 mg tablet Take 1 tablet daily by mouth with 10 mg tablet for a total of 11 mg daily or as directed by healthcare provider.  tadalafil (CIALIS) 20 mg tablet Take 1 Tab by mouth as needed for Other (ED).  insulin glargine (BASAGLAR KWIKPEN) 100 unit/mL (3 mL) inpn Takes 50 units twice a day.  BD INSULIN PEN NEEDLE UF SHORT 31 gauge x 5/16\" ndle USE 1 PEN UTD QID  
 NOVOLOG FLEXPEN 100 unit/mL inpn Takes 14-25 units per meal per sliding scale from endocrinology  VICTOZA 3-ZANDRA 0.6 mg/0.1 mL (18 mg/3 mL) sub-q pen INJ 1.8 MG SC QD  
 insulin syringe-needle U-100 by Does Not Apply route. Dispense ultrafine 1 mL syringes with 29 gauge needle  aspirin delayed-release 81 mg tablet Take 81 mg by mouth daily. No current facility-administered medications for this visit. Wt Readings from Last 3 Encounters:  
02/26/20 347 lb (157.4 kg) 01/13/20 347 lb (157.4 kg) 10/04/19 336 lb 3.2 oz (152.5 kg) BP Readings from Last 3 Encounters:  
02/26/20 130/80  
01/13/20 130/68  
10/04/19 136/62 Lab Results Component Value Date/Time  Sodium 137 01/13/2020 11:35 AM  
 Potassium 4.1 01/13/2020 11:35 AM  
 Chloride 99 01/13/2020 11:35 AM  
 CO2 25 01/13/2020 11:35 AM  
 Anion gap 13.0 01/13/2020 11:35 AM  
 Glucose 261 (H) 01/13/2020 11:35 AM  
 BUN 22 01/13/2020 11:35 AM  
 Creatinine 0.9 01/13/2020 11:35 AM  
 GFR est AA >60 03/24/2014 01:56 PM  
 GFR est non-AA >60 03/24/2014 01:56 PM  
 Calcium 9.3 01/13/2020 11:35 AM  
 
 
Lab Results Component Value Date/Time WBC 5.2 01/13/2020 11:35 AM  
 HGB 12.6 (L) 01/13/2020 11:35 AM  
 HCT 41.5 01/13/2020 11:35 AM  
 PLATELET 799 87/03/3155 11:35 AM  
 MCV 87 01/13/2020 11:35 AM  
 
 
Lab Results Component Value Date/Time Protein, total 7.7 01/13/2020 11:35 AM  
 Albumin 4.3 01/13/2020 11:35 AM  
 
 
Estimated Creatinine Clearance: 159.4 mL/min (by C-G formula based on SCr of 0.9 mg/dL). INR History:   (normal INR range 0.8-1.2) Date   INR  Dose/Comments 5/7/2020 3.0  11 mg daily 4/30/2020        2.6                   11 mg daily 4/23/2020        2.9                   11 mg daily 4/16/2020        2.2                   11 mg daily 4/9/2020          2.3                   11 mg daily 4/2/2020          3.0                   11 mg daily 3/26/2020        1.9                   11 mg daily 3/19/2020        1.8                   11 mg daily 3/12/2020        2.8                   11 mg daily 3/5/2020          2.4                   11 mg daily 2/27/2020        2.1                   11 mg daily 2/20/2020        2.2                   11 mg daily 2/13/2020        2.3                   11 mg daily (took 13 mg on 2/6/2020) 
2/6/2020          1.8                   11 mg daily (missed 2 doses) A/P:    
 
Anticoagulation: 
Considering Mr. Turcios's past history, todays findings, and per Anticoagulation Collaborative Practice Agreement/Protocol: 1. POC INR (3.0) is Therapeutic for INR goal today. 2.  Continue warfarin - 11 mg daily 3. Next INR check will be in 1 week(s). -Instructed patient to monitor for bruising/bleeding and keep intake of vitamin K in diet consistent A full discussion of the nature of anticoagulants has been carried out. A full discussion of the need for frequent and regular monitoring, precise dosage adjustment and compliance was stressed.   Side effects of potential bleeding were discussed and Mr. Andrew Acevedo was instructed to call [phone] if there are any signs of abnormal bleeding. Mr. Andrew Acevedo was instructed to avoid any OTC items containing aspirin or ibuprofen and prior to starting any new OTC products to consult with his physician or pharmacist to ensure no drug interactions are present. Mr. Andrew Acevedo was instructed to avoid any major changes in his general diet and to avoid alcohol consumption. Mr. Andrew Acevedo verbalized his understanding of all instructions and will call the office with any questions, concerns, or signs of abnormal bleeding or blood clot. Notifications of recommendations will be sent to Dr. Marquita Summers for review. Thank you, Sera Culp, PharmD Clinical Pharmacist Specialist 
 
CLINICAL PHARMACY CONSULT: MED RECONCILIATION/REVIEW ADDENDUM For Pharmacy Admin Tracking Only PHSO: PHSO Patient?: No 
Total # of Interventions Recommended: Count: 1 
- Maintenance Safety Lab Monitoring #: 1 Total Interventions Accepted: 1 Time Spent (min): 15 [facility] CLINICAL PHARMACY CONSULT: MED RECONCILIATION/REVIEW ADDENDUM For Pharmacy Admin Tracking Only PHSO: PHSO Patient?: No 
Total # of Interventions Recommended: Count: 1 
- Maintenance Safety Lab Monitoring #: 1 Total Interventions Accepted: 1 Time Spent (min): 15 [facility]

## 2020-05-14 ENCOUNTER — TELEPHONE (OUTPATIENT)
Dept: FAMILY MEDICINE CLINIC | Age: 50
End: 2020-05-14

## 2020-05-14 DIAGNOSIS — Z79.01 ANTICOAGULANT LONG-TERM USE: ICD-10-CM

## 2020-05-14 LAB — INR, EXTERNAL: 1.3

## 2020-05-14 NOTE — TELEPHONE ENCOUNTER
Patient states that he ate whole bag of broccoli yesterday.     Myranda Franco from [facility] reported that patient has an critical lab INR 1.3

## 2020-05-14 NOTE — TELEPHONE ENCOUNTER
Pharmacy Note - Anticoagulation 05/14/20 S/O:  Mr. Erika Obrien ,52 y.o. male, referred by Dr. Michael Horton, was contacted via an outbound telephone call today for telephonic anticoagulation management for the diagnosis of CVA, DVT, and PE. Patient checked POC PT/INR using home POC INR machine. The most recent INR was 1.3 today. HPI:  
 
Interim History: · INR Goal:  2.0-3.0 · Current warfarin regimen: 11 mg daily · Warfarin tablet strength:   1 mg, 10 mg · Total weekly dose (mg): 77 mg Today's pertinent positives includes: 
Change in diet/appetite  
-States he ate a whole bag of broccoli yesterday; states overall had a lot of broccoli compared to last week 
-Denies any medication changes 
-Denies any bruising/bleeding, chest pain, leg pain, SOB · Adherence: · Able to recall regimen? YES 
· Miss/extra dose? NO 
· Need refill? NO Upcoming procedure(s):  NO 
 
 
Past Medical History:  
Diagnosis Date  Diabetes ([facility] [address]  Erectile dysfunction due to diseases classified elsewhere  Hematuria   
 right renal cyst  
 Hypercholesterolemia  Hypertension  Liver disease Fatty Liver  Pulmonary emboli [facility]) 2003 & 2004  
 chronic coumadin therapy managed by Dr. Charanjit Allan  Renal failure acute   
 following surgery  Shingles 2020  Sleep apnea   
 does not use cpap machine Allergies Allergen Reactions  Tape [Adhesive] Contact Dermatitis ALLERGIC TO SILK TAPE ONLY Current Outpatient Medications Medication Sig  
 lisinopril-hydroCHLOROthiazide (PRINZIDE, ZESTORETIC) 20-12.5 mg per tablet TAKE 2 TABLETS BY MOUTH EVERY DAY.  fluticasone propionate (FLONASE) 50 mcg/actuation nasal spray 2 Sprays by Both Nostrils route daily as needed for Rhinitis.  amLODIPine (NORVASC) 10 mg tablet TAKE 1 TABLET BY MOUTH DAILY.  cloNIDine HCL (CATAPRES) 0.1 mg tablet TAKE 1 TABLET BY MOUTH TWICE DAILY.  furosemide (LASIX) 20 mg tablet TAKE 1 TABLET BY MOUTH EVERY DAY  potassium chloride (K-DUR, KLOR-CON) 20 mEq tablet TAKE 1 TABLET BY MOUTH DAILY.  atenoloL (TENORMIN) 100 mg tablet TAKE 1 TABLET BY MOUTH DAILY  warfarin (COUMADIN) 10 mg tablet TAKE 1 TABLET BY MOUTH AS DIRECTED  
 HYDROcodone-acetaminophen (NORCO)  mg tablet Take 1 Tab by mouth every six (6) hours as needed for Pain.  warfarin (COUMADIN) 1 mg tablet Take 1 tablet daily by mouth with 10 mg tablet for a total of 11 mg daily or as directed by healthcare provider.  tadalafil (CIALIS) 20 mg tablet Take 1 Tab by mouth as needed for Other (ED).  insulin glargine (BASAGLAR KWIKPEN) 100 unit/mL (3 mL) inpn Takes 50 units twice a day.  BD INSULIN PEN NEEDLE UF SHORT 31 gauge x 5/16\" ndle USE 1 PEN UTD QID  
 NOVOLOG FLEXPEN 100 unit/mL inpn Takes 14-25 units per meal per sliding scale from endocrinology  VICTOZA 3-ZANDRA 0.6 mg/0.1 mL (18 mg/3 mL) sub-q pen INJ 1.8 MG SC QD  
 insulin syringe-needle U-100 by Does Not Apply route. Dispense ultrafine 1 mL syringes with 29 gauge needle  aspirin delayed-release 81 mg tablet Take 81 mg by mouth daily. No current facility-administered medications for this visit. Wt Readings from Last 3 Encounters:  
02/26/20 347 lb (157.4 kg) 01/13/20 347 lb (157.4 kg) 10/04/19 336 lb 3.2 oz (152.5 kg) BP Readings from Last 3 Encounters:  
02/26/20 130/80  
01/13/20 130/68  
10/04/19 136/62 Lab Results Component Value Date/Time Sodium 137 01/13/2020 11:35 AM  
 Potassium 4.1 01/13/2020 11:35 AM  
 Chloride 99 01/13/2020 11:35 AM  
 CO2 25 01/13/2020 11:35 AM  
 Anion gap 13.0 01/13/2020 11:35 AM  
 Glucose 261 (H) 01/13/2020 11:35 AM  
 BUN 22 01/13/2020 11:35 AM  
 Creatinine 0.9 01/13/2020 11:35 AM  
 GFR est AA >60 03/24/2014 01:56 PM  
 GFR est non-AA >60 03/24/2014 01:56 PM  
 Calcium 9.3 01/13/2020 11:35 AM  
 
 
Lab Results Component Value Date/Time WBC 5.2 01/13/2020 11:35 AM  
 HGB 12.6 (L) 01/13/2020 11:35 AM  
 HCT 41.5 01/13/2020 11:35 AM  
 PLATELET 934 84/80/1572 11:35 AM  
 MCV 87 01/13/2020 11:35 AM  
 
 
Lab Results Component Value Date/Time Protein, total 7.7 01/13/2020 11:35 AM  
 Albumin 4.3 01/13/2020 11:35 AM  
 
 
Estimated Creatinine Clearance: 159.4 mL/min (by C-G formula based on SCr of 0.9 mg/dL). INR History:   (normal INR range 0.8-1.2) Date   INR Dose/Comments 5/14/2020 1.3 11 mg daily 5/7/2020          3.0 11 mg daily 4/30/2020        2.6 11 mg daily 4/23/2020        2.9 11 mg daily 4/16/2020        2.2 11 mg daily 4/9/2020          2.3 11 mg daily 4/2/2020          3.0 11 mg daily 3/26/2020        1.9 11 mg daily 3/19/2020        1.8 11 mg daily 3/12/2020        2.8 11 mg daily 3/5/2020          2.4 11 mg daily 2/27/2020        2.1 11 mg daily 2/20/2020        2.2 11 mg daily 2/13/2020        2.3 11 mg daily (took 13 mg on 2/6/2020) 
2/6/2020          1.8 11 mg daily (missed 2 doses) A/P:    
 
Anticoagulation: 
Considering Mr. Turcios's past history, todays findings, and per Anticoagulation Collaborative Practice Agreement/Protocol: 1. POC INR (1.3) is Subtherapeutic for INR goal today. 2.  Change warfarin - take 15 mg tonight (5/14), then continue 11 mg daily. 3. Next INR check will be in 1 week(s). -Reiterated importance of not increasing/decreasing intake of vitamin K containing food as this affects INR; reiterated importance of keeping intake of these foods consistent each week 
-Instructed patient to return to previous amount of vitamin K containing food in diet 
-Instructed patient to monitor closely for signs of thrombosis A full discussion of the nature of anticoagulants has been carried out. A full discussion of the need for frequent and regular monitoring, precise dosage adjustment and compliance was stressed.   Side effects of potential bleeding were discussed and Mr. Angelina Roman was instructed to call [phone] if there are any signs of abnormal bleeding. Mr. Angelina Roman was instructed to avoid any OTC items containing aspirin or ibuprofen and prior to starting any new OTC products to consult with his physician or pharmacist to ensure no drug interactions are present. Mr. Angelina Roman was instructed to avoid any major changes in his general diet and to avoid alcohol consumption. Mr. Angelina Roman verbalized his understanding of all instructions and will call the office with any questions, concerns, or signs of abnormal bleeding or blood clot. Notifications of recommendations will be sent to Dr. Brian Choi for review. Thank you, Nayeli Landeros, PharmD Clinical Pharmacist Specialist 
 
CLINICAL PHARMACY CONSULT: MED RECONCILIATION/REVIEW ADDENDUM For Pharmacy Admin Tracking Only PHSO: PHSO Patient?: No 
Total # of Interventions Recommended: Count: 2 
- Increased Dose #: 1 
- Maintenance Safety Lab Monitoring #: 1 Total Interventions Accepted: 2 Time Spent (min): 15 WILLIE Velazquez [facility], PharmD

## 2020-05-21 ENCOUNTER — TELEPHONE (OUTPATIENT)
Dept: FAMILY MEDICINE CLINIC | Age: 50
End: 2020-05-21

## 2020-05-21 DIAGNOSIS — Z79.01 ANTICOAGULANT LONG-TERM USE: ICD-10-CM

## 2020-05-21 LAB — INR, EXTERNAL: 2.8

## 2020-05-21 RX ORDER — GABAPENTIN 300 MG/1
CAPSULE ORAL
COMMUNITY

## 2020-05-21 NOTE — TELEPHONE ENCOUNTER
Pharmacy Note - Anticoagulation 05/21/20 S/O:  Mr. Phu Garvin ,52 y.o. male, referred by Dr. Janee Babinski, was contacted via an outbound telephone call today for telephonic anticoagulation management for the diagnosis of CVA, DVT, and PE. Patient checked POC PT/INR using home POC INR machine. The most recent INR was 2.8 today per patient's report. HPI:  
 
Interim History: · INR Goal:  2.0-3.0 · Current warfarin regimen: 11 mg daily · Confirmed took 15 mg on 5/14 as instructed · Warfarin tablet strength:   1 mg, 10 mg · Total weekly dose (mg): 77 mg Today's pertinent positives includes: No significant changes since last visit 
-Denies any bruising/bleeding, SOB, chest pain 
-Denies any change in greens - states having broccoli 4-5 times per week 
-Denies any changes in alcohol intake · Adherence: · Able to recall regimen? YES 
· Miss/extra dose? NO 
· Need refill? NO Upcoming procedure(s):  NO 
 
 
Past Medical History:  
Diagnosis Date  Diabetes ([facility] [address]  Erectile dysfunction due to diseases classified elsewhere  Hematuria   
 right renal cyst  
 Hypercholesterolemia  Hypertension  Liver disease Fatty Liver  Pulmonary emboli [facility]) 2003 & 2004  
 chronic coumadin therapy managed by Dr. Jennie Paige  Renal failure acute   
 following surgery  Shingles 2020  Sleep apnea   
 does not use cpap machine Allergies Allergen Reactions  Tape [Adhesive] Contact Dermatitis ALLERGIC TO SILK TAPE ONLY Current Outpatient Medications Medication Sig  
 lisinopril-hydroCHLOROthiazide (PRINZIDE, ZESTORETIC) 20-12.5 mg per tablet TAKE 2 TABLETS BY MOUTH EVERY DAY.  fluticasone propionate (FLONASE) 50 mcg/actuation nasal spray 2 Sprays by Both Nostrils route daily as needed for Rhinitis.  amLODIPine (NORVASC) 10 mg tablet TAKE 1 TABLET BY MOUTH DAILY.  cloNIDine HCL (CATAPRES) 0.1 mg tablet TAKE 1 TABLET BY MOUTH TWICE DAILY.  furosemide (LASIX) 20 mg tablet TAKE 1 TABLET BY MOUTH EVERY DAY  potassium chloride (K-DUR, KLOR-CON) 20 mEq tablet TAKE 1 TABLET BY MOUTH DAILY.  atenoloL (TENORMIN) 100 mg tablet TAKE 1 TABLET BY MOUTH DAILY  warfarin (COUMADIN) 10 mg tablet TAKE 1 TABLET BY MOUTH AS DIRECTED  
 HYDROcodone-acetaminophen (NORCO)  mg tablet Take 1 Tab by mouth every six (6) hours as needed for Pain.  warfarin (COUMADIN) 1 mg tablet Take 1 tablet daily by mouth with 10 mg tablet for a total of 11 mg daily or as directed by healthcare provider.  tadalafil (CIALIS) 20 mg tablet Take 1 Tab by mouth as needed for Other (ED).  insulin glargine (BASAGLAR KWIKPEN) 100 unit/mL (3 mL) inpn Takes 50 units twice a day.  BD INSULIN PEN NEEDLE UF SHORT 31 gauge x 5/16\" ndle USE 1 PEN UTD QID  
 NOVOLOG FLEXPEN 100 unit/mL inpn Takes 14-25 units per meal per sliding scale from endocrinology  VICTOZA 3-ZANDRA 0.6 mg/0.1 mL (18 mg/3 mL) sub-q pen INJ 1.8 MG SC QD  
 insulin syringe-needle U-100 by Does Not Apply route. Dispense ultrafine 1 mL syringes with 29 gauge needle  aspirin delayed-release 81 mg tablet Take 81 mg by mouth daily. No current facility-administered medications for this visit. Wt Readings from Last 3 Encounters:  
02/26/20 347 lb (157.4 kg) 01/13/20 347 lb (157.4 kg) 10/04/19 336 lb 3.2 oz (152.5 kg) BP Readings from Last 3 Encounters:  
02/26/20 130/80  
01/13/20 130/68  
10/04/19 136/62 Lab Results Component Value Date/Time  Sodium 137 01/13/2020 11:35 AM  
 Potassium 4.1 01/13/2020 11:35 AM  
 Chloride 99 01/13/2020 11:35 AM  
 CO2 25 01/13/2020 11:35 AM  
 Anion gap 13.0 01/13/2020 11:35 AM  
 Glucose 261 (H) 01/13/2020 11:35 AM  
 BUN 22 01/13/2020 11:35 AM  
 Creatinine 0.9 01/13/2020 11:35 AM  
 GFR est AA >60 03/24/2014 01:56 PM  
 GFR est non-AA >60 03/24/2014 01:56 PM  
 Calcium 9.3 01/13/2020 11:35 AM  
 
 
Lab Results Component Value Date/Time WBC 5.2 01/13/2020 11:35 AM  
 HGB 12.6 (L) 01/13/2020 11:35 AM  
 HCT 41.5 01/13/2020 11:35 AM  
 PLATELET 178 32/99/5216 11:35 AM  
 MCV 87 01/13/2020 11:35 AM  
 
 
Lab Results Component Value Date/Time Protein, total 7.7 01/13/2020 11:35 AM  
 Albumin 4.3 01/13/2020 11:35 AM  
 
 
Estimated Creatinine Clearance: 159.4 mL/min (by C-G formula based on SCr of 0.9 mg/dL). INR History:   (normal INR range 0.8-1.2) Date   INR Dose/Comments 5/21/2020 2.8 11 mg daily 5/14/2020        1.3       11 mg daily 5/7/2020          3.0 11 mg daily 4/30/2020        2.6       11 mg daily 4/23/2020        2.9       11 mg daily 4/16/2020        2.2       11 mg daily 4/9/2020          2.3       11 mg daily 4/2/2020          3.0       11 mg daily 3/26/2020        1.9       11 mg daily 3/19/2020        1.8       11 mg daily 3/12/2020        2.8       11 mg daily 3/5/2020          2.4       11 mg daily 2/27/2020        2.1       11 mg daily 2/20/2020        2.2       11 mg daily 2/13/2020        2.3       11 mg daily (took 13 mg on 2/6/2020) 
2/6/2020          1.8       11 mg daily (missed 2 doses) A/P:    
 
Anticoagulation: 
Considering Mr. Turcios's past history, todays findings, and per Anticoagulation Collaborative Practice Agreement/Protocol: 1. POC INR (2.8) is Therapeutic for INR goal today. 2.  Continue warfarin - 11 mg daily 3. Next INR check will be in 1 week(s). -Reiterated importance of keeping intake of greens and vitamin K in diet consistent from week to week Medication reconciliation was completed during the visit. There are no discontinued medications. A full discussion of the nature of anticoagulants has been carried out. A full discussion of the need for frequent and regular monitoring, precise dosage adjustment and compliance was stressed.   Side effects of potential bleeding were discussed and Mr. Denise Bynum was instructed to call [phone] if there are any signs of abnormal bleeding. Mr. Denise Bynum was instructed to avoid any OTC items containing aspirin or ibuprofen and prior to starting any new OTC products to consult with his physician or pharmacist to ensure no drug interactions are present. Mr. Denise Bynum was instructed to avoid any major changes in his general diet and to avoid alcohol consumption. Mr. Denise Bynum verbalized his understanding of all instructions and will call the office with any questions, concerns, or signs of abnormal bleeding or blood clot. Notifications of recommendations will be sent to Dr. Ant Lyn for review. Thank you, Nikki Gr, PharmD Clinical Pharmacist Specialist 
 
CLINICAL PHARMACY CONSULT: MED RECONCILIATION/REVIEW ADDENDUM For Pharmacy Admin Tracking Only PHSO: PHSO Patient?: No 
Total # of Interventions Recommended: Count: 1 
- Maintenance Safety Lab Monitoring #: 1 Total Interventions Accepted: 1 Time Spent (min): 15 Yessica Lozada, [address] [place] [address], PharmD

## 2020-05-28 ENCOUNTER — TELEPHONE (OUTPATIENT)
Dept: FAMILY MEDICINE CLINIC | Age: 50
End: 2020-05-28

## 2020-05-28 DIAGNOSIS — Z79.01 ANTICOAGULANT LONG-TERM USE: ICD-10-CM

## 2020-05-28 LAB — INR, EXTERNAL: 2.8

## 2020-05-28 NOTE — TELEPHONE ENCOUNTER
Pharmacy Note - Anticoagulation 05/28/20 S/O:  Mr. Alicia Benites ,52 y.o. male, referred by Dr. Marium Morrow, was contacted via an outbound telephone call today for telephonic anticoagulation management for the diagnosis of CVA, DVT, and PE. Patient checks POC INR using home POC INR machine. The most recent INR was 2.8 today per patient's report. HPI:  
 
Interim History: · INR Goal:  2.0-3.0 · Current warfarin regimen: 11 mg daily · Warfarin tablet strength:   1 mg, 10 mg · Total weekly dose (mg): 77 mg Today's pertinent positives includes: No significant changes since last visit  
-Denies any bruising/bleeding, SOB, chest pain 
-Denies any changes in greens or medicines · Adherence: · Able to recall regimen? YES 
· Miss/extra dose? NO 
· Need refill? NO Upcoming procedure(s):  NO 
 
Past Medical History:  
Diagnosis Date  Diabetes ([facility] [address]  Erectile dysfunction due to diseases classified elsewhere  Hematuria   
 right renal cyst  
 Hypercholesterolemia  Hypertension  Liver disease Fatty Liver  Pulmonary emboli [facility]) 2003 & 2004  
 chronic coumadin therapy managed by Dr. Reyes Pain  Renal failure acute   
 following surgery  Shingles 2020  Sleep apnea   
 does not use cpap machine Allergies Allergen Reactions  Tape [Adhesive] Contact Dermatitis ALLERGIC TO SILK TAPE ONLY Current Outpatient Medications Medication Sig  
 gabapentin (NEURONTIN) 300 mg capsule Taking 600 mg AM, 300 mg afternoon, 600 mg at night  lisinopril-hydroCHLOROthiazide (PRINZIDE, ZESTORETIC) 20-12.5 mg per tablet TAKE 2 TABLETS BY MOUTH EVERY DAY.  fluticasone propionate (FLONASE) 50 mcg/actuation nasal spray 2 Sprays by Both Nostrils route daily as needed for Rhinitis.  amLODIPine (NORVASC) 10 mg tablet TAKE 1 TABLET BY MOUTH DAILY.  cloNIDine HCL (CATAPRES) 0.1 mg tablet TAKE 1 TABLET BY MOUTH TWICE DAILY.  furosemide (LASIX) 20 mg tablet TAKE 1 TABLET BY MOUTH EVERY DAY  potassium chloride (K-DUR, KLOR-CON) 20 mEq tablet TAKE 1 TABLET BY MOUTH DAILY.  atenoloL (TENORMIN) 100 mg tablet TAKE 1 TABLET BY MOUTH DAILY  warfarin (COUMADIN) 10 mg tablet TAKE 1 TABLET BY MOUTH AS DIRECTED  
 HYDROcodone-acetaminophen (NORCO)  mg tablet Take 1 Tab by mouth every six (6) hours as needed for Pain.  warfarin (COUMADIN) 1 mg tablet Take 1 tablet daily by mouth with 10 mg tablet for a total of 11 mg daily or as directed by healthcare provider.  tadalafil (CIALIS) 20 mg tablet Take 1 Tab by mouth as needed for Other (ED).  insulin glargine (BASAGLAR KWIKPEN) 100 unit/mL (3 mL) inpn Takes 50 units twice a day.  BD INSULIN PEN NEEDLE UF SHORT 31 gauge x 5/16\" ndle USE 1 PEN UTD QID  
 NOVOLOG FLEXPEN 100 unit/mL inpn Takes 14-25 units per meal per sliding scale from endocrinology  VICTOZA 3-ZANDRA 0.6 mg/0.1 mL (18 mg/3 mL) sub-q pen INJ 1.8 MG SC QD  
 insulin syringe-needle U-100 by Does Not Apply route. Dispense ultrafine 1 mL syringes with 29 gauge needle  aspirin delayed-release 81 mg tablet Take 81 mg by mouth daily. No current facility-administered medications for this visit. Wt Readings from Last 3 Encounters:  
02/26/20 347 lb (157.4 kg) 01/13/20 347 lb (157.4 kg) 10/04/19 336 lb 3.2 oz (152.5 kg) BP Readings from Last 3 Encounters:  
02/26/20 130/80  
01/13/20 130/68  
10/04/19 136/62 Lab Results Component Value Date/Time  Sodium 137 01/13/2020 11:35 AM  
 Potassium 4.1 01/13/2020 11:35 AM  
 Chloride 99 01/13/2020 11:35 AM  
 CO2 25 01/13/2020 11:35 AM  
 Anion gap 13.0 01/13/2020 11:35 AM  
 Glucose 261 (H) 01/13/2020 11:35 AM  
 BUN 22 01/13/2020 11:35 AM  
 Creatinine 0.9 01/13/2020 11:35 AM  
 GFR est AA >60 03/24/2014 01:56 PM  
 GFR est non-AA >60 03/24/2014 01:56 PM  
 Calcium 9.3 01/13/2020 11:35 AM  
 
 
Lab Results Component Value Date/Time WBC 5.2 01/13/2020 11:35 AM  
 HGB 12.6 (L) 01/13/2020 11:35 AM  
 HCT 41.5 01/13/2020 11:35 AM  
 PLATELET 855 53/75/4531 11:35 AM  
 MCV 87 01/13/2020 11:35 AM  
 
 
Lab Results Component Value Date/Time Protein, total 7.7 01/13/2020 11:35 AM  
 Albumin 4.3 01/13/2020 11:35 AM  
 
 
Estimated Creatinine Clearance: 159.4 mL/min (by C-G formula based on SCr of 0.9 mg/dL). INR History:   (normal INR range 0.8-1.2) Date   INR Dose/Comments 5/28/2020 2.8 11 mg daily 5/21/2020        2.8       11 mg daily 5/14/2020        1.3       11 mg daily 5/7/2020          3.0       11 mg daily 4/30/2020        2.6       11 mg daily 4/23/2020        2.9       11 mg daily 4/16/2020        2.2       11 mg daily 4/9/2020          2.3       11 mg daily 4/2/2020          3.0       11 mg daily 3/26/2020        1.9       11 mg daily 3/19/2020        1.8       11 mg daily 3/12/2020        2.8       11 mg daily 3/5/2020          2.4       11 mg daily 2/27/2020        2.1       11 mg daily 2/20/2020        2.2       11 mg daily 2/13/2020        2.3       11 mg daily (took 13 mg on 2/6/2020) 
2/6/2020          1.8       11 mg daily (missed 2 doses) A/P:    
 
Anticoagulation: 
Considering Mr. Turcios's past history, todays findings, and per Anticoagulation Collaborative Practice Agreement/Protocol: 1. POC INR (2.8) is Therapeutic for INR goal today. 2.  Continue warfarin - 11 mg daily 3. Next INR check will be in 1 week(s). -Reiterated importance of keeping intake of vitamin K in diet consistent A full discussion of the nature of anticoagulants has been carried out. A full discussion of the need for frequent and regular monitoring, precise dosage adjustment and compliance was stressed. Mr. Vanessa Choi was instructed to avoid any major changes in his general diet and to avoid alcohol consumption. MrJaswinder Choi verbalized his understanding of all instructions and will call the office with any questions, concerns, or signs of abnormal bleeding or blood clot. Notifications of recommendations will be sent to Dr. Shoshana Mukherjee for review. Thank you, Beverley Fontana, PharmD Clinical Pharmacist Specialist 
 
CLINICAL PHARMACY CONSULT: MED RECONCILIATION/REVIEW ADDENDUM For Pharmacy Admin Tracking Only PHSO: PHSO Patient?: No 
Total # of Interventions Recommended: Count: 1 
- Maintenance Safety Lab Monitoring #: 1 Total Interventions Accepted: 1 Time Spent (min): 15 Jyoti Pizarro [facility], PharmD

## 2020-06-02 ENCOUNTER — TELEPHONE (OUTPATIENT)
Dept: FAMILY MEDICINE CLINIC | Age: 50
End: 2020-06-02

## 2020-06-02 DIAGNOSIS — Z79.01 WARFARIN ANTICOAGULATION: ICD-10-CM

## 2020-06-02 RX ORDER — WARFARIN 1 MG/1
TABLET ORAL
Qty: 90 TAB | Refills: 1 | Status: SHIPPED | OUTPATIENT
Start: 2020-06-02 | End: 2021-01-21 | Stop reason: SDUPTHER

## 2020-06-02 RX ORDER — WARFARIN 10 MG/1
TABLET ORAL
Qty: 90 TAB | Refills: 3 | Status: SHIPPED | OUTPATIENT
Start: 2020-06-02 | End: 2021-06-08 | Stop reason: SDUPTHER

## 2020-06-02 RX ORDER — WARFARIN 10 MG/1
TABLET ORAL
Qty: 30 TAB | Refills: 2 | Status: CANCELLED | OUTPATIENT
Start: 2020-06-02

## 2020-06-02 NOTE — TELEPHONE ENCOUNTER
Received voicemail from patient requesting refills for warfarin tablets. Sent refills for warfarin 10 mg and warfarin 1 mg tablets for patient under collaborative practice agreement with Dr. Diogenes Rodriguez. Called patient and made him aware.     Terrence AltamiranoD  Clinical Pharmacist Specialist

## 2020-06-02 NOTE — TELEPHONE ENCOUNTER
Last Visit: 5/6/20 with MD Cathryne Boast  Next Appointment: Advised to follow-up in 6 months  Previous Refill Encounter(s): 2/24/20 #30 with 2 refills    Requested Prescriptions     Pending Prescriptions Disp Refills    warfarin (COUMADIN) 10 mg tablet 30 Tab 2     Sig: TAKE 1 TABLET BY MOUTH AS DIRECTED

## 2020-06-04 ENCOUNTER — TELEPHONE (OUTPATIENT)
Dept: FAMILY MEDICINE CLINIC | Age: 50
End: 2020-06-04

## 2020-06-04 DIAGNOSIS — Z79.01 ANTICOAGULANT LONG-TERM USE: ICD-10-CM

## 2020-06-04 LAB — INR, EXTERNAL: 1.7

## 2020-06-04 NOTE — TELEPHONE ENCOUNTER
Pharmacy Note - Anticoagulation 06/04/20 S/O:  Mr. Odette Emery ,52 y.o. male, referred by Dr. Darin Irizarry, was contacted via an outbound telephone call today for telephonic anticoagulation management for the diagnosis of CVA, DVT, and PE. Patient checks POC INR using home INR machine. The most recent INR was 1.7 today per patient's report. HPI:  
 
Interim History: · INR Goal:  2.0-3.0 · Current warfarin regimen: 11 mg daily · States he ran out of warfarin tabs in the middle of last week before he could  refill · States he took 7 mg on 6/1, none on 6/2, then 13 mg on 6/3 · Warfarin tablet strength: 1 mg, 10 mg · Total weekly dose (mg): 77 mg Today's pertinent positives includes: 
Medication change  
-Denies any bleeding/bruising, SOB, chest pain 
-Denies any changes to greens or alcohol intake 
-States gabapentin dose was changed to 600 mg TID by his pain management doctor · Adherence: · Able to recall regimen? YES 
· Miss/extra dose? YES 
· Need refill? NO Upcoming procedure(s):  NO 
 
 
Past Medical History:  
Diagnosis Date  Diabetes ([facility] [address]  Erectile dysfunction due to diseases classified elsewhere  Hematuria   
 right renal cyst  
 Hypercholesterolemia  Hypertension  Liver disease Fatty Liver  Pulmonary emboli [facility]) 2003 & 2004  
 chronic coumadin therapy managed by Dr. Aaron Granger  Renal failure acute   
 following surgery  Shingles 2020  Sleep apnea   
 does not use cpap machine Allergies Allergen Reactions  Tape [Adhesive] Contact Dermatitis ALLERGIC TO SILK TAPE ONLY Current Outpatient Medications Medication Sig  warfarin (COUMADIN) 10 mg tablet TAKE 1 TABLET BY MOUTH AS DIRECTED  warfarin (COUMADIN) 1 mg tablet Take 1 tablet daily by mouth with 10 mg tablet for a total of 11 mg daily or as directed by healthcare provider.  gabapentin (NEURONTIN) 300 mg capsule Taking 600 mg AM, 300 mg afternoon, 600 mg at night  lisinopril-hydroCHLOROthiazide (PRINZIDE, ZESTORETIC) 20-12.5 mg per tablet TAKE 2 TABLETS BY MOUTH EVERY DAY.  fluticasone propionate (FLONASE) 50 mcg/actuation nasal spray 2 Sprays by Both Nostrils route daily as needed for Rhinitis.  amLODIPine (NORVASC) 10 mg tablet TAKE 1 TABLET BY MOUTH DAILY.  cloNIDine HCL (CATAPRES) 0.1 mg tablet TAKE 1 TABLET BY MOUTH TWICE DAILY.  furosemide (LASIX) 20 mg tablet TAKE 1 TABLET BY MOUTH EVERY DAY  potassium chloride (K-DUR, KLOR-CON) 20 mEq tablet TAKE 1 TABLET BY MOUTH DAILY.  atenoloL (TENORMIN) 100 mg tablet TAKE 1 TABLET BY MOUTH DAILY  HYDROcodone-acetaminophen (NORCO)  mg tablet Take 1 Tab by mouth every six (6) hours as needed for Pain.  tadalafil (CIALIS) 20 mg tablet Take 1 Tab by mouth as needed for Other (ED).  insulin glargine (BASAGLAR KWIKPEN) 100 unit/mL (3 mL) inpn Takes 50 units twice a day.  BD INSULIN PEN NEEDLE UF SHORT 31 gauge x 5/16\" ndle USE 1 PEN UTD QID  
 NOVOLOG FLEXPEN 100 unit/mL inpn Takes 14-25 units per meal per sliding scale from endocrinology  VICTOZA 3-ZANDRA 0.6 mg/0.1 mL (18 mg/3 mL) sub-q pen INJ 1.8 MG SC QD  
 insulin syringe-needle U-100 by Does Not Apply route. Dispense ultrafine 1 mL syringes with 29 gauge needle  aspirin delayed-release 81 mg tablet Take 81 mg by mouth daily. No current facility-administered medications for this visit. Wt Readings from Last 3 Encounters:  
02/26/20 347 lb (157.4 kg) 01/13/20 347 lb (157.4 kg) 10/04/19 336 lb 3.2 oz (152.5 kg) BP Readings from Last 3 Encounters:  
02/26/20 130/80  
01/13/20 130/68  
10/04/19 136/62 Lab Results Component Value Date/Time  Sodium 137 01/13/2020 11:35 AM  
 Potassium 4.1 01/13/2020 11:35 AM  
 Chloride 99 01/13/2020 11:35 AM  
 CO2 25 01/13/2020 11:35 AM  
 Anion gap 13.0 01/13/2020 11:35 AM  
 Glucose 261 (H) 01/13/2020 11:35 AM  
 BUN 22 01/13/2020 11:35 AM  
 Creatinine 0.9 01/13/2020 11:35 AM  
 GFR est AA >60 03/24/2014 01:56 PM  
 GFR est non-AA >60 03/24/2014 01:56 PM  
 Calcium 9.3 01/13/2020 11:35 AM  
 
 
Lab Results Component Value Date/Time WBC 5.2 01/13/2020 11:35 AM  
 HGB 12.6 (L) 01/13/2020 11:35 AM  
 HCT 41.5 01/13/2020 11:35 AM  
 PLATELET 531 58/53/8792 11:35 AM  
 MCV 87 01/13/2020 11:35 AM  
 
 
Lab Results Component Value Date/Time Protein, total 7.7 01/13/2020 11:35 AM  
 Albumin 4.3 01/13/2020 11:35 AM  
 
 
Estimated Creatinine Clearance: 159.4 mL/min (by C-G formula based on SCr of 0.9 mg/dL). INR History:   (normal INR range 0.8-1.2) Date   INR Dose/Comments 6/4/2020 1.7 11 mg daily 5/28/2020        2.8       11 mg daily 5/21/2020        2.8       11 mg daily 5/14/2020        1.3       11 mg daily 5/7/2020          3.0       11 mg daily 4/30/2020        2.6       11 mg daily 4/23/2020        2.9       11 mg daily 4/16/2020        2.2       11 mg daily 4/9/2020          2.3       11 mg daily 4/2/2020          3.0       11 mg daily 3/26/2020        1.9       11 mg daily 3/19/2020        1.8       11 mg daily 3/12/2020        2.8       11 mg daily 3/5/2020          2.4       11 mg daily 2/27/2020        2.1       11 mg daily 2/20/2020        2.2       11 mg daily 2/13/2020        2.3       11 mg daily (took 13 mg on 2/6/2020) 
2/6/2020          1.8       11 mg daily (missed 2 doses) 
  
 
A/P:    
 
Anticoagulation: 
Considering Mr. Turcios's past history, todays findings, and per Anticoagulation Collaborative Practice Agreement/Protocol: 1. POC INR (1.7) is Subtherapeutic for INR goal today. 2.  Continue warfarin - 11 mg daily 3. Next INR check will be in 1 week(s). -Reiterated importance of medication adherence and monitoring for signs of thrombosis and seeking emergency care if this occurs A full discussion of the nature of anticoagulants has been carried out. A full discussion of the need for frequent and regular monitoring, precise dosage adjustment and compliance was stressed. Side effects of potential bleeding were discussed and Mr. Denise Bynum was instructed to call [phone] if there are any signs of abnormal bleeding. Mr. Denise Bynum was instructed to avoid any OTC items containing aspirin or ibuprofen and prior to starting any new OTC products to consult with his physician or pharmacist to ensure no drug interactions are present. Mr. Denise Bynum was instructed to avoid any major changes in his general diet and to avoid alcohol consumption. Mr. Denise Bynum verbalized his understanding of all instructions and will call the office with any questions, concerns, or signs of abnormal bleeding or blood clot. Notifications of recommendations will be sent to Dr. Destiny Bruner for review. Thank you, Nikki Gr, PharmD Clinical Pharmacist Specialist 
 
CLINICAL PHARMACY CONSULT: MED RECONCILIATION/REVIEW ADDENDUM For Pharmacy Admin Tracking Only PHSO: PHSO Patient?: Yes Total # of Interventions Recommended: Count: 1 
- Maintenance Safety Lab Monitoring #: 1 Total Interventions Accepted: 1 Time Spent (min): 15 Yessica Lozada [facility], PharmD

## 2020-06-11 ENCOUNTER — TELEPHONE (OUTPATIENT)
Dept: FAMILY MEDICINE CLINIC | Age: 50
End: 2020-06-11

## 2020-06-11 DIAGNOSIS — Z79.01 ANTICOAGULANT LONG-TERM USE: ICD-10-CM

## 2020-06-11 LAB — INR, EXTERNAL: 2.8

## 2020-06-11 NOTE — TELEPHONE ENCOUNTER
Pharmacy Note - Anticoagulation 06/11/20 S/O:  Mr. Kaylie Vaughan ,52 y.o. male, referred by Dr. Carmen Collazo, was contacted via an outbound telephone call today for telephonic anticoagulation management for the diagnosis of CVA, DVT, and PE. Patient checks POC INR using home INR machine. The most recent INR was 2.8 today per patient's report. HPI:  
 
Interim History: · INR Goal:  2.0-3.0 · Current warfarin regimen: 11 mg daily · Warfarin tablet strength:   1 mg, 10 mg · Total weekly dose (mg): 77 mg Today's pertinent positives includes: No significant changes since last visit 
-Denies any bruising/bleeding, SOB, chest pain 
-Denies any changes in greens in diet or alcohol intake 
-Denies any changes in medications · Adherence: · Able to recall regimen? YES 
· Miss/extra dose? NO 
· Need refill? NO Upcoming procedure(s):  NO 
 
Past Medical History:  
Diagnosis Date  Diabetes ([facility] [address]  Erectile dysfunction due to diseases classified elsewhere  Hematuria   
 right renal cyst  
 Hypercholesterolemia  Hypertension  Liver disease Fatty Liver  Pulmonary emboli [facility]) 2003 & 2004  
 chronic coumadin therapy managed by Dr. Jamal Gibbons  Renal failure acute   
 following surgery  Shingles 2020  Sleep apnea   
 does not use cpap machine Allergies Allergen Reactions  Tape [Adhesive] Contact Dermatitis ALLERGIC TO SILK TAPE ONLY Current Outpatient Medications Medication Sig  warfarin (COUMADIN) 10 mg tablet TAKE 1 TABLET BY MOUTH AS DIRECTED  warfarin (COUMADIN) 1 mg tablet Take 1 tablet daily by mouth with 10 mg tablet for a total of 11 mg daily or as directed by healthcare provider.  gabapentin (NEURONTIN) 300 mg capsule Taking 2 tabs TID; from Dr. Camacho Warren  lisinopril-hydroCHLOROthiazide (PRINZIDE, ZESTORETIC) 20-12.5 mg per tablet TAKE 2 TABLETS BY MOUTH EVERY DAY.  fluticasone propionate (FLONASE) 50 mcg/actuation nasal spray 2 Sprays by Both Nostrils route daily as needed for Rhinitis.  amLODIPine (NORVASC) 10 mg tablet TAKE 1 TABLET BY MOUTH DAILY.  cloNIDine HCL (CATAPRES) 0.1 mg tablet TAKE 1 TABLET BY MOUTH TWICE DAILY.  furosemide (LASIX) 20 mg tablet TAKE 1 TABLET BY MOUTH EVERY DAY  potassium chloride (K-DUR, KLOR-CON) 20 mEq tablet TAKE 1 TABLET BY MOUTH DAILY.  atenoloL (TENORMIN) 100 mg tablet TAKE 1 TABLET BY MOUTH DAILY  HYDROcodone-acetaminophen (NORCO)  mg tablet Take 1 Tab by mouth every six (6) hours as needed for Pain.  tadalafil (CIALIS) 20 mg tablet Take 1 Tab by mouth as needed for Other (ED).  insulin glargine (BASAGLAR KWIKPEN) 100 unit/mL (3 mL) inpn Takes 50 units twice a day.  BD INSULIN PEN NEEDLE UF SHORT 31 gauge x 5/16\" ndle USE 1 PEN UTD QID  
 NOVOLOG FLEXPEN 100 unit/mL inpn Takes 14-25 units per meal per sliding scale from endocrinology  VICTOZA 3-ZANDRA 0.6 mg/0.1 mL (18 mg/3 mL) sub-q pen INJ 1.8 MG SC QD  
 insulin syringe-needle U-100 by Does Not Apply route. Dispense ultrafine 1 mL syringes with 29 gauge needle  aspirin delayed-release 81 mg tablet Take 81 mg by mouth daily. No current facility-administered medications for this visit. Wt Readings from Last 3 Encounters:  
02/26/20 347 lb (157.4 kg) 01/13/20 347 lb (157.4 kg) 10/04/19 336 lb 3.2 oz (152.5 kg) BP Readings from Last 3 Encounters:  
02/26/20 130/80  
01/13/20 130/68  
10/04/19 136/62 Lab Results Component Value Date/Time  Sodium 137 01/13/2020 11:35 AM  
 Potassium 4.1 01/13/2020 11:35 AM  
 Chloride 99 01/13/2020 11:35 AM  
 CO2 25 01/13/2020 11:35 AM  
 Anion gap 13.0 01/13/2020 11:35 AM  
 Glucose 261 (H) 01/13/2020 11:35 AM  
 BUN 22 01/13/2020 11:35 AM  
 Creatinine 0.9 01/13/2020 11:35 AM  
 GFR est AA >60 03/24/2014 01:56 PM  
 GFR est non-AA >60 03/24/2014 01:56 PM  
 Calcium 9.3 01/13/2020 11:35 AM  
 
 
Lab Results Component Value Date/Time WBC 5.2 01/13/2020 11:35 AM  
 HGB 12.6 (L) 01/13/2020 11:35 AM  
 HCT 41.5 01/13/2020 11:35 AM  
 PLATELET 125 18/22/4960 11:35 AM  
 MCV 87 01/13/2020 11:35 AM  
 
 
Lab Results Component Value Date/Time Protein, total 7.7 01/13/2020 11:35 AM  
 Albumin 4.3 01/13/2020 11:35 AM  
 
 
Estimated Creatinine Clearance: 159.4 mL/min (by C-G formula based on SCr of 0.9 mg/dL). INR History:   (normal INR range 0.8-1.2) Date   INR Dose/Comments 6/11/2020 2.8 11 mg daily 6/4/2020          1.7       11 mg daily 5/28/2020        2.8       11 mg daily 5/21/2020        2.8       11 mg daily 5/14/2020        1.3       11 mg daily 5/7/2020          3.0       11 mg daily 4/30/2020        2.6       11 mg daily 4/23/2020        2.9       11 mg daily 4/16/2020        2.2       11 mg daily 4/9/2020          2.3       11 mg daily 4/2/2020          3.0       11 mg daily 3/26/2020        1.9       11 mg daily 3/19/2020        1.8       11 mg daily 3/12/2020        2.8       11 mg daily 3/5/2020          2.4       11 mg daily 2/27/2020        2.1       11 mg daily 2/20/2020        2.2       11 mg daily 2/13/2020        2.3       11 mg daily (took 13 mg on 2/6/2020) 
2/6/2020          1.8       11 mg daily (missed 2 doses) A/P:    
 
Anticoagulation: 
Considering Mr. Turcios's past history, todays findings, and per Anticoagulation Collaborative Practice Agreement/Protocol: 1. POC INR (2.8) is Therapeutic for INR goal today. 2.  Continue warfarin - 11 mg daily 3. Next INR check will be in 1 week(s). A full discussion of the nature of anticoagulants has been carried out. A full discussion of the need for frequent and regular monitoring, precise dosage adjustment and compliance was stressed.   Side effects of potential bleeding were discussed and Mr. Galen Moritz was instructed to call [phone] if there are any signs of abnormal bleeding. Mr. Tyrone Patricia was instructed to avoid any OTC items containing aspirin or ibuprofen and prior to starting any new OTC products to consult with his physician or pharmacist to ensure no drug interactions are present. Mr. Tyrone Patricia was instructed to avoid any major changes in his general diet and to avoid alcohol consumption. Mr. Tyrone Patricia verbalized his understanding of all instructions and will call the office with any questions, concerns, or signs of abnormal bleeding or blood clot. Notifications of recommendations will be sent to Dr. Isra Stock for review. Thank you, Susan Montana, PharmD Clinical Pharmacist Specialist 
 
CLINICAL PHARMACY CONSULT: MED RECONCILIATION/REVIEW ADDENDUM For Pharmacy Admin Tracking Only PHSO: PHSO Patient?: No 
Total # of Interventions Recommended: Count: 1 
- Maintenance Safety Lab Monitoring #: 1 Total Interventions Accepted: 1 Time Spent (min): 15 Ena Bradford [facility], PharmD

## 2020-06-18 ENCOUNTER — TELEPHONE (OUTPATIENT)
Dept: FAMILY MEDICINE CLINIC | Age: 50
End: 2020-06-18

## 2020-06-18 DIAGNOSIS — Z79.01 ANTICOAGULANT LONG-TERM USE: ICD-10-CM

## 2020-06-18 LAB — INR, EXTERNAL: 2.6

## 2020-06-18 NOTE — TELEPHONE ENCOUNTER
[facility] Pharmacy Note - Anticoagulation 06/18/20 S/O:  Mr. Milind Caballero ,52 y.o. male, referred by Dr. Carmencita Estrada, was contacted via an outbound telephone call today for telephonic anticoagulation management for the diagnosis of CVA, DVT, and PE. Patient checks POC INR using home INR machine. The most recent INR was 2.6 today per patient's report. HPI:  
 
Interim History: · INR Goal:  2.0-3.0 · Current warfarin regimen: 11 mg daily · Warfarin tablet strength:   1 mg, 10 mg · Total weekly dose (mg): 77 mg Today's pertinent positives includes: No significant changes since last visit 
-Denies bruising/bleeding, SOB, chest pain 
-Denies any changes in greens or alcohol intake 
-Denies any changes in medicines · Adherence: · Able to recall regimen? YES 
· Miss/extra dose? NO 
· Need refill? NO Upcoming procedure(s):  NO 
 
 
Past Medical History:  
Diagnosis Date  Diabetes ([facility] [address]  Erectile dysfunction due to diseases classified elsewhere  Hematuria   
 right renal cyst  
 Hypercholesterolemia  Hypertension  Liver disease Fatty Liver  Pulmonary emboli [facility]) 2003 & 2004  
 chronic coumadin therapy managed by Dr. Karen Grover  Renal failure acute   
 following surgery  Shingles 2020  Sleep apnea   
 does not use cpap machine Allergies Allergen Reactions  Tape [Adhesive] Contact Dermatitis ALLERGIC TO SILK TAPE ONLY Current Outpatient Medications Medication Sig  warfarin (COUMADIN) 10 mg tablet TAKE 1 TABLET BY MOUTH AS DIRECTED  warfarin (COUMADIN) 1 mg tablet Take 1 tablet daily by mouth with 10 mg tablet for a total of 11 mg daily or as directed by healthcare provider.  gabapentin (NEURONTIN) 300 mg capsule Taking 2 tabs TID; from Dr. Uche Zuleta  lisinopril-hydroCHLOROthiazide (PRINZIDE, ZESTORETIC) 20-12.5 mg per tablet TAKE 2 TABLETS BY MOUTH EVERY DAY.  fluticasone propionate (FLONASE) 50 mcg/actuation nasal spray 2 Sprays by Both Nostrils route daily as needed for Rhinitis.  amLODIPine (NORVASC) 10 mg tablet TAKE 1 TABLET BY MOUTH DAILY.  cloNIDine HCL (CATAPRES) 0.1 mg tablet TAKE 1 TABLET BY MOUTH TWICE DAILY.  furosemide (LASIX) 20 mg tablet TAKE 1 TABLET BY MOUTH EVERY DAY  potassium chloride (K-DUR, KLOR-CON) 20 mEq tablet TAKE 1 TABLET BY MOUTH DAILY.  atenoloL (TENORMIN) 100 mg tablet TAKE 1 TABLET BY MOUTH DAILY  HYDROcodone-acetaminophen (NORCO)  mg tablet Take 1 Tab by mouth every six (6) hours as needed for Pain.  tadalafil (CIALIS) 20 mg tablet Take 1 Tab by mouth as needed for Other (ED).  insulin glargine (BASAGLAR KWIKPEN) 100 unit/mL (3 mL) inpn Takes 50 units twice a day.  BD INSULIN PEN NEEDLE UF SHORT 31 gauge x 5/16\" ndle USE 1 PEN UTD QID  
 NOVOLOG FLEXPEN 100 unit/mL inpn Takes 14-25 units per meal per sliding scale from endocrinology  VICTOZA 3-ZANDRA 0.6 mg/0.1 mL (18 mg/3 mL) sub-q pen INJ 1.8 MG SC QD  
 insulin syringe-needle U-100 by Does Not Apply route. Dispense ultrafine 1 mL syringes with 29 gauge needle  aspirin delayed-release 81 mg tablet Take 81 mg by mouth daily. No current facility-administered medications for this visit. Wt Readings from Last 3 Encounters:  
02/26/20 347 lb (157.4 kg) 01/13/20 347 lb (157.4 kg) 10/04/19 336 lb 3.2 oz (152.5 kg) BP Readings from Last 3 Encounters:  
02/26/20 130/80  
01/13/20 130/68  
10/04/19 136/62 Lab Results Component Value Date/Time  Sodium 137 01/13/2020 11:35 AM  
 Potassium 4.1 01/13/2020 11:35 AM  
 Chloride 99 01/13/2020 11:35 AM  
 CO2 25 01/13/2020 11:35 AM  
 Anion gap 13.0 01/13/2020 11:35 AM  
 Glucose 261 (H) 01/13/2020 11:35 AM  
 BUN 22 01/13/2020 11:35 AM  
 Creatinine 0.9 01/13/2020 11:35 AM  
 GFR est AA >60 03/24/2014 01:56 PM  
 GFR est non-AA >60 03/24/2014 01:56 PM  
 Calcium 9.3 01/13/2020 11:35 AM  
 
 
Lab Results Component Value Date/Time WBC 5.2 01/13/2020 11:35 AM  
 HGB 12.6 (L) 01/13/2020 11:35 AM  
 HCT 41.5 01/13/2020 11:35 AM  
 PLATELET 256 52/90/3556 11:35 AM  
 MCV 87 01/13/2020 11:35 AM  
 
 
Lab Results Component Value Date/Time Protein, total 7.7 01/13/2020 11:35 AM  
 Albumin 4.3 01/13/2020 11:35 AM  
 
 
Estimated Creatinine Clearance: 159.4 mL/min (by C-G formula based on SCr of 0.9 mg/dL). INR History:   (normal INR range 0.8-1.2) Date   INR Dose/Comments 6/18/2020 2.6 11 mg daily 6/11/2020        2.8       11 mg daily 6/4/2020          1.7       11 mg daily 5/28/2020        2.8       11 mg daily 5/21/2020        2.8       11 mg daily 5/14/2020        1.3       11 mg daily 5/7/2020          3.0       11 mg daily 4/30/2020        2.6       11 mg daily 4/23/2020        2.9       11 mg daily 4/16/2020        2.2       11 mg daily 4/9/2020          2.3       11 mg daily 4/2/2020          3.0       11 mg daily 3/26/2020        1.9       11 mg daily 3/19/2020        1.8       11 mg daily 3/12/2020        2.8       11 mg daily 3/5/2020          2.4       11 mg daily 2/27/2020        2.1       11 mg daily 2/20/2020        2.2       11 mg daily 2/13/2020        2.3       11 mg daily (took 13 mg on 2/6/2020) 
2/6/2020          1.8       11 mg daily (missed 2 doses) A/P:    
 
Anticoagulation: 
Considering Mr. Turcios's past history, todays findings, and per Anticoagulation Collaborative Practice Agreement/Protocol: 1. POC INR (2.6) is Therapeutic for INR goal today. 2.  Continue warfarin - 11 mg daily 3. Next INR check will be in 1 week(s). A full discussion of the nature of anticoagulants has been carried out. A full discussion of the need for frequent and regular monitoring, precise dosage adjustment and compliance was stressed.   Side effects of potential bleeding were discussed and Mr. Maury Anderson was instructed to call [phone] if there are any signs of abnormal bleeding. Mr. Maury Anderson was instructed to avoid any OTC items containing aspirin or ibuprofen and prior to starting any new OTC products to consult with his physician or pharmacist to ensure no drug interactions are present. Mr. Maury Anderson was instructed to avoid any major changes in his general diet and to avoid alcohol consumption. Mr. Maury Anderson verbalized his understanding of all instructions and will call the office with any questions, concerns, or signs of abnormal bleeding or blood clot. Notifications of recommendations will be sent to Dr. Toy Babinski for review. Thank you, Shanita Narvaez, PharmD Clinical Pharmacist Specialist 
 
CLINICAL PHARMACY CONSULT: MED RECONCILIATION/REVIEW ADDENDUM For Pharmacy Admin Tracking Only PHSO: PHSO Patient?: No 
Total # of Interventions Recommended: Count: 1 
- Maintenance Safety Lab Monitoring #: 1 Total Interventions Accepted: 1 Time Spent (min): 15 Lindsey Santoyo [facility], PharmD

## 2020-06-25 ENCOUNTER — TELEPHONE (OUTPATIENT)
Dept: FAMILY MEDICINE CLINIC | Age: 50
End: 2020-06-25

## 2020-06-25 DIAGNOSIS — Z79.01 ANTICOAGULANT LONG-TERM USE: ICD-10-CM

## 2020-06-25 LAB — INR, EXTERNAL: 2.1

## 2020-06-25 NOTE — TELEPHONE ENCOUNTER
Pharmacy Note - Anticoagulation 06/25/20 S/O:  Mr. Elizabeth Back ,52 y.o. male, referred by Dr. Jc Locke, was contacted via an outbound telephone call today for telephonic anticoagulation management for the diagnosis of CVA, DVT, and PE. Patient checks POC INR using home INR machine. The most recent INR was 2.1 today per patient's report. HPI:  
 
Interim History: · INR Goal:  2.0-3.0 · Current warfarin regimen: 11 mg daily · Warfarin tablet strength:   1 mg, 10 mg · Total weekly dose (mg): 77 mg Today's pertinent positives includes: No significant changes since last visit 
-Denies any bruising/bleeding, SOB, chest pain 
-Denies any changes in medications, vitamin K in diet, or alcohol intake · Adherence: · Able to recall regimen? YES 
· Miss/extra dose? NO 
· Need refill? NO Upcoming procedure(s):  NO 
 
Past Medical History:  
Diagnosis Date  Diabetes ([facility] [address]  Erectile dysfunction due to diseases classified elsewhere  Hematuria   
 right renal cyst  
 Hypercholesterolemia  Hypertension  Liver disease Fatty Liver  Pulmonary emboli [facility]) 2003 & 2004  
 chronic coumadin therapy managed by Dr. Audi Sims  Renal failure acute   
 following surgery  Shingles 2020  Sleep apnea   
 does not use cpap machine Allergies Allergen Reactions  Tape [Adhesive] Contact Dermatitis ALLERGIC TO SILK TAPE ONLY Current Outpatient Medications Medication Sig  warfarin (COUMADIN) 10 mg tablet TAKE 1 TABLET BY MOUTH AS DIRECTED  warfarin (COUMADIN) 1 mg tablet Take 1 tablet daily by mouth with 10 mg tablet for a total of 11 mg daily or as directed by healthcare provider.  gabapentin (NEURONTIN) 300 mg capsule Taking 2 tabs TID; from Dr. Ousmane Frances  lisinopril-hydroCHLOROthiazide (PRINZIDE, ZESTORETIC) 20-12.5 mg per tablet TAKE 2 TABLETS BY MOUTH EVERY DAY.  fluticasone propionate (FLONASE) 50 mcg/actuation nasal spray 2 Sprays by Both Nostrils route daily as needed for Rhinitis.  amLODIPine (NORVASC) 10 mg tablet TAKE 1 TABLET BY MOUTH DAILY.  cloNIDine HCL (CATAPRES) 0.1 mg tablet TAKE 1 TABLET BY MOUTH TWICE DAILY.  furosemide (LASIX) 20 mg tablet TAKE 1 TABLET BY MOUTH EVERY DAY  potassium chloride (K-DUR, KLOR-CON) 20 mEq tablet TAKE 1 TABLET BY MOUTH DAILY.  atenoloL (TENORMIN) 100 mg tablet TAKE 1 TABLET BY MOUTH DAILY  HYDROcodone-acetaminophen (NORCO)  mg tablet Take 1 Tab by mouth every six (6) hours as needed for Pain.  tadalafil (CIALIS) 20 mg tablet Take 1 Tab by mouth as needed for Other (ED).  insulin glargine (BASAGLAR KWIKPEN) 100 unit/mL (3 mL) inpn Takes 50 units twice a day.  BD INSULIN PEN NEEDLE UF SHORT 31 gauge x 5/16\" ndle USE 1 PEN UTD QID  
 NOVOLOG FLEXPEN 100 unit/mL inpn Takes 14-25 units per meal per sliding scale from endocrinology  VICTOZA 3-ZANDRA 0.6 mg/0.1 mL (18 mg/3 mL) sub-q pen INJ 1.8 MG SC QD  
 insulin syringe-needle U-100 by Does Not Apply route. Dispense ultrafine 1 mL syringes with 29 gauge needle  aspirin delayed-release 81 mg tablet Take 81 mg by mouth daily. No current facility-administered medications for this visit. Wt Readings from Last 3 Encounters:  
02/26/20 347 lb (157.4 kg) 01/13/20 347 lb (157.4 kg) 10/04/19 336 lb 3.2 oz (152.5 kg) BP Readings from Last 3 Encounters:  
02/26/20 130/80  
01/13/20 130/68  
10/04/19 136/62 Lab Results Component Value Date/Time  Sodium 137 01/13/2020 11:35 AM  
 Potassium 4.1 01/13/2020 11:35 AM  
 Chloride 99 01/13/2020 11:35 AM  
 CO2 25 01/13/2020 11:35 AM  
 Anion gap 13.0 01/13/2020 11:35 AM  
 Glucose 261 (H) 01/13/2020 11:35 AM  
 BUN 22 01/13/2020 11:35 AM  
 Creatinine 0.9 01/13/2020 11:35 AM  
 GFR est AA >60 03/24/2014 01:56 PM  
 GFR est non-AA >60 03/24/2014 01:56 PM  
 Calcium 9.3 01/13/2020 11:35 AM  
 
 
Lab Results Component Value Date/Time WBC 5.2 01/13/2020 11:35 AM  
 HGB 12.6 (L) 01/13/2020 11:35 AM  
 HCT 41.5 01/13/2020 11:35 AM  
 PLATELET 357 61/14/2812 11:35 AM  
 MCV 87 01/13/2020 11:35 AM  
 
 
Lab Results Component Value Date/Time Protein, total 7.7 01/13/2020 11:35 AM  
 Albumin 4.3 01/13/2020 11:35 AM  
 
 
Estimated Creatinine Clearance: 159.4 mL/min (by C-G formula based on SCr of 0.9 mg/dL). INR History:   (normal INR range 0.8-1.2) Date   INR Dose/Comments 6/25/2020 2.1 11 mg daily 6/18/2020        2.6       11 mg daily 6/11/2020        2.8       11 mg daily 6/4/2020          1.7       11 mg daily 5/28/2020        2.8       11 mg daily 5/21/2020        2.8       11 mg daily 5/14/2020        1.3       11 mg daily 5/7/2020          3.0       11 mg daily 4/30/2020        2.6       11 mg daily 4/23/2020        2.9       11 mg daily 4/16/2020        2.2       11 mg daily 4/9/2020          2.3       11 mg daily 4/2/2020          3.0       11 mg daily 3/26/2020        1.9       11 mg daily 3/19/2020        1.8       11 mg daily 3/12/2020        2.8       11 mg daily 3/5/2020          2.4       11 mg daily 2/27/2020        2.1       11 mg daily 2/20/2020        2.2       11 mg daily 2/13/2020        2.3       11 mg daily (took 13 mg on 2/6/2020) 
2/6/2020          1.8       11 mg daily (missed 2 doses) A/P:    
 
Anticoagulation: 
Considering Mr. Turcios's past history, todays findings, and per Anticoagulation Collaborative Practice Agreement/Protocol: 1. POC INR (2.1) is Therapeutic for INR goal today. 2.  Continue warfarin - 11 mg daily 3. Next INR check will be in 1 week(s). A full discussion of the nature of anticoagulants has been carried out. A full discussion of the need for frequent and regular monitoring, precise dosage adjustment and compliance was stressed.   Side effects of potential bleeding were discussed and Mr. Oswald Templeton was instructed to call [phone] if there are any signs of abnormal bleeding. Mr. Oswald Templeton was instructed to avoid any OTC items containing aspirin or ibuprofen and prior to starting any new OTC products to consult with his physician or pharmacist to ensure no drug interactions are present. Mr. Oswald Templeton was instructed to avoid any major changes in his general diet and to avoid alcohol consumption. Mr. Oswald Templeton verbalized his understanding of all instructions and will call the office with any questions, concerns, or signs of abnormal bleeding or blood clot. Notifications of recommendations will be sent to Dr. Carmencita Estrada for review. Thank you, Ruthie Langston, PharmD Clinical Pharmacist Specialist 
 
CLINICAL PHARMACY CONSULT: MED RECONCILIATION/REVIEW ADDENDUM For Pharmacy Admin Tracking Only PHSO: PHSO Patient?: No 
Total # of Interventions Recommended: Count: 1 
- Maintenance Safety Lab Monitoring #: 1 Total Interventions Accepted: 1 Time Spent (min): 15 Rachael Almazan [facility], PharmD

## 2020-07-02 ENCOUNTER — TELEPHONE (OUTPATIENT)
Dept: FAMILY MEDICINE CLINIC | Age: 50
End: 2020-07-02

## 2020-07-02 DIAGNOSIS — Z79.01 ANTICOAGULANT LONG-TERM USE: ICD-10-CM

## 2020-07-02 LAB — INR, EXTERNAL: 2.2

## 2020-07-02 NOTE — TELEPHONE ENCOUNTER
Pharmacy Note - Anticoagulation 07/02/20 S/O:  Mr. Talita Oates ,52 y.o. male, referred by Dr. Lucy Ball, was contacted via an outbound telephone call today for telephonic anticoagulation management for the diagnosis of CVA, DVT, and PE. Patient checks POC INR using home INR machine. The most recent INR was 2.2 today per patient's report. HPI:  
 
Interim History: · INR Goal:  2.0-3.0 · Current warfarin regimen: 11 mg daily · Warfarin tablet strength:   1 mg, 10 mg · Total weekly dose (mg): 77 mg Today's pertinent positives includes: No significant changes since last visit 
-Denies any bleeding/bruising, SOB, chest pain 
-Denies any changes in greens or alcohol intake 
-Denies any changes in medications · Adherence: · Able to recall regimen? YES 
· Miss/extra dose? NO 
· Need refill? NO Upcoming procedure(s):  NO 
 
Past Medical History:  
Diagnosis Date  Diabetes ([facility] [address]  Erectile dysfunction due to diseases classified elsewhere  Hematuria   
 right renal cyst  
 Hypercholesterolemia  Hypertension  Liver disease Fatty Liver  Pulmonary emboli [facility]) 2003 & 2004  
 chronic coumadin therapy managed by Dr. Petty Quintero  Renal failure acute   
 following surgery  Shingles 2020  Sleep apnea   
 does not use cpap machine Allergies Allergen Reactions  Tape [Adhesive] Contact Dermatitis ALLERGIC TO SILK TAPE ONLY Current Outpatient Medications Medication Sig  warfarin (COUMADIN) 10 mg tablet TAKE 1 TABLET BY MOUTH AS DIRECTED  warfarin (COUMADIN) 1 mg tablet Take 1 tablet daily by mouth with 10 mg tablet for a total of 11 mg daily or as directed by healthcare provider.  gabapentin (NEURONTIN) 300 mg capsule Taking 2 tabs TID; from Dr. Maxi Fontana  lisinopril-hydroCHLOROthiazide (PRINZIDE, ZESTORETIC) 20-12.5 mg per tablet TAKE 2 TABLETS BY MOUTH EVERY DAY.  fluticasone propionate (FLONASE) 50 mcg/actuation nasal spray 2 Sprays by Both Nostrils route daily as needed for Rhinitis.  amLODIPine (NORVASC) 10 mg tablet TAKE 1 TABLET BY MOUTH DAILY.  cloNIDine HCL (CATAPRES) 0.1 mg tablet TAKE 1 TABLET BY MOUTH TWICE DAILY.  furosemide (LASIX) 20 mg tablet TAKE 1 TABLET BY MOUTH EVERY DAY  potassium chloride (K-DUR, KLOR-CON) 20 mEq tablet TAKE 1 TABLET BY MOUTH DAILY.  atenoloL (TENORMIN) 100 mg tablet TAKE 1 TABLET BY MOUTH DAILY  HYDROcodone-acetaminophen (NORCO)  mg tablet Take 1 Tab by mouth every six (6) hours as needed for Pain.  tadalafil (CIALIS) 20 mg tablet Take 1 Tab by mouth as needed for Other (ED).  insulin glargine (BASAGLAR KWIKPEN) 100 unit/mL (3 mL) inpn Takes 50 units twice a day.  BD INSULIN PEN NEEDLE UF SHORT 31 gauge x 5/16\" ndle USE 1 PEN UTD QID  
 NOVOLOG FLEXPEN 100 unit/mL inpn Takes 14-25 units per meal per sliding scale from endocrinology  VICTOZA 3-ZANDRA 0.6 mg/0.1 mL (18 mg/3 mL) sub-q pen INJ 1.8 MG SC QD  
 insulin syringe-needle U-100 by Does Not Apply route. Dispense ultrafine 1 mL syringes with 29 gauge needle  aspirin delayed-release 81 mg tablet Take 81 mg by mouth daily. No current facility-administered medications for this visit. Wt Readings from Last 3 Encounters:  
02/26/20 347 lb (157.4 kg) 01/13/20 347 lb (157.4 kg) 10/04/19 336 lb 3.2 oz (152.5 kg) BP Readings from Last 3 Encounters:  
02/26/20 130/80  
01/13/20 130/68  
10/04/19 136/62 Lab Results Component Value Date/Time  Sodium 137 01/13/2020 11:35 AM  
 Potassium 4.1 01/13/2020 11:35 AM  
 Chloride 99 01/13/2020 11:35 AM  
 CO2 25 01/13/2020 11:35 AM  
 Anion gap 13.0 01/13/2020 11:35 AM  
 Glucose 261 (H) 01/13/2020 11:35 AM  
 BUN 22 01/13/2020 11:35 AM  
 Creatinine 0.9 01/13/2020 11:35 AM  
 GFR est AA >60 03/24/2014 01:56 PM  
 GFR est non-AA >60 03/24/2014 01:56 PM  
 Calcium 9.3 01/13/2020 11:35 AM  
 
 
Lab Results Component Value Date/Time WBC 5.2 01/13/2020 11:35 AM  
 HGB 12.6 (L) 01/13/2020 11:35 AM  
 HCT 41.5 01/13/2020 11:35 AM  
 PLATELET 505 42/55/9589 11:35 AM  
 MCV 87 01/13/2020 11:35 AM  
 
 
Lab Results Component Value Date/Time Protein, total 7.7 01/13/2020 11:35 AM  
 Albumin 4.3 01/13/2020 11:35 AM  
 
 
Estimated Creatinine Clearance: 159.4 mL/min (by C-G formula based on SCr of 0.9 mg/dL). INR History:   (normal INR range 0.8-1.2) Date   INR Dose/Comments 7/2/2020 2.2 11 mg daily 6/25/2020        2.1       11 mg daily 6/18/2020        2.6       11 mg daily 6/11/2020        2.8       11 mg daily 6/4/2020          1.7       11 mg daily 5/28/2020        2.8       11 mg daily 5/21/2020        2.8       11 mg daily 5/14/2020        1.3       11 mg daily 5/7/2020          3.0       11 mg daily 4/30/2020        2.6       11 mg daily 4/23/2020        2.9       11 mg daily 4/16/2020        2.2       11 mg daily 4/9/2020          2.3       11 mg daily 4/2/2020          3.0       11 mg daily 3/26/2020        1.9       11 mg daily 3/19/2020        1.8       11 mg daily 3/12/2020        2.8       11 mg daily 3/5/2020          2.4       11 mg daily 2/27/2020        2.1       11 mg daily 2/20/2020        2.2       11 mg daily 2/13/2020        2.3       11 mg daily (took 13 mg on 2/6/2020) 
2/6/2020          1.8       11 mg daily (missed 2 doses) A/P:    
 
Anticoagulation: 
Considering Mr. Turcios's past history, todays findings, and per Anticoagulation Collaborative Practice Agreement/Protocol: 1. POC INR (2.2) is Therapeutic for INR goal today. 2.  Continue warfarin - 11 mg daily 3. Next INR check will be in 1 week(s). A full discussion of the nature of anticoagulants has been carried out.   A full discussion of the need for frequent and regular monitoring, precise dosage adjustment and compliance was stressed. Mr. Roopa Holder was instructed to avoid any major changes in his general diet and to avoid alcohol consumption. Mr. Roopa Holder verbalized his understanding of all instructions and will call the office with any questions, concerns, or signs of abnormal bleeding or blood clot. Notifications of recommendations will be sent to Dr. Sourav Small for review. Thank you, Doris López, PharmD Clinical Pharmacist Specialist 
 
CLINICAL PHARMACY CONSULT: MED RECONCILIATION/REVIEW ADDENDUM For Pharmacy Admin Tracking Only PHSO: PHSO Patient?: No 
Total # of Interventions Recommended: Count: 1 
- Maintenance Safety Lab Monitoring #: 1 Total Interventions Accepted: 1 Time Spent (min): 15 Cheryl Nair [facility] [place], PharmD

## 2020-07-09 ENCOUNTER — TELEPHONE (OUTPATIENT)
Dept: FAMILY MEDICINE CLINIC | Age: 50
End: 2020-07-09

## 2020-07-09 DIAGNOSIS — Z79.01 ANTICOAGULANT LONG-TERM USE: ICD-10-CM

## 2020-07-09 LAB — INR, EXTERNAL: 2.1

## 2020-07-10 DIAGNOSIS — I10 HTN (HYPERTENSION), BENIGN: ICD-10-CM

## 2020-07-10 NOTE — TELEPHONE ENCOUNTER
Last Visit: 5/6/20 with MD Rasta Gonzalez  Next Appointment: Advised to follow-up in 6 months  Previous Refill Encounter(s): 3/16/20 #30 with 3 refills    Requested Prescriptions     Pending Prescriptions Disp Refills    atenoloL (TENORMIN) 100 mg tablet 30 Tab 3     Sig: Take 1 Tab by mouth daily.

## 2020-07-11 RX ORDER — ATENOLOL 100 MG/1
100 TABLET ORAL DAILY
Qty: 30 TAB | Refills: 6 | Status: SHIPPED | OUTPATIENT
Start: 2020-07-11 | End: 2021-02-10

## 2020-07-16 ENCOUNTER — TELEPHONE (OUTPATIENT)
Dept: FAMILY MEDICINE CLINIC | Age: 50
End: 2020-07-16

## 2020-07-16 DIAGNOSIS — Z79.01 ANTICOAGULANT LONG-TERM USE: ICD-10-CM

## 2020-07-16 LAB — INR, EXTERNAL: 2.1

## 2020-07-16 NOTE — TELEPHONE ENCOUNTER
Pharmacy Note - Anticoagulation    07/16/20       S/O:  Mr. Mike Werner ,52 y.o. male, referred by Dr. Batool Dobson, was contacted via an outbound telephone call today for telephonic anticoagulation management for the diagnosis of CVA, DVT, and PE. Patient checks POC INR using home INR machine. The most recent INR was 2.1 today per patient's report. HPI:     Interim History:    · INR Goal:  2.0-3.0    · Current warfarin regimen: 11 mg daily                      · Warfarin tablet strength:   1 mg, 10 mg  · Total weekly dose (mg): 77 mg    Today's pertinent positives includes:  No significant changes since last visit  -Denies bruising/bleeding, SOB, chest pain  -States he ate a little less broccoli than usual    · Adherence:   · Able to recall regimen? YES  · Miss/extra dose? NO  · Need refill? NO    Upcoming procedure(s):  NO    Past Medical History:   Diagnosis Date    Diabetes ([facility] [address]     Erectile dysfunction due to diseases classified elsewhere     Hematuria     right renal cyst    Hypercholesterolemia     Hypertension     Liver disease     Fatty Liver    Pulmonary emboli ([facility] [address] 2003 & 2004    chronic coumadin therapy managed by Dr. Watson Perry Renal failure acute     following surgery    Shingles     2020    Sleep apnea     does not use cpap machine       Allergies   Allergen Reactions    Tape [Adhesive] Contact Dermatitis     ALLERGIC TO SILK TAPE ONLY           Current Outpatient Medications   Medication Sig    atenoloL (TENORMIN) 100 mg tablet Take 1 Tab by mouth daily.  warfarin (COUMADIN) 10 mg tablet TAKE 1 TABLET BY MOUTH AS DIRECTED    warfarin (COUMADIN) 1 mg tablet Take 1 tablet daily by mouth with 10 mg tablet for a total of 11 mg daily or as directed by healthcare provider.  gabapentin (NEURONTIN) 300 mg capsule Taking 2 tabs TID; from Dr. Axel Feliz lisinopril-hydroCHLOROthiazide (PRINZIDE, ZESTORETIC) 20-12.5 mg per tablet TAKE 2 TABLETS BY MOUTH EVERY DAY.  fluticasone propionate (FLONASE) 50 mcg/actuation nasal spray 2 Sprays by Both Nostrils route daily as needed for Rhinitis.  amLODIPine (NORVASC) 10 mg tablet TAKE 1 TABLET BY MOUTH DAILY.  cloNIDine HCL (CATAPRES) 0.1 mg tablet TAKE 1 TABLET BY MOUTH TWICE DAILY.  furosemide (LASIX) 20 mg tablet TAKE 1 TABLET BY MOUTH EVERY DAY    potassium chloride (K-DUR, KLOR-CON) 20 mEq tablet TAKE 1 TABLET BY MOUTH DAILY.  HYDROcodone-acetaminophen (NORCO)  mg tablet Take 1 Tab by mouth every six (6) hours as needed for Pain.  tadalafil (CIALIS) 20 mg tablet Take 1 Tab by mouth as needed for Other (ED).  insulin glargine (BASAGLAR KWIKPEN) 100 unit/mL (3 mL) inpn Takes 50 units twice a day.  BD INSULIN PEN NEEDLE UF SHORT 31 gauge x 5/16\" ndle USE 1 PEN UTD QID    NOVOLOG FLEXPEN 100 unit/mL inpn Takes 14-25 units per meal per sliding scale from endocrinology    VICTOZA 3-ZANDRA 0.6 mg/0.1 mL (18 mg/3 mL) sub-q pen INJ 1.8 MG SC QD    insulin syringe-needle U-100 by Does Not Apply route. Dispense ultrafine 1 mL syringes with 29 gauge needle    aspirin delayed-release 81 mg tablet Take 81 mg by mouth daily. No current facility-administered medications for this visit.         Wt Readings from Last 3 Encounters:   02/26/20 347 lb (157.4 kg)   01/13/20 347 lb (157.4 kg)   10/04/19 336 lb 3.2 oz (152.5 kg)       BP Readings from Last 3 Encounters:   02/26/20 130/80   01/13/20 130/68   10/04/19 136/62       Lab Results   Component Value Date/Time    Sodium 137 01/13/2020 11:35 AM    Potassium 4.1 01/13/2020 11:35 AM    Chloride 99 01/13/2020 11:35 AM    CO2 25 01/13/2020 11:35 AM    Anion gap 13.0 01/13/2020 11:35 AM    Glucose 261 (H) 01/13/2020 11:35 AM    BUN 22 01/13/2020 11:35 AM    Creatinine 0.9 01/13/2020 11:35 AM    GFR est AA >60 03/24/2014 01:56 PM    GFR est non-AA >60 03/24/2014 01:56 PM    Calcium 9.3 01/13/2020 11:35 AM       Lab Results   Component Value Date/Time    WBC 5.2 01/13/2020 11:35 AM    HGB 12.6 (L) 01/13/2020 11:35 AM    HCT 41.5 01/13/2020 11:35 AM    PLATELET 268 42/91/3105 11:35 AM    MCV 87 01/13/2020 11:35 AM       Lab Results   Component Value Date/Time    Protein, total 7.7 01/13/2020 11:35 AM    Albumin 4.3 01/13/2020 11:35 AM       CrCl cannot be calculated (Patient's most recent lab result is older than the maximum 180 days allowed.). INR History:   (normal INR range 0.8-1.2)     Date   INR Dose/Comments  7/16/2020 2.1 11 mg daily   7/9/2020          2.1       11 mg daily  7/2/2020          2.2       11 mg daily  6/25/2020        2.1       11 mg daily  6/18/2020        2.6       11 mg daily  6/11/2020        2.8       11 mg daily  6/4/2020          1.7       11 mg daily  5/28/2020        2.8       11 mg daily  5/21/2020        2.8       11 mg daily  5/14/2020        1.3       11 mg daily  5/7/2020          3.0       11 mg daily  4/30/2020        2.6       11 mg daily  4/23/2020        2.9       11 mg daily  4/16/2020        2.2       11 mg daily  4/9/2020          2.3       11 mg daily  4/2/2020          3.0       11 mg daily  3/26/2020        1.9       11 mg daily  3/19/2020        1.8       11 mg daily  3/12/2020        2.8       11 mg daily  3/5/2020          2.4       11 mg daily  2/27/2020        2.1       11 mg daily  2/20/2020        2.2       11 mg daily  2/13/2020        2.3       11 mg daily (took 13 mg on 2/6/2020)  2/6/2020          1.8       11 mg daily (missed 2 doses)      A/P:       Anticoagulation:  Considering Mr. Turcios's past history, todays findings, and per Anticoagulation Collaborative Practice Agreement/Protocol:    1. POC INR (2.1) is Therapeutic for INR goal today. 2.  Continue warfarin - 11 mg daily  3. Next INR check will be in 1 week(s). A full discussion of the nature of anticoagulants has been carried out.   A full discussion of the need for frequent and regular monitoring, precise dosage adjustment and compliance was stressed. Mr. Danilo Timmons was instructed to call [phone] if there are any signs of abnormal bleeding. Mr. Danilo Timmons was instructed to avoid any major changes in his general diet and to avoid alcohol consumption. Mr. Danilo Timmons verbalized his understanding of all instructions and will call the office with any questions, concerns, or signs of abnormal bleeding or blood clot. Notifications of recommendations will be sent to Dr. Rinku Luz for review.     Thank you,    Nabeel Wright, PharmD  Clinical Pharmacist Specialist    CLINICAL PHARMACY CONSULT: MED RECONCILIATION/REVIEW ADDENDUM    For Pharmacy Admin Tracking Only    PHSO: PHSO Patient?: No  Total # of Interventions Recommended: Count: 1  - Maintenance Safety Lab Monitoring #: 1  Total Interventions Accepted: 1  Time Spent (min): Pillo Dawson, [facility], PharmD

## 2020-07-23 ENCOUNTER — TELEPHONE (OUTPATIENT)
Dept: FAMILY MEDICINE CLINIC | Age: 50
End: 2020-07-23

## 2020-07-23 DIAGNOSIS — Z79.01 ANTICOAGULANT LONG-TERM USE: ICD-10-CM

## 2020-07-23 LAB — INR, EXTERNAL: 2.7

## 2020-07-23 NOTE — TELEPHONE ENCOUNTER
Pharmacy Note - Anticoagulation    07/23/20       S/O:  Mr. Braulio Dance ,52 y.o. male, referred by Dr. Raquel Aguilar, was contacted via an outbound telephone call today for telephonic anticoagulation management for the diagnosis of CVA, DVT, and PE. Patient checks POC INR using home INR machine. The most recent INR was 2.7 today per patient's report. HPI:     Interim History:    · INR Goal:  2.0-3.0    · Current warfarin regimen: 11 mg daily                      · Warfarin tablet strength:   1 mg, 10 mg  · Total weekly dose (mg): 77 mg    Today's pertinent positives includes:  Change in diet/appetite   -Denies any bruising/bleeding, SOB, chest pain  -States he had a little less greens this week compared to usual - only had greens one day this week  -Denies any change in alcohol intake    · Adherence:   · Able to recall regimen? YES  · Miss/extra dose? NO  · Need refill? NO    Upcoming procedure(s):  NO    Past Medical History:   Diagnosis Date    Diabetes ([facility] [address]     Erectile dysfunction due to diseases classified elsewhere     Hematuria     right renal cyst    Hypercholesterolemia     Hypertension     Liver disease     Fatty Liver    Pulmonary emboli ([facility] [address] 2003 & 2004    chronic coumadin therapy managed by Dr. Myranda Catherine Renal failure acute     following surgery    Shingles     2020    Sleep apnea     does not use cpap machine       Allergies   Allergen Reactions    Tape [Adhesive] Contact Dermatitis     ALLERGIC TO SILK TAPE ONLY           Current Outpatient Medications   Medication Sig    atenoloL (TENORMIN) 100 mg tablet Take 1 Tab by mouth daily.  warfarin (COUMADIN) 10 mg tablet TAKE 1 TABLET BY MOUTH AS DIRECTED    warfarin (COUMADIN) 1 mg tablet Take 1 tablet daily by mouth with 10 mg tablet for a total of 11 mg daily or as directed by healthcare provider.     gabapentin (NEURONTIN) 300 mg capsule Taking 2 tabs TID; from Dr. Sapna Benavides (PRINZIDE, ZESTORETIC) 20-12.5 mg per tablet TAKE 2 TABLETS BY MOUTH EVERY DAY.  fluticasone propionate (FLONASE) 50 mcg/actuation nasal spray 2 Sprays by Both Nostrils route daily as needed for Rhinitis.  amLODIPine (NORVASC) 10 mg tablet TAKE 1 TABLET BY MOUTH DAILY.  cloNIDine HCL (CATAPRES) 0.1 mg tablet TAKE 1 TABLET BY MOUTH TWICE DAILY.  furosemide (LASIX) 20 mg tablet TAKE 1 TABLET BY MOUTH EVERY DAY    potassium chloride (K-DUR, KLOR-CON) 20 mEq tablet TAKE 1 TABLET BY MOUTH DAILY.  HYDROcodone-acetaminophen (NORCO)  mg tablet Take 1 Tab by mouth every six (6) hours as needed for Pain.  tadalafil (CIALIS) 20 mg tablet Take 1 Tab by mouth as needed for Other (ED).  insulin glargine (BASAGLAR KWIKPEN) 100 unit/mL (3 mL) inpn Takes 50 units twice a day.  BD INSULIN PEN NEEDLE UF SHORT 31 gauge x 5/16\" ndle USE 1 PEN UTD QID    NOVOLOG FLEXPEN 100 unit/mL inpn Takes 14-25 units per meal per sliding scale from endocrinology    VICTOZA 3-ZANDRA 0.6 mg/0.1 mL (18 mg/3 mL) sub-q pen INJ 1.8 MG SC QD    insulin syringe-needle U-100 by Does Not Apply route. Dispense ultrafine 1 mL syringes with 29 gauge needle    aspirin delayed-release 81 mg tablet Take 81 mg by mouth daily. No current facility-administered medications for this visit.         Wt Readings from Last 3 Encounters:   02/26/20 347 lb (157.4 kg)   01/13/20 347 lb (157.4 kg)   10/04/19 336 lb 3.2 oz (152.5 kg)       BP Readings from Last 3 Encounters:   02/26/20 130/80   01/13/20 130/68   10/04/19 136/62       Lab Results   Component Value Date/Time    Sodium 137 01/13/2020 11:35 AM    Potassium 4.1 01/13/2020 11:35 AM    Chloride 99 01/13/2020 11:35 AM    CO2 25 01/13/2020 11:35 AM    Anion gap 13.0 01/13/2020 11:35 AM    Glucose 261 (H) 01/13/2020 11:35 AM    BUN 22 01/13/2020 11:35 AM    Creatinine 0.9 01/13/2020 11:35 AM    GFR est AA >60 03/24/2014 01:56 PM    GFR est non-AA >60 03/24/2014 01:56 PM    Calcium 9.3 01/13/2020 11:35 AM       Lab Results   Component Value Date/Time    WBC 5.2 01/13/2020 11:35 AM    HGB 12.6 (L) 01/13/2020 11:35 AM    HCT 41.5 01/13/2020 11:35 AM    PLATELET 268 87/43/0736 11:35 AM    MCV 87 01/13/2020 11:35 AM       Lab Results   Component Value Date/Time    Protein, total 7.7 01/13/2020 11:35 AM    Albumin 4.3 01/13/2020 11:35 AM       CrCl cannot be calculated (Patient's most recent lab result is older than the maximum 180 days allowed.). INR History:   (normal INR range 0.8-1.2)     Date   INR Dose/Comments  7/23/2020 2.7 11 mg daily  7/16/2020        2.1       11 mg daily        7/9/2020          2.1       11 mg daily  7/2/2020          2.2       11 mg daily  6/25/2020        2.1       11 mg daily  6/18/2020        2.6       11 mg daily  6/11/2020        2.8       11 mg daily  6/4/2020          1.7       11 mg daily  5/28/2020        2.8       11 mg daily  5/21/2020        2.8       11 mg daily  5/14/2020        1.3       11 mg daily  5/7/2020          3.0       11 mg daily  4/30/2020        2.6       11 mg daily  4/23/2020        2.9       11 mg daily  4/16/2020        2.2       11 mg daily  4/9/2020          2.3       11 mg daily  4/2/2020          3.0       11 mg daily  3/26/2020        1.9       11 mg daily  3/19/2020        1.8       11 mg daily  3/12/2020        2.8       11 mg daily  3/5/2020          2.4       11 mg daily  2/27/2020        2.1       11 mg daily  2/20/2020        2.2       11 mg daily  2/13/2020        2.3       11 mg daily (took 13 mg on 2/6/2020)  2/6/2020          1.8       11 mg daily (missed 2 doses)    A/P:       Anticoagulation:  Considering Mr. Turcios's past history, todays findings, and per Anticoagulation Collaborative Practice Agreement/Protocol:    1. POC INR (2.7) is Therapeutic for INR goal today. 2.  Continue warfarin - 11 mg daily  3. Next INR check will be in 1 week(s).    -Reiterated importance of keeping greens in diet consistent each week    A full discussion of the nature of anticoagulants has been carried out. A full discussion of the need for frequent and regular monitoring, precise dosage adjustment and compliance was stressed. Side effects of potential bleeding were discussed and Mr. Manjinder Calvo was instructed to call [phone] if there are any signs of abnormal bleeding. Mr. Manjinder Calvo was instructed to avoid any OTC items containing aspirin or ibuprofen and prior to starting any new OTC products to consult with his physician or pharmacist to ensure no drug interactions are present. Mr. Manjinder Calvo was instructed to avoid any major changes in his general diet and to avoid alcohol consumption. Mr. Manjinder Calvo verbalized his understanding of all instructions and will call the office with any questions, concerns, or signs of abnormal bleeding or blood clot. Notifications of recommendations will be sent to Dr. Dora Valente for review.     Thank you,    Veronica Bhat, PharmD  Clinical Pharmacist Specialist    CLINICAL PHARMACY CONSULT: MED RECONCILIATION/REVIEW ADDENDUM    For Pharmacy Admin Tracking Only    PHSO: PHSO Patient?: No  Total # of Interventions Recommended: Count: 1  - Maintenance Safety Lab Monitoring #: 1  Total Interventions Accepted: 1  Time Spent (min): 138 Ehsa Dawson, [facility], PharmD

## 2020-07-30 ENCOUNTER — TELEPHONE (OUTPATIENT)
Dept: FAMILY MEDICINE CLINIC | Age: 50
End: 2020-07-30

## 2020-07-30 DIAGNOSIS — Z79.01 ANTICOAGULANT LONG-TERM USE: ICD-10-CM

## 2020-07-30 LAB — INR, EXTERNAL: 2.6

## 2020-07-30 NOTE — TELEPHONE ENCOUNTER
Pharmacy Note - Anticoagulation 07/30/20 S/O:  Mr. Vanessa Au ,52 y.o. male, referred by Dr. Charisse Garcia, was contacted via an outbound telephone call today for telephonic anticoagulation management for the diagnosis of CVA, DVT, and PE. Patient checks POC INR using home INR machine. The most recent INR was 2.6 today per patient's report. HPI:  
 
Interim History: · INR Goal:  2.0-3.0 · Current warfarin regimen: 11 mg daily · Warfarin tablet strength:   1 mg, 10 mg · Total weekly dose (mg): 77 mg Today's pertinent positives includes: No significant changes since last visit 
-Denies any bruising/bleeding, SOB, chest pain 
-Denies any changes in greens in diet or alcohol intake 
-Denies any changes in medications · Adherence: · Able to recall regimen? YES 
· Miss/extra dose? NO 
· Need refill? NO Upcoming procedure(s):  NO 
 
Past Medical History:  
Diagnosis Date  Diabetes ([facility] [address]  Erectile dysfunction due to diseases classified elsewhere  Hematuria   
 right renal cyst  
 Hypercholesterolemia  Hypertension  Liver disease Fatty Liver  Pulmonary emboli [facility]) 2003 & 2004  
 chronic coumadin therapy managed by Dr. Ira Abernathy  Renal failure acute   
 following surgery  Shingles 2020  Sleep apnea   
 does not use cpap machine Allergies Allergen Reactions  Tape [Adhesive] Contact Dermatitis ALLERGIC TO SILK TAPE ONLY Current Outpatient Medications Medication Sig  
 atenoloL (TENORMIN) 100 mg tablet Take 1 Tab by mouth daily.  warfarin (COUMADIN) 10 mg tablet TAKE 1 TABLET BY MOUTH AS DIRECTED  warfarin (COUMADIN) 1 mg tablet Take 1 tablet daily by mouth with 10 mg tablet for a total of 11 mg daily or as directed by healthcare provider.  gabapentin (NEURONTIN) 300 mg capsule Taking 2 tabs TID; from Dr. Critsy Ho  lisinopril-hydroCHLOROthiazide (PRINZIDE, ZESTORETIC) 20-12.5 mg per tablet TAKE 2 TABLETS BY MOUTH EVERY DAY.  fluticasone propionate (FLONASE) 50 mcg/actuation nasal spray 2 Sprays by Both Nostrils route daily as needed for Rhinitis.  amLODIPine (NORVASC) 10 mg tablet TAKE 1 TABLET BY MOUTH DAILY.  cloNIDine HCL (CATAPRES) 0.1 mg tablet TAKE 1 TABLET BY MOUTH TWICE DAILY.  furosemide (LASIX) 20 mg tablet TAKE 1 TABLET BY MOUTH EVERY DAY  potassium chloride (K-DUR, KLOR-CON) 20 mEq tablet TAKE 1 TABLET BY MOUTH DAILY.  HYDROcodone-acetaminophen (NORCO)  mg tablet Take 1 Tab by mouth every six (6) hours as needed for Pain.  tadalafil (CIALIS) 20 mg tablet Take 1 Tab by mouth as needed for Other (ED).  insulin glargine (BASAGLAR KWIKPEN) 100 unit/mL (3 mL) inpn Takes 50 units twice a day.  BD INSULIN PEN NEEDLE UF SHORT 31 gauge x 5/16\" ndle USE 1 PEN UTD QID  
 NOVOLOG FLEXPEN 100 unit/mL inpn Takes 14-25 units per meal per sliding scale from endocrinology  VICTOZA 3-ZANDRA 0.6 mg/0.1 mL (18 mg/3 mL) sub-q pen INJ 1.8 MG SC QD  
 insulin syringe-needle U-100 by Does Not Apply route. Dispense ultrafine 1 mL syringes with 29 gauge needle  aspirin delayed-release 81 mg tablet Take 81 mg by mouth daily. No current facility-administered medications for this visit. Wt Readings from Last 3 Encounters:  
02/26/20 347 lb (157.4 kg) 01/13/20 347 lb (157.4 kg) 10/04/19 336 lb 3.2 oz (152.5 kg) BP Readings from Last 3 Encounters:  
02/26/20 130/80  
01/13/20 130/68  
10/04/19 136/62 Lab Results Component Value Date/Time Sodium 137 01/13/2020 11:35 AM  
 Potassium 4.1 01/13/2020 11:35 AM  
 Chloride 99 01/13/2020 11:35 AM  
 CO2 25 01/13/2020 11:35 AM  
 Anion gap 13.0 01/13/2020 11:35 AM  
 Glucose 261 (H) 01/13/2020 11:35 AM  
 BUN 22 01/13/2020 11:35 AM  
 Creatinine 0.9 01/13/2020 11:35 AM  
 GFR est AA >60 03/24/2014 01:56 PM  
 GFR est non-AA >60 03/24/2014 01:56 PM  
 Calcium 9.3 01/13/2020 11:35 AM  
 
 
Lab Results Component Value Date/Time WBC 5.2 01/13/2020 11:35 AM  
 HGB 12.6 (L) 01/13/2020 11:35 AM  
 HCT 41.5 01/13/2020 11:35 AM  
 PLATELET 053 19/19/3734 11:35 AM  
 MCV 87 01/13/2020 11:35 AM  
 
 
Lab Results Component Value Date/Time Protein, total 7.7 01/13/2020 11:35 AM  
 Albumin 4.3 01/13/2020 11:35 AM  
 
 
CrCl cannot be calculated (Patient's most recent lab result is older than the maximum 180 days allowed.). INR History:   (normal INR range 0.8-1.2) Date   INR Dose/Comments 7/30/2020 2.6 11 mg daily 7/23/2020        2.7       11 mg daily 7/16/2020        2.1       11 mg daily       
7/9/2020          2.1       11 mg daily 7/2/2020          2.2       11 mg daily 6/25/2020        2.1       11 mg daily 6/18/2020        2.6       11 mg daily 6/11/2020        2.8       11 mg daily 6/4/2020          1.7       11 mg daily 5/28/2020        2.8       11 mg daily 5/21/2020        2.8       11 mg daily 5/14/2020        1.3       11 mg daily 5/7/2020          3.0       11 mg daily 4/30/2020        2.6       11 mg daily 4/23/2020        2.9       11 mg daily 4/16/2020        2.2       11 mg daily 4/9/2020          2.3       11 mg daily 4/2/2020          3.0       11 mg daily 3/26/2020        1.9       11 mg daily 3/19/2020        1.8       11 mg daily 3/12/2020        2.8       11 mg daily 3/5/2020          2.4       11 mg daily 2/27/2020        2.1       11 mg daily 2/20/2020        2.2       11 mg daily 2/13/2020        2.3       11 mg daily (took 13 mg on 2/6/2020) 
2/6/2020          1.8       11 mg daily (missed 2 doses) A/P:    
 
Anticoagulation: 
Considering Mr. Turcios's past history, todays findings, and per Anticoagulation Collaborative Practice Agreement/Protocol: 1. POC INR (2.6) is Therapeutic for INR goal today. 2.  Continue warfarin - 11 mg daily 3. Next INR check will be in 1 week(s). A full discussion of the nature of anticoagulants has been carried out. A full discussion of the need for frequent and regular monitoring, precise dosage adjustment and compliance was stressed. Mr. Aden Mcintosh was instructed to call [phone] if there are any signs of abnormal bleeding. Mr. Aden Mcintosh was instructed to avoid any OTC items containing aspirin or ibuprofen and prior to starting any new OTC products to consult with his physician or pharmacist to ensure no drug interactions are present. Mr. Aden Mcintosh was instructed to avoid any major changes in his general diet and to avoid alcohol consumption. Mr. Aden Mcintosh verbalized his understanding of all instructions and will call the office with any questions, concerns, or signs of abnormal bleeding or blood clot. Notifications of recommendations will be sent to Dr. Raquel Aguilar for review. Thank you, Simran Nava, PharmD Clinical Pharmacist Specialist 
 
CLINICAL PHARMACY CONSULT: MED RECONCILIATION/REVIEW ADDENDUM For Pharmacy Admin Tracking Only PHSO: PHSO Patient?: No 
Total # of Interventions Recommended: Count: 1 
- Maintenance Safety Lab Monitoring #: 1 Total Interventions Accepted: 1 Time Spent (min): 15 Estrada Patel [facility], PharmD

## 2020-08-06 ENCOUNTER — TELEPHONE (OUTPATIENT)
Dept: FAMILY MEDICINE CLINIC | Age: 50
End: 2020-08-06

## 2020-08-06 DIAGNOSIS — Z79.01 ANTICOAGULANT LONG-TERM USE: ICD-10-CM

## 2020-08-06 LAB — INR, EXTERNAL: 2.6

## 2020-08-06 NOTE — TELEPHONE ENCOUNTER
Pharmacy Note - Anticoagulation 08/06/20 S/O:  Mr. Isidro Buchanan ,52 y.o. male, referred by Dr. Michael Amaro, was contacted via an outbound telephone call today for telephonic anticoagulation management for the diagnosis of CVA, DVT, and PE. Patient checks POC INR using home INR machine. The most recent INR was 2.6 today per patient's report. HPI:  
 
Interim History: · INR Goal:  2.0-3.0 · Current warfarin regimen: 11 mg daily · Warfarin tablet strength:   1 mg, 10 mg · Total weekly dose (mg): 77 mg Today's pertinent positives includes: 
Medication change 
-Denies any bleeding/bruising, SOB, chest pain 
-Denies change in greens in diet or alcohol intake 
-States his Victoza was changed to once weekly Ozempic recently; states he cannot recall his Ozempic dose right now 
-Denies any other medication changes · Adherence: · Able to recall regimen? YES 
· Miss/extra dose? NO 
· Need refill? NO Upcoming procedure(s):  NO 
 
Past Medical History:  
Diagnosis Date  Diabetes ([facility] [address]  Erectile dysfunction due to diseases classified elsewhere  Hematuria   
 right renal cyst  
 Hypercholesterolemia  Hypertension  Liver disease Fatty Liver  Pulmonary emboli [facility]) 2003 & 2004  
 chronic coumadin therapy managed by Dr. Luis M Daily  Renal failure acute   
 following surgery  Shingles 2020  Sleep apnea   
 does not use cpap machine Allergies Allergen Reactions  Tape [Adhesive] Contact Dermatitis ALLERGIC TO SILK TAPE ONLY Current Outpatient Medications Medication Sig  
 atenoloL (TENORMIN) 100 mg tablet Take 1 Tab by mouth daily.  warfarin (COUMADIN) 10 mg tablet TAKE 1 TABLET BY MOUTH AS DIRECTED  warfarin (COUMADIN) 1 mg tablet Take 1 tablet daily by mouth with 10 mg tablet for a total of 11 mg daily or as directed by healthcare provider.  gabapentin (NEURONTIN) 300 mg capsule Taking 2 tabs TID; from Dr. Ant Grissom  lisinopril-hydroCHLOROthiazide (PRINZIDE, ZESTORETIC) 20-12.5 mg per tablet TAKE 2 TABLETS BY MOUTH EVERY DAY.  fluticasone propionate (FLONASE) 50 mcg/actuation nasal spray 2 Sprays by Both Nostrils route daily as needed for Rhinitis.  amLODIPine (NORVASC) 10 mg tablet TAKE 1 TABLET BY MOUTH DAILY.  cloNIDine HCL (CATAPRES) 0.1 mg tablet TAKE 1 TABLET BY MOUTH TWICE DAILY.  furosemide (LASIX) 20 mg tablet TAKE 1 TABLET BY MOUTH EVERY DAY  potassium chloride (K-DUR, KLOR-CON) 20 mEq tablet TAKE 1 TABLET BY MOUTH DAILY.  HYDROcodone-acetaminophen (NORCO)  mg tablet Take 1 Tab by mouth every six (6) hours as needed for Pain.  tadalafil (CIALIS) 20 mg tablet Take 1 Tab by mouth as needed for Other (ED).  insulin glargine (BASAGLAR KWIKPEN) 100 unit/mL (3 mL) inpn Takes 50 units twice a day.  BD INSULIN PEN NEEDLE UF SHORT 31 gauge x 5/16\" ndle USE 1 PEN UTD QID  
 NOVOLOG FLEXPEN 100 unit/mL inpn Takes 14-25 units per meal per sliding scale from endocrinology  VICTOZA 3-ZANDRA 0.6 mg/0.1 mL (18 mg/3 mL) sub-q pen INJ 1.8 MG SC QD  
 insulin syringe-needle U-100 by Does Not Apply route. Dispense ultrafine 1 mL syringes with 29 gauge needle  aspirin delayed-release 81 mg tablet Take 81 mg by mouth daily. No current facility-administered medications for this visit. Wt Readings from Last 3 Encounters:  
02/26/20 347 lb (157.4 kg) 01/13/20 347 lb (157.4 kg) 10/04/19 336 lb 3.2 oz (152.5 kg) BP Readings from Last 3 Encounters:  
02/26/20 130/80  
01/13/20 130/68  
10/04/19 136/62 Lab Results Component Value Date/Time  Sodium 137 01/13/2020 11:35 AM  
 Potassium 4.1 01/13/2020 11:35 AM  
 Chloride 99 01/13/2020 11:35 AM  
 CO2 25 01/13/2020 11:35 AM  
 Anion gap 13.0 01/13/2020 11:35 AM  
 Glucose 261 (H) 01/13/2020 11:35 AM  
 BUN 22 01/13/2020 11:35 AM  
 Creatinine 0.9 01/13/2020 11:35 AM  
 GFR est AA >60 03/24/2014 01:56 PM  
 GFR est non-AA >60 03/24/2014 01:56 PM  
 Calcium 9.3 01/13/2020 11:35 AM  
 
 
Lab Results Component Value Date/Time WBC 5.2 01/13/2020 11:35 AM  
 HGB 12.6 (L) 01/13/2020 11:35 AM  
 HCT 41.5 01/13/2020 11:35 AM  
 PLATELET 878 93/48/0850 11:35 AM  
 MCV 87 01/13/2020 11:35 AM  
 
 
Lab Results Component Value Date/Time Protein, total 7.7 01/13/2020 11:35 AM  
 Albumin 4.3 01/13/2020 11:35 AM  
 
 
CrCl cannot be calculated (Patient's most recent lab result is older than the maximum 180 days allowed.). INR History:   (normal INR range 0.8-1.2) Date   INR Dose/Comments 8/6/2020 2.6 11 mg daily 7/30/2020        2.6       11 mg daily 7/23/2020        2.7       11 mg daily 7/16/2020        2.1       11 mg daily       
7/9/2020          2.1       11 mg daily 7/2/2020          2.2       11 mg daily 6/25/2020        2.1       11 mg daily 6/18/2020        2.6       11 mg daily 6/11/2020        2.8       11 mg daily 6/4/2020          1.7       11 mg daily 5/28/2020        2.8       11 mg daily 5/21/2020        2.8       11 mg daily 5/14/2020        1.3       11 mg daily 5/7/2020          3.0       11 mg daily 4/30/2020        2.6       11 mg daily 4/23/2020        2.9       11 mg daily 4/16/2020        2.2       11 mg daily 4/9/2020          2.3       11 mg daily 4/2/2020          3.0       11 mg daily 3/26/2020        1.9       11 mg daily 3/19/2020        1.8       11 mg daily 3/12/2020        2.8       11 mg daily 3/5/2020          2.4       11 mg daily 2/27/2020        2.1       11 mg daily 2/20/2020        2.2       11 mg daily 2/13/2020        2.3       11 mg daily (took 13 mg on 2/6/2020) 
2/6/2020          1.8       11 mg daily (missed 2 doses) A/P:    
 
Anticoagulation: 
Considering Mr. Turcios's past history, todays findings, and per Anticoagulation Collaborative Practice Agreement/Protocol: 1. POC INR (2.6) is Therapeutic for INR goal today. 2.  Continue warfarin - 11 mg daily 3. Next INR check will be in 1 week(s). A full discussion of the nature of anticoagulants has been carried out. A full discussion of the need for frequent and regular monitoring, precise dosage adjustment and compliance was stressed. Mr. Sheila Marquez was instructed to call [phone] if there are any signs of abnormal bleeding. Mr. Sheila Marquez was instructed to avoid any OTC items containing aspirin or ibuprofen and prior to starting any new OTC products to consult with his physician or pharmacist to ensure no drug interactions are present. Mr. Sheila Marquez was instructed to avoid any major changes in his general diet and to avoid alcohol consumption. Mr. Sheila Marquez verbalized his understanding of all instructions and will call the office with any questions, concerns, or signs of abnormal bleeding or blood clot. Notifications of recommendations will be sent to Dr. Otilia Elizabeth for review. Thank you, Kim Lowe, PharmD Clinical Pharmacist Specialist 
 
CLINICAL PHARMACY CONSULT: MED RECONCILIATION/REVIEW ADDENDUM For Pharmacy Admin Tracking Only PHSO: PHSO Patient?: No 
Total # of Interventions Recommended: Count: 1 
- Maintenance Safety Lab Monitoring #: 1 Total Interventions Accepted: 1 Time Spent (min): 15 Annmarie Alaniz [facility], PharmD

## 2020-08-13 ENCOUNTER — TELEPHONE (OUTPATIENT)
Dept: FAMILY MEDICINE CLINIC | Age: 50
End: 2020-08-13

## 2020-08-13 DIAGNOSIS — Z79.01 ANTICOAGULANT LONG-TERM USE: ICD-10-CM

## 2020-08-13 LAB — INR, EXTERNAL: 1.9

## 2020-08-13 NOTE — TELEPHONE ENCOUNTER
Pharmacy Note - Anticoagulation 08/13/20 S/O:  Mr. Fernando Iniguez ,52 y.o. male, referred by Dr. Ming Bobby, was contacted via an outbound telephone call today for telephonic anticoagulation management for the diagnosis of CVA, DVT, and PE. Patient checks POC INR using home INR machine. The most recent INR was 1.9 today per patient's report. HPI:  
 
Interim History: · INR Goal:  2.0-3.0 · Current warfarin regimen: 11 mg daily · Warfarin tablet strength:   1 mg, 10 mg · Total weekly dose (mg): 77 mg Today's pertinent positives includes: 
Change in diet/appetite 
-Denies any bruising/bleeding, SOB, chest pain, leg pain 
-States he had a little more greens this week - had a little more broccoli and cabbage this week 
-Denies any change in alcohol intake - states he has 1 drink 2 times per week 
-Denies any changes in medication · Adherence: · Able to recall regimen? YES 
· Miss/extra dose? NO 
· Need refill? NO Upcoming procedure(s):  NO 
 
 
Past Medical History:  
Diagnosis Date  Diabetes ([facility] [address]  Erectile dysfunction due to diseases classified elsewhere  Hematuria   
 right renal cyst  
 Hypercholesterolemia  Hypertension  Liver disease Fatty Liver  Pulmonary emboli [facility]) 2003 & 2004  
 chronic coumadin therapy managed by Dr. Manny Herman  Renal failure acute   
 following surgery  Shingles 2020  Sleep apnea   
 does not use cpap machine Allergies Allergen Reactions  Tape [Adhesive] Contact Dermatitis ALLERGIC TO SILK TAPE ONLY Current Outpatient Medications Medication Sig  semaglutide (OZEMPIC SC) by SubCUTAneous route. Taking once weekly  atenoloL (TENORMIN) 100 mg tablet Take 1 Tab by mouth daily.  warfarin (COUMADIN) 10 mg tablet TAKE 1 TABLET BY MOUTH AS DIRECTED  warfarin (COUMADIN) 1 mg tablet Take 1 tablet daily by mouth with 10 mg tablet for a total of 11 mg daily or as directed by healthcare provider.  gabapentin (NEURONTIN) 300 mg capsule Taking 2 tabs TID; from Dr. Rito Coleman  lisinopril-hydroCHLOROthiazide (PRINZIDE, ZESTORETIC) 20-12.5 mg per tablet TAKE 2 TABLETS BY MOUTH EVERY DAY.  fluticasone propionate (FLONASE) 50 mcg/actuation nasal spray 2 Sprays by Both Nostrils route daily as needed for Rhinitis.  amLODIPine (NORVASC) 10 mg tablet TAKE 1 TABLET BY MOUTH DAILY.  cloNIDine HCL (CATAPRES) 0.1 mg tablet TAKE 1 TABLET BY MOUTH TWICE DAILY.  furosemide (LASIX) 20 mg tablet TAKE 1 TABLET BY MOUTH EVERY DAY  potassium chloride (K-DUR, KLOR-CON) 20 mEq tablet TAKE 1 TABLET BY MOUTH DAILY.  HYDROcodone-acetaminophen (NORCO)  mg tablet Take 1 Tab by mouth every six (6) hours as needed for Pain.  tadalafil (CIALIS) 20 mg tablet Take 1 Tab by mouth as needed for Other (ED).  insulin glargine (BASAGLAR KWIKPEN) 100 unit/mL (3 mL) inpn Takes 50 units twice a day.  BD INSULIN PEN NEEDLE UF SHORT 31 gauge x 5/16\" ndle USE 1 PEN UTD QID  
 NOVOLOG FLEXPEN 100 unit/mL inpn Takes 14-25 units per meal per sliding scale from endocrinology  insulin syringe-needle U-100 by Does Not Apply route. Dispense ultrafine 1 mL syringes with 29 gauge needle  aspirin delayed-release 81 mg tablet Take 81 mg by mouth daily. No current facility-administered medications for this visit. Wt Readings from Last 3 Encounters:  
02/26/20 347 lb (157.4 kg) 01/13/20 347 lb (157.4 kg) 10/04/19 336 lb 3.2 oz (152.5 kg) BP Readings from Last 3 Encounters:  
02/26/20 130/80  
01/13/20 130/68  
10/04/19 136/62 Lab Results Component Value Date/Time  Sodium 137 01/13/2020 11:35 AM  
 Potassium 4.1 01/13/2020 11:35 AM  
 Chloride 99 01/13/2020 11:35 AM  
 CO2 25 01/13/2020 11:35 AM  
 Anion gap 13.0 01/13/2020 11:35 AM  
 Glucose 261 (H) 01/13/2020 11:35 AM  
 BUN 22 01/13/2020 11:35 AM  
 Creatinine 0.9 01/13/2020 11:35 AM  
 GFR est AA >60 03/24/2014 01:56 PM  
 GFR est non-AA >60 03/24/2014 01:56 PM  
 Calcium 9.3 01/13/2020 11:35 AM  
 
 
Lab Results Component Value Date/Time WBC 5.2 01/13/2020 11:35 AM  
 HGB 12.6 (L) 01/13/2020 11:35 AM  
 HCT 41.5 01/13/2020 11:35 AM  
 PLATELET 793 34/78/7153 11:35 AM  
 MCV 87 01/13/2020 11:35 AM  
 
 
Lab Results Component Value Date/Time Protein, total 7.7 01/13/2020 11:35 AM  
 Albumin 4.3 01/13/2020 11:35 AM  
 
CrCl cannot be calculated (Patient's most recent lab result is older than the maximum 180 days allowed.). INR History:   (normal INR range 0.8-1.2) Date   INR Dose/Comments 8/13/2020 1.9 11 mg daily 8/6/2020          2.6       11 mg daily 7/30/2020        2.6       11 mg daily 7/23/2020        2.7       11 mg daily 7/16/2020        2.1       11 mg daily       
7/9/2020          2.1       11 mg daily 7/2/2020          2.2       11 mg daily 6/25/2020        2.1       11 mg daily 6/18/2020        2.6       11 mg daily 6/11/2020        2.8       11 mg daily 6/4/2020          1.7       11 mg daily 5/28/2020        2.8       11 mg daily 5/21/2020        2.8       11 mg daily 5/14/2020        1.3       11 mg daily 5/7/2020          3.0       11 mg daily 4/30/2020        2.6       11 mg daily 4/23/2020        2.9       11 mg daily 4/16/2020        2.2       11 mg daily 4/9/2020          2.3       11 mg daily 4/2/2020          3.0       11 mg daily 3/26/2020        1.9       11 mg daily 3/19/2020        1.8       11 mg daily 3/12/2020        2.8       11 mg daily 3/5/2020          2.4       11 mg daily 2/27/2020        2.1       11 mg daily 2/20/2020        2.2       11 mg daily 2/13/2020        2.3       11 mg daily (took 13 mg on 2/6/2020) 
2/6/2020          1.8       11 mg daily (missed 2 doses) A/P: 
 
Anticoagulation: 
Considering Mr. Turcios's past history, todays findings, and per Anticoagulation Collaborative Practice Agreement/Protocol: 1. POC INR (1.9) is slightly Subtherapeutic for INR goal today. 2.  Continue warfarin - 11 mg daily 3. Next INR check will be in 1 week(s). -Reiterated importance of keeping intake of vitamin K in diet consistent Medication reconciliation was completed during the visit. There are no discontinued medications. A full discussion of the nature of anticoagulants has been carried out. A full discussion of the need for frequent and regular monitoring, precise dosage adjustment and compliance was stressed. Mr. Edwina Wilkinson was instructed to call [phone] if there are any signs of abnormal bleeding. Mr. Edwina Wilkinson was instructed to avoid any OTC items containing aspirin or ibuprofen and prior to starting any new OTC products to consult with his physician or pharmacist to ensure no drug interactions are present. Mr. Edwina Wilkinson was instructed to avoid any major changes in his general diet and to avoid alcohol consumption. Mr. Edwina Wilkinson verbalized his understanding of all instructions and will call the office with any questions, concerns, or signs of abnormal bleeding or blood clot. Notifications of recommendations will be sent to Dr. Ginette Waite for review. Thank you, Ana Belcher, PharmD Clinical Pharmacist Specialist 
 
CLINICAL PHARMACY CONSULT: MED RECONCILIATION/REVIEW ADDENDUM For Pharmacy Admin Tracking Only PHSO: PHSO Patient?: No 
Total # of Interventions Recommended: Count: 1 
- Maintenance Safety Lab Monitoring #: 1 Total Interventions Accepted: 1 Time Spent (min): 15 WILLIE [facility], PharmD

## 2020-08-20 ENCOUNTER — TELEPHONE (OUTPATIENT)
Dept: FAMILY MEDICINE CLINIC | Age: 50
End: 2020-08-20

## 2020-08-20 DIAGNOSIS — Z79.01 ANTICOAGULANT LONG-TERM USE: ICD-10-CM

## 2020-08-20 LAB — INR, EXTERNAL: 2.2

## 2020-08-20 NOTE — TELEPHONE ENCOUNTER
Pharmacy Note - Anticoagulation 08/20/20 S/O:  Mr. Farideh Coronado ,52 y.o. male, referred by Dr. Satya Billingsley, was contacted via an outbound telephone call today for telephonic anticoagulation management for the diagnosis of CVA, DVT, and PE. Patient checks POC INR using home INR machine. The most recent INR was 2.2 today per patient's report. HPI:  
 
Interim History: · INR Goal:  2.0-3.0 · Current warfarin regimen: 11 mg daily · Warfarin tablet strength:   1 mg, 10 mg · Total weekly dose (mg): 77 mg Today's pertinent positives includes: No significant changes since last visit 
-Denies bleeding/bruising, SOB, chest pain 
-Denies any change in intake of greens - has 2-3 times per week 
-Denies any change in alcohol intake 
-Denies any changes in medications · Adherence: · Able to recall regimen? YES 
· Miss/extra dose? NO 
· Need refill? NO Upcoming procedure(s):  NO 
 
 
Past Medical History:  
Diagnosis Date  Diabetes ([facility] [address]  Erectile dysfunction due to diseases classified elsewhere  Hematuria   
 right renal cyst  
 Hypercholesterolemia  Hypertension  Liver disease Fatty Liver  Pulmonary emboli [facility]) 2003 & 2004  
 chronic coumadin therapy managed by Dr. Zohaib Dunbar  Renal failure acute   
 following surgery  Shingles 2020  Sleep apnea   
 does not use cpap machine Allergies Allergen Reactions  Tape [Adhesive] Contact Dermatitis ALLERGIC TO SILK TAPE ONLY Current Outpatient Medications Medication Sig  
 lisinopril-hydroCHLOROthiazide (PRINZIDE, ZESTORETIC) 20-12.5 mg per tablet TAKE 2 TABLETS BY MOUTH EVERY DAY  fluticasone propionate (FLONASE) 50 mcg/actuation nasal spray SHAKE LIQUID AND USE 2 SPRAYS IN EACH NOSTRIL DAILY AS NEEDED FOR RHINITIS  semaglutide (OZEMPIC SC) 0.5 mg by SubCUTAneous route. Taking once weekly on Tuesdays  atenoloL (TENORMIN) 100 mg tablet Take 1 Tab by mouth daily.  warfarin (COUMADIN) 10 mg tablet TAKE 1 TABLET BY MOUTH AS DIRECTED  warfarin (COUMADIN) 1 mg tablet Take 1 tablet daily by mouth with 10 mg tablet for a total of 11 mg daily or as directed by healthcare provider.  gabapentin (NEURONTIN) 300 mg capsule Taking 2 tabs TID; from Dr. Yoselyn Mcbride  amLODIPine (NORVASC) 10 mg tablet TAKE 1 TABLET BY MOUTH DAILY.  cloNIDine HCL (CATAPRES) 0.1 mg tablet TAKE 1 TABLET BY MOUTH TWICE DAILY. (Patient taking differently: Take 0.2 mg by mouth daily.)  furosemide (LASIX) 20 mg tablet TAKE 1 TABLET BY MOUTH EVERY DAY  potassium chloride (K-DUR, KLOR-CON) 20 mEq tablet TAKE 1 TABLET BY MOUTH DAILY.  HYDROcodone-acetaminophen (NORCO)  mg tablet Take 1 Tab by mouth every six (6) hours as needed for Pain.  tadalafil (CIALIS) 20 mg tablet Take 1 Tab by mouth as needed for Other (ED).  insulin glargine (BASAGLAR KWIKPEN) 100 unit/mL (3 mL) inpn Takes 65 units twice a day.  BD INSULIN PEN NEEDLE UF SHORT 31 gauge x 5/16\" ndle USE 1 PEN UTD QID  
 NOVOLOG FLEXPEN 100 unit/mL inpn Takes 14-25 units per meal per sliding scale from endocrinology  insulin syringe-needle U-100 by Does Not Apply route. Dispense ultrafine 1 mL syringes with 29 gauge needle  aspirin delayed-release 81 mg tablet Take 81 mg by mouth daily. No current facility-administered medications for this visit. Wt Readings from Last 3 Encounters:  
02/26/20 347 lb (157.4 kg) 01/13/20 347 lb (157.4 kg) 10/04/19 336 lb 3.2 oz (152.5 kg) BP Readings from Last 3 Encounters:  
02/26/20 130/80  
01/13/20 130/68  
10/04/19 136/62 Lab Results Component Value Date/Time  Sodium 137 01/13/2020 11:35 AM  
 Potassium 4.1 01/13/2020 11:35 AM  
 Chloride 99 01/13/2020 11:35 AM  
 CO2 25 01/13/2020 11:35 AM  
 Anion gap 13.0 01/13/2020 11:35 AM  
 Glucose 261 (H) 01/13/2020 11:35 AM  
 BUN 22 01/13/2020 11:35 AM  
 Creatinine 0.9 01/13/2020 11:35 AM  
 GFR est AA >60 03/24/2014 01:56 PM  
 GFR est non-AA >60 03/24/2014 01:56 PM  
 Calcium 9.3 01/13/2020 11:35 AM  
 
 
Lab Results Component Value Date/Time WBC 5.2 01/13/2020 11:35 AM  
 HGB 12.6 (L) 01/13/2020 11:35 AM  
 HCT 41.5 01/13/2020 11:35 AM  
 PLATELET 070 56/18/1095 11:35 AM  
 MCV 87 01/13/2020 11:35 AM  
 
 
Lab Results Component Value Date/Time Protein, total 7.7 01/13/2020 11:35 AM  
 Albumin 4.3 01/13/2020 11:35 AM  
 
 
CrCl cannot be calculated (Patient's most recent lab result is older than the maximum 180 days allowed.). INR History:   (normal INR range 0.8-1.2) Date   INR Dose/Comments 8/20/2020 2.2 11 mg daily 8/13/2020        1.9       11 mg daily 8/6/2020          2.6       11 mg daily 7/30/2020        2.6       11 mg daily 7/23/2020        2.7       11 mg daily 7/16/2020        2.1       11 mg daily       
7/9/2020          2.1       11 mg daily 7/2/2020          2.2       11 mg daily 6/25/2020        2.1       11 mg daily 6/18/2020        2.6       11 mg daily 6/11/2020        2.8       11 mg daily 6/4/2020          1.7       11 mg daily 5/28/2020        2.8       11 mg daily 5/21/2020        2.8       11 mg daily 5/14/2020        1.3       11 mg daily 5/7/2020          3.0       11 mg daily 4/30/2020        2.6       11 mg daily 4/23/2020        2.9       11 mg daily 4/16/2020        2.2       11 mg daily 4/9/2020          2.3       11 mg daily 4/2/2020          3.0       11 mg daily 3/26/2020        1.9       11 mg daily 3/19/2020        1.8       11 mg daily 3/12/2020        2.8       11 mg daily 3/5/2020          2.4       11 mg daily 2/27/2020        2.1       11 mg daily 2/20/2020        2.2       11 mg daily 2/13/2020        2.3       11 mg daily (took 13 mg on 2/6/2020) 
2/6/2020          1.8       11 mg daily (missed 2 doses) A/P:    
 
Anticoagulation: Considering Mr. Turcios's past history, todays findings, and per Anticoagulation Collaborative Practice Agreement/Protocol: 1. POC INR (2.2) is Therapeutic for INR goal today. 2.  Continue warfarin - 11 mg daily 3. Next INR check will be in 1 week(s). A full discussion of the nature of anticoagulants has been carried out. A full discussion of the need for frequent and regular monitoring, precise dosage adjustment and compliance was stressed. Mr. Berhane Ambriz was instructed to call [phone] if there are any signs of abnormal bleeding. Mr. Berhane Ambriz was instructed to avoid any OTC items containing aspirin or ibuprofen and prior to starting any new OTC products to consult with his physician or pharmacist to ensure no drug interactions are present. Mr. Berhane Ambriz was instructed to avoid any major changes in his general diet and to avoid alcohol consumption. Mr. Berhane Ambriz verbalized his understanding of all instructions and will call the office with any questions, concerns, or signs of abnormal bleeding or blood clot. Notifications of recommendations will be sent to Dr. Rachel Rhodes for review. Thank you, Nettie Quintana, PharmD Clinical Pharmacist Specialist 
 
CLINICAL PHARMACY CONSULT: MED RECONCILIATION/REVIEW ADDENDUM For Pharmacy Admin Tracking Only PHSO: PHSO Patient?: No 
Total # of Interventions Recommended: Count: 1 
- Maintenance Safety Lab Monitoring #: 1 Total Interventions Accepted: 1 Time Spent (min): 15 Seema Mims [facility], PharmD

## 2020-08-27 ENCOUNTER — TELEPHONE (OUTPATIENT)
Dept: FAMILY MEDICINE CLINIC | Age: 50
End: 2020-08-27

## 2020-08-27 DIAGNOSIS — Z79.01 ANTICOAGULANT LONG-TERM USE: ICD-10-CM

## 2020-08-27 LAB — INR, EXTERNAL: 2.4

## 2020-08-27 NOTE — TELEPHONE ENCOUNTER
Pharmacy Note - Anticoagulation 08/27/20 S/O:  Mr. Vanessa Au ,52 y.o. male, referred by Dr. Charisse Garcia, was contacted via an outbound telephone call today for telephonic anticoagulation management for the diagnosis of CVA, DVT, and PE. Patient checks POC INR using home INR machine. The most recent INR was 2.4 today per patient's report. HPI:  
 
Interim History: · INR Goal:  2.0-3.0 · Current warfarin regimen: 11 mg daily · Warfarin tablet strength:   1 mg, 10 mg · Total weekly dose (mg): 77 mg Today's pertinent positives includes: No significant changes since last visit 
-Denies bruising/bleeding, SOB, chest pain, leg pain 
-Denies any changes in greens in diet or alcohol intake 
-Denies any changes in medications · Adherence: · Able to recall regimen? YES 
· Miss/extra dose? NO 
· Need refill? NO Upcoming procedure(s):  NO 
 
 
Past Medical History:  
Diagnosis Date  Diabetes ([facility] [address]  Erectile dysfunction due to diseases classified elsewhere  Hematuria   
 right renal cyst  
 Hypercholesterolemia  Hypertension  Liver disease Fatty Liver  Pulmonary emboli [facility]) 2003 & 2004  
 chronic coumadin therapy managed by Dr. Roth Party  Renal failure acute   
 following surgery  Shingles 2020  Sleep apnea   
 does not use cpap machine Allergies Allergen Reactions  Tape [Adhesive] Contact Dermatitis ALLERGIC TO SILK TAPE ONLY Current Outpatient Medications Medication Sig  
 lisinopril-hydroCHLOROthiazide (PRINZIDE, ZESTORETIC) 20-12.5 mg per tablet TAKE 2 TABLETS BY MOUTH EVERY DAY  fluticasone propionate (FLONASE) 50 mcg/actuation nasal spray SHAKE LIQUID AND USE 2 SPRAYS IN EACH NOSTRIL DAILY AS NEEDED FOR RHINITIS  semaglutide (OZEMPIC SC) 0.5 mg by SubCUTAneous route. Taking once weekly on Tuesdays  atenoloL (TENORMIN) 100 mg tablet Take 1 Tab by mouth daily.  warfarin (COUMADIN) 10 mg tablet TAKE 1 TABLET BY MOUTH AS DIRECTED  warfarin (COUMADIN) 1 mg tablet Take 1 tablet daily by mouth with 10 mg tablet for a total of 11 mg daily or as directed by healthcare provider.  gabapentin (NEURONTIN) 300 mg capsule Taking 2 tabs TID; from Dr. Cris Cristobal  amLODIPine (NORVASC) 10 mg tablet TAKE 1 TABLET BY MOUTH DAILY.  cloNIDine HCL (CATAPRES) 0.1 mg tablet TAKE 1 TABLET BY MOUTH TWICE DAILY. (Patient taking differently: Take 0.2 mg by mouth daily.)  furosemide (LASIX) 20 mg tablet TAKE 1 TABLET BY MOUTH EVERY DAY  potassium chloride (K-DUR, KLOR-CON) 20 mEq tablet TAKE 1 TABLET BY MOUTH DAILY.  HYDROcodone-acetaminophen (NORCO)  mg tablet Take 1 Tab by mouth every six (6) hours as needed for Pain.  tadalafil (CIALIS) 20 mg tablet Take 1 Tab by mouth as needed for Other (ED).  insulin glargine (BASAGLAR KWIKPEN) 100 unit/mL (3 mL) inpn Takes 65 units twice a day.  BD INSULIN PEN NEEDLE UF SHORT 31 gauge x 5/16\" ndle USE 1 PEN UTD QID  
 NOVOLOG FLEXPEN 100 unit/mL inpn Takes 14-25 units per meal per sliding scale from endocrinology  insulin syringe-needle U-100 by Does Not Apply route. Dispense ultrafine 1 mL syringes with 29 gauge needle  aspirin delayed-release 81 mg tablet Take 81 mg by mouth daily. No current facility-administered medications for this visit. Wt Readings from Last 3 Encounters:  
02/26/20 347 lb (157.4 kg) 01/13/20 347 lb (157.4 kg) 10/04/19 336 lb 3.2 oz (152.5 kg) BP Readings from Last 3 Encounters:  
02/26/20 130/80  
01/13/20 130/68  
10/04/19 136/62 Lab Results Component Value Date/Time  Sodium 137 01/13/2020 11:35 AM  
 Potassium 4.1 01/13/2020 11:35 AM  
 Chloride 99 01/13/2020 11:35 AM  
 CO2 25 01/13/2020 11:35 AM  
 Anion gap 13.0 01/13/2020 11:35 AM  
 Glucose 261 (H) 01/13/2020 11:35 AM  
 BUN 22 01/13/2020 11:35 AM  
 Creatinine 0.9 01/13/2020 11:35 AM  
 GFR est AA >60 03/24/2014 01:56 PM  
 GFR est non-AA >60 03/24/2014 01:56 PM  
 Calcium 9.3 01/13/2020 11:35 AM  
 
 
Lab Results Component Value Date/Time WBC 5.2 01/13/2020 11:35 AM  
 HGB 12.6 (L) 01/13/2020 11:35 AM  
 HCT 41.5 01/13/2020 11:35 AM  
 PLATELET 039 27/11/0814 11:35 AM  
 MCV 87 01/13/2020 11:35 AM  
 
 
Lab Results Component Value Date/Time Protein, total 7.7 01/13/2020 11:35 AM  
 Albumin 4.3 01/13/2020 11:35 AM  
 
 
CrCl cannot be calculated (Patient's most recent lab result is older than the maximum 180 days allowed.). INR History:   (normal INR range 0.8-1.2) Date   INR Dose/Comments 8/27/2020 2.4 11 mg daily 8/20/2020        2.2       11 mg daily 8/13/2020        1.9       11 mg daily 8/6/2020          2.6       11 mg daily 7/30/2020        2.6       11 mg daily 7/23/2020        2.7       11 mg daily 7/16/2020        2.1       11 mg daily       
7/9/2020          2.1       11 mg daily 7/2/2020          2.2       11 mg daily 6/25/2020        2.1       11 mg daily 6/18/2020        2.6       11 mg daily 6/11/2020        2.8       11 mg daily 6/4/2020          1.7       11 mg daily 5/28/2020        2.8       11 mg daily 5/21/2020        2.8       11 mg daily 5/14/2020        1.3       11 mg daily 5/7/2020          3.0       11 mg daily 4/30/2020        2.6       11 mg daily 4/23/2020        2.9       11 mg daily 4/16/2020        2.2       11 mg daily 4/9/2020          2.3       11 mg daily 4/2/2020          3.0       11 mg daily 3/26/2020        1.9       11 mg daily 3/19/2020        1.8       11 mg daily 3/12/2020        2.8       11 mg daily 3/5/2020          2.4       11 mg daily 2/27/2020        2.1       11 mg daily 2/20/2020        2.2       11 mg daily 2/13/2020        2.3       11 mg daily (took 13 mg on 2/6/2020) 
2/6/2020          1.8       11 mg daily (missed 2 doses) A/P:    
 
Anticoagulation: Considering Mr. Turcios's past history, todays findings, and per Anticoagulation Collaborative Practice Agreement/Protocol: 1. POC INR (2.4) is Therapeutic for INR goal today. 2.  Continue warfarin - 11 mg daily 3. Next INR check will be in 1 week(s). A full discussion of the nature of anticoagulants has been carried out. A full discussion of the need for frequent and regular monitoring, precise dosage adjustment and compliance was stressed. Mr. Shana Joel was instructed to call [phone] if there are any signs of abnormal bleeding. Mr. Shana Joel was instructed to avoid any OTC items containing aspirin or ibuprofen and prior to starting any new OTC products to consult with his physician or pharmacist to ensure no drug interactions are present. Mr. Shana Joel was instructed to avoid any major changes in his general diet and to avoid alcohol consumption. Mr. Shana Joel verbalized his understanding of all instructions and will call the office with any questions, concerns, or signs of abnormal bleeding or blood clot. Notifications of recommendations will be sent to Dr. Alda Damon for review. Thank you, Yina Renae, PharmD Clinical Pharmacist Specialist 
 
CLINICAL PHARMACY CONSULT: MED RECONCILIATION/REVIEW ADDENDUM For Pharmacy Admin Tracking Only PHSO: PHSO Patient?: No 
Total # of Interventions Recommended: Count: 1 
- Maintenance Safety Lab Monitoring #: 1 Total Interventions Accepted: 1 Time Spent (min): 15 Shaniqua Moore, [facility], PharmD

## 2020-09-02 ENCOUNTER — TELEPHONE (OUTPATIENT)
Dept: FAMILY MEDICINE CLINIC | Age: 50
End: 2020-09-02

## 2020-09-02 DIAGNOSIS — Z79.01 ANTICOAGULANT LONG-TERM USE: ICD-10-CM

## 2020-09-02 LAB — INR, EXTERNAL: 2.8

## 2020-09-02 NOTE — TELEPHONE ENCOUNTER
Pharmacy Note - Anticoagulation 09/02/20 S/O:  Mr. Lynne Mcburney ,52 y.o. male, referred by Dr. Rinku Luz, was contacted via an outbound telephone call today for telephonic anticoagulation management for the diagnosis of CVA, DVT, and PE. Patient checks POC INR using home INR machine. The most recent INR was 2.8 today per patient's report. HPI:  
 
Interim History: · INR Goal:  2.0-3.0 · Current warfarin regimen: 11 mg daily · Warfarin tablet strength:   1 mg, 10 mg · Total weekly dose (mg): 77 mg Today's pertinent positives includes: 
Change in diet/appetite 
-Denies any bruising/bleeding, SOB, chest pain, leg pain 
-States he had less greens this week; states he only had greens once this week 
-Denies any change in alcohol intake 
-Denies any changes in medications · Adherence: · Able to recall regimen? YES 
· Miss/extra dose? NO 
· Need refill? NO Upcoming procedure(s):  NO 
 
 
Past Medical History:  
Diagnosis Date  Diabetes ([facility] [address]  Erectile dysfunction due to diseases classified elsewhere  Hematuria   
 right renal cyst  
 Hypercholesterolemia  Hypertension  Liver disease Fatty Liver  Pulmonary emboli [facility]) 2003 & 2004  
 chronic coumadin therapy managed by Dr. Lexa Love  Renal failure acute   
 following surgery  Shingles 2020  Sleep apnea   
 does not use cpap machine Allergies Allergen Reactions  Tape [Adhesive] Contact Dermatitis ALLERGIC TO SILK TAPE ONLY Current Outpatient Medications Medication Sig  
 lisinopril-hydroCHLOROthiazide (PRINZIDE, ZESTORETIC) 20-12.5 mg per tablet TAKE 2 TABLETS BY MOUTH EVERY DAY  fluticasone propionate (FLONASE) 50 mcg/actuation nasal spray SHAKE LIQUID AND USE 2 SPRAYS IN EACH NOSTRIL DAILY AS NEEDED FOR RHINITIS  semaglutide (OZEMPIC SC) 0.5 mg by SubCUTAneous route. Taking once weekly on Tuesdays  atenoloL (TENORMIN) 100 mg tablet Take 1 Tab by mouth daily.  warfarin (COUMADIN) 10 mg tablet TAKE 1 TABLET BY MOUTH AS DIRECTED  warfarin (COUMADIN) 1 mg tablet Take 1 tablet daily by mouth with 10 mg tablet for a total of 11 mg daily or as directed by healthcare provider.  gabapentin (NEURONTIN) 300 mg capsule Taking 2 tabs TID; from Dr. Yoselyn Mcbride  amLODIPine (NORVASC) 10 mg tablet TAKE 1 TABLET BY MOUTH DAILY.  cloNIDine HCL (CATAPRES) 0.1 mg tablet TAKE 1 TABLET BY MOUTH TWICE DAILY. (Patient taking differently: Take 0.2 mg by mouth daily.)  furosemide (LASIX) 20 mg tablet TAKE 1 TABLET BY MOUTH EVERY DAY  potassium chloride (K-DUR, KLOR-CON) 20 mEq tablet TAKE 1 TABLET BY MOUTH DAILY.  HYDROcodone-acetaminophen (NORCO)  mg tablet Take 1 Tab by mouth every six (6) hours as needed for Pain.  tadalafil (CIALIS) 20 mg tablet Take 1 Tab by mouth as needed for Other (ED).  insulin glargine (BASAGLAR KWIKPEN) 100 unit/mL (3 mL) inpn Takes 65 units twice a day.  BD INSULIN PEN NEEDLE UF SHORT 31 gauge x 5/16\" ndle USE 1 PEN UTD QID  
 NOVOLOG FLEXPEN 100 unit/mL inpn Takes 14-25 units per meal per sliding scale from endocrinology  insulin syringe-needle U-100 by Does Not Apply route. Dispense ultrafine 1 mL syringes with 29 gauge needle  aspirin delayed-release 81 mg tablet Take 81 mg by mouth daily. No current facility-administered medications for this visit. Wt Readings from Last 3 Encounters:  
02/26/20 347 lb (157.4 kg) 01/13/20 347 lb (157.4 kg) 10/04/19 336 lb 3.2 oz (152.5 kg) BP Readings from Last 3 Encounters:  
02/26/20 130/80  
01/13/20 130/68  
10/04/19 136/62 Lab Results Component Value Date/Time  Sodium 137 01/13/2020 11:35 AM  
 Potassium 4.1 01/13/2020 11:35 AM  
 Chloride 99 01/13/2020 11:35 AM  
 CO2 25 01/13/2020 11:35 AM  
 Anion gap 13.0 01/13/2020 11:35 AM  
 Glucose 261 (H) 01/13/2020 11:35 AM  
 BUN 22 01/13/2020 11:35 AM  
 Creatinine 0.9 01/13/2020 11:35 AM  
 GFR est AA >60 03/24/2014 01:56 PM  
 GFR est non-AA >60 03/24/2014 01:56 PM  
 Calcium 9.3 01/13/2020 11:35 AM  
 
 
Lab Results Component Value Date/Time WBC 5.2 01/13/2020 11:35 AM  
 HGB 12.6 (L) 01/13/2020 11:35 AM  
 HCT 41.5 01/13/2020 11:35 AM  
 PLATELET 714 38/15/3806 11:35 AM  
 MCV 87 01/13/2020 11:35 AM  
 
 
Lab Results Component Value Date/Time Protein, total 7.7 01/13/2020 11:35 AM  
 Albumin 4.3 01/13/2020 11:35 AM  
 
 
CrCl cannot be calculated (Patient's most recent lab result is older than the maximum 180 days allowed.). INR History:   (normal INR range 0.8-1.2) Date INR Dose/Comments 9/2/2020 2.8 11 mg daily 8/27/2020 2.4       11 mg daily 8/20/2020 2. 2       11 mg daily 8/13/2020 1. 0       12 mg daily 8/6/2020 2. 5       14 mg daily 7/30/2020 2. 5       39 mg daily 7/23/2020 2. 7       11 mg daily 7/16/2020 2. 9       38 mg daily       
7/9/2020 2. 9       68 mg daily 7/2/2020 2. 2       11 mg daily 6/25/2020 2. 4       78 mg daily 6/18/2020 2. 5       90 mg daily 6/11/2020 2. 4       60 mg daily 6/4/2020 1. 7       11 mg daily 5/28/2020 2. 0       82 mg daily 5/21/2020 2. 2       54 mg daily 5/14/2020 1. 3       11 mg daily 5/7/2020 3.0       11 mg daily 4/30/2020 2. 1       01 mg daily 4/23/2020 2. 6       79 mg daily 4/16/2020 2. 2       11 mg daily 4/9/2020 2. 3       11 mg daily 4/2/2020 3.0       11 mg daily 3/26/2020 1. 4       90 mg daily 3/19/2020 1. 9       47 mg daily 3/12/2020 2. 1       45 mg daily 3/5/2020 2. 4       11 mg daily 2/27/2020 2. 6       00 mg daily 2/20/2020 2. 2       11 mg daily 2/13/2020 2. 3       11 mg daily (took 13 mg on 2/6/2020) 
2/6/2020     1.8       11 mg daily (missed 2 doses) 
  
 
A/P:    
 
Anticoagulation: 
Considering Mr. Turcios's past history, todays findings, and per Anticoagulation Collaborative Practice Agreement/Protocol: 1. POC INR (2.8) is Therapeutic for INR goal today. 2.  Continue warfarin - 11 mg daily 3. Next INR check will be in 1 week(s). Medication reconciliation was completed during the visit. There are no discontinued medications. A full discussion of the nature of anticoagulants has been carried out. A full discussion of the need for frequent and regular monitoring, precise dosage adjustment and compliance was stressed. Side effects of potential bleeding were discussed and Mr. Ganesh Wolfe was instructed to call [phone] if there are any signs of abnormal bleeding. Mr. Ganesh Wolfe was instructed to avoid any OTC items containing aspirin or ibuprofen and prior to starting any new OTC products to consult with his physician or pharmacist to ensure no drug interactions are present. Mr. Ganesh Wolfe was instructed to avoid any major changes in his general diet and to avoid alcohol consumption. Mr. Ganesh Wolfe verbalized his understanding of all instructions and will call the office with any questions, concerns, or signs of abnormal bleeding or blood clot. Notifications of recommendations will be sent to Dr. Yonis Boone for review. Thank you, Stan Jimenez, PharmD Clinical Pharmacist Specialist 
 
CLINICAL PHARMACY CONSULT: MED RECONCILIATION/REVIEW ADDENDUM For Pharmacy Admin Tracking Only PHSO: PHSO Patient?: No 
Total # of Interventions Recommended: Count: 1 
- Maintenance Safety Lab Monitoring #: 1 Total Interventions Accepted: 1 Time Spent (min): 15 [facility], PharmD

## 2020-09-22 ENCOUNTER — TELEPHONE (OUTPATIENT)
Dept: FAMILY MEDICINE CLINIC | Age: 50
End: 2020-09-22

## 2020-10-08 DIAGNOSIS — E87.6 HYPOKALEMIA: ICD-10-CM

## 2020-10-08 DIAGNOSIS — I10 HTN (HYPERTENSION), BENIGN: ICD-10-CM

## 2020-10-08 NOTE — TELEPHONE ENCOUNTER
Last Visit: 5/6/20 with MD Irais Vaughan  Next Appointment: 10/14/20 with MD Irais Vaughan  Previous Refill Encounter(s): 4/3/20 30 d/s with 5 refills    Requested Prescriptions     Pending Prescriptions Disp Refills    amLODIPine (NORVASC) 10 mg tablet 90 Tab 1     Sig: Take 1 Tab by mouth daily.  cloNIDine HCL (CATAPRES) 0.1 mg tablet 180 Tab 1     Sig: Take 1 Tab by mouth two (2) times a day.  potassium chloride (K-DUR, KLOR-CON) 20 mEq tablet 90 Tab 1     Sig: Take 1 Tab by mouth daily.  furosemide (LASIX) 20 mg tablet 90 Tab 1     Sig: Take 1 Tab by mouth daily.

## 2020-10-09 RX ORDER — CLONIDINE HYDROCHLORIDE 0.1 MG/1
0.1 TABLET ORAL 2 TIMES DAILY
Qty: 180 TAB | Refills: 1 | Status: SHIPPED | OUTPATIENT
Start: 2020-10-09 | End: 2021-04-05

## 2020-10-09 RX ORDER — AMLODIPINE BESYLATE 10 MG/1
10 TABLET ORAL DAILY
Qty: 90 TAB | Refills: 1 | Status: SHIPPED | OUTPATIENT
Start: 2020-10-09 | End: 2021-04-05

## 2020-10-09 RX ORDER — POTASSIUM CHLORIDE 20 MEQ/1
20 TABLET, EXTENDED RELEASE ORAL DAILY
Qty: 90 TAB | Refills: 1 | Status: SHIPPED | OUTPATIENT
Start: 2020-10-09 | End: 2021-04-05

## 2020-10-09 RX ORDER — FUROSEMIDE 20 MG/1
20 TABLET ORAL DAILY
Qty: 90 TAB | Refills: 1 | Status: SHIPPED | OUTPATIENT
Start: 2020-10-09 | End: 2021-04-11

## 2020-10-14 ENCOUNTER — VIRTUAL VISIT (OUTPATIENT)
Dept: FAMILY MEDICINE CLINIC | Age: 50
End: 2020-10-14
Payer: COMMERCIAL

## 2020-10-14 DIAGNOSIS — M51.26 HNP (HERNIATED NUCLEUS PULPOSUS), LUMBAR: Primary | ICD-10-CM

## 2020-10-14 DIAGNOSIS — M54.31 SCIATICA OF RIGHT SIDE: ICD-10-CM

## 2020-10-14 PROCEDURE — 99214 OFFICE O/P EST MOD 30 MIN: CPT | Performed by: FAMILY MEDICINE

## 2020-10-14 NOTE — PROGRESS NOTES
Mary Mayorga is a 52 y.o. male who was seen by synchronous (real-time) audio-video technology on 10/14/2020 for Back Pain (sciatica, DDD L1-l2)         Assessment & Plan:   Diagnoses and all orders for this visit:    1. HNP (herniated nucleus pulposus), lumbar  -     REFERRAL TO ORTHOPEDICS    2. Sciatica of right side        As above,    not controlled   treatment plan as listed below  Orders Placed This Encounter    REFERRAL TO ORTHOPEDICS     Spine consult  Still see pain management. meds as ordered  Follow-up and Dispositions    · Return in about 3 months (around 1/14/2021) for Chol, htn. [facility] and [facility] has been advised of same. This has been fully explained to the patient, who indicates understanding. 712  Subjective:     He has had some sciatica issues; Pt has pain that radiates from the hip to the foot. Weather may worsen sx. He has seen ortho and was referred to pain management. He takes Hydrocodone and gabapentin. His pain has been worsening; He has pain on the right side. He will see Dr. Josesito Carney soon. The referral has been placed. He has some DDD disease in the L1 - L2 areas per pt report. States that the Charisse patch worked best;  E. IJaswinder Duque stopped covering this med so he stopped taking med. PT/INR  was 2.6 last Thursday. Prior to Admission medications    Medication Sig Start Date End Date Taking? Authorizing Provider   amLODIPine (NORVASC) 10 mg tablet Take 1 Tab by mouth daily. 10/9/20  Yes Marianne Spencer MD   cloNIDine HCL (CATAPRES) 0.1 mg tablet Take 1 Tab by mouth two (2) times a day. 10/9/20  Yes Marianne Spencer MD   potassium chloride (K-DUR, KLOR-CON) 20 mEq tablet Take 1 Tab by mouth daily. 10/9/20  Yes Marianne Spencer MD   furosemide (LASIX) 20 mg tablet Take 1 Tab by mouth daily.  10/9/20  Yes Marianne Spencer MD   lisinopril-hydroCHLOROthiazide (PRINZIDE, ZESTORETIC) 20-12.5 mg per tablet TAKE 2 TABLETS BY MOUTH EVERY DAY 8/19/20  Yes Chante Bean MD   fluticasone propionate (FLONASE) 50 mcg/actuation nasal spray SHAKE LIQUID AND USE 2 SPRAYS IN EACH NOSTRIL DAILY AS NEEDED FOR RHINITIS 8/19/20  Yes Chante Bean MD   semaglutide (OZEMPIC SC) 0.5 mg by SubCUTAneous route. Taking once weekly on Tuesdays   Yes Provider, Historical   atenoloL (TENORMIN) 100 mg tablet Take 1 Tab by mouth daily. 7/11/20  Yes Chante Bean MD   warfarin (COUMADIN) 10 mg tablet TAKE 1 TABLET BY MOUTH AS DIRECTED 6/2/20  Yes Chante Bean MD   warfarin (COUMADIN) 1 mg tablet Take 1 tablet daily by mouth with 10 mg tablet for a total of 11 mg daily or as directed by healthcare provider. 6/2/20  Yes Chante Bean MD   gabapentin (NEURONTIN) 300 mg capsule Taking 2 tabs TID; from Dr. Richelle Goff   Yes Provider, Historical   HYDROcodone-acetaminophen (NORCO)  mg tablet Take 1 Tab by mouth every six (6) hours as needed for Pain. Yes Provider, Historical   tadalafil (CIALIS) 20 mg tablet Take 1 Tab by mouth as needed for Other (ED). 4/30/19  Yes Stevan Maurer MD   insulin glargine (BASAGLAR KWIKPEN) 100 unit/mL (3 mL) inpn Takes 65 units twice a day. Yes Provider, Historical   BD INSULIN PEN NEEDLE UF SHORT 31 gauge x 5/16\" ndle USE 1 PEN UTD QID 7/4/17  Yes Provider, Historical   NOVOLOG FLEXPEN 100 unit/mL inpn Takes 14-25 units per meal per sliding scale from endocrinology 12/17/16  Yes Provider, Historical   insulin syringe-needle U-100 by Does Not Apply route. Dispense ultrafine 1 mL syringes with 29 gauge needle 6/15/10  Yes Zee Graham NP   aspirin delayed-release 81 mg tablet Take 81 mg by mouth daily.    Yes Provider, Historical     Patient Active Problem List    Diagnosis Date Noted    Diabetes ([facility] [address]     Hypercholesterolemia     Hypertension     Sleep apnea     Pulmonary emboli ([facility]     Liver disease     [facility] with [facility] 07/30/2019    Chronic pain syndrome 2019    Anticoagulant long-term use 2018    Chronic pulmonary edema 01/10/2017    Pure hypercholesterolemia 01/10/2017    Primary osteoarthritis of left knee 10/28/2016    Diabetic foot infection ([address] 10/30/2015    Frontal headache 10/29/2015    Sepsis due to methicillin susceptible Staphylococcus aureus ([address] 10/29/2015    SIRS (systemic inflammatory response syndrome) ([address] 10/29/2015    History of MRSA infection 10/01/2015    HNP (herniated nucleus pulposus), lumbar 2013    Lumbar radiculopathy 2013    Carpal tunnel syndrome 2013    Radial styloid tenosynovitis 2013    Hyperlipidemia 2012    Personal history of pulmonary embolism 2012    S/P hip replacement 2012    Renal mass 2010    Tendon tear, foot 2010    Slipped capital femoral epiphysis 2010    Pulmonary hypertension ([address] 2010    HTN (hypertension), benign 2010    DIETER (obstructive sleep apnea) 2010    CVD (cardiovascular disease) 2010    Fatty liver 2010    Morbid obesity ([address] 2010    Microscopic hematuria 2010     Allergies   Allergen Reactions    Tape [Adhesive] Contact Dermatitis     ALLERGIC TO SILK TAPE ONLY         Past Medical History:   Diagnosis Date    Diabetes ([address]     Erectile dysfunction due to diseases classified elsewhere     Hematuria     right renal cyst    Hypercholesterolemia     Hypertension     Liver disease     Fatty Liver    Pulmonary emboli ([address]  &     chronic coumadin therapy managed by Dr. Olga Armijo Renal failure acute     following surgery    Shingles         Sleep apnea     does not use cpap machine     Past Surgical History:   Procedure Laterality Date    COLONOSCOPY N/A 2017    COL[facility] HX APPENDECTOMY  2006    HX CARPAL TUNNEL RELEASE Right     HX HIP REPLACEMENT Right     HX KNEE REPLACEMENT Left     HX ORTHOPAEDIC      Right toe amputation, pins and screws in foot    VASCULAR SURGERY PROCEDURE UNLIST      Jocelin filter     Family History   Problem Relation Age of Onset    Diabetes Father     Hypertension Father     Heart Disease Maternal Grandmother     Cancer Maternal Grandmother     Depression Mother     Cancer Paternal Grandfather      Social History     Tobacco Use    Smoking status: Current Every Day Smoker     Types: Cigars    Smokeless tobacco: Never Used   Substance Use Topics    Alcohol use: No       Review of Systems   Constitutional: Negative for chills and fever. Cardiovascular: Negative for chest pain and palpitations. All other systems reviewed and are negative. Objective:   No flowsheet data found. General: alert, cooperative, no distress   Mental  status: normal mood, behavior, speech, dress, motor activity, and thought processes, able to follow commands   HENT: NCAT   Neck: no visualized mass   Resp: no respiratory distress   Neuro: no gross deficits   Skin: no discoloration or lesions of concern on visible areas   Psychiatric: normal affect, consistent with stated mood, no evidence of hallucinations     Additional exam findings: none      We discussed the expected course, resolution and complications of the diagnosis(es) in detail. Medication risks, benefits, costs, interactions, and alternatives were discussed as indicated. I advised him to contact the office if his condition worsens, changes or fails to improve as anticipated. He expressed understanding with the diagnosis(es) and plan. Annel Jesus, who was evaluated through a patient-initiated, synchronous (real-time) audio-video encounter, and/or his healthcare decision maker, is aware that it is a billable service, with coverage as determined by his insurance carrier. He provided verbal consent to proceed: Yes, and patient identification was verified.  It was conducted pursuant to the emergency declaration under the [facility] authority and the [email or web address] Act. A caregiver was present when appropriate. Ability to conduct physical exam was limited. I was in the office. The patient was at home.       Elma Moss MD

## 2020-10-20 NOTE — PATIENT INSTRUCTIONS
Back Pain: Care Instructions Your Care Instructions Back pain has many possible causes. It is often related to problems with muscles and ligaments of the back. It may also be related to problems with the nerves, discs, or bones of the back. Moving, lifting, standing, sitting, or sleeping in an awkward way can strain the back. Sometimes you don't notice the injury until later. Arthritis is another common cause of back pain. Although it may hurt a lot, back pain usually improves on its own within several weeks. Most people recover in 12 weeks or less. Using good home treatment and being careful not to stress your back can help you feel better sooner. Follow-up care is a key part of your treatment and safety. Be sure to make and go to all appointments, and call your doctor if you are having problems. It's also a good idea to know your test results and keep a list of the medicines you take. How can you care for yourself at home? · Sit or lie in positions that are most comfortable and reduce your pain. Try one of these positions when you lie down: ? Lie on your back with your knees bent and supported by large pillows. ? Lie on the floor with your legs on the seat of a sofa or chair. ? Lie on your side with your knees and hips bent and a pillow between your legs. ? Lie on your stomach if it does not make pain worse. · Do not sit up in bed, and avoid soft couches and twisted positions. Bed rest can help relieve pain at first, but it delays healing. Avoid bed rest after the first day of back pain. · Change positions every 30 minutes. If you must sit for long periods of time, take breaks from sitting. Get up and walk around, or lie in a comfortable position. · Try using a heating pad on a low or medium setting for 15 to 20 minutes every 2 or 3 hours. Try a warm shower in place of one session with the heating pad. · You can also try an ice pack for 10 to 15 minutes every 2 to 3 hours. Put a thin cloth between the ice pack and your skin. · Take pain medicines exactly as directed. ? If the doctor gave you a prescription medicine for pain, take it as prescribed. ? If you are not taking a prescription pain medicine, ask your doctor if you can take an over-the-counter medicine. · Take short walks several times a day. You can start with 5 to 10 minutes, 3 or 4 times a day, and work up to longer walks. Walk on level surfaces and avoid hills and stairs until your back is better. · Return to work and other activities as soon as you can. Continued rest without activity is usually not good for your back. · To prevent future back pain, do exercises to stretch and strengthen your back and stomach. Learn how to use good posture, safe lifting techniques, and proper body mechanics. When should you call for help? Call your doctor now or seek immediate medical care if: 
  · You have new or worsening numbness in your legs.  
  · You have new or worsening weakness in your legs. (This could make it hard to stand up.)  
  · You lose control of your bladder or bowels. Watch closely for changes in your health, and be sure to contact your doctor if: 
  · You have a fever, lose weight, or don't feel well.  
  · You do not get better as expected. Where can you learn more? Go to http://www.gray.com/ Enter D694 in the search box to learn more about \"Back Pain: Care Instructions. \" Current as of: March 2, 2020               Content Version: 12.6 © 9990-7863 [employer]. Care instructions adapted under license by [employer] (which disclaims liability or warranty for this information). If you have questions about a medical condition or this instruction, always ask your healthcare professional. [place] [address] any warranty or liability for your use of this information.

## 2020-11-05 ENCOUNTER — TELEPHONE (OUTPATIENT)
Dept: FAMILY MEDICINE CLINIC | Age: 50
End: 2020-11-05

## 2020-11-05 NOTE — TELEPHONE ENCOUNTER
Pharmacy Progress Note - Telephone Call    Mr. James Lin 52 y.o. was contacted via an outbound telephone call regarding anticoagulation management today. A voicemail was left for patient to return my call. Thank you,  Dianne Garcia.  Ildefonso Templeton, PHARMD, [facility]    CLINICAL PHARMACY CONSULT: MED RECONCILIATION/REVIEW ADDENDUM    For Pharmacy Admin Tracking Only    PHSO: PHSO Patient?: No  Time Spent (min): 5

## 2020-11-12 ENCOUNTER — TELEPHONE (OUTPATIENT)
Dept: FAMILY MEDICINE CLINIC | Age: 50
End: 2020-11-12

## 2020-11-12 NOTE — TELEPHONE ENCOUNTER
Pharmacy Progress Note  - Telephone Anticoagulation Management    S/O:  Mr. Josefina Simmons ,52 y.o. male, referred by Dr. David Stein MD, was contacted via an outbound telephone call today for telephonic anticoagulation management for the diagnosis of Deep Vein Thrombosis, Pulmonary Embolism and Cardioembolic CVA. Patient uses a home POC INR machine to check INR. Patient's most recent INR was 2.1 today per patient's report. · INR Goal:  2.0-3.0    · Current warfarin regimen: 11 mg daily                      · Warfarin tablet strength:   10 mg, 1 mg  · Total weekly dose (mg): 77 mg    Today's pertinent positives includes:  No significant changes since last visit  - Denies any s/sx of bleeding  - Denies any chest pain/SOB or pain in arms or legs  - Denies any changes in leafy greens or alcohol intake  - Denies any changes in medicatons    · Adherence:   · Able to recall regimen? YES  · Miss/extra dose? NO  · Need refill? NO    Upcoming procedure(s):  NO    Wt Readings from Last 3 Encounters:   02/26/20 347 lb (157.4 kg)   01/13/20 347 lb (157.4 kg)   10/04/19 336 lb 3.2 oz (152.5 kg)     BP Readings from Last 3 Encounters:   02/26/20 130/80   01/13/20 130/68   10/04/19 136/62     Past Medical History:   Diagnosis Date    Diabetes [facility] [address]     Erectile dysfunction due to diseases classified elsewhere     Hematuria     right renal cyst    Hypercholesterolemia     Hypertension     Liver disease     Fatty Liver    Pulmonary emboli ([facility] [address] 2003 & 2004    chronic coumadin therapy managed by Dr. Kamla Roberts Renal failure acute     following surgery    Shingles     2020    Sleep apnea     does not use cpap machine     Allergies   Allergen Reactions    Tape [Adhesive] Contact Dermatitis     ALLERGIC TO SILK TAPE ONLY         Current Outpatient Medications   Medication Sig    amLODIPine (NORVASC) 10 mg tablet Take 1 Tab by mouth daily.     cloNIDine HCL (CATAPRES) 0.1 mg tablet Take 1 Tab by mouth two (2) times a day.  potassium chloride (K-DUR, KLOR-CON) 20 mEq tablet Take 1 Tab by mouth daily.  furosemide (LASIX) 20 mg tablet Take 1 Tab by mouth daily.  lisinopril-hydroCHLOROthiazide (PRINZIDE, ZESTORETIC) 20-12.5 mg per tablet TAKE 2 TABLETS BY MOUTH EVERY DAY    fluticasone propionate (FLONASE) 50 mcg/actuation nasal spray SHAKE LIQUID AND USE 2 SPRAYS IN EACH NOSTRIL DAILY AS NEEDED FOR RHINITIS    semaglutide (OZEMPIC SC) 0.5 mg by SubCUTAneous route. Taking once weekly on Tuesdays    atenoloL (TENORMIN) 100 mg tablet Take 1 Tab by mouth daily.  warfarin (COUMADIN) 10 mg tablet TAKE 1 TABLET BY MOUTH AS DIRECTED    warfarin (COUMADIN) 1 mg tablet Take 1 tablet daily by mouth with 10 mg tablet for a total of 11 mg daily or as directed by healthcare provider.  gabapentin (NEURONTIN) 300 mg capsule Taking 2 tabs TID; from Dr. Jose Raul Weeks HYDROcodone-acetaminophen [facility])  mg tablet Take 1 Tab by mouth every six (6) hours as needed for Pain.  tadalafil (CIALIS) 20 mg tablet Take 1 Tab by mouth as needed for Other (ED).  insulin glargine (BASAGLAR KWIKPEN) 100 unit/mL (3 mL) inpn Takes 65 units twice a day.  BD INSULIN PEN NEEDLE UF SHORT 31 gauge x 5/16\" ndle USE 1 PEN UTD QID    NOVOLOG FLEXPEN 100 unit/mL inpn Takes 14-25 units per meal per sliding scale from endocrinology    insulin syringe-needle U-100 by Does Not Apply route. Dispense ultrafine 1 mL syringes with 29 gauge needle    aspirin delayed-release 81 mg tablet Take 81 mg by mouth daily. No current facility-administered medications for this visit.         Lab Results   Component Value Date/Time    WBC 5.2 01/13/2020 11:35 AM    HGB 12.6 (L) 01/13/2020 11:35 AM    HCT 41.5 01/13/2020 11:35 AM    PLATELET 148 82/85/7330 11:35 AM    MCV 87 01/13/2020 11:35 AM     Lab Results   Component Value Date/Time    Sodium 137 01/13/2020 11:35 AM    Potassium 4.1 01/13/2020 11:35 AM    Chloride 99 01/13/2020 11:35 AM CO2 25 01/13/2020 11:35 AM    Anion gap 13.0 01/13/2020 11:35 AM    Glucose 261 (H) 01/13/2020 11:35 AM    BUN 22 01/13/2020 11:35 AM    Creatinine 0.9 01/13/2020 11:35 AM    GFR est AA >60 03/24/2014 01:56 PM    GFR est non-AA >60 03/24/2014 01:56 PM    Calcium 9.3 01/13/2020 11:35 AM    Bilirubin, total 0.3 01/13/2020 11:35 AM    Alk. phosphatase 82 01/13/2020 11:35 AM    Protein, total 7.7 01/13/2020 11:35 AM    Albumin 4.3 01/13/2020 11:35 AM    Globulin 3.4 01/13/2020 11:35 AM    A-G Ratio 1.3 01/13/2020 11:35 AM    ALT (SGPT) 28 01/13/2020 11:35 AM       INR History:   (normal INR range 0.8-1.2)            Date        INR    Dose/Comments        11/12/2020  2.1       11 mg daily  9/2/2020      2.8       11 mg daily  8/27/2020    2. 3       35 mg daily  8/20/2020    2. 2       11 mg daily  8/13/2020    1. 4       06 mg daily  8/6/2020      2. 4       82 mg daily  7/30/2020    2. 8       94 mg daily  7/23/2020    2. 7       11 mg daily  7/16/2020    2. 5       70 mg daily        7/9/2020      2. 3       39 mg daily  7/2/2020      2. 2       11 mg daily  6/25/2020    2. 2       27 mg daily  6/18/2020    2. 1       70 mg daily  6/11/2020    2. 4       04 mg daily  6/4/2020      1. 3       22 mg daily  5/28/2020    2. 4       85 mg daily  5/21/2020    2. 6       11 mg daily  5/14/2020    1. 9       06 mg daily  5/7/2020      3.0       11 mg daily  4/30/2020    2. 0       74 mg daily  4/23/2020    2. 9       41 mg daily  4/16/2020    2. 2       11 mg daily  4/9/2020      2. 8       58 mg daily  4/2/2020      3.0       11 mg daily  3/26/2020    1. 2       20 mg daily  3/19/2020    1. 3       59 mg daily  3/12/2020    2. 4       02 mg daily  3/5/2020      2. 1       76 mg daily  2/27/2020    2. 7       29 mg daily  2/20/2020    2. 2       11 mg daily  2/13/2020    2. 3       11 mg daily (took 13 mg on 2/6/2020)  2/6/2020     1.8       11 mg daily (missed 2 doses)        A/P:       Anticoagulation:  Considering Mr. Turcios's past history, todays findings, and per Anticoagulation Collaborative Practice Agreement/Protocol:    1. POC INR (2.1) is Therapeutic for INR goal today. 2.  Continue warfarin 11 mg daily  3. Next INR check will be in 4 week(s). Medication reconciliation was completed during the visit. There are no discontinued medications. A full discussion of the nature of anticoagulants has been carried out. A full discussion of the need for frequent and regular monitoring, precise dosage adjustment and compliance was stressed. Side effects of potential bleeding were discussed and Mr. Isael Balderas was instructed to call [phone] if there are any signs of abnormal bleeding. Mr. Isael Balderas was instructed to avoid any OTC items containing aspirin or ibuprofen and prior to starting any new OTC products to consult with his physician or pharmacist to ensure no drug interactions are present. Mr. Isael Balderas was instructed to avoid any major changes in his general diet and to avoid alcohol consumption. Mr. Isael Balderas verbalized his understanding of all instructions and will call the office with any questions, concerns, or signs of abnormal bleeding or blood clot. Notifications of recommendations will be sent to Dr. Chandrakant Pollock MD for review.     Thank you,  Dannie Pendleton, PHARMD      CLINICAL PHARMACY CONSULT: MED RECONCILIATION/REVIEW ADDENDUM    For Pharmacy Admin Tracking Only    PHSO: PHSO Patient?: No  Total # of Interventions Recommended: Count: 1  - Maintenance Safety Lab Monitoring #: 1  Total Interventions Accepted: 1  Time Spent (min): 15

## 2020-11-19 ENCOUNTER — TELEPHONE (OUTPATIENT)
Dept: FAMILY MEDICINE CLINIC | Age: 50
End: 2020-11-19

## 2020-11-19 DIAGNOSIS — M54.31 SCIATICA OF RIGHT SIDE: Primary | ICD-10-CM

## 2020-11-19 NOTE — TELEPHONE ENCOUNTER
Patient is asking if Dr. Cely Soto can fill his Norco. He said he is in between doctor's right now and his next appointment with the new one is 11/24/20. Please advise and pend new Rx if appropriate. Last visit:  10/14/20 with MD Cely Soto  Next appt:   Advised to follow-up in 3 months

## 2020-11-20 RX ORDER — HYDROCODONE BITARTRATE AND ACETAMINOPHEN 10; 325 MG/1; MG/1
1 TABLET ORAL
Qty: 28 TAB | Refills: 0 | Status: SHIPPED | OUTPATIENT
Start: 2020-11-20 | End: 2020-11-27

## 2020-12-10 ENCOUNTER — TELEPHONE (OUTPATIENT)
Dept: FAMILY MEDICINE CLINIC | Age: 50
End: 2020-12-10

## 2020-12-10 NOTE — TELEPHONE ENCOUNTER
Pharmacy Progress Note - Telephone Call    Mr. Inder Chen 52 y.o. was contacted via an outbound telephone call regarding anticoagulation mangement today. A voicemail was left for patient to return my call. Thank you,  Thierry Morrow.  Kiya Hutchinson, PHARMD, [facility]    CLINICAL PHARMACY CONSULT: MED RECONCILIATION/REVIEW ADDENDUM    For Pharmacy Admin Tracking Only    PHSO: PHSO Patient?: No  Time Spent (min): 5

## 2021-01-21 ENCOUNTER — TELEPHONE (OUTPATIENT)
Dept: FAMILY MEDICINE CLINIC | Age: 51
End: 2021-01-21

## 2021-01-21 DIAGNOSIS — Z79.01 WARFARIN ANTICOAGULATION: ICD-10-CM

## 2021-01-21 RX ORDER — WARFARIN 1 MG/1
TABLET ORAL
Qty: 90 TAB | Refills: 1 | Status: SHIPPED | OUTPATIENT
Start: 2021-01-21 | End: 2021-06-08 | Stop reason: SDUPTHER

## 2021-01-21 NOTE — TELEPHONE ENCOUNTER
Pharmacy Progress Note  - Telephone Anticoagulation Management    S/O:  Mr. Venida Eisenmenger ,54 y.o. male, referred by Dr. Francisca Sam MD, was contacted via an outbound telephone call today for telephonic anticoagulation management for the diagnosis of Deep Vein Thrombosis, Pulmonary Embolism and Cardioembolic CVA. Patient uses homes machine to check PT/INR. Patient's most recent INR was 2.1 on 1/14/2021 per his report. Interim History:    · INR Goal:  2.0-3.0    · Current warfarin regimen: 11 mg daily                      · Warfarin tablet strength:   10 mg , 1 mg  · Total weekly dose (mg): 77 mg    Today's pertinent positives includes:  No significant changes since last visit    · Adherence:   · Able to recall regimen? YES  · Miss/extra dose? NO  · Need refill? YES    Upcoming procedure(s):  NO    Wt Readings from Last 3 Encounters:   02/26/20 347 lb (157.4 kg)   01/13/20 347 lb (157.4 kg)   10/04/19 336 lb 3.2 oz (152.5 kg)     BP Readings from Last 3 Encounters:   02/26/20 130/80   01/13/20 130/68   10/04/19 136/62     Past Medical History:   Diagnosis Date    Diabetes ([facility] [address]     Erectile dysfunction due to diseases classified elsewhere     Hematuria     right renal cyst    Hypercholesterolemia     Hypertension     Liver disease     Fatty Liver    Pulmonary emboli ([facility] [address] 2003 & 2004    chronic coumadin therapy managed by Dr. Beatriz Grant Renal failure acute     following surgery    Shingles     2020    Sleep apnea     does not use cpap machine     Allergies   Allergen Reactions    Tape [Adhesive] Contact Dermatitis     ALLERGIC TO SILK TAPE ONLY         Current Outpatient Medications   Medication Sig    amLODIPine (NORVASC) 10 mg tablet Take 1 Tab by mouth daily.  cloNIDine HCL (CATAPRES) 0.1 mg tablet Take 1 Tab by mouth two (2) times a day.  potassium chloride (K-DUR, KLOR-CON) 20 mEq tablet Take 1 Tab by mouth daily.     furosemide (LASIX) 20 mg tablet Take 1 Tab by mouth daily.    lisinopril-hydroCHLOROthiazide (PRINZIDE, ZESTORETIC) 20-12.5 mg per tablet TAKE 2 TABLETS BY MOUTH EVERY DAY    fluticasone propionate (FLONASE) 50 mcg/actuation nasal spray SHAKE LIQUID AND USE 2 SPRAYS IN EACH NOSTRIL DAILY AS NEEDED FOR RHINITIS    semaglutide (OZEMPIC SC) 0.5 mg by SubCUTAneous route. Taking once weekly on Tuesdays    atenoloL (TENORMIN) 100 mg tablet Take 1 Tab by mouth daily.  warfarin (COUMADIN) 10 mg tablet TAKE 1 TABLET BY MOUTH AS DIRECTED    warfarin (COUMADIN) 1 mg tablet Take 1 tablet daily by mouth with 10 mg tablet for a total of 11 mg daily or as directed by healthcare provider.  gabapentin (NEURONTIN) 300 mg capsule Taking 2 tabs TID; from Dr. Kinga Qureshi tadalafil (CIALIS) 20 mg tablet Take 1 Tab by mouth as needed for Other (ED).  insulin glargine (BASAGLAR KWIKPEN) 100 unit/mL (3 mL) inpn Takes 65 units twice a day.  BD INSULIN PEN NEEDLE UF SHORT 31 gauge x 5/16\" ndle USE 1 PEN UTD QID    NOVOLOG FLEXPEN 100 unit/mL inpn Takes 14-25 units per meal per sliding scale from endocrinology    insulin syringe-needle U-100 by Does Not Apply route. Dispense ultrafine 1 mL syringes with 29 gauge needle    aspirin delayed-release 81 mg tablet Take 81 mg by mouth daily. No current facility-administered medications for this visit.         Lab Results   Component Value Date/Time    WBC 5.2 01/13/2020 11:35 AM    HGB 12.6 (L) 01/13/2020 11:35 AM    HCT 41.5 01/13/2020 11:35 AM    PLATELET 767 44/61/8052 11:35 AM    MCV 87 01/13/2020 11:35 AM     Lab Results   Component Value Date/Time    Sodium 137 01/13/2020 11:35 AM    Potassium 4.1 01/13/2020 11:35 AM    Chloride 99 01/13/2020 11:35 AM    CO2 25 01/13/2020 11:35 AM    Anion gap 13.0 01/13/2020 11:35 AM    Glucose 261 (H) 01/13/2020 11:35 AM    BUN 22 01/13/2020 11:35 AM    Creatinine 0.9 01/13/2020 11:35 AM    GFR est AA >60 03/24/2014 01:56 PM    GFR est non-AA >60 03/24/2014 01:56 PM    Calcium 9.3 01/13/2020 11:35 AM    Bilirubin, total 0.3 01/13/2020 11:35 AM    Alk. phosphatase 82 01/13/2020 11:35 AM    Protein, total 7.7 01/13/2020 11:35 AM    Albumin 4.3 01/13/2020 11:35 AM    Globulin 3.4 01/13/2020 11:35 AM    A-G Ratio 1.3 01/13/2020 11:35 AM    ALT (SGPT) 28 01/13/2020 11:35 AM       INR History:   (normal INR range 0.8-1.2)     Date   INR    Dose/Comments  1/14/2021    2.1       11 mg daily   11/12/2020  2.1       11 mg daily  9/2/2020      2.8       11 mg daily  8/27/2020    2.4       11 mg daily  8/20/2020    2.2       11 mg daily  8/13/2020    1.9       11 mg daily  8/6/2020      2.6       11 mg daily  7/30/2020    2.6       11 mg daily  7/23/2020    2.7       11 mg daily  7/16/2020    2.1       11 mg daily        7/9/2020      2.1       11 mg daily  7/2/2020      2.2       11 mg daily  6/25/2020    2.1       11 mg daily  6/18/2020    2.6       11 mg daily  6/11/2020    2.8       11 mg daily  6/4/2020      1.7       11 mg daily  5/28/2020    2.8       11 mg daily  5/21/2020    2.8       11 mg daily  5/14/2020    1.3       11 mg daily  5/7/2020      3.0       11 mg daily  4/30/2020    2.6       11 mg daily  4/23/2020    2.9       11 mg daily  4/16/2020    2.2       11 mg daily  4/9/2020      2.3       11 mg daily  4/2/2020      3.0       11 mg daily  3/26/2020    1.9       11 mg daily  3/19/2020    1.8       11 mg daily  3/12/2020    2.8       11 mg daily  3/5/2020      2.4       11 mg daily  2/27/2020    2.1       11 mg daily  2/20/2020    2.2       11 mg daily  2/13/2020    2.3       11 mg daily (took 13 mg on 2/6/2020)  2/6/2020     1.8       11 mg daily (missed 2 doses)      A/P:       Anticoagulation:  Considering Mr. Turcios's past history, todays findings, and per Anticoagulation Collaborative Practice Agreement/Protocol:    1. POC INR (2.1) is Therapeutic for INR goal today. 2.  Continue warfarin 11 mg daily. 3. Next INR check will be in 4 week(s).  Patient checking weekly due to insurance guidelines, but will follow up with him monthly since he has been stable on current regimen. Medication reconciliation was completed during the visit. There are no discontinued medications. A full discussion of the nature of anticoagulants has been carried out. A full discussion of the need for frequent and regular monitoring, precise dosage adjustment and compliance was stressed. Side effects of potential bleeding were discussed and Mr. Yina Williamson was instructed to call [phone] if there are any signs of abnormal bleeding. Mr. Yina Williamson was instructed to avoid any OTC items containing aspirin or ibuprofen and prior to starting any new OTC products to consult with his physician or pharmacist to ensure no drug interactions are present. Mr. Yina Williamson was instructed to avoid any major changes in his general diet and to avoid alcohol consumption. Mr. Yina Williamson verbalized his understanding of all instructions and will call the office with any questions, concerns, or signs of abnormal bleeding or blood clot. Notifications of recommendations will be sent to Dr. Veronica Graves MD for review.     Thank you,  JASON DiazD        CLINICAL PHARMACY CONSULT: MED RECONCILIATION/REVIEW ADDENDUM    For Pharmacy Admin Tracking Only    PHSO: PHSO Patient?: No  Total # of Interventions Recommended: Count: 2  - Refills Provided #: 1  - Maintenance Safety Lab Monitoring #: 1  Total Interventions Accepted: 2  Time Spent (min): 15

## 2021-02-09 DIAGNOSIS — I10 HTN (HYPERTENSION), BENIGN: ICD-10-CM

## 2021-02-10 RX ORDER — ATENOLOL 100 MG/1
TABLET ORAL
Qty: 30 TAB | Refills: 6 | Status: SHIPPED | OUTPATIENT
Start: 2021-02-10 | End: 2021-09-17

## 2021-02-10 RX ORDER — ATENOLOL 100 MG/1
TABLET ORAL
Qty: 30 TAB | Refills: 6 | Status: SHIPPED | OUTPATIENT
Start: 2021-02-10 | End: 2021-07-27

## 2021-03-09 LAB
CREATININE, EXTERNAL: 0.9
HBA1C MFR BLD HPLC: 8.6 %
LDL-C, EXTERNAL: 52

## 2021-03-13 DIAGNOSIS — I10 HTN (HYPERTENSION), BENIGN: ICD-10-CM

## 2021-03-14 RX ORDER — LISINOPRIL AND HYDROCHLOROTHIAZIDE 12.5; 2 MG/1; MG/1
TABLET ORAL
Qty: 60 TAB | Refills: 6 | Status: SHIPPED | OUTPATIENT
Start: 2021-03-14 | End: 2021-09-17

## 2021-03-18 LAB — INR, EXTERNAL: 2.6

## 2021-03-30 ENCOUNTER — TELEPHONE (OUTPATIENT)
Dept: FAMILY MEDICINE CLINIC | Age: 51
End: 2021-03-30

## 2021-03-30 NOTE — TELEPHONE ENCOUNTER
Called and talked to pt about his INR. Of 3.4. Pt took 5 mg on Thursday of coumadin and then resumed 11 mg daily thereafter. He will check his protime again This Thursday 4/1/2021. He also has a lesion near his nipple that requires examination; Pt needs OV.  Will try to set up an appointment for next Tuesday at 2:00

## 2021-04-01 ENCOUNTER — TELEPHONE (OUTPATIENT)
Dept: FAMILY MEDICINE CLINIC | Age: 51
End: 2021-04-01

## 2021-04-01 NOTE — TELEPHONE ENCOUNTER
Pharmacy Progress Note - Telephone Call    Mr. Steph Forman 54 y.o. was contacted via an outbound telephone call regarding anticoagulation management today. A voicemail was left for patient to return my call. Following up on patient's recent elevated INR of 3.4 reported last week. Adjustments made by PCP. Thank you,  Za Rosales.  Reema Mc, JASOND, [facility]    CLINICAL PHARMACY CONSULT: MED RECONCILIATION/REVIEW ADDENDUM    For Pharmacy Admin Tracking Only    PHSO: PHSO Patient?: No  Time Spent (min): 5

## 2021-04-01 NOTE — TELEPHONE ENCOUNTER
Pharmacy Progress Note  - Telephone Anticoagulation Management    S/O:  Mr. Melanie Cueva ,54 y.o. male, referred by Dr. June Valenzuela MD, was contacted via an outbound telephone call today for telephonic anticoagulation management for the diagnosis of Deep Vein Thrombosis, Pulmonary Embolism and Cardioembolic CVA. Patient checks INR using home machine. Patient's most recent INR was 3.1 on 4/1/21. · INR Goal:  2.0-3.0    · Current warfarin regimen: 11 mg once daily                      · Warfarin tablet strength:   1 mg, 10 mg   · Total weekly dose (mg): 77 mg    Today's pertinent positives includes:  Medication change  - Patient states that he was out of his 1 mg warfarin tablets for brief period of time and started to alternate 15 mg and 10 mg doses for a few days which led to an increased INR of 3.4 last week  - Patient denies any s/sx of bleeding   - Patient denies any other medications changes or diet changes       · Adherence:   · Able to recall regimen? YES  · Miss/extra dose? NO  · Need refill?  NO    Upcoming procedure(s):  NO    Wt Readings from Last 3 Encounters:   02/26/20 347 lb (157.4 kg)   01/13/20 347 lb (157.4 kg)   10/04/19 336 lb 3.2 oz (152.5 kg)     BP Readings from Last 3 Encounters:   02/26/20 130/80   01/13/20 130/68   10/04/19 136/62     Past Medical History:   Diagnosis Date    Diabetes ([facility] [address]     Erectile dysfunction due to diseases classified elsewhere     Hematuria     right renal cyst    Hypercholesterolemia     Hypertension     Liver disease     Fatty Liver    Pulmonary emboli ([facility] [address] 2003 & 2004    chronic coumadin therapy managed by Dr. Mayank Zhao Renal failure acute     following surgery    Shingles 2020    Sleep apnea     does not use cpap machine     Allergies   Allergen Reactions    Tape [Adhesive] Contact Dermatitis     ALLERGIC TO SILK TAPE ONLY         Current Outpatient Medications   Medication Sig    lisinopril-hydroCHLOROthiazide (PRINZIDE, ZESTORETIC) 20-12.5 mg per tablet TAKE 2 TABLETS BY MOUTH EVERY DAY    atenoloL (TENORMIN) 100 mg tablet TAKE 1 TABLET BY MOUTH DAILY    atenoloL (TENORMIN) 100 mg tablet TAKE 1 TABLET BY MOUTH DAILY    warfarin (COUMADIN) 1 mg tablet Take 1 tablet daily by mouth with 10 mg tablet for a total of 11 mg daily or as directed by healthcare provider.  amLODIPine (NORVASC) 10 mg tablet Take 1 Tab by mouth daily.  cloNIDine HCL (CATAPRES) 0.1 mg tablet Take 1 Tab by mouth two (2) times a day.  potassium chloride (K-DUR, KLOR-CON) 20 mEq tablet Take 1 Tab by mouth daily.  furosemide (LASIX) 20 mg tablet Take 1 Tab by mouth daily.  fluticasone propionate (FLONASE) 50 mcg/actuation nasal spray SHAKE LIQUID AND USE 2 SPRAYS IN EACH NOSTRIL DAILY AS NEEDED FOR RHINITIS    semaglutide (OZEMPIC SC) 0.5 mg by SubCUTAneous route. Taking once weekly on Tuesdays    warfarin (COUMADIN) 10 mg tablet TAKE 1 TABLET BY MOUTH AS DIRECTED    gabapentin (NEURONTIN) 300 mg capsule Taking 2 tabs TID; from Dr. rBianne Whelan tadalafil (CIALIS) 20 mg tablet Take 1 Tab by mouth as needed for Other (ED).  insulin glargine (BASAGLAR KWIKPEN) 100 unit/mL (3 mL) inpn Takes 65 units twice a day.  BD INSULIN PEN NEEDLE UF SHORT 31 gauge x 5/16\" ndle USE 1 PEN UTD QID    NOVOLOG FLEXPEN 100 unit/mL inpn Takes 14-25 units per meal per sliding scale from endocrinology    insulin syringe-needle U-100 by Does Not Apply route. Dispense ultrafine 1 mL syringes with 29 gauge needle    aspirin delayed-release 81 mg tablet Take 81 mg by mouth daily. No current facility-administered medications for this visit.         Lab Results   Component Value Date/Time    WBC 5.2 01/13/2020 11:35 AM    HGB 12.6 (L) 01/13/2020 11:35 AM    HCT 41.5 01/13/2020 11:35 AM    PLATELET 494 17/43/8091 11:35 AM    MCV 87 01/13/2020 11:35 AM     Lab Results   Component Value Date/Time    Sodium 137 01/13/2020 11:35 AM    Potassium 4.1 01/13/2020 11:35 AM Chloride 99 01/13/2020 11:35 AM    CO2 25 01/13/2020 11:35 AM    Anion gap 13.0 01/13/2020 11:35 AM    Glucose 261 (H) 01/13/2020 11:35 AM    BUN 22 01/13/2020 11:35 AM    Creatinine 0.9 01/13/2020 11:35 AM    GFR est AA >60 03/24/2014 01:56 PM    GFR est non-AA >60 03/24/2014 01:56 PM    Calcium 9.3 01/13/2020 11:35 AM    Bilirubin, total 0.3 01/13/2020 11:35 AM    Alk.  phosphatase 82 01/13/2020 11:35 AM    Protein, total 7.7 01/13/2020 11:35 AM    Albumin 4.3 01/13/2020 11:35 AM    Globulin 3.4 01/13/2020 11:35 AM    A-G Ratio 1.3 01/13/2020 11:35 AM    ALT (SGPT) 28 01/13/2020 11:35 AM       INR History:   (normal INR range 0.8-1.2)     Date   INR Dose/Comments         04/01/2021  3.1       5 mg x 1, 11 mg daily   03/24/2021  3.4       11 mg daily    1/14/2021    2.1       11 mg daily   11/12/2020  2.1       11 mg daily  9/2/2020      2.8       11 mg daily  8/27/2020    2.4       11 mg daily  8/20/2020    2.2       11 mg daily  8/13/2020    1.9       11 mg daily  8/6/2020      2.6       11 mg daily  7/30/2020    2.6       11 mg daily  7/23/2020    2.7       11 mg daily  7/16/2020    2.1       11 mg daily        7/9/2020      2.1       11 mg daily  7/2/2020      2.2       11 mg daily  6/25/2020    2.1       11 mg daily  6/18/2020    2.6       11 mg daily  6/11/2020    2.8       11 mg daily  6/4/2020      1.7       11 mg daily  5/28/2020    2.8       11 mg daily  5/21/2020    2.8       11 mg daily  5/14/2020    1.3       11 mg daily  5/7/2020      3.0       11 mg daily  4/30/2020    2.6       11 mg daily  4/23/2020    2.9       11 mg daily  4/16/2020    2.2       11 mg daily  4/9/2020      2.3       11 mg daily  4/2/2020      3.0       11 mg daily  3/26/2020    1.9       11 mg daily  3/19/2020    1.8       11 mg daily  3/12/2020    2.8       11 mg daily  3/5/2020      2.4       11 mg daily  2/27/2020    2.1       11 mg daily  2/20/2020    2.2       11 mg daily  2/13/2020    2.3       11 mg daily (took 13 mg on 2/6/2020)  2/6/2020     1.8       11 mg daily (missed 2 doses)          A/P:       Anticoagulation:  Considering Mr. Turcios's past history, todays findings, and per Anticoagulation Collaborative Practice Agreement/Protocol:    1. POC INR (3.1) is Supratherapeutic for INR goal today. 2. Hold warfarin tonight. Then Continue warfarin 11 mg once daily   3. Next INR check will be in 1 week(s). Medication reconciliation was completed during the visit. There are no discontinued medications. A full discussion of the nature of anticoagulants has been carried out. A full discussion of the need for frequent and regular monitoring, precise dosage adjustment and compliance was stressed. Side effects of potential bleeding were discussed and Mr. Pastor Laureano was instructed to call [phone] if there are any signs of abnormal bleeding. Mr. Pastor Laureano was instructed to avoid any OTC items containing aspirin or ibuprofen and prior to starting any new OTC products to consult with his physician or pharmacist to ensure no drug interactions are present. Mr. Pastor Laureano was instructed to avoid any major changes in his general diet and to avoid alcohol consumption. Mr. Pastor Laureano verbalized his understanding of all instructions and will call the office with any questions, concerns, or signs of abnormal bleeding or blood clot. Notifications of recommendations will be sent to Dr. Cipriano Nathan MD for review.     Thank you,  Shaila Pepe, ROEL        CLINICAL PHARMACY CONSULT: MED RECONCILIATION/REVIEW ADDENDUM    For Pharmacy Admin Tracking Only    PHSO: PHSO Patient?: No  Total # of Interventions Recommended: Count: 2  - Decreased Dose #: 1  - Maintenance Safety Lab Monitoring #: 1  Total Interventions Accepted: 2  Time Spent (min): 15

## 2021-04-05 DIAGNOSIS — I10 HTN (HYPERTENSION), BENIGN: ICD-10-CM

## 2021-04-05 DIAGNOSIS — E87.6 HYPOKALEMIA: ICD-10-CM

## 2021-04-05 RX ORDER — MUPIROCIN CALCIUM 20 MG/G
CREAM TOPICAL 2 TIMES DAILY
Qty: 15 G | Refills: 0 | Status: SHIPPED | OUTPATIENT
Start: 2021-04-05 | End: 2022-06-07 | Stop reason: ALTCHOICE

## 2021-04-05 RX ORDER — AMLODIPINE BESYLATE 10 MG/1
TABLET ORAL
Qty: 90 TAB | Refills: 1 | Status: SHIPPED | OUTPATIENT
Start: 2021-04-05 | End: 2021-09-17

## 2021-04-05 RX ORDER — DOXYCYCLINE 100 MG/1
100 CAPSULE ORAL 2 TIMES DAILY
Qty: 20 CAP | Refills: 0 | Status: SHIPPED | OUTPATIENT
Start: 2021-04-05 | End: 2021-04-15

## 2021-04-05 RX ORDER — POTASSIUM CHLORIDE 20 MEQ/1
TABLET, EXTENDED RELEASE ORAL
Qty: 90 TAB | Refills: 1 | Status: SHIPPED | OUTPATIENT
Start: 2021-04-05 | End: 2021-09-17

## 2021-04-05 RX ORDER — CLONIDINE HYDROCHLORIDE 0.1 MG/1
TABLET ORAL
Qty: 180 TAB | Refills: 1 | Status: SHIPPED | OUTPATIENT
Start: 2021-04-05 | End: 2021-09-17

## 2021-04-05 NOTE — TELEPHONE ENCOUNTER
In addition to requested Rx'es. Pt has reported that he has an abscess - like area around the right breast. Was able to see area on 4/3/2021. Area appear follicular ; and palpably fluctuant. Some evidence of whitish discharge at one of the follicular lesion. Will prescribe abx for area as well as a topical bactroban to the follicular areas. INI will need to refer to surgery.

## 2021-04-08 DIAGNOSIS — I10 HTN (HYPERTENSION), BENIGN: ICD-10-CM

## 2021-04-09 ENCOUNTER — TELEPHONE (OUTPATIENT)
Dept: FAMILY MEDICINE CLINIC | Age: 51
End: 2021-04-09

## 2021-04-09 NOTE — TELEPHONE ENCOUNTER
Pharmacy Progress Note - Telephone Call    Mr. Navarro Level 54 y.o. was contacted via an outbound telephone call regarding anticoagulation today. A voicemail was left for patient to return my call. Thank you,  Slim Ramirez.  Sunshine Aguilar, JASOND, [facility]      CLINICAL PHARMACY CONSULT: MED RECONCILIATION/REVIEW ADDENDUM    For Pharmacy Admin Tracking Only    PHSO: PHSO Patient?: No  Time Spent (min): 5

## 2021-04-09 NOTE — TELEPHONE ENCOUNTER
Pharmacy Progress Note  - Telephone Anticoagulation Management    S/O:  Mr. Richard Hernandez ,54 y.o. male, referred by Dr. Genna Enrique MD, was contacted via an outbound telephone call today for telephonic anticoagulation management for the diagnosis of Deep Vein Thrombosis, Pulmonary Embolism and Cardioembolic CVA. Patient checks INR using home machine. Patient's most recent INR was 2.5 today per his report. · INR Goal:  2.0-3.0    · Current warfarin regimen: Hold x 1 day. Then continue 11 mg once daily                      · Warfarin tablet strength:   1 mg, 10 mg     Today's pertinent positives includes:  No significant changes since last visit  - Denies any s/sx of bleeding  - Denies any SOB/chest pain or pain in arms or legs     · Adherence:   · Able to recall regimen? YES  · Miss/extra dose? NO  · Need refill?  NO    Upcoming procedure(s):  NO    Wt Readings from Last 3 Encounters:   02/26/20 347 lb (157.4 kg)   01/13/20 347 lb (157.4 kg)   10/04/19 336 lb 3.2 oz (152.5 kg)     BP Readings from Last 3 Encounters:   02/26/20 130/80   01/13/20 130/68   10/04/19 136/62     Past Medical History:   Diagnosis Date    Diabetes ([facility] [address]     Erectile dysfunction due to diseases classified elsewhere     Hematuria     right renal cyst    Hypercholesterolemia     Hypertension     Liver disease     Fatty Liver    Pulmonary emboli ([facility] [address] 2003 & 2004    chronic coumadin therapy managed by Dr. Marquita Spencer Renal failure acute     following surgery    Shingles     2020    Sleep apnea     does not use cpap machine     Allergies   Allergen Reactions    Tape [Adhesive] Contact Dermatitis     ALLERGIC TO SILK TAPE ONLY         Current Outpatient Medications   Medication Sig    amLODIPine (NORVASC) 10 mg tablet TAKE 1 TABLET BY MOUTH DAILY    cloNIDine HCL (CATAPRES) 0.1 mg tablet TAKE 1 TABLET BY MOUTH TWICE DAILY    potassium chloride (K-DUR, KLOR-CON) 20 mEq tablet TAKE 1 TABLET BY MOUTH DAILY    mupirocin calcium (BACTROBAN) 2 % topical cream Apply  to affected area two (2) times a day.  doxycycline (VIBRAMYCIN) 100 mg capsule Take 1 Cap by mouth two (2) times a day for 10 days.  lisinopril-hydroCHLOROthiazide (PRINZIDE, ZESTORETIC) 20-12.5 mg per tablet TAKE 2 TABLETS BY MOUTH EVERY DAY    atenoloL (TENORMIN) 100 mg tablet TAKE 1 TABLET BY MOUTH DAILY    atenoloL (TENORMIN) 100 mg tablet TAKE 1 TABLET BY MOUTH DAILY    warfarin (COUMADIN) 1 mg tablet Take 1 tablet daily by mouth with 10 mg tablet for a total of 11 mg daily or as directed by healthcare provider.  furosemide (LASIX) 20 mg tablet Take 1 Tab by mouth daily.  fluticasone propionate (FLONASE) 50 mcg/actuation nasal spray SHAKE LIQUID AND USE 2 SPRAYS IN EACH NOSTRIL DAILY AS NEEDED FOR RHINITIS    semaglutide (OZEMPIC SC) 0.5 mg by SubCUTAneous route. Taking once weekly on Tuesdays    warfarin (COUMADIN) 10 mg tablet TAKE 1 TABLET BY MOUTH AS DIRECTED    gabapentin (NEURONTIN) 300 mg capsule Taking 2 tabs TID; from Dr. Es Mcmahan tadalafil (CIALIS) 20 mg tablet Take 1 Tab by mouth as needed for Other (ED).  insulin glargine (BASAGLAR KWIKPEN) 100 unit/mL (3 mL) inpn Takes 65 units twice a day.  BD INSULIN PEN NEEDLE UF SHORT 31 gauge x 5/16\" ndle USE 1 PEN UTD QID    NOVOLOG FLEXPEN 100 unit/mL inpn Takes 14-25 units per meal per sliding scale from endocrinology    insulin syringe-needle U-100 by Does Not Apply route. Dispense ultrafine 1 mL syringes with 29 gauge needle    aspirin delayed-release 81 mg tablet Take 81 mg by mouth daily. No current facility-administered medications for this visit.         Lab Results   Component Value Date/Time    WBC 5.2 01/13/2020 11:35 AM    HGB 12.6 (L) 01/13/2020 11:35 AM    HCT 41.5 01/13/2020 11:35 AM    PLATELET 038 05/40/6425 11:35 AM    MCV 87 01/13/2020 11:35 AM     Lab Results   Component Value Date/Time    Sodium 137 01/13/2020 11:35 AM    Potassium 4.1 01/13/2020 11:35 AM    Chloride 99 01/13/2020 11:35 AM    CO2 25 01/13/2020 11:35 AM    Anion gap 13.0 01/13/2020 11:35 AM    Glucose 261 (H) 01/13/2020 11:35 AM    BUN 22 01/13/2020 11:35 AM    Creatinine 0.9 01/13/2020 11:35 AM    GFR est AA >60 03/24/2014 01:56 PM    GFR est non-AA >60 03/24/2014 01:56 PM    Calcium 9.3 01/13/2020 11:35 AM    Bilirubin, total 0.3 01/13/2020 11:35 AM    Alk.  phosphatase 82 01/13/2020 11:35 AM    Protein, total 7.7 01/13/2020 11:35 AM    Albumin 4.3 01/13/2020 11:35 AM    Globulin 3.4 01/13/2020 11:35 AM    A-G Ratio 1.3 01/13/2020 11:35 AM    ALT (SGPT) 28 01/13/2020 11:35 AM       INR History:   (normal INR range 0.8-1.2)          Date          INR Dose/Comments                           04/09/2021  2.5        Hold x 1. 11 mg daily   04/01/2021  3.1        5 mg x 1, 11 mg daily   03/24/2021  3.4       11 mg daily    1/14/2021    2.1       11 mg daily   11/12/2020  2.1       34 mg daily  9/2/2020      2.8       11 mg daily  8/27/2020    2.4       11 mg daily  8/20/2020    2.2       11 mg daily  8/13/2020    1.9       11 mg daily  8/6/2020      2.6       11 mg daily  7/30/2020    2.6       11 mg daily  7/23/2020    2.7       11 mg daily  7/16/2020    2.1       11 mg daily        7/9/2020      2.1       11 mg daily  7/2/2020      2.2       11 mg daily  6/25/2020    2.1       11 mg daily  6/18/2020    2.6       11 mg daily  6/11/2020    2.8       11 mg daily  6/4/2020      1.7       11 mg daily  5/28/2020    2.8       11 mg daily  5/21/2020    2.8       11 mg daily  5/14/2020    1.3       11 mg daily  5/7/2020      3.0       11 mg daily  4/30/2020    2.6       11 mg daily  4/23/2020    2.9       11 mg daily  4/16/2020    2.2       11 mg daily  4/9/2020      2.3       11 mg daily  4/2/2020      3.0       11 mg daily  3/26/2020    1.9       11 mg daily  3/19/2020    1.8       11 mg daily  3/12/2020    2.8       11 mg daily  3/5/2020      2.4       11 mg daily  2/27/2020    2.1       11 mg daily  2/20/2020    2.2       11 mg daily  2/13/2020    2.3       11 mg daily (took 13 mg on 2/6/2020)  2/6/2020      1.8       11 mg daily (missed 2 doses)          A/P:       Anticoagulation:  Considering Mr. Turcios's past history, todays findings, and per Anticoagulation Collaborative Practice Agreement/Protocol:    1. POC INR (2.5) is Therapeutic for INR goal today. 2.  Continue warfarin 11 mg once daily  3. Patient to continue checking INR once weekly. PharmD to follow up in 2-4 week(s). Medication reconciliation was completed during the visit. There are no discontinued medications. A full discussion of the nature of anticoagulants has been carried out. A full discussion of the need for frequent and regular monitoring, precise dosage adjustment and compliance was stressed. Side effects of potential bleeding were discussed and Mr. Tracy Castro was instructed to call [phone] if there are any signs of abnormal bleeding. Mr. Tracy Castro was instructed to avoid any OTC items containing aspirin or ibuprofen and prior to starting any new OTC products to consult with his physician or pharmacist to ensure no drug interactions are present. Mr. Tracy Castro was instructed to avoid any major changes in his general diet and to avoid alcohol consumption. Mr. Tracy Castro verbalized his understanding of all instructions and will call the office with any questions, concerns, or signs of abnormal bleeding or blood clot. Notifications of recommendations will be sent to Dr. Reyna Devine MD for review.     Thank you,  Vi Patel, PHARMD      CLINICAL PHARMACY CONSULT: MED RECONCILIATION/REVIEW ADDENDUM    For Pharmacy Admin Tracking Only    PHSO: PHSO Patient?: No  Total # of Interventions Recommended: Count: 1  - Maintenance Safety Lab Monitoring #: 1  Total Interventions Accepted: 1  Time Spent (min): 15

## 2021-04-11 RX ORDER — FUROSEMIDE 20 MG/1
TABLET ORAL
Qty: 90 TAB | Refills: 1 | Status: SHIPPED | OUTPATIENT
Start: 2021-04-11 | End: 2021-10-13

## 2021-05-15 DIAGNOSIS — N61.1 ABSCESS OF MALE BREAST: Primary | ICD-10-CM

## 2021-05-15 RX ORDER — CEPHALEXIN 500 MG/1
500 CAPSULE ORAL 2 TIMES DAILY
Qty: 20 CAP | Refills: 0 | Status: SHIPPED | OUTPATIENT
Start: 2021-05-15 | End: 2021-06-24 | Stop reason: SDUPTHER

## 2021-05-22 ENCOUNTER — TELEPHONE (OUTPATIENT)
Dept: FAMILY MEDICINE CLINIC | Age: 51
End: 2021-05-22

## 2021-05-23 NOTE — PROGRESS NOTES
Call connected by answering service. Call from Chandrakant Farfan at [facility] who reported a critical INR on patient from his home device of 1.0. [facility] is the reporting agency per patients home device. Call to patient. He verified his full name, date of birth, and address. Patient was made aware of a critical INR result of 1.0 and it was recommended he be evaluated in the ER. Patient reports he has been off of his Coumadin for the last five days as he had a Myelogram procedure three days ago and just re-started taking his Coumadin. Patient states he is unaware if Dr. Henna Santos was aware of the procedure and if he had held his medication prior to the procedure. Patient denies any chest pain, shortness of breath, and or leg pain. He reports he has had a history of blood clots and PEs in the past. He states he does not feel like he has any of this going on now and knows when he needs to go to the emergency room. Patient states he will go to the ER tomorrow if he feels any symptoms. Patient is aware that he may be asymptomatic. Patient also denies any abnormal bleeding and or bruising. I have rediscussed with him the signs and symptoms and the meaning of this critical result and he verbalizes understanding. Patient did ask if he needed to adjust his Coumadin dosage as he usually takes 11 mg a day. I informed patient that I did not have any of his medical information in front of me at this time and I would recommend he go to the ER for evaluation of this critical INR lab. Patient reports he knows his provider (Dr. Henna Santos) personally through his wife and knows how to reach her outside of the office and he will contact her in the morning.  KEYSHAWN Zabala, FNP-C

## 2021-05-27 ENCOUNTER — TELEPHONE (OUTPATIENT)
Dept: FAMILY MEDICINE CLINIC | Age: 51
End: 2021-05-27

## 2021-05-27 NOTE — TELEPHONE ENCOUNTER
Pharmacy Progress Note - Telephone Call    Mr. Navarro Level 54 y.o. was contacted via an outbound telephone call regarding anticoagulation management today. A voicemail was left for patient to return my call. Chart reviewed. PharmD unaware of procedure that resulted in a disruption of warfarin therapy. Thank you,  Slim Ramirez.  Peggy Garcia, SHARONS

## 2021-06-02 DIAGNOSIS — E78.00 HYPERCHOLESTEROLEMIA: ICD-10-CM

## 2021-06-02 DIAGNOSIS — E11.42 TYPE 2 DIABETES MELLITUS WITH DIABETIC POLYNEUROPATHY, UNSPECIFIED WHETHER LONG TERM INSULIN USE (HCC): ICD-10-CM

## 2021-06-02 DIAGNOSIS — I10 HTN (HYPERTENSION), BENIGN: Primary | ICD-10-CM

## 2021-06-04 ENCOUNTER — TELEPHONE (OUTPATIENT)
Dept: FAMILY MEDICINE CLINIC | Age: 51
End: 2021-06-04

## 2021-06-04 DIAGNOSIS — Z79.01 WARFARIN ANTICOAGULATION: ICD-10-CM

## 2021-06-08 RX ORDER — WARFARIN 10 MG/1
TABLET ORAL
Qty: 90 TABLET | Refills: 3 | Status: SHIPPED | OUTPATIENT
Start: 2021-06-08 | End: 2021-10-08

## 2021-06-08 RX ORDER — WARFARIN 1 MG/1
TABLET ORAL
Qty: 90 TABLET | Refills: 1 | Status: SHIPPED | OUTPATIENT
Start: 2021-06-08 | End: 2021-12-01 | Stop reason: SDUPTHER

## 2021-06-08 NOTE — TELEPHONE ENCOUNTER
Pharmacy Progress Note - Telephone Encounter    S/O: Mr. Steph Forman 54 y.o. male, referred by Dr. Vivi Venegas MD, was contacted via an outbound telephone call to discuss anticoagulation today. Verified patients identifiers (name & ) per HIPAA policy.     - Patient states that his INR is back on track after an interruption in therapy due to a procedure  - His most recent INR was 2.1 on 6/3/21 per his report     A/P:  - INR is therapeutic for goal of 2-3  - Continue warfarin 11 mg once daily  - Will see how PCP's office can be connected to INR reports   - Will be calling patient every 2 weeks to touch base and review INR from now on  - Refills provided for warfarin per request   - Patient endorses understanding to the provided information. All questions answered at this time. Medications Discontinued During This Encounter   Medication Reason    warfarin (COUMADIN) 1 mg tablet REORDER    warfarin (COUMADIN) 10 mg tablet REORDER     Orders Placed This Encounter    warfarin (COUMADIN) 1 mg tablet     Sig: Take 1 tablet daily by mouth with 10 mg tablet for a total of 11 mg daily or as directed by healthcare provider. Dispense:  90 Tablet     Refill:  1    warfarin (COUMADIN) 10 mg tablet     Sig: TAKE 1 TABLET BY MOUTH AS DIRECTED     Dispense:  90 Tablet     Refill:  3         Thank you,  ASHLEIGH Shoemaker        For Pharmacy Admin Tracking Only     CPA in place:  Yes   Recommendation Provided To: Patient/Caregiver: 3 via Telephone   Intervention Detail: Lab(s) Ordered and Refill(s) Provided   Total # of Interventions Recommended: 3   Total # of Interventions Accepted: 3   Intervention Accepted By: Patient/Caregiver: 3   Time Spent (min): 15

## 2021-06-18 ENCOUNTER — TELEPHONE (OUTPATIENT)
Dept: FAMILY MEDICINE CLINIC | Age: 51
End: 2021-06-18

## 2021-06-21 NOTE — TELEPHONE ENCOUNTER
Pharmacy Progress Note  - Telephone Anticoagulation Management    S/O:  Mr. Sterling Rowell ,54 y.o. male, referred by Dr. Molly Mitchell MD, was contacted via an outbound telephone call today for telephonic anticoagulation management for the diagnosis of Deep Vein Thrombosis, Pulmonary Embolism and Cardioembolic CVA. Patient checks INR using home machine. Patient's most recent INR was 2.8 on 6/17/21 per patient report. · INR Goal:  2.0-3.0    · Current warfarin regimen: 11 mg once daily                       · Warfarin tablet strength:   1 mg, 10 mg   · Total weekly dose (mg): 77 mg    Today's pertinent positives includes:  No significant changes since last visit  - Denies any s/sx of bleeding  - Denies any chest pain/SOB or pain in arms or legs   - Denies any changes in diet or medications     · Adherence:   · Able to recall regimen? YES  · Miss/extra dose? NO  · Need refill? NO    Upcoming procedure(s):  NO    Wt Readings from Last 3 Encounters:   02/26/20 347 lb (157.4 kg)   01/13/20 347 lb (157.4 kg)   10/04/19 336 lb 3.2 oz (152.5 kg)     BP Readings from Last 3 Encounters:   02/26/20 130/80   01/13/20 130/68   10/04/19 136/62     Past Medical History:   Diagnosis Date    Diabetes ([address]     Erectile dysfunction due to diseases classified elsewhere     Hematuria     right renal cyst    Hypercholesterolemia     Hypertension     Liver disease     Fatty Liver    Pulmonary emboli ([address] 2003 & 2004    chronic coumadin therapy managed by Dr. Darrell Burkitt Renal failure acute     following surgery    Shingles     2020    Sleep apnea     does not use cpap machine     Allergies   Allergen Reactions    Tape [Adhesive] Contact Dermatitis     ALLERGIC TO SILK TAPE ONLY         Current Outpatient Medications   Medication Sig    warfarin (COUMADIN) 1 mg tablet Take 1 tablet daily by mouth with 10 mg tablet for a total of 11 mg daily or as directed by healthcare provider.     warfarin (COUMADIN) 10 mg tablet TAKE 1 TABLET BY MOUTH AS DIRECTED    furosemide (LASIX) 20 mg tablet TAKE 1 TABLET BY MOUTH DAILY    amLODIPine (NORVASC) 10 mg tablet TAKE 1 TABLET BY MOUTH DAILY    cloNIDine HCL (CATAPRES) 0.1 mg tablet TAKE 1 TABLET BY MOUTH TWICE DAILY    potassium chloride (K-DUR, KLOR-CON) 20 mEq tablet TAKE 1 TABLET BY MOUTH DAILY    mupirocin calcium (BACTROBAN) 2 % topical cream Apply  to affected area two (2) times a day.  lisinopril-hydroCHLOROthiazide (PRINZIDE, ZESTORETIC) 20-12.5 mg per tablet TAKE 2 TABLETS BY MOUTH EVERY DAY    atenoloL (TENORMIN) 100 mg tablet TAKE 1 TABLET BY MOUTH DAILY    atenoloL (TENORMIN) 100 mg tablet TAKE 1 TABLET BY MOUTH DAILY    fluticasone propionate (FLONASE) 50 mcg/actuation nasal spray SHAKE LIQUID AND USE 2 SPRAYS IN EACH NOSTRIL DAILY AS NEEDED FOR RHINITIS    semaglutide (OZEMPIC SC) 0.5 mg by SubCUTAneous route. Taking once weekly on Tuesdays    gabapentin (NEURONTIN) 300 mg capsule Taking 2 tabs TID; from Dr. Tammi García tadalafil (CIALIS) 20 mg tablet Take 1 Tab by mouth as needed for Other (ED).  insulin glargine (BASAGLAR KWIKPEN) 100 unit/mL (3 mL) inpn Takes 65 units twice a day.  BD INSULIN PEN NEEDLE UF SHORT 31 gauge x 5/16\" ndle USE 1 PEN UTD QID    NOVOLOG FLEXPEN 100 unit/mL inpn Takes 14-25 units per meal per sliding scale from endocrinology    insulin syringe-needle U-100 by Does Not Apply route. Dispense ultrafine 1 mL syringes with 29 gauge needle    aspirin delayed-release 81 mg tablet Take 81 mg by mouth daily. No current facility-administered medications for this visit.        Lab Results   Component Value Date/Time    WBC 5.2 01/13/2020 11:35 AM    HGB 12.6 (L) 01/13/2020 11:35 AM    HCT 41.5 01/13/2020 11:35 AM    PLATELET 505 08/64/3824 11:35 AM    MCV 87 01/13/2020 11:35 AM     Lab Results   Component Value Date/Time    Sodium 137 01/13/2020 11:35 AM    Potassium 4.1 01/13/2020 11:35 AM    Chloride 99 01/13/2020 11:35 AM CO2 25 01/13/2020 11:35 AM    Anion gap 13.0 01/13/2020 11:35 AM    Glucose 261 (H) 01/13/2020 11:35 AM    BUN 22 01/13/2020 11:35 AM    Creatinine 0.9 01/13/2020 11:35 AM    GFR est AA >60 03/24/2014 01:56 PM    GFR est non-AA >60 03/24/2014 01:56 PM    Calcium 9.3 01/13/2020 11:35 AM    Bilirubin, total 0.3 01/13/2020 11:35 AM    Alk.  phosphatase 82 01/13/2020 11:35 AM    Protein, total 7.7 01/13/2020 11:35 AM    Albumin 4.3 01/13/2020 11:35 AM    Globulin 3.4 01/13/2020 11:35 AM    A-G Ratio 1.3 01/13/2020 11:35 AM    ALT (SGPT) 28 01/13/2020 11:35 AM       INR History:   (normal INR range 0.8-1.2)         Date           INR     Dose/Comments       06/18/2021  2.8       11 mg daily        06/03/2021  2.1       11 mg daily       04/09/2021  2.5       Hold x 1. 11 mg daily   04/01/2021  3.1        5 mg x 1, 11 mg daily   03/24/2021  3.4       71 mg daily    1/14/2021    2.1       11 mg daily   11/12/2020  2.1       67 mg daily  9/2/2020      2.8       11 mg daily  8/27/2020    2.4       11 mg daily  8/20/2020    2.2       11 mg daily  8/13/2020    1.9       11 mg daily  8/6/2020      2.6       11 mg daily  7/30/2020    2.6       11 mg daily  7/23/2020    2.7       11 mg daily  7/16/2020    2.1       11 mg daily        7/9/2020      2.1       11 mg daily  7/2/2020      2.2       11 mg daily  6/25/2020    2.1       11 mg daily  6/18/2020    2.6       11 mg daily  6/11/2020    2.8       11 mg daily  6/4/2020      1.7       11 mg daily  5/28/2020    2.8       11 mg daily  5/21/2020    2.8       11 mg daily  5/14/2020    1.3       11 mg daily  5/7/2020      3.0       11 mg daily  4/30/2020    2.6       11 mg daily  4/23/2020    2.9       11 mg daily  4/16/2020    2.2       11 mg daily  4/9/2020      2.3       11 mg daily  4/2/2020      3.0       11 mg daily  3/26/2020    1.9       11 mg daily  3/19/2020    1.8       11 mg daily  3/12/2020    2.8       11 mg daily  3/5/2020      2.4       11 mg daily  2/27/2020    2.1       11 mg daily  2/20/2020    2.2       11 mg daily  2/13/2020    2.3       11 mg daily (took 13 mg on 2/6/2020)  2/6/2020      1.8       11 mg daily (missed 2 doses)      A/P:       Anticoagulation:  Considering Mr. Turcios's past history, todays findings, and per Anticoagulation Collaborative Practice Agreement/Protocol:    1. POC INR (2.8) is Therapeutic for INR goal today. 2.  Continue warfarin 11 mg once daily   3. Next INR check will be in 2 week(s). Medication reconciliation was completed during the visit. There are no discontinued medications. A full discussion of the nature of anticoagulants has been carried out. A full discussion of the need for frequent and regular monitoring, precise dosage adjustment and compliance was stressed. Side effects of potential bleeding were discussed and Mr. Shea Butler was instructed to call [phone] if there are any signs of abnormal bleeding. Mr. Shea Butler was instructed to avoid any OTC items containing aspirin or ibuprofen and prior to starting any new OTC products to consult with his physician or pharmacist to ensure no drug interactions are present. Mr. Shea Butler was instructed to avoid any major changes in his general diet and to avoid alcohol consumption. Mr. Shea Butler verbalized his understanding of all instructions and will call the office with any questions, concerns, or signs of abnormal bleeding or blood clot. Notifications of recommendations will be sent to Dr. Jennie Villalobos MD for review. Thank you,  Sharon Boss, [address] [place] [address] in place:  Yes   Recommendation Provided To: Patient/Caregiver: 1 via Telephone   Intervention Detail: Lab(s) Ordered   Gap Closed?: No   Total # of Interventions Recommended: 1   Total # of Interventions Accepted: 1   Intervention Accepted By: Patient/Caregiver: 1   Time Spent (min): 15

## 2021-06-24 RX ORDER — CEPHALEXIN 500 MG/1
500 CAPSULE ORAL 2 TIMES DAILY
Qty: 20 CAPSULE | Refills: 0 | Status: SHIPPED | OUTPATIENT
Start: 2021-06-24 | End: 2021-07-04

## 2021-06-24 NOTE — PROGRESS NOTES
Pt has appointment with Surgery for breast Abscess on 7/6/2021. States that the abscess has returned. On 6/22/2021 pt  Requested an Abx for the absecess until appointment with surgery on 7/6/2021.

## 2021-07-02 ENCOUNTER — TELEPHONE (OUTPATIENT)
Dept: FAMILY MEDICINE CLINIC | Age: 51
End: 2021-07-02

## 2021-07-03 NOTE — TELEPHONE ENCOUNTER
Pharmacy Progress Note  - Telephone Anticoagulation Management    S/O:  Mr. Sydnie Aguilera ,54 y.o. male, referred by Dr. Josefa Dickson MD, was contacted via an outbound telephone call today for telephonic anticoagulation management for the diagnosis of Deep Vein Thrombosis, Pulmonary Embolism and Cardioembolic CVA. Patient checks INR using home machine. Patient's most recent INR was 2.5 on 7/1/21 per patient report. · INR Goal:  2.0-3.0    · Current warfarin regimen: 11 mg once daily                      · Warfarin tablet strength:   1 mg, 10 mg   · Total weekly dose (mg): 77 mg    Today's pertinent positives includes:  No significant changes since last visit  - Denies any s/sx of bleeding  - Denies any chest pain/SOB or pain in arms or legs  - Denies any changes to diet or medications     · Adherence:   · Able to recall regimen? YES  · Miss/extra dose? NO  · Need refill? NO    Upcoming procedure(s):  NO    Wt Readings from Last 3 Encounters:   02/26/20 347 lb (157.4 kg)   01/13/20 347 lb (157.4 kg)   10/04/19 336 lb 3.2 oz (152.5 kg)     BP Readings from Last 3 Encounters:   02/26/20 130/80   01/13/20 130/68   10/04/19 136/62     Past Medical History:   Diagnosis Date    Diabetes ([facility]     Erectile dysfunction due to diseases classified elsewhere     Hematuria     right renal cyst    Hypercholesterolemia     Hypertension     Liver disease     Fatty Liver    Pulmonary emboli ([facility] 2003 & 2004    chronic coumadin therapy managed by Dr. Tammy Gee Renal failure acute     following surgery    Shingles     2020    Sleep apnea     does not use cpap machine     Allergies   Allergen Reactions    Tape [Adhesive] Contact Dermatitis     ALLERGIC TO SILK TAPE ONLY         Current Outpatient Medications   Medication Sig    cephALEXin (KEFLEX) 500 mg capsule Take 1 Capsule by mouth two (2) times a day for 10 days.     warfarin (COUMADIN) 1 mg tablet Take 1 tablet daily by mouth with 10 mg tablet for a total of 11 mg daily or as directed by healthcare provider.  warfarin (COUMADIN) 10 mg tablet TAKE 1 TABLET BY MOUTH AS DIRECTED    furosemide (LASIX) 20 mg tablet TAKE 1 TABLET BY MOUTH DAILY    amLODIPine (NORVASC) 10 mg tablet TAKE 1 TABLET BY MOUTH DAILY    cloNIDine HCL (CATAPRES) 0.1 mg tablet TAKE 1 TABLET BY MOUTH TWICE DAILY    potassium chloride (K-DUR, KLOR-CON) 20 mEq tablet TAKE 1 TABLET BY MOUTH DAILY    mupirocin calcium (BACTROBAN) 2 % topical cream Apply  to affected area two (2) times a day.  lisinopril-hydroCHLOROthiazide (PRINZIDE, ZESTORETIC) 20-12.5 mg per tablet TAKE 2 TABLETS BY MOUTH EVERY DAY    atenoloL (TENORMIN) 100 mg tablet TAKE 1 TABLET BY MOUTH DAILY    atenoloL (TENORMIN) 100 mg tablet TAKE 1 TABLET BY MOUTH DAILY    fluticasone propionate (FLONASE) 50 mcg/actuation nasal spray SHAKE LIQUID AND USE 2 SPRAYS IN EACH NOSTRIL DAILY AS NEEDED FOR RHINITIS    semaglutide (OZEMPIC SC) 0.5 mg by SubCUTAneous route. Taking once weekly on Tuesdays    gabapentin (NEURONTIN) 300 mg capsule Taking 2 tabs TID; from Dr. Alecia Donovan tadalafil (CIALIS) 20 mg tablet Take 1 Tab by mouth as needed for Other (ED).  insulin glargine (BASAGLAR KWIKPEN) 100 unit/mL (3 mL) inpn Takes 65 units twice a day.  BD INSULIN PEN NEEDLE UF SHORT 31 gauge x 5/16\" ndle USE 1 PEN UTD QID    NOVOLOG FLEXPEN 100 unit/mL inpn Takes 14-25 units per meal per sliding scale from endocrinology    insulin syringe-needle U-100 by Does Not Apply route. Dispense ultrafine 1 mL syringes with 29 gauge needle    aspirin delayed-release 81 mg tablet Take 81 mg by mouth daily. No current facility-administered medications for this visit.        Lab Results   Component Value Date/Time    WBC 5.2 01/13/2020 11:35 AM    HGB 12.6 (L) 01/13/2020 11:35 AM    HCT 41.5 01/13/2020 11:35 AM    PLATELET 098 75/99/0603 11:35 AM    MCV 87 01/13/2020 11:35 AM     Lab Results   Component Value Date/Time    Sodium 137 01/13/2020 11:35 AM    Potassium 4.1 01/13/2020 11:35 AM    Chloride 99 01/13/2020 11:35 AM    CO2 25 01/13/2020 11:35 AM    Anion gap 13.0 01/13/2020 11:35 AM    Glucose 261 (H) 01/13/2020 11:35 AM    BUN 22 01/13/2020 11:35 AM    Creatinine 0.9 01/13/2020 11:35 AM    GFR est AA >60 03/24/2014 01:56 PM    GFR est non-AA >60 03/24/2014 01:56 PM    Calcium 9.3 01/13/2020 11:35 AM    Bilirubin, total 0.3 01/13/2020 11:35 AM    Alk.  phosphatase 82 01/13/2020 11:35 AM    Protein, total 7.7 01/13/2020 11:35 AM    Albumin 4.3 01/13/2020 11:35 AM    Globulin 3.4 01/13/2020 11:35 AM    A-G Ratio 1.3 01/13/2020 11:35 AM    ALT (SGPT) 28 01/13/2020 11:35 AM       INR History:   (normal INR range 0.8-1.2)     Date   INR Dose/Comments      07/01/2021  2.5        11 mg daily         06/18/2021  2.8       11 mg daily        06/03/2021  2.1       11 mg daily       04/09/2021  2.5       Hold x 1. 11 mg daily   04/01/2021  3.1        5 mg x 1, 11 mg daily   03/24/2021  3.4       40 mg daily    1/14/2021    2.1       11 mg daily   11/12/2020  2.1       40 mg daily  9/2/2020      2.8       11 mg daily  8/27/2020    2.4       11 mg daily  8/20/2020    2.2       11 mg daily  8/13/2020    1.9       11 mg daily  8/6/2020      2.6       11 mg daily  7/30/2020    2.6       11 mg daily  7/23/2020    2.7       11 mg daily  7/16/2020    2.1       11 mg daily        7/9/2020      2.1       11 mg daily  7/2/2020      2.2       11 mg daily  6/25/2020    2.1       11 mg daily  6/18/2020    2.6       11 mg daily  6/11/2020    2.8       11 mg daily  6/4/2020      1.7       11 mg daily  5/28/2020    2.8       11 mg daily  5/21/2020    2.8       11 mg daily  5/14/2020    1.3       11 mg daily  5/7/2020      3.0       11 mg daily  4/30/2020    2.6       11 mg daily  4/23/2020    2.9       11 mg daily  4/16/2020    2.2       11 mg daily  4/9/2020      2.3       11 mg daily  4/2/2020      3.0       11 mg daily  3/26/2020    1.9       11 mg daily  3/19/2020    1.8       11 mg daily  3/12/2020    2.8       11 mg daily  3/5/2020      2.4       11 mg daily  2/27/2020    2.1       11 mg daily  2/20/2020    2.2       11 mg daily  2/13/2020    2.3       11 mg daily (took 13 mg on 2/6/2020)  2/6/2020      1.8       11 mg daily (missed 2 doses)          A/P:       Anticoagulation:  Considering Mr. Turcios's past history, todays findings, and per Anticoagulation Collaborative Practice Agreement/Protocol:    1. POC INR (2.5) is Therapeutic for INR goal today. 2.  Continue warfarin 11 mg once daily  3. Next INR check will be in 2 week(s). Medication reconciliation was completed during the visit. There are no discontinued medications. A full discussion of the nature of anticoagulants has been carried out. A full discussion of the need for frequent and regular monitoring, precise dosage adjustment and compliance was stressed. Side effects of potential bleeding were discussed and Mr. Sofía Mccoy was instructed to call [phone] if there are any signs of abnormal bleeding. Mr. Sofía Mccoy was instructed to avoid any OTC items containing aspirin or ibuprofen and prior to starting any new OTC products to consult with his physician or pharmacist to ensure no drug interactions are present. Mr. Sofía Mccoy was instructed to avoid any major changes in his general diet and to avoid alcohol consumption. Mr. Sofía Mccoy verbalized his understanding of all instructions and will call the office with any questions, concerns, or signs of abnormal bleeding or blood clot. Notifications of recommendations will be sent to Dr. Lilyan Severin, MD for review. Thank you,  Brandon De Oliveira, [address] [place] [address] in place:  Yes   Recommendation Provided To: Patient/Caregiver: 1 via Telephone   Intervention Detail: Lab(s) Ordered   Gap Closed?: No   Total # of Interventions Recommended: 1   Total # of Interventions Accepted: 1   Intervention Accepted By: Patient/Caregiver: 1   Time Spent (min): 15

## 2021-07-06 ENCOUNTER — OFFICE VISIT (OUTPATIENT)
Dept: SURGERY | Age: 51
End: 2021-07-06
Payer: COMMERCIAL

## 2021-07-06 VITALS
OXYGEN SATURATION: 99 % | TEMPERATURE: 98.1 F | DIASTOLIC BLOOD PRESSURE: 75 MMHG | HEART RATE: 72 BPM | RESPIRATION RATE: 17 BRPM | BODY MASS INDEX: 40.43 KG/M2 | SYSTOLIC BLOOD PRESSURE: 165 MMHG | HEIGHT: 74 IN | WEIGHT: 315 LBS

## 2021-07-06 DIAGNOSIS — N61.1 ABSCESS OF RIGHT BREAST: Primary | ICD-10-CM

## 2021-07-06 PROCEDURE — 99205 OFFICE O/P NEW HI 60 MIN: CPT | Performed by: SURGERY

## 2021-07-06 RX ORDER — SODIUM CHLORIDE 0.9 % (FLUSH) 0.9 %
5-40 SYRINGE (ML) INJECTION EVERY 8 HOURS
Status: CANCELLED | OUTPATIENT
Start: 2021-07-06

## 2021-07-06 RX ORDER — SODIUM CHLORIDE 0.9 % (FLUSH) 0.9 %
5-40 SYRINGE (ML) INJECTION AS NEEDED
Status: CANCELLED | OUTPATIENT
Start: 2021-07-06

## 2021-07-06 NOTE — PATIENT INSTRUCTIONS
?????? ?????: ?????? ???????????  Skin Abscess: Care Instructions  ????? ?????? ???????????    ?????? ????? ??? ???? ??????????? ???? ??????? ?? ?????? ???? ???? ??? ???? ???? ?? ????? ?????? ?????? ???? ??? ??? ?????? ???? ??????? ?????? ????? ???? ???? ?????? ???? ???? ????????? ?? ?????? ????? ????? ???? ?????? ?????? ???? ???? ??? ???? ?????? ???? ????? ????? ?????????????????? ??????? ??? ????? ????? ??????? ??? ???? ???? ??????? ???? ?????? ????? ????????? ???? ???? ??? ????? ????  ??????? ?????? ???? ?????? ??????? ??????, ??? ?????????? ??? ???? ??? ????? ??? ???? ???? ?????? ?? ???? ????? ?????? ??? ?????, ??? ???? ??????? ???? ??? ???.  ????? ??????? ? ?????????? ???????? ??? ?? ????? ????? ?? ?????? ??? ?? ?????? ??? ??? ????? ????? ????????? ??? ???? ??????????????? ?????? ????? ??????? ?? ??? ????? ????????????????? ???? ??? ???? ???? ???? ??? ??????? ???? ???? ????? ??????? ????? ????? ????? ??????? ???????? ????? ???? ??? ???? ?? ????? ???? ??????? ????, ??? ?????? ????? ???? ????? ??? ???? ????????  ?????? ???? ?????? ????? ???? ??????  · ?????? ???? ???? ??? ??? ????? ???? ?????, ?? ??? ???? ?? ????? ?? ????? ????? ?????? ?? ???? 3 ???? 4 ??? ???? ??? ???? ???? ?????? ?????? ????? ???? ??? ??? ???????? ??????? ???? ???? ??????  · ??????? ??? ??????????????? ???? ?????, ????? ??? ??????? ???? ?? ??????? ????? ???? ????? ??? ????? ?? ??? ??? ???? ????? ???? ????? ??? ????? ????????????????? ???????? ????? ????? ?????  · ???????? ??????? ??????? ?????? ???? ????  ? ????? ??????? ??? ?????? ?????? ???? ???, ????? ??? ??????? ???? ?? ??????? ?????  ? ??? ???? ?????? ???? ?????????????? ???? ?? ??? ????? ????? ????????? ???????? ???? ?? ???? ?????? ????? ???? ???????????? ????? ???? ???? ?????? ?????  · ????? ??????????? ???????? ??? ????? ?????? ??????????? ???? ???? ?? ????? ??? ???? ?? ???? ????? ????? ??????  · ??? ??????? ?? ???? ?????? ????:  ? ???? ???????? ???? ?? ????? ???-?? ??????????????? ?????? ????? ??????? ??? ???? ???? ????? ????, ????? ??????? ??? ????? ??? ????? ????? ???? ???? ???? ??? ??? ????  ? ???? ??? ??? ?????? ??, ?????? ?? ?????? ????? ???? ???, ?????? ???? ????? 15 ???? 20 ????? ??????? ??? ??? ?????? ??????  ????? ??? ?????? ????? ?????  ???? ????? ????????? ?????? ???? ???????? ??????? ?????? ???, ???:  · ????? ??????? ??? ??????? ???? ?????? ?????, ????:  ? ????? ???? ?????, ???? ?????, ?? ??? ???? ?? ???? ??? ??? ?????  ? ???????? ???? ???? ??? ????  ? ??????? ???? ???? ??????  ? ???? ?????  ????? ??????? ?????????????? ?????? ?????? ?????? ???? ??? ????????? ???? ???? ????, ???:  · ????? ??? ??? ????? ?? ??? ?????  ?? ????? ???? ???? ???? ??? ???? ????? ???????  ??? ???   [email or web address]  ?? ????? D633 ??? ????? ???? ????????? ?????? ????? \"?????? ?????: ?????? ???????????.\"  ??????? ?????? ???????: 02 ????? 2020               ??? ??????????? ???????: 12.8  © [id number] [employer]?   ?????????? ????? ????? ??????????? ?????? ??????????? ????? ????????? ????????? ??????? ????????? ?? ??? ???? ?? ????????? ????? ????? ???? ?????? ?????, ???? ?? ???? ???? ????? ????????? ?????????? ??? ???? ?? ????? ??????  [employer] - ????? ?? ???? ??????? ???? ???? ??? ??? ?? ?? ????????? ??? ???

## 2021-07-06 NOTE — PROGRESS NOTES
[facility]  General Surgery    Subjective:      HPI: Patient is a very pleasant morbidly obese-BMI of 49.61 kg per metered square 68-year-old male with a past medical history which is extensive and remarkable for hypertension, diabetes mellitus type 2 with diabetic neuropathy, hypercholesterolemia, obstructive sleep apnea, chronic pulmonary edema, pulmonary hypertension, history of pulmonary emboli on anticoagulation with Coumadin and status post [place] filter placement, history of herniated nucleus pulposis and status post hip replacement. The patient is referred by Dr. Raquel Vizcaino for evaluation and management of a abscess of the right breast.  Patient states that the abscess first occurred approximately 2 months ago. It becomes enlarged and then drains. He is on his third round of antibiotics-Keflex. He currently has no fever or chills. He has no history of injury to the breast.  Family history of breast cancer and maternal grandmother diagnosed in her 76s. Paternal grandfather had colon cancer in his 62s. Maternal grandfather had a cancer type of unknown to the patient and passed away in his 46s. He is unaware of any other cancer history in the family.     Patient Active Problem List    Diagnosis Date Noted    Diabetes ([address]     Hypercholesterolemia     Hypertension     Sleep apnea     Pulmonary emboli ([address]     Liver disease     Type 2 diabetes mellitus with diabetic neuropathy ([address] 07/30/2019    Chronic pain syndrome 05/17/2019    Anticoagulant long-term use 11/09/2018    Chronic pulmonary edema 01/10/2017    Pure hypercholesterolemia 01/10/2017    Primary osteoarthritis of left knee 10/28/2016    Diabetic foot infection ([address] 10/30/2015    Frontal headache 10/29/2015    Sepsis due to methicillin susceptible Staphylococcus aureus ([address] 10/29/2015    SIRS (systemic inflammatory response syndrome) ([address] 10/29/2015    History of MRSA infection 10/01/2015    HNP (herniated nucleus pulposus), lumbar 12/20/2013    Lumbar radiculopathy 12/20/2013    Carpal tunnel syndrome 07/12/2013    Radial styloid tenosynovitis 07/12/2013    Hyperlipidemia 02/07/2012    Personal history of pulmonary embolism 02/07/2012    S/P hip replacement 02/07/2012    Renal mass 04/19/2010    Tendon tear, foot 04/19/2010    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    Pulmonary hypertension ([address] 04/16/2010    HTN (hypertension), benign 04/16/2010    DIETER (obstructive sleep apnea) 04/16/2010    CVD (cardiovascular disease) 04/16/2010    Fatty liver 04/16/2010    Morbid obesity ([address] 04/16/2010    Microscopic hematuria 04/16/2010     Past Medical History:   Diagnosis Date    Diabetes ([address]     Erectile dysfunction due to diseases classified elsewhere     Hematuria     right renal cyst    Hypercholesterolemia     Hypertension     Liver disease     Fatty Liver    Pulmonary emboli ([address] 2003 & 2004    [facility] Renal failure acute     following surgery    Shingles     2020    Sleep apnea     does not use cpap machine      Past Surgical History:   Procedure Laterality Date    COLONOSCOPY N/A 11/14/2017    COLON[facility]COPY performed by Matt [facility] at [facility] HX APPENDECTOMY  2006    HX CARPAL TUNNEL RELEASE Right     HX HIP REPLACEMENT Right     HX KNEE REPLACEMENT Left     HX ORTHOPAEDIC      Right toe amputation, pins and screws in foot    VASCULAR SURGERY PROCEDURE UNLIST      [place] filter      Family History   Problem Relation Age of Onset    Diabetes Father     Hypertension Father     Heart Disease Maternal Grandmother     Cancer Maternal Grandmother     Depression Mother     Cancer Paternal Grandfather       Social History     Tobacco Use    Smoking status: Current Every Day Smoker     Types: Cigars    Smokeless tobacco: Never Used   Substance Use Topics    Alcohol use: No      Allergies   Allergen Reactions    Tape [Adhesive] Contact Dermatitis     ALLERGIC TO SILK TAPE ONLY           Prior to Admission medications    Medication Sig Start Date End Date Taking? Authorizing Provider   warfarin (COUMADIN) 1 mg tablet Take 1 tablet daily by mouth with 10 mg tablet for a total of 11 mg daily or as directed by healthcare provider. 6/8/21  Yes Herbert Paulino MD   warfarin (COUMADIN) 10 mg tablet TAKE 1 TABLET BY MOUTH AS DIRECTED 6/8/21  Yes Herbert Paulino MD   furosemide (LASIX) 20 mg tablet TAKE 1 TABLET BY MOUTH DAILY 4/11/21  Yes Herbert Paulino MD   amLODIPine (NORVASC) 10 mg tablet TAKE 1 TABLET BY MOUTH DAILY 4/5/21  Yes Herbert Paulino MD   cloNIDine HCL (CATAPRES) 0.1 mg tablet TAKE 1 TABLET BY MOUTH TWICE DAILY 4/5/21  Yes Herbert Paulino MD   potassium chloride (K-DUR, KLOR-CON) 20 mEq tablet TAKE 1 TABLET BY MOUTH DAILY 4/5/21  Yes Herbert Paulino MD   mupirocin calcium (BACTROBAN) 2 % topical cream Apply  to affected area two (2) times a day. 4/5/21  Yes Herbert Paulino MD   lisinopril-hydroCHLOROthiazide (PRINZIDE, ZESTORETIC) 20-12.5 mg per tablet TAKE 2 TABLETS BY MOUTH EVERY DAY 3/14/21  Yes Herbert Paulino MD   atenoloL (TENORMIN) 100 mg tablet TAKE 1 TABLET BY MOUTH DAILY 2/10/21  Yes Herbert Paulino MD   fluticasone propionate (FLONASE) 50 mcg/actuation nasal spray SHAKE LIQUID AND USE 2 SPRAYS IN EACH NOSTRIL DAILY AS NEEDED FOR RHINITIS 8/19/20  Yes Herbert Paulino MD   semaglutide (OZEMPIC SC) 0.5 mg by SubCUTAneous route. Taking once weekly on Tuesdays   Yes Provider, Historical   gabapentin (NEURONTIN) 300 mg capsule Taking 2 tabs TID; from Dr. Homer Reyes   Yes Provider, Historical   tadalafil (CIALIS) 20 mg tablet Take 1 Tab by mouth as needed for Other (ED). 4/30/19  Yes Selene Smith MD   insulin glargine (BASAGLAR KWIKPEN) 100 unit/mL (3 mL) inpn Takes 65 units twice a day.    Yes Provider, Historical   BD INSULIN PEN NEEDLE UF SHORT 31 gauge x 5/16\" ndle USE 1 PEN UTD QID 7/4/17  Yes Provider, Historical   NOVOLOG FLEXPEN 100 unit/mL inpn Takes 14-25 units per meal per sliding scale from endocrinology 12/17/16  Yes Provider, Historical   insulin syringe-needle U-100 by Does Not Apply route. Dispense ultrafine 1 mL syringes with 29 gauge needle 6/15/10  Yes Zee Graham NP   aspirin delayed-release 81 mg tablet Take 81 mg by mouth daily. Yes Provider, Historical   atenoloL (TENORMIN) 100 mg tablet TAKE 1 TABLET BY MOUTH DAILY  Patient not taking: Reported on 7/6/2021 2/10/21   Gloria Pierre MD       Review of Systems:    14 systems were reviewed. The results are as above in the HPI and otherwise negative. Objective:     Vitals:    07/06/21 1043 07/06/21 1048   BP: (!) 160/70 (!) 165/75   Pulse: 72 72   Resp: 17    Temp: 98.1 °F (36.7 °C)    SpO2: 99%    Weight: (!) 175.5 kg (387 lb)    Height: 6' 2\" (1.88 m)        Physical Exam:  GENERAL: alert, cooperative, no distress, appears stated age,   EYE: conjunctivae/corneas clear. PERRL, EOM's intact. THROAT & NECK: normal and no erythema or exudates noted. ,    LYMPHATIC: Cervical, supraclavicular, and axillary nodes normal. ,   LUNG: clear to auscultation bilaterally,   HEART: regular rate and rhythm, S1, S2 normal, no murmur, click, rub or gallop,   BREASTS:   Left: No dimpling, discoloration, nipple inversion or retractions. No axillary or supraclavicular lymphadenopathy. No mass  Right: No dimpling, discoloration, nipple inversion or retractions. No axillary or supraclavicular lymphadenopathy. 1 cm soft mass at the border of the vermilion in the 5 o'clock position right breast no surrounding cellulitis fluctuance or crepitance. ABDOMEN: soft, non-tender. Bowel sounds normal. No masses,  no organomegaly,   EXTREMITIES:  extremities normal, atraumatic, no cyanosis or edema,   SKIN: Normal.,   NEUROLOGIC: AOx3. Cranial nerves 2-12 and sensation grossly intact. ,     Data Review:  to be done    Impression:     · Patient with persistent right breast abscess that has failed medical management    Plan:     · Excisional biopsy right breast abscess  · Consent on chart  · Preoperative orders written

## 2021-07-06 NOTE — H&P (VIEW-ONLY)
[facility]  General Surgery    Subjective:      HPI: Patient is a very pleasant morbidly obese-BMI of 49.61 kg per metered square 51-year-old male with a past medical history which is extensive and remarkable for hypertension, diabetes mellitus type 2 with diabetic neuropathy, hypercholesterolemia, obstructive sleep apnea, chronic pulmonary edema, pulmonary hypertension, history of pulmonary emboli on anticoagulation with Coumadin and status post [place] filter placement, history of herniated nucleus pulposis and status post hip replacement. The patient is referred by Dr. Brenda Junior for evaluation and management of a abscess of the right breast.  Patient states that the abscess first occurred approximately 2 months ago. It becomes enlarged and then drains. He is on his third round of antibiotics-Keflex. He currently has no fever or chills. He has no history of injury to the breast.  Family history of breast cancer and maternal grandmother diagnosed in her 76s. Paternal grandfather had colon cancer in his 62s. Maternal grandfather had a cancer type of unknown to the patient and passed away in his 46s. He is unaware of any other cancer history in the family.     Patient Active Problem List    Diagnosis Date Noted    Diabetes ([address]     Hypercholesterolemia     Hypertension     Sleep apnea     Pulmonary emboli ([address]     Liver disease     Type 2 diabetes mellitus with diabetic neuropathy ([address] 07/30/2019    Chronic pain syndrome 05/17/2019    Anticoagulant long-term use 11/09/2018    Chronic pulmonary edema 01/10/2017    Pure hypercholesterolemia 01/10/2017    Primary osteoarthritis of left knee 10/28/2016    Diabetic foot infection ([address] 10/30/2015    Frontal headache 10/29/2015    Sepsis due to methicillin susceptible Staphylococcus aureus ([address] 10/29/2015    SIRS (systemic inflammatory response syndrome) ([address] 10/29/2015    History of MRSA infection 10/01/2015    HNP (herniated nucleus pulposus), lumbar 12/20/2013    Lumbar radiculopathy 12/20/2013    Carpal tunnel syndrome 07/12/2013    Radial styloid tenosynovitis 07/12/2013    Hyperlipidemia 02/07/2012    Personal history of pulmonary embolism 02/07/2012    S/P hip replacement 02/07/2012    Renal mass 04/19/2010    Tendon tear, foot 04/19/2010    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    Pulmonary hypertension ([address] 04/16/2010    HTN (hypertension), benign 04/16/2010    DIETER (obstructive sleep apnea) 04/16/2010    CVD (cardiovascular disease) 04/16/2010    Fatty liver 04/16/2010    Morbid obesity ([address] 04/16/2010    Microscopic hematuria 04/16/2010     Past Medical History:   Diagnosis Date    Diabetes ([address]     Erectile dysfunction due to diseases classified elsewhere     Hematuria     right renal cyst    Hypercholesterolemia     Hypertension     Liver disease     Fatty Liver    Pulmonary emboli ([address] 2003 & 2004    chronic coumadin therapy managed by Dr. Thea Soriano Renal failure acute     following surgery    Shingles     2020    Sleep apnea     does not use cpap machine      Past Surgical History:   Procedure Laterality Date    COLONOSCOPY N/A 11/14/2017    COLONOSCOPY performed by Shae Antoine [facility] at [facility] HX APPENDECTOMY  2006    HX CARPAL TUNNEL RELEASE Right     HX HIP REPLACEMENT Right     HX KNEE REPLACEMENT Left     HX ORTHOPAEDIC      Right toe amputation, pins and screws in foot    VASCULAR SURGERY PROCEDURE UNLIST      [place] filter      Family History   Problem Relation Age of Onset    Diabetes Father     Hypertension Father     Heart Disease Maternal Grandmother     Cancer Maternal Grandmother     Depression Mother     Cancer Paternal Grandfather       Social History     Tobacco Use    Smoking status: Current Every Day Smoker     Types: Cigars    Smokeless tobacco: Never Used   Substance Use Topics    Alcohol use: No      Allergies   Allergen Reactions    Tape [Adhesive] Contact Dermatitis     ALLERGIC TO SILK TAPE ONLY           Prior to Admission medications    Medication Sig Start Date End Date Taking? Authorizing Provider   warfarin (COUMADIN) 1 mg tablet Take 1 tablet daily by mouth with 10 mg tablet for a total of 11 mg daily or as directed by healthcare provider. 6/8/21  Yes Maximino Membreno MD   warfarin (COUMADIN) 10 mg tablet TAKE 1 TABLET BY MOUTH AS DIRECTED 6/8/21  Yes Maximino Membreno MD   furosemide (LASIX) 20 mg tablet TAKE 1 TABLET BY MOUTH DAILY 4/11/21  Yes Maximino Membreno MD   amLODIPine (NORVASC) 10 mg tablet TAKE 1 TABLET BY MOUTH DAILY 4/5/21  Yes Maximino Membreno MD   cloNIDine HCL (CATAPRES) 0.1 mg tablet TAKE 1 TABLET BY MOUTH TWICE DAILY 4/5/21  Yes Maximino Membreno MD   potassium chloride (K-DUR, KLOR-CON) 20 mEq tablet TAKE 1 TABLET BY MOUTH DAILY 4/5/21  Yes Maximino Membreno MD   mupirocin calcium (BACTROBAN) 2 % topical cream Apply  to affected area two (2) times a day. 4/5/21  Yes Maximino Membreno MD   lisinopril-hydroCHLOROthiazide (PRINZIDE, ZESTORETIC) 20-12.5 mg per tablet TAKE 2 TABLETS BY MOUTH EVERY DAY 3/14/21  Yes Maximino Membreno MD   atenoloL (TENORMIN) 100 mg tablet TAKE 1 TABLET BY MOUTH DAILY 2/10/21  Yes Maximino Membreno MD   fluticasone propionate (FLONASE) 50 mcg/actuation nasal spray SHAKE LIQUID AND USE 2 SPRAYS IN EACH NOSTRIL DAILY AS NEEDED FOR RHINITIS 8/19/20  Yes Maximino Membreno MD   semaglutide (OZEMPIC SC) 0.5 mg by SubCUTAneous route. Taking once weekly on Tuesdays   Yes Provider, Historical   gabapentin (NEURONTIN) 300 mg capsule Taking 2 tabs TID; from Dr. Cecelia Kehr   Yes Provider, Historical   tadalafil (CIALIS) 20 mg tablet Take 1 Tab by mouth as needed for Other (ED). 4/30/19  Yes Kenyon Baltazar MD   insulin glargine (BASAGLAR KWIKPEN) 100 unit/mL (3 mL) inpn Takes 65 units twice a day.    Yes Provider, Historical   BD INSULIN PEN NEEDLE UF SHORT 31 gauge x 5/16\" ndle USE 1 PEN UTD QID 7/4/17  Yes Provider, Historical   NOVOLOG FLEXPEN 100 unit/mL inpn Takes 14-25 units per meal per sliding scale from endocrinology 12/17/16  Yes Provider, Historical   insulin syringe-needle U-100 by Does Not Apply route. Dispense ultrafine 1 mL syringes with 29 gauge needle 6/15/10  Yes Zee Graham NP   aspirin delayed-release 81 mg tablet Take 81 mg by mouth daily. Yes Provider, Historical   atenoloL (TENORMIN) 100 mg tablet TAKE 1 TABLET BY MOUTH DAILY  Patient not taking: Reported on 7/6/2021 2/10/21   Tanja Lai MD       Review of Systems:    14 systems were reviewed. The results are as above in the HPI and otherwise negative. Objective:     Vitals:    07/06/21 1043 07/06/21 1048   BP: (!) 160/70 (!) 165/75   Pulse: 72 72   Resp: 17    Temp: 98.1 °F (36.7 °C)    SpO2: 99%    Weight: (!) 175.5 kg (387 lb)    Height: 6' 2\" (1.88 m)        Physical Exam:  GENERAL: alert, cooperative, no distress, appears stated age,   EYE: conjunctivae/corneas clear. PERRL, EOM's intact. THROAT & NECK: normal and no erythema or exudates noted. ,    LYMPHATIC: Cervical, supraclavicular, and axillary nodes normal. ,   LUNG: clear to auscultation bilaterally,   HEART: regular rate and rhythm, S1, S2 normal, no murmur, click, rub or gallop,   BREASTS:   Left: No dimpling, discoloration, nipple inversion or retractions. No axillary or supraclavicular lymphadenopathy. No mass  Right: No dimpling, discoloration, nipple inversion or retractions. No axillary or supraclavicular lymphadenopathy. 1 cm soft mass at the border of the vermilion in the 5 o'clock position right breast no surrounding cellulitis fluctuance or crepitance. ABDOMEN: soft, non-tender. Bowel sounds normal. No masses,  no organomegaly,   EXTREMITIES:  extremities normal, atraumatic, no cyanosis or edema,   SKIN: Normal.,   NEUROLOGIC: AOx3. Cranial nerves 2-12 and sensation grossly intact. ,     Data Review:  to be done    Impression:     · Patient with persistent right breast abscess that has failed medical management    Plan:     · Excisional biopsy right breast abscess  · Consent on chart  · Preoperative orders written

## 2021-07-06 NOTE — PROGRESS NOTES
Rafael Aldana is a 54 y.o. male  Chief Complaint   [facility]     [facility]     Visit Vitals  BP (!) 165/75   Pulse 72   Temp 98.1 °F (36.7 °C)   Resp 17   Ht 6' 2\" (1.88 m)   Wt (!) 387 lb (175.5 kg)   SpO2 99%   BMI 49.69 kg/m²     Mr. Crow Cortes has been given the following recommendations today due to his elevated BP reading: referred to Alternative/PCP.

## 2021-07-15 ENCOUNTER — TELEPHONE (OUTPATIENT)
Dept: FAMILY MEDICINE CLINIC | Age: 51
End: 2021-07-15

## 2021-07-15 ENCOUNTER — ANESTHESIA EVENT (OUTPATIENT)
Dept: SURGERY | Age: 51
End: 2021-07-15
Payer: COMMERCIAL

## 2021-07-15 NOTE — TELEPHONE ENCOUNTER
Charley Dubose ( surgical scheduler) for Dr. Kentrell Diaz office is requesting clearance forms that were sent over last week be faxed back/and scanned into the chart today for pt's surgery on 07/16/21.

## 2021-07-16 ENCOUNTER — HOSPITAL ENCOUNTER (OUTPATIENT)
Age: 51
Discharge: HOME | End: 2021-07-16
Attending: SURGERY | Admitting: SURGERY
Payer: COMMERCIAL

## 2021-07-16 ENCOUNTER — ANESTHESIA (OUTPATIENT)
Dept: SURGERY | Age: 51
End: 2021-07-16
Payer: COMMERCIAL

## 2021-07-16 ENCOUNTER — TELEPHONE (OUTPATIENT)
Dept: INTERNAL MEDICINE CLINIC | Age: 51
End: 2021-07-16

## 2021-07-16 VITALS
TEMPERATURE: 97.8 F | WEIGHT: 315 LBS | HEART RATE: 81 BPM | BODY MASS INDEX: 40.43 KG/M2 | RESPIRATION RATE: 18 BRPM | SYSTOLIC BLOOD PRESSURE: 138 MMHG | DIASTOLIC BLOOD PRESSURE: 66 MMHG | OXYGEN SATURATION: 96 % | HEIGHT: 74 IN

## 2021-07-16 DIAGNOSIS — G89.18 POSTOPERATIVE PAIN: Primary | ICD-10-CM

## 2021-07-16 PROBLEM — N61.1 ABSCESS OF RIGHT BREAST: Status: ACTIVE | Noted: 2021-07-16

## 2021-07-16 LAB
ANION GAP SERPL CALC-SCNC: 7 MMOL/L (ref 3–18)
BUN SERPL-MCNC: 23 MG/DL (ref 7–18)
BUN/CREAT SERPL: 27 (ref 12–20)
CALCIUM SERPL-MCNC: 8.5 MG/DL (ref 8.5–10.1)
CHLORIDE SERPL-SCNC: 103 MMOL/L (ref 100–111)
CO2 SERPL-SCNC: 29 MMOL/L (ref 21–32)
CREAT SERPL-MCNC: 0.86 MG/DL (ref 0.6–1.3)
GLUCOSE BLD STRIP.AUTO-MCNC: 172 MG/DL (ref 70–110)
GLUCOSE BLD STRIP.AUTO-MCNC: 192 MG/DL (ref 70–110)
GLUCOSE SERPL-MCNC: 213 MG/DL (ref 74–99)
POTASSIUM SERPL-SCNC: 3.7 MMOL/L (ref 3.5–5.5)
SODIUM SERPL-SCNC: 139 MMOL/L (ref 136–145)

## 2021-07-16 PROCEDURE — 76060000032 HC ANESTHESIA 0.5 TO 1 HR: Performed by: SURGERY

## 2021-07-16 PROCEDURE — 74011000250 HC RX REV CODE- 250: Performed by: SURGERY

## 2021-07-16 PROCEDURE — 74011250636 HC RX REV CODE- 250/636: Performed by: SURGERY

## 2021-07-16 PROCEDURE — 80048 BASIC METABOLIC PNL TOTAL CA: CPT

## 2021-07-16 PROCEDURE — 82962 GLUCOSE BLOOD TEST: CPT

## 2021-07-16 PROCEDURE — 77030002996 HC SUT SLK J&J -A: Performed by: SURGERY

## 2021-07-16 PROCEDURE — 77030040361 HC SLV COMPR DVT MDII -B: Performed by: SURGERY

## 2021-07-16 PROCEDURE — 77030010509 HC AIRWY LMA MSK TELE -A: Performed by: ANESTHESIOLOGY

## 2021-07-16 PROCEDURE — 88304 TISSUE EXAM BY PATHOLOGIST: CPT

## 2021-07-16 PROCEDURE — 74011000250 HC RX REV CODE- 250: Performed by: NURSE ANESTHETIST, CERTIFIED REGISTERED

## 2021-07-16 PROCEDURE — 77030002933 HC SUT MCRYL J&J -A: Performed by: SURGERY

## 2021-07-16 PROCEDURE — 2709999900 HC NON-CHARGEABLE SUPPLY: Performed by: SURGERY

## 2021-07-16 PROCEDURE — 76010000138 HC OR TIME 0.5 TO 1 HR: Performed by: SURGERY

## 2021-07-16 PROCEDURE — 76210000006 HC OR PH I REC 0.5 TO 1 HR: Performed by: SURGERY

## 2021-07-16 PROCEDURE — 00400 ANES INTEGUMENTARY SYS NOS: CPT | Performed by: NURSE ANESTHETIST, CERTIFIED REGISTERED

## 2021-07-16 PROCEDURE — 88307 TISSUE EXAM BY PATHOLOGIST: CPT

## 2021-07-16 PROCEDURE — 77030040922 HC BLNKT HYPOTHRM STRY -A: Performed by: SURGERY

## 2021-07-16 PROCEDURE — 77030031139 HC SUT VCRL2 J&J -A: Performed by: SURGERY

## 2021-07-16 PROCEDURE — 74011636637 HC RX REV CODE- 636/637: Performed by: NURSE ANESTHETIST, CERTIFIED REGISTERED

## 2021-07-16 PROCEDURE — 74011250637 HC RX REV CODE- 250/637: Performed by: NURSE ANESTHETIST, CERTIFIED REGISTERED

## 2021-07-16 PROCEDURE — 74011250636 HC RX REV CODE- 250/636: Performed by: NURSE ANESTHETIST, CERTIFIED REGISTERED

## 2021-07-16 PROCEDURE — 00400 ANES INTEGUMENTARY SYS NOS: CPT | Performed by: ANESTHESIOLOGY

## 2021-07-16 PROCEDURE — 19120 REMOVAL OF BREAST LESION: CPT | Performed by: SURGERY

## 2021-07-16 PROCEDURE — 76210000021 HC REC RM PH II 0.5 TO 1 HR: Performed by: SURGERY

## 2021-07-16 RX ORDER — SODIUM CHLORIDE 0.9 % (FLUSH) 0.9 %
5-40 SYRINGE (ML) INJECTION EVERY 8 HOURS
Status: DISCONTINUED | OUTPATIENT
Start: 2021-07-16 | End: 2021-07-16 | Stop reason: HOSPADM

## 2021-07-16 RX ORDER — HYDROMORPHONE HYDROCHLORIDE 2 MG/ML
0.2 INJECTION, SOLUTION INTRAMUSCULAR; INTRAVENOUS; SUBCUTANEOUS AS NEEDED
Status: DISCONTINUED | OUTPATIENT
Start: 2021-07-16 | End: 2021-07-16 | Stop reason: HOSPADM

## 2021-07-16 RX ORDER — LIDOCAINE HYDROCHLORIDE 20 MG/ML
INJECTION, SOLUTION EPIDURAL; INFILTRATION; INTRACAUDAL; PERINEURAL AS NEEDED
Status: DISCONTINUED | OUTPATIENT
Start: 2021-07-16 | End: 2021-07-16 | Stop reason: HOSPADM

## 2021-07-16 RX ORDER — SODIUM CHLORIDE 0.9 % (FLUSH) 0.9 %
5-40 SYRINGE (ML) INJECTION AS NEEDED
Status: DISCONTINUED | OUTPATIENT
Start: 2021-07-16 | End: 2021-07-16 | Stop reason: HOSPADM

## 2021-07-16 RX ORDER — DIPHENHYDRAMINE HYDROCHLORIDE 50 MG/ML
12.5 INJECTION, SOLUTION INTRAMUSCULAR; INTRAVENOUS
Status: DISCONTINUED | OUTPATIENT
Start: 2021-07-16 | End: 2021-07-16 | Stop reason: HOSPADM

## 2021-07-16 RX ORDER — KETOROLAC TROMETHAMINE 15 MG/ML
INJECTION, SOLUTION INTRAMUSCULAR; INTRAVENOUS AS NEEDED
Status: DISCONTINUED | OUTPATIENT
Start: 2021-07-16 | End: 2021-07-16 | Stop reason: HOSPADM

## 2021-07-16 RX ORDER — OXYCODONE HYDROCHLORIDE 5 MG/1
5 TABLET ORAL
Qty: 20 TABLET | Refills: 0 | Status: SHIPPED | OUTPATIENT
Start: 2021-07-16 | End: 2021-07-19

## 2021-07-16 RX ORDER — FAMOTIDINE 20 MG/1
20 TABLET, FILM COATED ORAL ONCE
Status: COMPLETED | OUTPATIENT
Start: 2021-07-16 | End: 2021-07-16

## 2021-07-16 RX ORDER — SODIUM CHLORIDE, SODIUM LACTATE, POTASSIUM CHLORIDE, CALCIUM CHLORIDE 600; 310; 30; 20 MG/100ML; MG/100ML; MG/100ML; MG/100ML
50 INJECTION, SOLUTION INTRAVENOUS CONTINUOUS
Status: DISCONTINUED | OUTPATIENT
Start: 2021-07-16 | End: 2021-07-16 | Stop reason: HOSPADM

## 2021-07-16 RX ORDER — INSULIN LISPRO 100 [IU]/ML
INJECTION, SOLUTION INTRAVENOUS; SUBCUTANEOUS ONCE
Status: DISCONTINUED | OUTPATIENT
Start: 2021-07-16 | End: 2021-07-16 | Stop reason: HOSPADM

## 2021-07-16 RX ORDER — MAGNESIUM SULFATE 100 %
4 CRYSTALS MISCELLANEOUS AS NEEDED
Status: DISCONTINUED | OUTPATIENT
Start: 2021-07-16 | End: 2021-07-16 | Stop reason: HOSPADM

## 2021-07-16 RX ORDER — GLYCOPYRROLATE 0.2 MG/ML
INJECTION INTRAMUSCULAR; INTRAVENOUS AS NEEDED
Status: DISCONTINUED | OUTPATIENT
Start: 2021-07-16 | End: 2021-07-16 | Stop reason: HOSPADM

## 2021-07-16 RX ORDER — BUPIVACAINE HYDROCHLORIDE AND EPINEPHRINE 5; 5 MG/ML; UG/ML
INJECTION, SOLUTION EPIDURAL; INTRACAUDAL; PERINEURAL AS NEEDED
Status: DISCONTINUED | OUTPATIENT
Start: 2021-07-16 | End: 2021-07-16 | Stop reason: HOSPADM

## 2021-07-16 RX ORDER — SODIUM CHLORIDE, SODIUM LACTATE, POTASSIUM CHLORIDE, CALCIUM CHLORIDE 600; 310; 30; 20 MG/100ML; MG/100ML; MG/100ML; MG/100ML
100 INJECTION, SOLUTION INTRAVENOUS CONTINUOUS
Status: DISCONTINUED | OUTPATIENT
Start: 2021-07-16 | End: 2021-07-16 | Stop reason: HOSPADM

## 2021-07-16 RX ORDER — INSULIN LISPRO 100 [IU]/ML
INJECTION, SOLUTION INTRAVENOUS; SUBCUTANEOUS ONCE
Status: COMPLETED | OUTPATIENT
Start: 2021-07-16 | End: 2021-07-16

## 2021-07-16 RX ORDER — DEXTROSE 50 % IN WATER (D50W) INTRAVENOUS SYRINGE
25-50 AS NEEDED
Status: DISCONTINUED | OUTPATIENT
Start: 2021-07-16 | End: 2021-07-16 | Stop reason: HOSPADM

## 2021-07-16 RX ORDER — PROPOFOL 10 MG/ML
INJECTION, EMULSION INTRAVENOUS AS NEEDED
Status: DISCONTINUED | OUTPATIENT
Start: 2021-07-16 | End: 2021-07-16 | Stop reason: HOSPADM

## 2021-07-16 RX ORDER — ONDANSETRON 2 MG/ML
INJECTION INTRAMUSCULAR; INTRAVENOUS AS NEEDED
Status: DISCONTINUED | OUTPATIENT
Start: 2021-07-16 | End: 2021-07-16 | Stop reason: HOSPADM

## 2021-07-16 RX ORDER — OXYCODONE AND ACETAMINOPHEN 5; 325 MG/1; MG/1
1 TABLET ORAL AS NEEDED
Status: DISCONTINUED | OUTPATIENT
Start: 2021-07-16 | End: 2021-07-16 | Stop reason: HOSPADM

## 2021-07-16 RX ORDER — HYDROCODONE BITARTRATE AND ACETAMINOPHEN 5; 325 MG/1; MG/1
TABLET ORAL
COMMUNITY

## 2021-07-16 RX ORDER — LIDOCAINE HYDROCHLORIDE 10 MG/ML
0.1 INJECTION, SOLUTION EPIDURAL; INFILTRATION; INTRACAUDAL; PERINEURAL AS NEEDED
Status: DISCONTINUED | OUTPATIENT
Start: 2021-07-16 | End: 2021-07-16 | Stop reason: HOSPADM

## 2021-07-16 RX ORDER — FENTANYL CITRATE 50 UG/ML
INJECTION, SOLUTION INTRAMUSCULAR; INTRAVENOUS AS NEEDED
Status: DISCONTINUED | OUTPATIENT
Start: 2021-07-16 | End: 2021-07-16 | Stop reason: HOSPADM

## 2021-07-16 RX ORDER — HYDROMORPHONE HYDROCHLORIDE 2 MG/ML
0.5 INJECTION, SOLUTION INTRAMUSCULAR; INTRAVENOUS; SUBCUTANEOUS
Status: DISCONTINUED | OUTPATIENT
Start: 2021-07-16 | End: 2021-07-16 | Stop reason: HOSPADM

## 2021-07-16 RX ADMIN — PROPOFOL 200 MG: 10 INJECTION, EMULSION INTRAVENOUS at 11:17

## 2021-07-16 RX ADMIN — FENTANYL CITRATE 25 MCG: 50 INJECTION, SOLUTION INTRAMUSCULAR; INTRAVENOUS at 11:13

## 2021-07-16 RX ADMIN — FENTANYL CITRATE 25 MCG: 50 INJECTION, SOLUTION INTRAMUSCULAR; INTRAVENOUS at 11:17

## 2021-07-16 RX ADMIN — LIDOCAINE HYDROCHLORIDE 100 MG: 20 INJECTION, SOLUTION EPIDURAL; INFILTRATION; INTRACAUDAL; PERINEURAL at 11:17

## 2021-07-16 RX ADMIN — KETOROLAC TROMETHAMINE 15 MG: 15 INJECTION, SOLUTION INTRAMUSCULAR; INTRAVENOUS at 11:43

## 2021-07-16 RX ADMIN — INSULIN LISPRO 3 UNITS: 100 INJECTION, SOLUTION INTRAVENOUS; SUBCUTANEOUS at 10:26

## 2021-07-16 RX ADMIN — GLYCOPYRROLATE 0.1 MG: 0.2 INJECTION INTRAMUSCULAR; INTRAVENOUS at 11:13

## 2021-07-16 RX ADMIN — WATER 3 G: 1 INJECTION INTRAMUSCULAR; INTRAVENOUS; SUBCUTANEOUS at 11:25

## 2021-07-16 RX ADMIN — ONDANSETRON 4 MG: 2 INJECTION INTRAMUSCULAR; INTRAVENOUS at 11:43

## 2021-07-16 RX ADMIN — FAMOTIDINE 20 MG: 20 TABLET, FILM COATED ORAL at 10:19

## 2021-07-16 RX ADMIN — FENTANYL CITRATE 50 MCG: 50 INJECTION, SOLUTION INTRAMUSCULAR; INTRAVENOUS at 11:42

## 2021-07-16 RX ADMIN — SODIUM CHLORIDE, SODIUM LACTATE, POTASSIUM CHLORIDE, AND CALCIUM CHLORIDE 50 ML/HR: 600; 310; 30; 20 INJECTION, SOLUTION INTRAVENOUS at 10:19

## 2021-07-16 NOTE — TELEPHONE ENCOUNTER
Pharmacy Progress Note - Telephone Encounter    S/O: Mr. Luana Ely 54 y.o. male, referred by Dr. Gloria Pierre MD, was contacted via an outbound telephone call to discuss anticoagulation today. Verified patients identifiers (name & ) per HIPAA policy.     - PharmD unaware of patient's outpatient procedure today  - He states that he did hold his warfarin for the past 5 days leading up to his surgery and plans to start back tonight  - He did not bridge with enoxaparin during this time    A/P:  - Patient to restart warfarin at 11 mg once daily tonight  - Recheck INR in 1 week  - Patient endorses understanding to the provided information. All questions answered at this time. There are no discontinued medications. No orders of the defined types were placed in this encounter. Thank you,  Geovanni Chairez. ASHLEIGH Phillips        For Pharmacy Admin Tracking Only     CPA in place:  Yes   Recommendation Provided To: Patient/Caregiver: 1 via Telephone   Time Spent (min): 10

## 2021-07-16 NOTE — OP NOTES
[address] [place] [address] [facility], [address] [facility]                                 OPERATIVE REPORT    PATIENT:    Luana Ely  MRN:            [id number]      DATE:  2021  BILLIN  ROOM:        @BED@  ATTENDING:   Stephanie Reynoso MD  DICTATING:   Stephanie Reynoso MD      PREOPERATIVE DIAGNOSIS: Right breast lower inner quadrant inflammatory nodule    POSTOPERATIVE DIAGNOSIS: Same    PROCEDURES PERFORMED: Excisional biopsy of right breast mass    SURGEON: Blade Whitehead MD    ASSISTANT: Durward Bumpers, SA    ANESTHESIA: LMA general and local (0.5% Marcaine with epinephrine and one-to-one solution with 1% lidocaine plain). SPECIMENS REMOVED: Right breast mass. FINDINGS: Right breast mass    ESTIMATED BLOOD LOSS: 10 mL. FLUIDS GIVEN: 500 mL. IMPLANTS: None    DESCRIPTION OF PROCEDURE: The patient was identified in the holding area with family where consent for excisional biopsy of right  breast mass was verified. In the operating room, the patient was positioned supine on the OR table. She did receive perioperative antibiotics. LMA general anesthesia was induced. The patient's right breast was prepped and draped in sterile fashion using chlorhexidine solution and sterile drapes. The time-out was performed to ensure correct procedure. The local anesthetic was infiltrated into the skin and deep dermal tissues in the vermilion of the areola from the 2 o'clock to the 7 o'clock position. The 15 blade was used to create an incision through skin into the breast  tissue in a curvilinear fashion along the vermillion. The inflammatory nodule in the 5 o'clock position was completely excised from the surrounding normal breast tissue. The deepest layer of dissection was the breast tissue. Additional local anesthetic was infiltrated into the cavity.  Electrocautery was used to control bleeding within the cavity. The 3-0 Vicryl suture in interrupted  stitches were used to reapproximate the deep dermal tissues. 4-0 Monocryl  suture was used to reapproximate the skin in a running subcuticular  closure. Mastisol, Steri-Strips, Band-Aids and Tegaderm were used to create a sterile dressing. . The patient tolerated the  procedure very well. DISPOSITION: He was stable upon transport to the recovery room.         Francia Yung MD

## 2021-07-16 NOTE — DISCHARGE SUMMARY
[facility]  Romana Fujisawa, MD, [facility]  General Surgery  Discharge Summary     Patient ID:  Winsome Dickens  [id number]  29 y.o.  1970    Admit Date: 7/16/2021    Discharge Date: 7/16/2021    Admission Diagnoses: Abscess of right breast [N61.1]    Discharge Diagnoses:    Problem List as of 7/16/2021 Date Reviewed: 7/6/2021        Codes Class Noted - Resolved    Abscess of right breast ICD-10-CM: N61.1  ICD-9-CM: 611.0  7/16/2021 - Present        Diabetes ([facility].) ICD-10-CM: E11.9  ICD-9-CM: 250.00  Unknown - Present        Hypercholesterolemia ICD-10-CM: E78.00  ICD-9-CM: 272.0  Unknown - Present        Hypertension ICD-10-CM: I10  ICD-9-CM: 401.9  Unknown - Present        Sleep apnea ICD-10-CM: G47.30  ICD-9-CM: 780.57  Unknown - Present    Overview Signed 5/6/2020 12:59 PM by Farhat Lundberg MD     does not use cpap machine             Pulmonary emboli [facility]) ICD-10-CM: I26.99  ICD-9-CM: 415.19  Unknown - Present    Overview Signed 5/6/2020 12:59 PM by Farhat Lundberg MD     chronic coumadin therapy managed by Dr. Penney Heimlich             Liver disease ICD-10-CM: K76.9  ICD-9-CM: 573.9  Unknown - Present    Overview Signed 5/6/2020 12:59 PM by Farhat Lundberg MD     Fatty Liver             Type 2 diabetes mellitus with diabetic neuropathy ([email or web address] ICD-10-CM: E11.40  ICD-9-CM: 250.60, 357.2  7/30/2019 - Present        Chronic pain syndrome ICD-10-CM: G89.4  ICD-9-CM: 338.4  5/17/2019 - Present    Overview Signed 1/13/2020 11:19 AM by Margarette Boxer     On [place] patch and Neurontin.   Managed by Dr. Tressa Hernadez chronic pain management             Anticoagulant long-term use ICD-10-CM: Z79.01  ICD-9-CM: V58.61  11/9/2018 - Present        Chronic pulmonary edema ICD-10-CM: J81.1  ICD-9-CM: 849  1/10/2017 - Present    Overview Signed 1/10/2017  9:23 AM by Debby Bjorn     Patient has been seen by hematology  Bilaterally  Filter 2004             Pure hypercholesterolemia ICD-10-CM: E78.00  ICD-9-CM: 272.0  1/10/2017 - Present        Primary osteoarthritis of left knee ICD-10-CM: M17.12  ICD-9-CM: 715.16  10/28/2016 - Present        Diabetic foot infection ([facility].) ICD-10-CM: E11.628, L08.9  ICD-9-CM: 250.80, 686.9  10/30/2015 - Present        Frontal headache ICD-10-CM: R51.9  ICD-9-CM: 784.0  10/29/2015 - Present    Overview Signed 4/30/2019 11:13 AM by Sol LEE     Overview:   X 2 days, severe, but not the worse ever.              Sepsis due to methicillin susceptible Staphylococcus aureus ([facility].) ICD-10-CM: A41.01  ICD-9-CM: 038.11, 995.91  10/29/2015 - Present    Overview Signed 4/30/2019 11:13 AM by Sol LEE     Overview:   Positive wound culture 10/2/2015             SIRS (systemic inflammatory response syndrome) ([facility].) ICD-10-CM: R65.10  ICD-9-CM: 995.90  10/29/2015 - Present    Overview Signed 4/30/2019 11:13 AM by Sol LEE     Overview:   WBC 12  Temp 38.3               History of MRSA infection ICD-10-CM: Z86.14  ICD-9-CM: V12.04  10/1/2015 - Present    Overview Signed 4/30/2019 11:13 AM by Sol LEE     Overview:   osteomyelitis of the right great toe with sepsis             HNP (herniated nucleus pulposus), lumbar ICD-10-CM: M51.26  ICD-9-CM: 722.10  12/20/2013 - Present        Lumbar radiculopathy ICD-10-CM: M54.16  ICD-9-CM: 724.4  12/20/2013 - Present        Carpal tunnel syndrome ICD-10-CM: G56.00  ICD-9-CM: 354.0  7/12/2013 - Present        Radial styloid tenosynovitis ICD-10-CM: M65.4  ICD-9-CM: 727.04  7/12/2013 - Present        Hyperlipidemia ICD-10-CM: E78.5  ICD-9-CM: 272.4  2/7/2012 - Present        Personal history of pulmonary embolism ICD-10-CM: E74.099  ICD-9-CM: V12.55  2/7/2012 - Present    Overview Signed 4/30/2019 11:13 AM by Sol LEE     Overview:   s/p placement of [place] filter, lifetime anti-coagulation             S/P hip replacement ICD-10-CM: B93.856  ICD-9-CM: V43.64  2/7/2012 - Present Renal mass ICD-10-CM: N28.89  ICD-9-CM: 593.9  4/19/2010 - Present        Tendon tear, foot ICD-10-CM: K67.249O  ICD-9-CM: 845.10  4/19/2010 - Present        Slipped capital femoral epiphysis ICD-10-CM: M93.003  ICD-9-CM: 732.2  4/16/2010 - Present    Overview Signed 4/16/2010  5:55 PM by Isaiah Roy A     Bilateral - needs THR             Pulmonary hypertension ([facility] ICD-10-CM: I27.20  ICD-9-CM: 416.8  4/16/2010 - Present        HTN (hypertension), benign ICD-10-CM: I10  ICD-9-CM: 401.1  4/16/2010 - Present        DIETER (obstructive sleep apnea) ICD-10-CM: G47.33  ICD-9-CM: 327.23  4/16/2010 - Present        CVD (cardiovascular disease) ICD-10-CM: I25.10  ICD-9-CM: 429.2  4/16/2010 - Present        Fatty liver ICD-10-CM: K76.0  ICD-9-CM: 571.8  4/16/2010 - Present        Morbid obesity ([facility] ICD-10-CM: E66.01  ICD-9-CM: 278.01  4/16/2010 - Present        Microscopic hematuria ICD-10-CM: R31.29  ICD-9-CM: 599.72  4/16/2010 - Present        RESOLVED: On warfarin therapy ICD-10-CM: Z79.01  ICD-9-CM: V58.61  1/11/2017 - 6/14/2018    Overview Signed 1/11/2017  9:20 AM by Greg Daugherty term for pulmonary emboli   Has had hematology work up               RESOLVED: Diabetes mellitus ([facility] ICD-10-CM: E11.9  ICD-9-CM: 250.00  4/16/2010 - 6/14/2018        RESOLVED: Neuropathy in diabetes ([facility] ICD-10-CM: E11.40  ICD-9-CM: 250.60, 357.2  4/16/2010 - 6/14/2018               Admission Condition: Good    Discharge Condition: Good    Last Procedure: Procedure(s):  [address] Course:   Normal hospital course for this procedure. Consults: None    Significant Diagnostic Studies: None    Disposition: home    Patient Instructions:   Current Discharge Medication List      START taking these medications    Details   oxyCODONE IR (ROXICODONE) 5 mg immediate release tablet Take 1 Tablet by mouth every four (4) hours as needed for Pain for up to 3 days.  Max Daily Amount: 30 mg.  Qty: 20 Tablet, Refills: 0    Associated Diagnoses: Postoperative pain      docusate sodium (COLACE) 50 mg capsule Take 1 Capsule by mouth two (2) times a day for 90 days. Qty: 60 Capsule, Refills: 2      bisacodyL 5 mg tab Take 5 mg by mouth daily. Qty: 3 Tablet, Refills: 0         CONTINUE these medications which have NOT CHANGED    Details   HYDROcodone-acetaminophen (Norco) 5-325 mg per tablet Take  by mouth. !! warfarin (COUMADIN) 1 mg tablet Take 1 tablet daily by mouth with 10 mg tablet for a total of 11 mg daily or as directed by healthcare provider.   Qty: 90 Tablet, Refills: 1    Associated Diagnoses: Warfarin anticoagulation      !! warfarin (COUMADIN) 10 mg tablet TAKE 1 TABLET BY MOUTH AS DIRECTED  Qty: 90 Tablet, Refills: 3    Associated Diagnoses: Warfarin anticoagulation      furosemide (LASIX) 20 mg tablet TAKE 1 TABLET BY MOUTH DAILY  Qty: 90 Tab, Refills: 1    Associated Diagnoses: HTN (hypertension), benign      amLODIPine (NORVASC) 10 mg tablet TAKE 1 TABLET BY MOUTH DAILY  Qty: 90 Tab, Refills: 1    Associated Diagnoses: HTN (hypertension), benign      cloNIDine HCL (CATAPRES) 0.1 mg tablet TAKE 1 TABLET BY MOUTH TWICE DAILY  Qty: 180 Tab, Refills: 1    Associated Diagnoses: HTN (hypertension), benign      potassium chloride (K-DUR, KLOR-CON) 20 mEq tablet TAKE 1 TABLET BY MOUTH DAILY  Qty: 90 Tab, Refills: 1    Associated Diagnoses: Hypokalemia      lisinopril-hydroCHLOROthiazide (PRINZIDE, ZESTORETIC) 20-12.5 mg per tablet TAKE 2 TABLETS BY MOUTH EVERY DAY  Qty: 60 Tab, Refills: 6    Associated Diagnoses: HTN (hypertension), benign      !! atenoloL (TENORMIN) 100 mg tablet TAKE 1 TABLET BY MOUTH DAILY  Qty: 30 Tab, Refills: 6    Associated Diagnoses: HTN (hypertension), benign      !! atenoloL (TENORMIN) 100 mg tablet TAKE 1 TABLET BY MOUTH DAILY  Qty: 30 Tab, Refills: 6    Associated Diagnoses: HTN (hypertension), benign      fluticasone propionate (FLONASE) 50 mcg/actuation nasal spray SHAKE LIQUID AND USE 2 SPRAYS IN EACH NOSTRIL DAILY AS NEEDED FOR RHINITIS  Qty: 16 g, Refills: 4    Associated Diagnoses: Allergic rhinitis due to pollen, unspecified seasonality      semaglutide (OZEMPIC SC) 0.5 mg by SubCUTAneous route. Taking once weekly on Tuesdays      gabapentin (NEURONTIN) 300 mg capsule Taking 2 tabs TID; from Dr. Maty Gamez      insulin glargine [facility]) 100 unit/mL (3 mL) inpn Takes 65 units twice a day. BD INSULIN PEN NEEDLE UF SHORT 31 gauge x 5/16\" ndle USE 1 PEN UTD QID  Refills: 3      NOVOLOG FLEXPEN 100 unit/mL inpn Takes 14-25 units per meal per sliding scale from endocrinology  Refills: 3      insulin syringe-needle U-100 by Does Not Apply route. Dispense ultrafine 1 mL syringes with 29 gauge needle  Qty: 100 Syringe, Refills: 11      aspirin delayed-release 81 mg tablet Take 81 mg by mouth daily. mupirocin calcium (BACTROBAN) 2 % topical cream Apply  to affected area two (2) times a day. Qty: 15 g, Refills: 0      tadalafil (CIALIS) 20 mg tablet Take 1 Tab by mouth as needed for Other (ED). Qty: 6 Tab, Refills: 11       !! - Potential duplicate medications found. Please discuss with provider. Activity: See surgical instructions  Diet: Low fat, Low cholesterol  Wound Care: As directed    Follow-up with Dr. Brennen Higuera in 2 weeks.   Follow-up tests/labs None    Signed:  Isael Graham MD  7/16/2021  12:28 PM

## 2021-07-16 NOTE — ANESTHESIA PREPROCEDURE EVALUATION
Relevant Problems   RESPIRATORY SYSTEM   (+) History of MRSA infection   (+) DIETER (obstructive sleep apnea)   (+) Sleep apnea      NEUROLOGY   (+) Frontal headache      CARDIOVASCULAR   (+) HTN (hypertension), benign   (+) Hypertension      GASTROINTESTINAL   (+) Fatty liver   (+) Liver disease      ENDOCRINE   (+) Diabetes (HCC)   (+) Morbid obesity (HCC)   (+) Type 2 diabetes mellitus with diabetic neuropathy (HCC)       Anesthetic History   No history of anesthetic complications            Review of Systems / Medical History  Patient summary reviewed and pertinent labs reviewed    Pulmonary        Sleep apnea: No treatment           Neuro/Psych   Within defined limits           Cardiovascular    Hypertension                   GI/Hepatic/Renal                Endo/Other    Diabetes: type 2    Morbid obesity and arthritis     Other Findings              Physical Exam    Airway  Mallampati: II  TM Distance: 4 - 6 cm  Neck ROM: short neck   Mouth opening: Normal     Cardiovascular  Regular rate and rhythm,  S1 and S2 normal,  no murmur, click, rub, or gallop             Dental  No notable dental hx       Pulmonary  Breath sounds clear to auscultation               Abdominal  GI exam deferred       Other Findings            Anesthetic Plan    ASA: 3  Anesthesia type: general          Induction: Intravenous  Anesthetic plan and risks discussed with: Patient

## 2021-07-16 NOTE — DISCHARGE INSTRUCTIONS
[place] [address] [facility]  Amber Moscoso MD, FACS  General Surgery    Pt may remove the dressing and shower in two days. Allow soap and water to run over the incision. No driving or operating heavy machinery while on narcotic pain medications. Please apply an ice pack to the operative site for 30 minutes 3 times daily to help reduce pain and swelling and the need for narcotic pain medication. No strenuous activity or contact sports for two weeks. No lifting greater than 15 lbs for 2 weeks. Call MD for any redness, swelling, bleeding or pus at the incision. Also call for any nausea, vomiting, increased pain or pain uncontrolled by pain medicine. DISCHARGE SUMMARY from Nurse    PATIENT INSTRUCTIONS:    After general anesthesia or intravenous sedation, for 24 hours or while taking prescription Narcotics:  · Limit your activities  · Do not drive and operate hazardous machinery  · Do not make important personal or business decisions  · Do  not drink alcoholic beverages  · If you have not urinated within 8 hours after discharge, please contact your surgeon on call.     Report the following to your surgeon:  · Excessive pain, swelling, redness or odor of or around the surgical area  · Temperature over 100.5  · Nausea and vomiting lasting longer than 4 hours or if unable to take medications  · Any signs of decreased circulation or nerve impairment to extremity: change in color, persistent  numbness, tingling, coldness or increase pain  · Any questions

## 2021-07-16 NOTE — INTERVAL H&P NOTE
Update History & Physical    The Patient's History and Physical of July 6, 2021 was reviewed with the patient and I examined the patient. There was no change. The surgical site was confirmed by the patient and me. Plan:  The risk, benefits, expected outcome, and alternative to the recommended procedure have been discussed with the patient. Patient understands and wants to proceed with the procedure.     Electronically signed by Bee Jordan MD on 7/16/2021 at 9:24 AM

## 2021-07-27 ENCOUNTER — OFFICE VISIT (OUTPATIENT)
Dept: SURGERY | Age: 51
End: 2021-07-27
Payer: COMMERCIAL

## 2021-07-27 VITALS
BODY MASS INDEX: 40.43 KG/M2 | DIASTOLIC BLOOD PRESSURE: 74 MMHG | RESPIRATION RATE: 20 BRPM | SYSTOLIC BLOOD PRESSURE: 144 MMHG | WEIGHT: 315 LBS | HEART RATE: 82 BPM | TEMPERATURE: 97.9 F | HEIGHT: 74 IN | OXYGEN SATURATION: 98 %

## 2021-07-27 DIAGNOSIS — Z09 POSTOPERATIVE EXAMINATION: Primary | ICD-10-CM

## 2021-07-27 PROCEDURE — 99024 POSTOP FOLLOW-UP VISIT: CPT | Performed by: SURGERY

## 2021-07-27 NOTE — PROGRESS NOTES
Chief Complaint   Patient presents with    Post OP Follow Up     Excisional biopsy of right breast mass   1. Have you been to the ER, urgent care clinic since your last visit? Hospitalized since your last visit? No    2. Have you seen or consulted any other health care providers outside of the [address] [place] [address] since your last visit? Include any pap smears or colon screening.  No    Mr. Jessie Barba has been given the following recommendations today due to his elevated BP reading: patient will discuss blood pressure readings with pcp

## 2021-07-29 ENCOUNTER — TELEPHONE (OUTPATIENT)
Dept: FAMILY MEDICINE CLINIC | Age: 51
End: 2021-07-29

## 2021-07-30 NOTE — PROGRESS NOTES
[facility]  General Surgery    Subjective:  Patient presents today without complaints  Objective:  Vitals:    07/27/21 1005 07/27/21 1011   BP: (!) 148/74 (!) 144/74   Pulse: 82    Resp: 20    Temp: 97.9 °F (36.6 °C)    SpO2: 98%    Weight: (!) 174.6 kg (385 lb)    Height: 6' 2\" (1.88 m)        Physical Exam:    General: Wake and alert, oriented x4, no apparent distress   Breasts:       Right: Incision healing well. No seroma or hematoma cellulitis  Current Medications:  Current Outpatient Medications   Medication Sig Dispense Refill    HYDROcodone-acetaminophen (Norco) 5-325 mg per tablet Take  by mouth.  warfarin (COUMADIN) 1 mg tablet Take 1 tablet daily by mouth with 10 mg tablet for a total of 11 mg daily or as directed by healthcare provider. 90 Tablet 1    warfarin (COUMADIN) 10 mg tablet TAKE 1 TABLET BY MOUTH AS DIRECTED 90 Tablet 3    amLODIPine (NORVASC) 10 mg tablet TAKE 1 TABLET BY MOUTH DAILY 90 Tab 1    cloNIDine HCL (CATAPRES) 0.1 mg tablet TAKE 1 TABLET BY MOUTH TWICE DAILY 180 Tab 1    potassium chloride (K-DUR, KLOR-CON) 20 mEq tablet TAKE 1 TABLET BY MOUTH DAILY 90 Tab 1    lisinopril-hydroCHLOROthiazide (PRINZIDE, ZESTORETIC) 20-12.5 mg per tablet TAKE 2 TABLETS BY MOUTH EVERY DAY 60 Tab 6    atenoloL (TENORMIN) 100 mg tablet TAKE 1 TABLET BY MOUTH DAILY 30 Tab 6    fluticasone propionate (FLONASE) 50 mcg/actuation nasal spray SHAKE LIQUID AND USE 2 SPRAYS IN EACH NOSTRIL DAILY AS NEEDED FOR RHINITIS 16 g 4    semaglutide (OZEMPIC SC) 0.5 mg by SubCUTAneous route. Taking once weekly on Tuesdays      gabapentin (NEURONTIN) 300 mg capsule Taking 2 tabs TID; from Dr. Luis Jonas insulin glargine [facility]) 100 unit/mL (3 mL) inpn Takes 65 units twice a day.       BD INSULIN PEN NEEDLE UF SHORT 31 gauge x 5/16\" ndle USE 1 PEN UTD QID  3    NOVOLOG FLEXPEN 100 unit/mL inpn Takes 14-25 units per meal per sliding scale from endocrinology  3    insulin syringe-needle U-100 by Does Not Apply route. Dispense ultrafine 1 mL syringes with 29 gauge needle 100 Syringe 11    aspirin delayed-release 81 mg tablet Take 81 mg by mouth daily.  furosemide (LASIX) 20 mg tablet TAKE 1 TABLET BY MOUTH DAILY 90 Tab 1    mupirocin calcium (BACTROBAN) 2 % topical cream Apply  to affected area two (2) times a day. (Patient not taking: Reported on 7/16/2021) 15 g 0    tadalafil (CIALIS) 20 mg tablet Take 1 Tab by mouth as needed for Other (ED). (Patient not taking: Reported on 7/16/2021) 6 Tab 11       Chart and notes reviewed. Data reviewed. I have evaluated and examined the patient. Impression and plan:  Patient doing well following excision of epidermal inclusion cyst from the breast.  Continue to keep the wound clean and dry.   Follow-up as needed     Bee Jordan MD

## 2021-07-30 NOTE — TELEPHONE ENCOUNTER
Pharmacy Progress Note  - Telephone Anticoagulation Management    S/O:  Mr. Bong Dueñas ,54 y.o. male, referred by Dr. Ginette Merida MD, was contacted via an outbound telephone call today for telephonic anticoagulation management for the diagnosis of Deep Vein Thrombosis, Pulmonary Embolism and Cardioembolic CVA. Patient checks INR using home machine. Patient's most recent INR was 2.2 on 7/28/21 per patient report. · INR Goal:  2.0-3.0    · Current warfarin regimen: 11 mg once daily                      · Warfarin tablet strength:   1 mg, 10 mg   · Total weekly dose (mg): 77 mg    Today's pertinent positives includes:  No significant changes since last visit  - Denies any chest pain/SOB or pain in arms or legs  - Denies any s/sx of bleeding  - Denies any changes to diet or medications    · Adherence:   · Able to recall regimen? YES  · Miss/extra dose? NO  · Need refill? NO    Upcoming procedure(s):  NO    Wt Readings from Last 3 Encounters:   07/27/21 (!) 385 lb (174.6 kg)   07/16/21 (!) 380 lb (172.4 kg)   07/06/21 (!) 387 lb (175.5 kg)     BP Readings from Last 3 Encounters:   07/27/21 (!) 144/74   07/16/21 138/66   07/06/21 (!) 165/75     Past Medical History:   Diagnosis Date    Diabetes ([facility] [address]     Erectile dysfunction due to diseases classified elsewhere     Hematuria     right renal cyst    Hypercholesterolemia     Hypertension     Liver disease     Fatty Liver    Long term current use of anticoagulant therapy     Pulmonary emboli ([facility] [address] 2003 & 2004    chronic coumadin therapy managed by Dr. Zenaida Dubon multiple PE    Renal failure acute     following surgery    Shingles     2020    Sleep apnea     does not use cpap machine     Allergies   Allergen Reactions    Tape [Adhesive] Contact Dermatitis     ALLERGIC TO SILK TAPE ONLY         Current Outpatient Medications   Medication Sig    HYDROcodone-acetaminophen (Norco) 5-325 mg per tablet Take  by mouth.     warfarin (COUMADIN) 1 mg tablet Take 1 tablet daily by mouth with 10 mg tablet for a total of 11 mg daily or as directed by healthcare provider.  warfarin (COUMADIN) 10 mg tablet TAKE 1 TABLET BY MOUTH AS DIRECTED    furosemide (LASIX) 20 mg tablet TAKE 1 TABLET BY MOUTH DAILY    amLODIPine (NORVASC) 10 mg tablet TAKE 1 TABLET BY MOUTH DAILY    cloNIDine HCL (CATAPRES) 0.1 mg tablet TAKE 1 TABLET BY MOUTH TWICE DAILY    potassium chloride (K-DUR, KLOR-CON) 20 mEq tablet TAKE 1 TABLET BY MOUTH DAILY    mupirocin calcium (BACTROBAN) 2 % topical cream Apply  to affected area two (2) times a day. (Patient not taking: Reported on 7/16/2021)    lisinopril-hydroCHLOROthiazide (PRINZIDE, ZESTORETIC) 20-12.5 mg per tablet TAKE 2 TABLETS BY MOUTH EVERY DAY    atenoloL (TENORMIN) 100 mg tablet TAKE 1 TABLET BY MOUTH DAILY    fluticasone propionate (FLONASE) 50 mcg/actuation nasal spray SHAKE LIQUID AND USE 2 SPRAYS IN EACH NOSTRIL DAILY AS NEEDED FOR RHINITIS    semaglutide (OZEMPIC SC) 0.5 mg by SubCUTAneous route. Taking once weekly on Tuesdays    gabapentin (NEURONTIN) 300 mg capsule Taking 2 tabs TID; from Dr. Alecia Donovan tadalafil (CIALIS) 20 mg tablet Take 1 Tab by mouth as needed for Other (ED). (Patient not taking: Reported on 7/16/2021)    insulin glargine (BASAGLAR KWIKPEN) 100 unit/mL (3 mL) inpn Takes 65 units twice a day.  BD INSULIN PEN NEEDLE UF SHORT 31 gauge x 5/16\" ndle USE 1 PEN UTD QID    NOVOLOG FLEXPEN 100 unit/mL inpn Takes 14-25 units per meal per sliding scale from endocrinology    insulin syringe-needle U-100 by Does Not Apply route. Dispense ultrafine 1 mL syringes with 29 gauge needle    aspirin delayed-release 81 mg tablet Take 81 mg by mouth daily. No current facility-administered medications for this visit.        Lab Results   Component Value Date/Time    WBC 5.2 01/13/2020 11:35 AM    HGB 12.6 (L) 01/13/2020 11:35 AM    HCT 41.5 01/13/2020 11:35 AM    PLATELET 000 94/69/6437 11:35 AM    MCV 87 01/13/2020 11:35 AM     Lab Results   Component Value Date/Time    Sodium 139 07/16/2021 09:58 AM    Potassium 3.7 07/16/2021 09:58 AM    Chloride 103 07/16/2021 09:58 AM    CO2 29 07/16/2021 09:58 AM    Anion gap 7 07/16/2021 09:58 AM    Glucose 213 (H) 07/16/2021 09:58 AM    BUN 23 (H) 07/16/2021 09:58 AM    Creatinine 0.86 07/16/2021 09:58 AM    BUN/Creatinine ratio 27 (H) 07/16/2021 09:58 AM    GFR est AA >60 07/16/2021 09:58 AM    GFR est non-AA >60 07/16/2021 09:58 AM    Calcium 8.5 07/16/2021 09:58 AM    Bilirubin, total 0.3 01/13/2020 11:35 AM    Alk.  phosphatase 82 01/13/2020 11:35 AM    Protein, total 7.7 01/13/2020 11:35 AM    Albumin 4.3 01/13/2020 11:35 AM    Globulin 3.4 01/13/2020 11:35 AM    A-G Ratio 1.3 01/13/2020 11:35 AM    ALT (SGPT) 28 01/13/2020 11:35 AM       INR History:   (normal INR range 0.8-1.2)     Date   INR    Dose/Comments                07/28/2021       2.2       11 mg once daily  07/01/2021       2.5       11 mg daily         06/18/2021  2.8       82 mg daily        06/03/2021  2.1       11 mg daily       04/09/2021  2.5       Hold x 1. 11 mg daily   04/01/2021  3.1        5 mg x 1, 11 mg daily   03/24/2021  3.4       55 mg daily    1/14/2021    2.1       11 mg daily   11/12/2020  2.1       11 mg daily  9/2/2020      2.8       11 mg daily  8/27/2020    2.4       11 mg daily  8/20/2020    2.2       11 mg daily  8/13/2020    1.9       11 mg daily  8/6/2020      2.6       11 mg daily  7/30/2020    2.6       11 mg daily  7/23/2020    2.7       11 mg daily  7/16/2020    2.1       11 mg daily        7/9/2020      2.1       11 mg daily  7/2/2020      2.2       11 mg daily  6/25/2020    2.1       11 mg daily  6/18/2020    2.6       11 mg daily  6/11/2020    2.8       11 mg daily  6/4/2020      1.7       11 mg daily  5/28/2020    2.8       11 mg daily  5/21/2020    2.8       11 mg daily  5/14/2020    1.3       11 mg daily  5/7/2020      3.0       11 mg daily  4/30/2020    2.6       11 mg daily  4/23/2020    2.9       11 mg daily  4/16/2020    2.2       11 mg daily  4/9/2020      2.3       11 mg daily  4/2/2020      3.0       11 mg daily  3/26/2020    1.9       11 mg daily  3/19/2020    1.8       11 mg daily  3/12/2020    2.8       11 mg daily  3/5/2020      2.4       11 mg daily  2/27/2020    2.1       11 mg daily  2/20/2020    2.2       11 mg daily  2/13/2020    2.3       11 mg daily (took 13 mg on 2/6/2020)  2/6/2020      1.8       11 mg daily (missed 2 doses)          A/P:       Anticoagulation:  Considering Mr. Turcios's past history, todays findings, and per Anticoagulation Collaborative Practice Agreement/Protocol:    1. POC INR (2.2) is Therapeutic for INR goal today. 2.  Continue warfarin 11 mg once daily. 3. Instructed patient to notify PharmD of any upcoming procedures. 4. Next INR check will be in 2 week(s). Medication reconciliation was completed during the visit. There are no discontinued medications. A full discussion of the nature of anticoagulants has been carried out. A full discussion of the need for frequent and regular monitoring, precise dosage adjustment and compliance was stressed. Side effects of potential bleeding were discussed and Mr. Drea Fleming was instructed to call [phone] if there are any signs of abnormal bleeding. Mr. Drea Fleming was instructed to avoid any OTC items containing aspirin or ibuprofen and prior to starting any new OTC products to consult with his physician or pharmacist to ensure no drug interactions are present. Mr. Drea Fleming was instructed to avoid any major changes in his general diet and to avoid alcohol consumption. Mr. Drea Fleming verbalized his understanding of all instructions and will call the office with any questions, concerns, or signs of abnormal bleeding or blood clot. Notifications of recommendations will be sent to Dr. Gloria Pierre MD for review.     Thank you,  Dominguez Officer, [address] [facility] Tracking Only     CPA in place:  Yes   Recommendation Provided To: Patient/Caregiver: 1 via Telephone   Intervention Detail: Lab(s) Ordered   Gap Closed?: No   Intervention Accepted By: Patient/Caregiver: 1   Time Spent (min): 10

## 2021-08-09 ENCOUNTER — TELEPHONE (OUTPATIENT)
Dept: FAMILY MEDICINE CLINIC | Age: 51
End: 2021-08-09

## 2021-08-09 DIAGNOSIS — Z86.711 PERSONAL HISTORY OF PULMONARY EMBOLISM: Primary | ICD-10-CM

## 2021-08-09 DIAGNOSIS — I26.99 PULMONARY EMBOLISM, OTHER, UNSPECIFIED CHRONICITY, UNSPECIFIED WHETHER ACUTE COR PULMONALE PRESENT (HCC): ICD-10-CM

## 2021-08-09 NOTE — TELEPHONE ENCOUNTER
Patient called in requesting new script for compression hoses.   Requesting documentation to be faxed to [facility] [facility]  [phone]

## 2021-08-12 ENCOUNTER — TELEPHONE (OUTPATIENT)
Dept: FAMILY MEDICINE CLINIC | Age: 51
End: 2021-08-12

## 2021-08-13 NOTE — TELEPHONE ENCOUNTER
Pharmacy Progress Note - Telephone Call    Mr. Taniya Hickman 54 y.o. was contacted via an outbound telephone call regarding anticoagulation management today. A voicemail was left for patient to return my call. Most recent INR was reviewed: 2.4 on 8/4/21  Current INR is therapeutic for INR goal of 2.0 - 3.0  Continue current regimen        Thank you,  ASHLEIGH Stinson    For Pharmacy Admin Tracking Only     CPA in place:  Yes   Recommendation Provided To: Patient/Caregiver: 1 via Telephone   Intervention Detail: Lab(s) Ordered   Gap Closed?: No   Intervention Accepted By: Patient/Caregiver: 1   Time Spent (min): 10

## 2021-08-16 ENCOUNTER — TELEPHONE (OUTPATIENT)
Dept: FAMILY MEDICINE CLINIC | Age: 51
End: 2021-08-16

## 2021-08-16 NOTE — TELEPHONE ENCOUNTER
Called and let patient know order has been placed and faxed over. Patient verbalized understanding and had no further questions at this time.

## 2021-08-20 ENCOUNTER — TELEPHONE (OUTPATIENT)
Dept: FAMILY MEDICINE CLINIC | Age: 51
End: 2021-08-20

## 2021-08-20 NOTE — TELEPHONE ENCOUNTER
Pharmacy Progress Note  - Telephone Anticoagulation Management    S/O:  Mr. Sydnie Aguilera ,54 y.o. male, referred by Dr. Josefa Dickson MD, was contacted via an outbound telephone call today for telephonic anticoagulation management for the diagnosis of Deep Vein Thrombosis, Pulmonary Embolism and Cardioembolic CVA. Patient checks INR using home machine. Patient's most recent INR was 2.8 on 8/20/21 per patient report. · INR Goal:  2.0-3.0    · Current warfarin regimen: 11 mg once daily                       · Warfarin tablet strength:   1 mg, 10 mg   · Total weekly dose (mg): 77 mg    Today's pertinent positives includes:  No significant changes since last visit    · Adherence:   · Able to recall regimen? YES  · Miss/extra dose? NO  · Need refill? NO    Upcoming procedure(s):  NO    Wt Readings from Last 3 Encounters:   07/27/21 (!) 385 lb (174.6 kg)   07/16/21 (!) 380 lb (172.4 kg)   07/06/21 (!) 387 lb (175.5 kg)     BP Readings from Last 3 Encounters:   07/27/21 (!) 144/74   07/16/21 138/66   07/06/21 (!) 165/75     Past Medical History:   Diagnosis Date    Diabetes ([facility] [address]     Erectile dysfunction due to diseases classified elsewhere     Hematuria     right renal cyst    Hypercholesterolemia     Hypertension     Liver disease     Fatty Liver    Long term current use of anticoagulant therapy     Pulmonary emboli ([facility] [address] 2003 & 2004    chronic coumadin therapy managed by Dr. Colletta Caro multiple PE    Renal failure acute     following surgery    Shingles     2020    Sleep apnea     does not use cpap machine     Allergies   Allergen Reactions    Tape [Adhesive] Contact Dermatitis     ALLERGIC TO SILK TAPE ONLY         Current Outpatient Medications   Medication Sig    HYDROcodone-acetaminophen (Norco) 5-325 mg per tablet Take  by mouth.  warfarin (COUMADIN) 1 mg tablet Take 1 tablet daily by mouth with 10 mg tablet for a total of 11 mg daily or as directed by healthcare provider.     warfarin (COUMADIN) 10 mg tablet TAKE 1 TABLET BY MOUTH AS DIRECTED    furosemide (LASIX) 20 mg tablet TAKE 1 TABLET BY MOUTH DAILY    amLODIPine (NORVASC) 10 mg tablet TAKE 1 TABLET BY MOUTH DAILY    cloNIDine HCL (CATAPRES) 0.1 mg tablet TAKE 1 TABLET BY MOUTH TWICE DAILY    potassium chloride (K-DUR, KLOR-CON) 20 mEq tablet TAKE 1 TABLET BY MOUTH DAILY    mupirocin calcium (BACTROBAN) 2 % topical cream Apply  to affected area two (2) times a day. (Patient not taking: Reported on 7/16/2021)    lisinopril-hydroCHLOROthiazide (PRINZIDE, ZESTORETIC) 20-12.5 mg per tablet TAKE 2 TABLETS BY MOUTH EVERY DAY    atenoloL (TENORMIN) 100 mg tablet TAKE 1 TABLET BY MOUTH DAILY    fluticasone propionate (FLONASE) 50 mcg/actuation nasal spray SHAKE LIQUID AND USE 2 SPRAYS IN EACH NOSTRIL DAILY AS NEEDED FOR RHINITIS    semaglutide (OZEMPIC SC) 0.5 mg by SubCUTAneous route. Taking once weekly on Tuesdays    gabapentin (NEURONTIN) 300 mg capsule Taking 2 tabs TID; from Dr. Kodi Guerra tadalafil (CIALIS) 20 mg tablet Take 1 Tab by mouth as needed for Other (ED). (Patient not taking: Reported on 7/16/2021)    insulin glargine (BASAGLAR KWIKPEN) 100 unit/mL (3 mL) inpn Takes 65 units twice a day.  BD INSULIN PEN NEEDLE UF SHORT 31 gauge x 5/16\" ndle USE 1 PEN UTD QID    NOVOLOG FLEXPEN 100 unit/mL inpn Takes 14-25 units per meal per sliding scale from endocrinology    insulin syringe-needle U-100 by Does Not Apply route. Dispense ultrafine 1 mL syringes with 29 gauge needle    aspirin delayed-release 81 mg tablet Take 81 mg by mouth daily. No current facility-administered medications for this visit.        Lab Results   Component Value Date/Time    WBC 5.2 01/13/2020 11:35 AM    HGB 12.6 (L) 01/13/2020 11:35 AM    HCT 41.5 01/13/2020 11:35 AM    PLATELET 455 37/20/2199 11:35 AM    MCV 87 01/13/2020 11:35 AM     Lab Results   Component Value Date/Time    Sodium 139 07/16/2021 09:58 AM    Potassium 3.7 07/16/2021 09:58 AM    Chloride 103 07/16/2021 09:58 AM    CO2 29 07/16/2021 09:58 AM    Anion gap 7 07/16/2021 09:58 AM    Glucose 213 (H) 07/16/2021 09:58 AM    BUN 23 (H) 07/16/2021 09:58 AM    Creatinine 0.86 07/16/2021 09:58 AM    BUN/Creatinine ratio 27 (H) 07/16/2021 09:58 AM    GFR est AA >60 07/16/2021 09:58 AM    GFR est non-AA >60 07/16/2021 09:58 AM    Calcium 8.5 07/16/2021 09:58 AM    Bilirubin, total 0.3 01/13/2020 11:35 AM    Alk.  phosphatase 82 01/13/2020 11:35 AM    Protein, total 7.7 01/13/2020 11:35 AM    Albumin 4.3 01/13/2020 11:35 AM    Globulin 3.4 01/13/2020 11:35 AM    A-G Ratio 1.3 01/13/2020 11:35 AM    ALT (SGPT) 28 01/13/2020 11:35 AM       INR History:   (normal INR range 0.8-1.2)     Date   INR  Dose/Comments  08/20/2021       2.8       11 mg daily   07/28/2021       2.2       11 mg daily  07/01/2021       2.5       11 mg daily         06/18/2021  2.8       11 mg daily        06/03/2021  2.1       11 mg daily       04/09/2021  2.5       Hold x 1. 11 mg daily   04/01/2021  3.1        5 mg x 1, 11 mg daily   03/24/2021  3.4       64 mg daily    1/14/2021    2.1       11 mg daily   11/12/2020  2.1       11 mg daily  9/2/2020      2.8       11 mg daily  8/27/2020    2.4       11 mg daily  8/20/2020    2.2       11 mg daily  8/13/2020    1.9       11 mg daily  8/6/2020      2.6       11 mg daily  7/30/2020    2.6       11 mg daily  7/23/2020    2.7       11 mg daily  7/16/2020    2.1       11 mg daily        7/9/2020      2.1       11 mg daily  7/2/2020      2.2       11 mg daily  6/25/2020    2.1       11 mg daily  6/18/2020    2.6       11 mg daily  6/11/2020    2.8       11 mg daily  6/4/2020      1.7       11 mg daily  5/28/2020    2.8       11 mg daily  5/21/2020    2.8       11 mg daily  5/14/2020    1.3       11 mg daily  5/7/2020      3.0       11 mg daily  4/30/2020    2.6       11 mg daily  4/23/2020    2.9       11 mg daily  4/16/2020    2.2       11 mg daily  4/9/2020      2.3       11 mg daily  4/2/2020      3.0       11 mg daily  3/26/2020    1.9       11 mg daily  3/19/2020    1.8       11 mg daily  3/12/2020    2.8       11 mg daily  3/5/2020      2.4       11 mg daily  2/27/2020    2.1       11 mg daily  2/20/2020    2.2       11 mg daily  2/13/2020    2.3       11 mg daily (took 13 mg on 2/6/2020)  2/6/2020      1.8       11 mg daily (missed 2 doses)          A/P:       Anticoagulation:  Considering Mr. Turcios's past history, todays findings, and per Anticoagulation Collaborative Practice Agreement/Protocol:    1. POC INR (2.8) is Therapeutic for INR goal today. 2.  Continue warfarin 11 mg once daily. 3. Next INR check will be in 2 week(s). Medication reconciliation was completed during the visit. There are no discontinued medications. A full discussion of the nature of anticoagulants has been carried out. A full discussion of the need for frequent and regular monitoring, precise dosage adjustment and compliance was stressed. Side effects of potential bleeding were discussed and Mr. Tono Moncada was instructed to call [phone] if there are any signs of abnormal bleeding. Mr. Tono Moncada was instructed to avoid any OTC items containing aspirin or ibuprofen and prior to starting any new OTC products to consult with his physician or pharmacist to ensure no drug interactions are present. Mr. Tono Moncada was instructed to avoid any major changes in his general diet and to avoid alcohol consumption. Mr. Tono Moncada verbalized his understanding of all instructions and will call the office with any questions, concerns, or signs of abnormal bleeding or blood clot. Notifications of recommendations will be sent to Dr. Rakel Duarte MD for review. Thank you,  Bernabe Ziegler, [address] in place:  Yes   Recommendation Provided To: Patient/Caregiver: 1 via Telephone   Intervention Detail: Lab(s) Ordered   Gap Closed?: No   Intervention Accepted By: Patient/Caregiver: 1   Time Spent (min): 10

## 2021-09-02 ENCOUNTER — TELEPHONE (OUTPATIENT)
Dept: FAMILY MEDICINE CLINIC | Age: 51
End: 2021-09-02

## 2021-09-03 NOTE — TELEPHONE ENCOUNTER
Pharmacy Progress Note  - Telephone Anticoagulation Management    S/O:  Mr. Milena Alberts ,54 y.o. male, referred by Dr. Billie Gan MD, was contacted via an outbound telephone call today for telephonic anticoagulation management for the diagnosis of Deep Vein Thrombosis, Pulmonary Embolism and Cardioembolic CVA. Patient checks INR using home machine. Patient's most recent INR was 2.5 last week on 8/26/21 per patient's report. Interim History: Patient was not able to check his blood sugar today since he ran out of strips. He states that his order is being shipped to him soon. · INR Goal:  2.0-3.0    · Current warfarin regimen: 11 mg once daily                      · Warfarin tablet strength:   1 mg, 10 mg   · Total weekly dose (mg): 77 mg    Today's pertinent positives includes:  No significant changes since last visit      · Adherence:   · Able to recall regimen? YES  · Miss/extra dose? NO  · Need refill? NO    Upcoming procedure(s):  NO      Past Medical History:   Diagnosis Date    Diabetes ([facility] [address]     Erectile dysfunction due to diseases classified elsewhere     Hematuria     right renal cyst    Hypercholesterolemia     Hypertension     Liver disease     Fatty Liver    Long term current use of anticoagulant therapy     Pulmonary emboli ([facility] [address] 2003 & 2004    chronic coumadin therapy managed by Dr. Konstantin Gonzalez multiple PE    Renal failure acute     following surgery    Shingles     2020    Sleep apnea     does not use cpap machine     Allergies   Allergen Reactions    Tape [Adhesive] Contact Dermatitis     ALLERGIC TO SILK TAPE ONLY         Current Outpatient Medications   Medication Sig    HYDROcodone-acetaminophen (Norco) 5-325 mg per tablet Take  by mouth.  warfarin (COUMADIN) 1 mg tablet Take 1 tablet daily by mouth with 10 mg tablet for a total of 11 mg daily or as directed by healthcare provider.     warfarin (COUMADIN) 10 mg tablet TAKE 1 TABLET BY MOUTH AS DIRECTED    furosemide (LASIX) 20 mg tablet TAKE 1 TABLET BY MOUTH DAILY    amLODIPine (NORVASC) 10 mg tablet TAKE 1 TABLET BY MOUTH DAILY    cloNIDine HCL (CATAPRES) 0.1 mg tablet TAKE 1 TABLET BY MOUTH TWICE DAILY    potassium chloride (K-DUR, KLOR-CON) 20 mEq tablet TAKE 1 TABLET BY MOUTH DAILY    mupirocin calcium (BACTROBAN) 2 % topical cream Apply  to affected area two (2) times a day. (Patient not taking: Reported on 7/16/2021)    lisinopril-hydroCHLOROthiazide (PRINZIDE, ZESTORETIC) 20-12.5 mg per tablet TAKE 2 TABLETS BY MOUTH EVERY DAY    atenoloL (TENORMIN) 100 mg tablet TAKE 1 TABLET BY MOUTH DAILY    fluticasone propionate (FLONASE) 50 mcg/actuation nasal spray SHAKE LIQUID AND USE 2 SPRAYS IN EACH NOSTRIL DAILY AS NEEDED FOR RHINITIS    semaglutide (OZEMPIC SC) 0.5 mg by SubCUTAneous route. Taking once weekly on Tuesdays    gabapentin (NEURONTIN) 300 mg capsule Taking 2 tabs TID; from Dr. Gely Huynh tadalafil (CIALIS) 20 mg tablet Take 1 Tab by mouth as needed for Other (ED). (Patient not taking: Reported on 7/16/2021)    insulin glargine (BASAGLAR KWIKPEN) 100 unit/mL (3 mL) inpn Takes 65 units twice a day.  BD INSULIN PEN NEEDLE UF SHORT 31 gauge x 5/16\" ndle USE 1 PEN UTD QID    NOVOLOG FLEXPEN 100 unit/mL inpn Takes 14-25 units per meal per sliding scale from endocrinology    insulin syringe-needle U-100 by Does Not Apply route. Dispense ultrafine 1 mL syringes with 29 gauge needle    aspirin delayed-release 81 mg tablet Take 81 mg by mouth daily. No current facility-administered medications for this visit.        INR History:   (normal INR range 0.8-1.2)     Date   INR     Dose/Comments                       08/26/2021       2.5       11 mg daily   08/20/2021       2.8       11 mg daily   07/28/2021       2.2       11 mg daily  07/01/2021       2.5       11 mg daily         06/18/2021  2.8       11 mg daily        06/03/2021  2.1       11 mg daily       04/09/2021  2.5       Hold x 1. 11 mg daily   04/01/2021  3.1        5 mg x 1, 11 mg daily   03/24/2021  3.4       17 mg daily    1/14/2021    2.1       11 mg daily   11/12/2020  2.1       54 mg daily  9/2/2020      2.8       11 mg daily  8/27/2020    2.4       11 mg daily  8/20/2020    2.2       11 mg daily  8/13/2020    1.9       11 mg daily  8/6/2020      2.6       11 mg daily  7/30/2020    2.6       11 mg daily  7/23/2020    2.7       11 mg daily  7/16/2020    2.1       11 mg daily        7/9/2020      2.1       11 mg daily  7/2/2020      2.2       11 mg daily  6/25/2020    2.1       11 mg daily  6/18/2020    2.6       11 mg daily  6/11/2020    2.8       11 mg daily  6/4/2020      1.7       11 mg daily  5/28/2020    2.8       11 mg daily  5/21/2020    2.8       11 mg daily  5/14/2020    1.3       11 mg daily  5/7/2020      3.0       11 mg daily  4/30/2020    2.6       11 mg daily  4/23/2020    2.9       11 mg daily  4/16/2020    2.2       11 mg daily  4/9/2020      2.3       11 mg daily  4/2/2020      3.0       11 mg daily  3/26/2020    1.9       11 mg daily  3/19/2020    1.8       11 mg daily  3/12/2020    2.8       11 mg daily  3/5/2020      2.4       11 mg daily  2/27/2020    2.1       11 mg daily  2/20/2020    2.2       11 mg daily  2/13/2020    2.3       11 mg daily (took 13 mg on 2/6/2020)  2/6/2020      1.8       11 mg daily (missed 2 doses)          A/P:       Anticoagulation:  Considering Mr. Turcios's past history, todays findings, and per Anticoagulation Collaborative Practice Agreement/Protocol:    1. POC INR (2.5) is Therapeutic for INR goal today. 2.  Continue warfarin 11 mg once daily. 3. Next INR check will be in 2 week(s). Thank you,  Tamika Rincon, [address] in place:  Yes   Recommendation Provided To: Patient/Caregiver: 1 via Telephone   Intervention Detail: Lab(s) Ordered   Gap Closed?: No   Intervention Accepted By: Patient/Caregiver: 1   Time Spent (min): 15

## 2021-09-12 DIAGNOSIS — I10 HTN (HYPERTENSION), BENIGN: ICD-10-CM

## 2021-09-12 DIAGNOSIS — E87.6 HYPOKALEMIA: ICD-10-CM

## 2021-09-14 DIAGNOSIS — I10 HTN (HYPERTENSION), BENIGN: ICD-10-CM

## 2021-09-16 ENCOUNTER — TELEPHONE (OUTPATIENT)
Dept: FAMILY MEDICINE CLINIC | Age: 51
End: 2021-09-16

## 2021-09-17 RX ORDER — ATENOLOL 100 MG/1
TABLET ORAL
Qty: 30 TABLET | Refills: 6 | Status: SHIPPED | OUTPATIENT
Start: 2021-09-17 | End: 2021-12-01 | Stop reason: SDUPTHER

## 2021-09-17 RX ORDER — POTASSIUM CHLORIDE 20 MEQ/1
TABLET, EXTENDED RELEASE ORAL
Qty: 90 TABLET | Refills: 1 | Status: SHIPPED | OUTPATIENT
Start: 2021-09-17 | End: 2021-12-01 | Stop reason: SDUPTHER

## 2021-09-17 RX ORDER — CLONIDINE HYDROCHLORIDE 0.1 MG/1
TABLET ORAL
Qty: 180 TABLET | Refills: 1 | Status: SHIPPED | OUTPATIENT
Start: 2021-09-17 | End: 2021-12-01 | Stop reason: SDUPTHER

## 2021-09-17 RX ORDER — AMLODIPINE BESYLATE 10 MG/1
TABLET ORAL
Qty: 90 TABLET | Refills: 1 | Status: SHIPPED | OUTPATIENT
Start: 2021-09-17 | End: 2021-12-01 | Stop reason: SDUPTHER

## 2021-09-17 RX ORDER — LISINOPRIL AND HYDROCHLOROTHIAZIDE 12.5; 2 MG/1; MG/1
TABLET ORAL
Qty: 60 TABLET | Refills: 6 | Status: SHIPPED | OUTPATIENT
Start: 2021-09-17 | End: 2021-12-01 | Stop reason: SDUPTHER

## 2021-09-20 NOTE — TELEPHONE ENCOUNTER
Pharmacy Progress Note  - Telephone Anticoagulation Management    S/O:  Mr. Saad Mccabe ,54 y.o. male, referred by Dr. Darcy Tucker MD, was contacted via an outbound telephone call today for telephonic anticoagulation management for the diagnosis of Deep Vein Thrombosis, Pulmonary Embolism and Cardioembolic CVA. Patient uses home machine to check INR. Patient's most recent INR was 1.5 on 9/16/21 per patient report. · INR Goal:  2.0-3.0    · Current warfarin regimen: 11 mg once daily                       · Warfarin tablet strength:   1 mg, 10 mg   · Total weekly dose (mg): 77 mg daily     Today's pertinent positives includes:  Change in diet/appetite  - Patient states that he is now eating leafy greens daily and plans to continue with this       · Adherence:   · Able to recall regimen? YES  · Miss/extra dose? NO  · Need refill?  NO    Upcoming procedure(s):  NO    Wt Readings from Last 3 Encounters:   07/27/21 (!) 385 lb (174.6 kg)   07/16/21 (!) 380 lb (172.4 kg)   07/06/21 (!) 387 lb (175.5 kg)     BP Readings from Last 3 Encounters:   07/27/21 (!) 144/74   07/16/21 138/66   07/06/21 (!) 165/75     Past Medical History:   Diagnosis Date    Diabetes ([facility] [address]     Erectile dysfunction due to diseases classified elsewhere     Hematuria     right renal cyst    Hypercholesterolemia     Hypertension     Liver disease     Fatty Liver    Long term current use of anticoagulant therapy     Pulmonary emboli ([address] 2003 & 2004    chronic coumadin therapy managed by Dr. Valerie Isabel multiple PE    Renal failure acute     following surgery    Shingles 2020    Sleep apnea     does not use cpap machine     Allergies   Allergen Reactions    Tape [Adhesive] Contact Dermatitis     ALLERGIC TO SILK TAPE ONLY         Current Outpatient Medications   Medication Sig    lisinopril-hydroCHLOROthiazide (PRINZIDE, ZESTORETIC) 20-12.5 mg per tablet TAKE 2 TABLETS BY MOUTH EVERY DAY    cloNIDine HCL (CATAPRES) 0.1 mg tablet TAKE 1 TABLET BY MOUTH TWICE DAILY    potassium chloride (K-DUR, KLOR-CON) 20 mEq tablet TAKE 1 TABLET BY MOUTH DAILY    atenoloL (TENORMIN) 100 mg tablet TAKE 1 TABLET BY MOUTH DAILY    amLODIPine (NORVASC) 10 mg tablet TAKE 1 TABLET BY MOUTH DAILY    HYDROcodone-acetaminophen (Norco) 5-325 mg per tablet Take  by mouth.  warfarin (COUMADIN) 1 mg tablet Take 1 tablet daily by mouth with 10 mg tablet for a total of 11 mg daily or as directed by healthcare provider.  warfarin (COUMADIN) 10 mg tablet TAKE 1 TABLET BY MOUTH AS DIRECTED    furosemide (LASIX) 20 mg tablet TAKE 1 TABLET BY MOUTH DAILY    mupirocin calcium (BACTROBAN) 2 % topical cream Apply  to affected area two (2) times a day. (Patient not taking: Reported on 7/16/2021)    fluticasone propionate (FLONASE) 50 mcg/actuation nasal spray SHAKE LIQUID AND USE 2 SPRAYS IN EACH NOSTRIL DAILY AS NEEDED FOR RHINITIS    semaglutide (OZEMPIC SC) 0.5 mg by SubCUTAneous route. Taking once weekly on Tuesdays    gabapentin (NEURONTIN) 300 mg capsule Taking 2 tabs TID; from Dr. Varinder Meier tadalafil (CIALIS) 20 mg tablet Take 1 Tab by mouth as needed for Other (ED). (Patient not taking: Reported on 7/16/2021)    insulin glargine (BASAGLAR KWIKPEN) 100 unit/mL (3 mL) inpn Takes 65 units twice a day.  BD INSULIN PEN NEEDLE UF SHORT 31 gauge x 5/16\" ndle USE 1 PEN UTD QID    NOVOLOG FLEXPEN 100 unit/mL inpn Takes 14-25 units per meal per sliding scale from endocrinology    insulin syringe-needle U-100 by Does Not Apply route. Dispense ultrafine 1 mL syringes with 29 gauge needle    aspirin delayed-release 81 mg tablet Take 81 mg by mouth daily. No current facility-administered medications for this visit.        Lab Results   Component Value Date/Time    WBC 5.2 01/13/2020 11:35 AM    HGB 12.6 (L) 01/13/2020 11:35 AM    HCT 41.5 01/13/2020 11:35 AM    PLATELET 764 39/15/8869 11:35 AM    MCV 87 01/13/2020 11:35 AM     Lab Results Component Value Date/Time    Sodium 139 07/16/2021 09:58 AM    Potassium 3.7 07/16/2021 09:58 AM    Chloride 103 07/16/2021 09:58 AM    CO2 29 07/16/2021 09:58 AM    Anion gap 7 07/16/2021 09:58 AM    Glucose 213 (H) 07/16/2021 09:58 AM    BUN 23 (H) 07/16/2021 09:58 AM    Creatinine 0.86 07/16/2021 09:58 AM    BUN/Creatinine ratio 27 (H) 07/16/2021 09:58 AM    GFR est AA >60 07/16/2021 09:58 AM    GFR est non-AA >60 07/16/2021 09:58 AM    Calcium 8.5 07/16/2021 09:58 AM    Bilirubin, total 0.3 01/13/2020 11:35 AM    Alk.  phosphatase 82 01/13/2020 11:35 AM    Protein, total 7.7 01/13/2020 11:35 AM    Albumin 4.3 01/13/2020 11:35 AM    Globulin 3.4 01/13/2020 11:35 AM    A-G Ratio 1.3 01/13/2020 11:35 AM    ALT (SGPT) 28 01/13/2020 11:35 AM       INR History:   (normal INR range 0.8-1.2)     Date   INR  Dose/Comments                                                09/16/2021       1.5       11 mg daily (increase in vitamin k)  08/26/2021       2.5       11 mg daily   08/20/2021       2.8       11 mg daily   07/28/2021       2.2       71 mg daily  07/01/2021       2.5       23 mg daily         06/18/2021  2.8       11 mg daily        06/03/2021  2.1       11 mg daily       04/09/2021  2.5       Hold x 1. 11 mg daily   04/01/2021  3.1        5 mg x 1, 11 mg daily   03/24/2021  3.4       53 mg daily    1/14/2021    2.1       11 mg daily   11/12/2020  2.1       11 mg daily  9/2/2020      2.8       11 mg daily  8/27/2020    2.4       11 mg daily  8/20/2020    2.2       11 mg daily  8/13/2020    1.9       11 mg daily  8/6/2020      2.6       11 mg daily  7/30/2020    2.6       11 mg daily  7/23/2020    2.7       11 mg daily  7/16/2020    2.1       11 mg daily        7/9/2020      2.1       11 mg daily  7/2/2020      2.2       11 mg daily  6/25/2020    2.1       11 mg daily  6/18/2020    2.6       11 mg daily  6/11/2020    2.8       11 mg daily  6/4/2020      1.7       11 mg daily  5/28/2020    2.8       11 mg daily  5/21/2020    2.8       11 mg daily  5/14/2020    1.3       11 mg daily  5/7/2020      3.0       11 mg daily  4/30/2020    2.6       11 mg daily  4/23/2020    2.9       11 mg daily  4/16/2020    2.2       11 mg daily  4/9/2020      2.3       11 mg daily  4/2/2020      3.0       11 mg daily  3/26/2020    1.9       11 mg daily  3/19/2020    1.8       11 mg daily  3/12/2020    2.8       11 mg daily  3/5/2020      2.4       11 mg daily  2/27/2020    2.1       11 mg daily  2/20/2020    2.2       11 mg daily  2/13/2020    2.3       11 mg daily (took 13 mg on 2/6/2020)  2/6/2020      1.8       11 mg daily (missed 2 doses)                 A/P:       Anticoagulation:  Considering Mr. Turcios's past history, todays findings, and per Anticoagulation Collaborative Practice Agreement/Protocol:    1. POC INR (1.5) is Subtherapeutic for INR goal today. Patient states that this was due to an increase in eating leafy greens. He states that he is now eating this daily and plans to continue with it. 2.  Change warfarin to 12 mg once daily (20% increase) to account for this change in diet. Notified patient that we will need to be consistent with leafy green intake. He expressed understanding. 3. Next INR check will be in 1 week(s). Medication reconciliation was completed during the visit. There are no discontinued medications. A full discussion of the nature of anticoagulants has been carried out. A full discussion of the need for frequent and regular monitoring, precise dosage adjustment and compliance was stressed. Side effects of potential bleeding were discussed and Mr. Tono Moncada was instructed to call [phone] if there are any signs of abnormal bleeding. Mr. Tono Moncada was instructed to avoid any OTC items containing aspirin or ibuprofen and prior to starting any new OTC products to consult with his physician or pharmacist to ensure no drug interactions are present.   Mr. Tono Moncada was instructed to avoid any major changes in his general diet and to avoid alcohol consumption. Mr. Osiel Tee verbalized his understanding of all instructions and will call the office with any questions, concerns, or signs of abnormal bleeding or blood clot. Thank you,  Alden Amos, [address] in place:  Yes   Recommendation Provided To: Patient/Caregiver: 1 via Telephone   Intervention Detail: Dose Adjustment: 1, reason: Therapy Optimization and Lab(s) Ordered   Gap Closed?: No   Intervention Accepted By: Patient/Caregiver: 2   Time Spent (min): 15

## 2021-09-23 ENCOUNTER — TELEPHONE (OUTPATIENT)
Dept: FAMILY MEDICINE CLINIC | Age: 51
End: 2021-09-23

## 2021-09-23 DIAGNOSIS — I27.82 CHRONIC PULMONARY EMBOLISM, UNSPECIFIED PULMONARY EMBOLISM TYPE, UNSPECIFIED WHETHER ACUTE COR PULMONALE PRESENT (HCC): Primary | ICD-10-CM

## 2021-09-24 ENCOUNTER — TELEPHONE (OUTPATIENT)
Dept: FAMILY MEDICINE CLINIC | Age: 51
End: 2021-09-24

## 2021-09-24 NOTE — TELEPHONE ENCOUNTER
Kong called from [facility], requesting office notes for pts compression stockings.    Please advise  [phone]

## 2021-09-28 NOTE — TELEPHONE ENCOUNTER
Pharmacy Progress Note  - Telephone Anticoagulation Management    S/O:  Mr. Jolly Gutierrez ,54 y.o. male, referred by Dr. Terrell Fontenot MD, was contacted via an outbound telephone call today for telephonic anticoagulation management for the diagnosis of Deep Vein Thrombosis, Pulmonary Embolism and Cardioembolic CVA. Patient uses home machine to check INR. Patient's most recent INR was 2.1 on 9/23/21 per patient report. Interim History: Last week, patient states that he increased his leafy green vegetable intake which is why his INR was lower. However, he noticed that he also missed 2 days of his warfarin after reviewing his pill box. Therefore, he continued with 11 mg once daily instead of 12 mg because of this mix up. · INR Goal:  2.0-3.0    · Current warfarin regimen: 11 mg once daily                       · Warfarin tablet strength:   1 mg, 10 mg     Today's pertinent positives includes:  No significant changes since last visit  - Denies any s/sx of bleeding  - Denies any chest pain/SOB or pain in arms or legs   - Denies any changes to diet or medications     · Adherence:   · Able to recall regimen? YES  · Miss/extra dose? NO  · Need refill?  NO    Upcoming procedure(s):  NO    Past Medical History:   Diagnosis Date    Diabetes ([facility] [address]     Erectile dysfunction due to diseases classified elsewhere     Hematuria     right renal cyst    Hypercholesterolemia     Hypertension     Liver disease     Fatty Liver    Long term current use of anticoagulant therapy     Pulmonary emboli ([facility] [address] 2003 & 2004    chronic coumadin therapy managed by Dr. Lokesh Zavala multiple PE    Renal failure acute     following surgery    Shingles 2020    Sleep apnea     does not use cpap machine     Allergies   Allergen Reactions    Tape [Adhesive] Contact Dermatitis     ALLERGIC TO SILK TAPE ONLY         Current Outpatient Medications   Medication Sig    lisinopril-hydroCHLOROthiazide (Jalil Mcnulty) 20-12.5 mg per tablet TAKE 2 TABLETS BY MOUTH EVERY DAY    cloNIDine HCL (CATAPRES) 0.1 mg tablet TAKE 1 TABLET BY MOUTH TWICE DAILY    potassium chloride (K-DUR, KLOR-CON) 20 mEq tablet TAKE 1 TABLET BY MOUTH DAILY    atenoloL (TENORMIN) 100 mg tablet TAKE 1 TABLET BY MOUTH DAILY    amLODIPine (NORVASC) 10 mg tablet TAKE 1 TABLET BY MOUTH DAILY    HYDROcodone-acetaminophen (Norco) 5-325 mg per tablet Take  by mouth.  warfarin (COUMADIN) 1 mg tablet Take 1 tablet daily by mouth with 10 mg tablet for a total of 11 mg daily or as directed by healthcare provider.  warfarin (COUMADIN) 10 mg tablet TAKE 1 TABLET BY MOUTH AS DIRECTED    furosemide (LASIX) 20 mg tablet TAKE 1 TABLET BY MOUTH DAILY    mupirocin calcium (BACTROBAN) 2 % topical cream Apply  to affected area two (2) times a day. (Patient not taking: Reported on 7/16/2021)    fluticasone propionate (FLONASE) 50 mcg/actuation nasal spray SHAKE LIQUID AND USE 2 SPRAYS IN EACH NOSTRIL DAILY AS NEEDED FOR RHINITIS    semaglutide (OZEMPIC SC) 0.5 mg by SubCUTAneous route. Taking once weekly on Tuesdays    gabapentin (NEURONTIN) 300 mg capsule Taking 2 tabs TID; from Dr. Nikole Marie tadalafil (CIALIS) 20 mg tablet Take 1 Tab by mouth as needed for Other (ED). (Patient not taking: Reported on 7/16/2021)    insulin glargine (BASAGLAR KWIKPEN) 100 unit/mL (3 mL) inpn Takes 65 units twice a day.  BD INSULIN PEN NEEDLE UF SHORT 31 gauge x 5/16\" ndle USE 1 PEN UTD QID    NOVOLOG FLEXPEN 100 unit/mL inpn Takes 14-25 units per meal per sliding scale from endocrinology    insulin syringe-needle U-100 by Does Not Apply route. Dispense ultrafine 1 mL syringes with 29 gauge needle    aspirin delayed-release 81 mg tablet Take 81 mg by mouth daily. No current facility-administered medications for this visit.        INR History:   (normal INR range 0.8-1.2)     Date   INR Dose/Comments  09/23/2021       2.1       11 mg daily  09/16/2021       1.5       11 mg daily (increase in vitamin k)  08/26/2021       2.5       11 mg daily   08/20/2021       2.8       11 mg daily   07/28/2021       2.2       71 mg daily  07/01/2021       2.5       21 mg daily         06/18/2021  2.8       11 mg daily        06/03/2021  2.1       11 mg daily       04/09/2021  2.5       Hold x 1. 11 mg daily   04/01/2021  3.1        5 mg x 1, 11 mg daily   03/24/2021  3.4       11 mg daily    1/14/2021    2.1       11 mg daily   11/12/2020  2.1       11 mg daily  9/2/2020      2.8       11 mg daily  8/27/2020    2.4       11 mg daily  8/20/2020    2.2       11 mg daily  8/13/2020    1.9       11 mg daily  8/6/2020      2.6       11 mg daily  7/30/2020    2.6       11 mg daily  7/23/2020    2.7       11 mg daily  7/16/2020    2.1       11 mg daily        7/9/2020      2.1       11 mg daily  7/2/2020      2.2       11 mg daily  6/25/2020    2.1       11 mg daily  6/18/2020    2.6       11 mg daily  6/11/2020    2.8       11 mg daily  6/4/2020      1.7       11 mg daily  5/28/2020    2.8       11 mg daily  5/21/2020    2.8       11 mg daily  5/14/2020    1.3       11 mg daily  5/7/2020      3.0       11 mg daily  4/30/2020    2.6       11 mg daily  4/23/2020    2.9       11 mg daily  4/16/2020    2.2       11 mg daily  4/9/2020      2.3       11 mg daily  4/2/2020      3.0       11 mg daily  3/26/2020    1.9       11 mg daily  3/19/2020    1.8       11 mg daily  3/12/2020    2.8       11 mg daily  3/5/2020      2.4       11 mg daily  2/27/2020    2.1       11 mg daily  2/20/2020    2.2       11 mg daily  2/13/2020    2.3       11 mg daily (took 13 mg on 2/6/2020)  2/6/2020      1.8       11 mg daily (missed 2 doses)         A/P:       Anticoagulation:  Considering Mr. Turcios's past history, todays findings, and per Anticoagulation Collaborative Practice Agreement/Protocol:    1. POC INR (2.1) is Therapeutic for INR goal today. 2.  Continue warfarin 11 mg once daily.  Reinforced adherence and importance of being consistent with leafy green intake. 3. Next INR check will be in 1 week(s). Medication reconciliation was completed during the visit. There are no discontinued medications. A full discussion of the nature of anticoagulants has been carried out. A full discussion of the need for frequent and regular monitoring, precise dosage adjustment and compliance was stressed. Side effects of potential bleeding were discussed and Mr. Thad Duke was instructed to call [phone] if there are any signs of abnormal bleeding. Mr. Thad Duke was instructed to avoid any OTC items containing aspirin or ibuprofen and prior to starting any new OTC products to consult with his physician or pharmacist to ensure no drug interactions are present. Mr. Thad Duke was instructed to avoid any major changes in his general diet and to avoid alcohol consumption. Mr. Thad Duke verbalized his understanding of all instructions and will call the office with any questions, concerns, or signs of abnormal bleeding or blood clot. Thank you,  Driss Werner, [address] in place:  Yes   Recommendation Provided To: Patient/Caregiver: 1 via Telephone   Intervention Detail: Lab(s) Ordered   Gap Closed?: No   Intervention Accepted By: Patient/Caregiver: 1   Time Spent (min): 15

## 2021-10-06 DIAGNOSIS — Z79.01 WARFARIN ANTICOAGULATION: ICD-10-CM

## 2021-10-07 ENCOUNTER — TELEPHONE (OUTPATIENT)
Dept: FAMILY MEDICINE CLINIC | Age: 51
End: 2021-10-07

## 2021-10-08 RX ORDER — WARFARIN 10 MG/1
TABLET ORAL
Qty: 90 TABLET | Refills: 3 | Status: SHIPPED | OUTPATIENT
Start: 2021-10-08 | End: 2021-12-01 | Stop reason: SDUPTHER

## 2021-10-08 NOTE — TELEPHONE ENCOUNTER
Pharmacy Progress Note  - Telephone Anticoagulation Management    S/O:  Mr. Maria Eugenia Murray ,54 y.o. male, referred by Dr. Mary Jason MD, was contacted via an outbound telephone call today for telephonic anticoagulation management for the diagnosis of Deep Vein Thrombosis, Pulmonary Embolism and Cardioembolic CVA. Patient checks INR using home machine. Patient's most recent INR was 2.0 on 10/7/21 per his report. · INR Goal:  2.0-3.0    · Current warfarin regimen: 11 mg once daily                       · Warfarin tablet strength:   1 mg, 10 mg     Today's pertinent positives includes:  No significant changes since last visit  - Denies any s/sx of bleeding  - Denies any chest pain/SOB or pain in arms or legs   - Denies any changes to diet or medications         · Adherence:   · Able to recall regimen? YES  · Miss/extra dose? NO  · Need refill?  NO    Upcoming procedure(s):  NO    Wt Readings from Last 3 Encounters:   07/27/21 (!) 385 lb (174.6 kg)   07/16/21 (!) 380 lb (172.4 kg)   07/06/21 (!) 387 lb (175.5 kg)     BP Readings from Last 3 Encounters:   07/27/21 (!) 144/74   07/16/21 138/66   07/06/21 (!) 165/75     Past Medical History:   Diagnosis Date    Diabetes ([facility] [address]     Erectile dysfunction due to diseases classified elsewhere     Hematuria     right renal cyst    Hypercholesterolemia     Hypertension     Liver disease     Fatty Liver    Long term current use of anticoagulant therapy     Pulmonary emboli ([facility] [address] 2003 & 2004    chronic coumadin therapy managed by Dr. Meredith Youssef multiple PE    Renal failure acute     following surgery    Shingles 2020    Sleep apnea     does not use cpap machine     Allergies   Allergen Reactions    Tape [Adhesive] Contact Dermatitis     ALLERGIC TO SILK TAPE ONLY         Current Outpatient Medications   Medication Sig    warfarin (COUMADIN) 10 mg tablet TAKE 1 TABLET BY MOUTH AS DIRECTED    lisinopril-hydroCHLOROthiazide (Levorn Antolin) 20-12.5 mg per tablet TAKE 2 TABLETS BY MOUTH EVERY DAY    cloNIDine HCL (CATAPRES) 0.1 mg tablet TAKE 1 TABLET BY MOUTH TWICE DAILY    potassium chloride (K-DUR, KLOR-CON) 20 mEq tablet TAKE 1 TABLET BY MOUTH DAILY    atenoloL (TENORMIN) 100 mg tablet TAKE 1 TABLET BY MOUTH DAILY    amLODIPine (NORVASC) 10 mg tablet TAKE 1 TABLET BY MOUTH DAILY    HYDROcodone-acetaminophen (Norco) 5-325 mg per tablet Take  by mouth.  warfarin (COUMADIN) 1 mg tablet Take 1 tablet daily by mouth with 10 mg tablet for a total of 11 mg daily or as directed by healthcare provider.  furosemide (LASIX) 20 mg tablet TAKE 1 TABLET BY MOUTH DAILY    mupirocin calcium (BACTROBAN) 2 % topical cream Apply  to affected area two (2) times a day. (Patient not taking: Reported on 7/16/2021)    fluticasone propionate (FLONASE) 50 mcg/actuation nasal spray SHAKE LIQUID AND USE 2 SPRAYS IN EACH NOSTRIL DAILY AS NEEDED FOR RHINITIS    semaglutide (OZEMPIC SC) 0.5 mg by SubCUTAneous route. Taking once weekly on Tuesdays    gabapentin (NEURONTIN) 300 mg capsule Taking 2 tabs TID; from Dr. Steven Martins tadalafil (CIALIS) 20 mg tablet Take 1 Tab by mouth as needed for Other (ED). (Patient not taking: Reported on 7/16/2021)    insulin glargine (BASAGLAR KWIKPEN) 100 unit/mL (3 mL) inpn Takes 65 units twice a day.  BD INSULIN PEN NEEDLE UF SHORT 31 gauge x 5/16\" ndle USE 1 PEN UTD QID    NOVOLOG FLEXPEN 100 unit/mL inpn Takes 14-25 units per meal per sliding scale from endocrinology    insulin syringe-needle U-100 by Does Not Apply route. Dispense ultrafine 1 mL syringes with 29 gauge needle    aspirin delayed-release 81 mg tablet Take 81 mg by mouth daily. No current facility-administered medications for this visit.        Lab Results   Component Value Date/Time    WBC 5.2 01/13/2020 11:35 AM    HGB 12.6 (L) 01/13/2020 11:35 AM    HCT 41.5 01/13/2020 11:35 AM    PLATELET 130 50/79/7327 11:35 AM    MCV 87 01/13/2020 11:35 AM     Lab Results   Component Value Date/Time    Sodium 139 07/16/2021 09:58 AM    Potassium 3.7 07/16/2021 09:58 AM    Chloride 103 07/16/2021 09:58 AM    CO2 29 07/16/2021 09:58 AM    Anion gap 7 07/16/2021 09:58 AM    Glucose 213 (H) 07/16/2021 09:58 AM    BUN 23 (H) 07/16/2021 09:58 AM    Creatinine 0.86 07/16/2021 09:58 AM    BUN/Creatinine ratio 27 (H) 07/16/2021 09:58 AM    GFR est AA >60 07/16/2021 09:58 AM    GFR est non-AA >60 07/16/2021 09:58 AM    Calcium 8.5 07/16/2021 09:58 AM    Bilirubin, total 0.3 01/13/2020 11:35 AM    Alk.  phosphatase 82 01/13/2020 11:35 AM    Protein, total 7.7 01/13/2020 11:35 AM    Albumin 4.3 01/13/2020 11:35 AM    Globulin 3.4 01/13/2020 11:35 AM    A-G Ratio 1.3 01/13/2020 11:35 AM    ALT (SGPT) 28 01/13/2020 11:35 AM       INR History:   (normal INR range 0.8-1.2)     Date   INR     Dose/Comments                            10/07/2021       2.0       11 mg daily   09/23/2021       2.1       11 mg daily  09/16/2021       1.5       11 mg daily (increase in vitamin k)  08/26/2021       2.5       55 mg daily   08/20/2021       2.8       61 mg daily   07/28/2021       2.2       13 mg daily  07/01/2021       2.5       02 mg daily         06/18/2021  2.8       11 mg daily        06/03/2021  2.1       11 mg daily       04/09/2021  2.5       Hold x 1. 11 mg daily   04/01/2021  3.1        5 mg x 1, 11 mg daily   03/24/2021  3.4       82 mg daily    1/14/2021    2.1       11 mg daily   11/12/2020  2.1       11 mg daily  9/2/2020      2.8       11 mg daily  8/27/2020    2.4       11 mg daily  8/20/2020    2.2       11 mg daily  8/13/2020    1.9       11 mg daily  8/6/2020      2.6       11 mg daily  7/30/2020    2.6       11 mg daily  7/23/2020    2.7       11 mg daily  7/16/2020    2.1       11 mg daily        7/9/2020      2.1       11 mg daily  7/2/2020      2.2       11 mg daily  6/25/2020    2.1       11 mg daily  6/18/2020    2.6       11 mg daily  6/11/2020    2.8       11 mg daily  6/4/2020      1.7       11 mg daily  5/28/2020    2.8       11 mg daily  5/21/2020    2.8       11 mg daily  5/14/2020    1.3       11 mg daily  5/7/2020      3.0       11 mg daily  4/30/2020    2.6       11 mg daily  4/23/2020    2.9       11 mg daily  4/16/2020    2.2       11 mg daily  4/9/2020      2.3       11 mg daily  4/2/2020      3.0       11 mg daily  3/26/2020    1.9       11 mg daily  3/19/2020    1.8       11 mg daily  3/12/2020    2.8       11 mg daily  3/5/2020      2.4       11 mg daily  2/27/2020    2.1       11 mg daily  2/20/2020    2.2       11 mg daily  2/13/2020    2.3       11 mg daily (took 13 mg on 2/6/2020)  2/6/2020      1.8       11 mg daily (missed 2 doses)             A/P:       Anticoagulation:  Considering Mr. Turcios's past history, todays findings, and per Anticoagulation Collaborative Practice Agreement/Protocol:    1. POC INR (2.0) is Therapeutic for INR goal today. 2.  Continue warfarin 11 mg once daily   3. Next INR check will be in 2 week(s). Medication reconciliation was completed during the visit. There are no discontinued medications. A full discussion of the nature of anticoagulants has been carried out. A full discussion of the need for frequent and regular monitoring, precise dosage adjustment and compliance was stressed. Side effects of potential bleeding were discussed and Mr. Isael Hunt was instructed to call [phone] if there are any signs of abnormal bleeding. Mr. Isael Hunt was instructed to avoid any OTC items containing aspirin or ibuprofen and prior to starting any new OTC products to consult with his physician or pharmacist to ensure no drug interactions are present. Mr. Isael Hunt was instructed to avoid any major changes in his general diet and to avoid alcohol consumption.     Mr. Isael Hunt verbalized his understanding of all instructions and will call the office with any questions, concerns, or signs of abnormal bleeding or blood clot.  Notifications of recommendations will be sent to Dr. Leticia Corona MD for review. Thank you,  Gael Scheuermann, [address] in place:  Yes   Recommendation Provided To: Patient/Caregiver: 1 via Telephone   Intervention Detail: Lab(s) Ordered   Gap Closed?: No   Intervention Accepted By: Patient/Caregiver: 1   Time Spent (min): 15

## 2021-10-09 DIAGNOSIS — I10 HTN (HYPERTENSION), BENIGN: ICD-10-CM

## 2021-10-13 RX ORDER — FUROSEMIDE 20 MG/1
TABLET ORAL
Qty: 90 TABLET | Refills: 1 | Status: SHIPPED | OUTPATIENT
Start: 2021-10-13 | End: 2022-04-15

## 2021-10-22 ENCOUNTER — TELEPHONE (OUTPATIENT)
Dept: FAMILY MEDICINE CLINIC | Age: 51
End: 2021-10-22

## 2021-10-26 NOTE — TELEPHONE ENCOUNTER
Pharmacy Progress Note  - Telephone Anticoagulation Management    S/O:  Mr. Claudia Sanchez ,54 y.o. male, referred by Dr. Hola Case MD, was contacted via an outbound telephone call today for telephonic anticoagulation management for the diagnosis of Deep Vein Thrombosis, Pulmonary Embolism and Cardioembolic CVA. Patient checks INR using home machine. Patient's most recent INR was 2.1 on 10/21/21 per his report. · INR Goal:  2.0-3.0    · Current warfarin regimen: 11 mg daily                       · Warfarin tablet strength:   1 mg, 10 mg   · Total weekly dose (mg): 77 mg    Today's pertinent positives includes:  No significant changes since last visit  - Denies any s/sx of bleeding  - Denies any chest pain/SOB or pain in arms or legs   - Denies any changes to diet or medications       · Adherence:   · Able to recall regimen? YES  · Miss/extra dose? NO  · Need refill?  NO    Upcoming procedure(s):  NO      Past Medical History:   Diagnosis Date    Diabetes ([facility] [address]     Erectile dysfunction due to diseases classified elsewhere     Hematuria     right renal cyst    Hypercholesterolemia     Hypertension     Liver disease     Fatty Liver    Long term current use of anticoagulant therapy     Pulmonary emboli ([facility] [address] 2003 & 2004    chronic coumadin therapy managed by Dr. Oriana Lawrence multiple PE    Renal failure acute     following surgery    Shingles     2020    Sleep apnea     does not use cpap machine     Allergies   Allergen Reactions    Tape [Adhesive] Contact Dermatitis     ALLERGIC TO SILK TAPE ONLY         Current Outpatient Medications   Medication Sig    furosemide (LASIX) 20 mg tablet TAKE 1 TABLET BY MOUTH DAILY    warfarin (COUMADIN) 10 mg tablet TAKE 1 TABLET BY MOUTH AS DIRECTED    lisinopril-hydroCHLOROthiazide (PRINZIDE, ZESTORETIC) 20-12.5 mg per tablet TAKE 2 TABLETS BY MOUTH EVERY DAY    cloNIDine HCL (CATAPRES) 0.1 mg tablet TAKE 1 TABLET BY MOUTH TWICE DAILY    potassium chloride (K-DUR, KLOR-CON) 20 mEq tablet TAKE 1 TABLET BY MOUTH DAILY    atenoloL (TENORMIN) 100 mg tablet TAKE 1 TABLET BY MOUTH DAILY    amLODIPine (NORVASC) 10 mg tablet TAKE 1 TABLET BY MOUTH DAILY    HYDROcodone-acetaminophen (Norco) 5-325 mg per tablet Take  by mouth.  warfarin (COUMADIN) 1 mg tablet Take 1 tablet daily by mouth with 10 mg tablet for a total of 11 mg daily or as directed by healthcare provider.  mupirocin calcium (BACTROBAN) 2 % topical cream Apply  to affected area two (2) times a day. (Patient not taking: Reported on 7/16/2021)    fluticasone propionate (FLONASE) 50 mcg/actuation nasal spray SHAKE LIQUID AND USE 2 SPRAYS IN EACH NOSTRIL DAILY AS NEEDED FOR RHINITIS    semaglutide (OZEMPIC SC) 0.5 mg by SubCUTAneous route. Taking once weekly on Tuesdays    gabapentin (NEURONTIN) 300 mg capsule Taking 2 tabs TID; from Dr. Martina Alcazar tadalafil (CIALIS) 20 mg tablet Take 1 Tab by mouth as needed for Other (ED). (Patient not taking: Reported on 7/16/2021)    insulin glargine (BASAGLAR KWIKPEN) 100 unit/mL (3 mL) inpn Takes 65 units twice a day.  BD INSULIN PEN NEEDLE UF SHORT 31 gauge x 5/16\" ndle USE 1 PEN UTD QID    NOVOLOG FLEXPEN 100 unit/mL inpn Takes 14-25 units per meal per sliding scale from endocrinology    insulin syringe-needle U-100 by Does Not Apply route. Dispense ultrafine 1 mL syringes with 29 gauge needle    aspirin delayed-release 81 mg tablet Take 81 mg by mouth daily. No current facility-administered medications for this visit.        INR History:   (normal INR range 0.8-1.2)     Date   INR Dose/Comments                                         10/21/2021       2.1       11 mg daily   10/07/2021       2.0       11 mg daily   09/23/2021       2.1       11 mg daily  09/16/2021       1.5       11 mg daily (increase in vitamin k)  08/26/2021       2.5       11 mg daily   08/20/2021       2.8       11 mg daily   07/28/2021       2.2       11 mg daily  07/01/2021       2.5       20 mg daily         06/18/2021  2.8       11 mg daily        06/03/2021  2.1       11 mg daily       04/09/2021  2.5       Hold x 1. 11 mg daily   04/01/2021  3.1        5 mg x 1, 11 mg daily   03/24/2021  3.4       34 mg daily    1/14/2021    2.1       11 mg daily   11/12/2020  2.1       67 mg daily  9/2/2020      2.8       11 mg daily  8/27/2020    2.4       11 mg daily  8/20/2020    2.2       11 mg daily  8/13/2020    1.9       11 mg daily  8/6/2020      2.6       11 mg daily  7/30/2020    2.6       11 mg daily  7/23/2020    2.7       11 mg daily  7/16/2020    2.1       11 mg daily        7/9/2020      2.1       11 mg daily  7/2/2020      2.2       11 mg daily  6/25/2020    2.1       11 mg daily  6/18/2020    2.6       11 mg daily  6/11/2020    2.8       11 mg daily  6/4/2020      1.7       11 mg daily  5/28/2020    2.8       11 mg daily  5/21/2020    2.8       11 mg daily  5/14/2020    1.3       11 mg daily  5/7/2020      3.0       11 mg daily  4/30/2020    2.6       11 mg daily  4/23/2020    2.9       11 mg daily  4/16/2020    2.2       11 mg daily  4/9/2020      2.3       11 mg daily  4/2/2020      3.0       11 mg daily  3/26/2020    1.9       11 mg daily  3/19/2020    1.8       11 mg daily  3/12/2020    2.8       11 mg daily  3/5/2020      2.4       11 mg daily  2/27/2020    2.1       11 mg daily  2/20/2020    2.2       11 mg daily  2/13/2020    2.3       11 mg daily (took 13 mg on 2/6/2020)  2/6/2020      1.8       11 mg daily (missed 2 doses)           A/P:       Anticoagulation:  Considering Mr. Turcios's past history, todays findings, and per Anticoagulation Collaborative Practice Agreement/Protocol:    1. Home POC INR (2.1) is Therapeutic for INR goal today. 2.  Continue warfarin 11 mg daily. 3. Next INR check will be in 2 week(s). Thank you,  Jose R Garcia, [address] in place:  Yes   Recommendation Provided To: Patient/Caregiver: 1 via Telephone   Intervention Detail: Lab(s) Ordered   Gap Closed?: No   Intervention Accepted By: Patient/Caregiver: 1   Time Spent (min): 10

## 2021-11-05 ENCOUNTER — TELEPHONE (OUTPATIENT)
Dept: FAMILY MEDICINE CLINIC | Age: 51
End: 2021-11-05

## 2021-11-10 NOTE — TELEPHONE ENCOUNTER
Pharmacy Progress Note  - Telephone Anticoagulation Management    S/O:  Mr. Phu Garvin ,54 y.o. male, referred by Dr. Benjamin Waterman MD, was contacted via an outbound telephone call today for telephonic anticoagulation management for the diagnosis of Deep Vein Thrombosis, Pulmonary Embolism and Cardioembolic CVA. Patient checks INR using home machine. Patient's most recent INR was 2.3 on 11/4/21 per his report. · INR Goal:  2.0-3.0    · Current warfarin regimen: 11 mg daily                       · Warfarin tablet strength:   1 mg, 10 mg     Today's pertinent positives includes:  No significant changes since last visit    · Adherence:   · Able to recall regimen? YES  · Miss/extra dose? NO  · Need refill?  NO    Upcoming procedure(s):  NO      Past Medical History:   Diagnosis Date    Diabetes ([facility] [address]     Erectile dysfunction due to diseases classified elsewhere     Hematuria     right renal cyst    Hypercholesterolemia     Hypertension     Liver disease     Fatty Liver    Long term current use of anticoagulant therapy     Pulmonary emboli ([facility] [address] 2003 & 2004    chronic coumadin therapy managed by Dr. Jennie Paige multiple PE    Renal failure acute     following surgery    Shingles     2020    Sleep apnea     does not use cpap machine     Allergies   Allergen Reactions    Tape [Adhesive] Contact Dermatitis     ALLERGIC TO SILK TAPE ONLY         Current Outpatient Medications   Medication Sig    furosemide (LASIX) 20 mg tablet TAKE 1 TABLET BY MOUTH DAILY    warfarin (COUMADIN) 10 mg tablet TAKE 1 TABLET BY MOUTH AS DIRECTED    lisinopril-hydroCHLOROthiazide (PRINZIDE, ZESTORETIC) 20-12.5 mg per tablet TAKE 2 TABLETS BY MOUTH EVERY DAY    cloNIDine HCL (CATAPRES) 0.1 mg tablet TAKE 1 TABLET BY MOUTH TWICE DAILY    potassium chloride (K-DUR, KLOR-CON) 20 mEq tablet TAKE 1 TABLET BY MOUTH DAILY    atenoloL (TENORMIN) 100 mg tablet TAKE 1 TABLET BY MOUTH DAILY    amLODIPine (NORVASC) 10 mg tablet TAKE 1 TABLET BY MOUTH DAILY    HYDROcodone-acetaminophen (Norco) 5-325 mg per tablet Take  by mouth.  warfarin (COUMADIN) 1 mg tablet Take 1 tablet daily by mouth with 10 mg tablet for a total of 11 mg daily or as directed by healthcare provider.  mupirocin calcium (BACTROBAN) 2 % topical cream Apply  to affected area two (2) times a day. (Patient not taking: Reported on 7/16/2021)    fluticasone propionate (FLONASE) 50 mcg/actuation nasal spray SHAKE LIQUID AND USE 2 SPRAYS IN EACH NOSTRIL DAILY AS NEEDED FOR RHINITIS    semaglutide (OZEMPIC SC) 0.5 mg by SubCUTAneous route. Taking once weekly on Tuesdays    gabapentin (NEURONTIN) 300 mg capsule Taking 2 tabs TID; from Dr. Juliana Potts tadalafil (CIALIS) 20 mg tablet Take 1 Tab by mouth as needed for Other (ED). (Patient not taking: Reported on 7/16/2021)    insulin glargine (BASAGLAR KWIKPEN) 100 unit/mL (3 mL) inpn Takes 65 units twice a day.  BD INSULIN PEN NEEDLE UF SHORT 31 gauge x 5/16\" ndle USE 1 PEN UTD QID    NOVOLOG FLEXPEN 100 unit/mL inpn Takes 14-25 units per meal per sliding scale from endocrinology    insulin syringe-needle U-100 by Does Not Apply route. Dispense ultrafine 1 mL syringes with 29 gauge needle    aspirin delayed-release 81 mg tablet Take 81 mg by mouth daily. No current facility-administered medications for this visit.        Lab Results   Component Value Date/Time    WBC 5.2 01/13/2020 11:35 AM    HGB 12.6 (L) 01/13/2020 11:35 AM    HCT 41.5 01/13/2020 11:35 AM    PLATELET 640 70/05/9870 11:35 AM    MCV 87 01/13/2020 11:35 AM     Lab Results   Component Value Date/Time    Sodium 139 07/16/2021 09:58 AM    Potassium 3.7 07/16/2021 09:58 AM    Chloride 103 07/16/2021 09:58 AM    CO2 29 07/16/2021 09:58 AM    Anion gap 7 07/16/2021 09:58 AM    Glucose 213 (H) 07/16/2021 09:58 AM    BUN 23 (H) 07/16/2021 09:58 AM    Creatinine 0.86 07/16/2021 09:58 AM    BUN/Creatinine ratio 27 (H) 07/16/2021 09:58 AM    GFR est AA >60 07/16/2021 09:58 AM    GFR est non-AA >60 07/16/2021 09:58 AM    Calcium 8.5 07/16/2021 09:58 AM    Bilirubin, total 0.3 01/13/2020 11:35 AM    Alk.  phosphatase 82 01/13/2020 11:35 AM    Protein, total 7.7 01/13/2020 11:35 AM    Albumin 4.3 01/13/2020 11:35 AM    Globulin 3.4 01/13/2020 11:35 AM    A-G Ratio 1.3 01/13/2020 11:35 AM    ALT (SGPT) 28 01/13/2020 11:35 AM       INR History:   (normal INR range 0.8-1.2)     Date   INR       Dose/Comments                         11/04/2021       2.3       11 mg daily   10/21/2021       2.1       11 mg daily   10/07/2021       2.0       11 mg daily   09/23/2021       2.1       11 mg daily  09/16/2021       1.5       11 mg daily (increase in vitamin k)  08/26/2021       2.5       89 mg daily   08/20/2021       2.8       44 mg daily   07/28/2021       2.2       58 mg daily  07/01/2021       2.5       88 mg daily         06/18/2021  2.8       11 mg daily        06/03/2021  2.1       11 mg daily       04/09/2021  2.5       Hold x 1. 11 mg daily   04/01/2021  3.1        5 mg x 1, 11 mg daily   03/24/2021  3.4       67 mg daily    1/14/2021    2.1       11 mg daily   11/12/2020  2.1       11 mg daily  9/2/2020      2.8       11 mg daily  8/27/2020    2.4       11 mg daily  8/20/2020    2.2       11 mg daily  8/13/2020    1.9       11 mg daily  8/6/2020      2.6       11 mg daily  7/30/2020    2.6       11 mg daily  7/23/2020    2.7       11 mg daily  7/16/2020    2.1       11 mg daily        7/9/2020      2.1       11 mg daily  7/2/2020      2.2       11 mg daily  6/25/2020    2.1       11 mg daily  6/18/2020    2.6       11 mg daily  6/11/2020    2.8       11 mg daily  6/4/2020      1.7       11 mg daily  5/28/2020    2.8       11 mg daily  5/21/2020    2.8       11 mg daily  5/14/2020    1.3       11 mg daily  5/7/2020      3.0       11 mg daily  4/30/2020    2.6       11 mg daily  4/23/2020    2.9       11 mg daily  4/16/2020    2.2       11 mg daily  4/9/2020      2.3       11 mg daily  4/2/2020      3.0       11 mg daily  3/26/2020    1.9       11 mg daily  3/19/2020    1.8       11 mg daily  3/12/2020    2.8       11 mg daily  3/5/2020      2.4       11 mg daily  2/27/2020    2.1       11 mg daily  2/20/2020    2.2       11 mg daily  2/13/2020    2.3       11 mg daily (took 13 mg on 2/6/2020)  2/6/2020      1.8       11 mg daily (missed 2 doses)          A/P:       Anticoagulation:  Considering Mr. Turcios's past history, todays findings, and per Anticoagulation Collaborative Practice Agreement/Protocol:    1. POC INR (2.3) is Therapeutic for INR goal today. 2.  Continue warfarin 11 mg once daily. 3. Next INR check will be in 2 week(s). Medication reconciliation was completed during the visit. There are no discontinued medications. A full discussion of the nature of anticoagulants has been carried out. A full discussion of the need for frequent and regular monitoring, precise dosage adjustment and compliance was stressed. Side effects of potential bleeding were discussed and Mr. Wesley Munson was instructed to call [phone] if there are any signs of abnormal bleeding. Mr. Wesley Munson was instructed to avoid any OTC items containing aspirin or ibuprofen and prior to starting any new OTC products to consult with his physician or pharmacist to ensure no drug interactions are present. Mr. Wesley Munson was instructed to avoid any major changes in his general diet and to avoid alcohol consumption. Mr. Wesley Munson verbalized his understanding of all instructions and will call the office with any questions, concerns, or signs of abnormal bleeding or blood clot. Thank you,  Sheryl Whitmore, [address] [facility] in place:  Yes   Recommendation Provided To: Patient/Caregiver: 1 via Telephone   Intervention Detail: Lab(s) Ordered   Gap Closed?: No   Intervention Accepted By: Patient/Caregiver: 1   Time Spent (min): 10

## 2021-11-19 ENCOUNTER — TELEPHONE (OUTPATIENT)
Dept: FAMILY MEDICINE CLINIC | Age: 51
End: 2021-11-19

## 2021-11-21 NOTE — TELEPHONE ENCOUNTER
Pharmacy Progress Note     Patient states that he was out of testing strips, so he has not checked his INR this week. However, last week he states that his INR was 1.9 after eating a little more greens than usual. He continued with 11 mg once daily. Most recent INR was subtherapeutic at 1.9 due to increase in vitamin k intake. Agree with continuing 11 mg once daily; will re-check in 2 weeks once patient gets more testing strips.        Date                INR       Dose/Comments                                    11/11/2021       1.9       11 mg daily (+increase in vitamin k)  11/04/2021       2.3       11 mg daily   10/21/2021       2.1       11 mg daily   10/07/2021       2.0       11 mg daily   09/23/2021       2.1       11 mg daily  09/16/2021       1.5       11 mg daily (increase in vitamin k)  08/26/2021       2.5       11 mg daily   08/20/2021       2.8       11 mg daily   07/28/2021       2.2       46 mg daily  07/01/2021       2.5       11 mg daily         06/18/2021  2.8       11 mg daily        06/03/2021  2.1       11 mg daily       04/09/2021  2.5       Hold x 1. 11 mg daily   04/01/2021  3.1        5 mg x 1, 11 mg daily   03/24/2021  3.4       11 mg daily    1/14/2021    2.1       11 mg daily   11/12/2020  2.1       11 mg daily  9/2/2020      2.8       11 mg daily  8/27/2020    2.4       11 mg daily  8/20/2020    2.2       11 mg daily  8/13/2020    1.9       11 mg daily  8/6/2020      2.6       11 mg daily  7/30/2020    2.6       11 mg daily  7/23/2020    2.7       11 mg daily  7/16/2020    2.1       11 mg daily        7/9/2020      2.1       11 mg daily  7/2/2020      2.2       11 mg daily  6/25/2020    2.1       11 mg daily  6/18/2020    2.6       11 mg daily  6/11/2020    2.8       11 mg daily  6/4/2020      1.7       11 mg daily  5/28/2020    2.8       11 mg daily  5/21/2020    2.8       11 mg daily  5/14/2020    1.3       11 mg daily  5/7/2020      3.0       11 mg daily  4/30/2020    2.6       11 mg daily  4/23/2020    2.9       11 mg daily  4/16/2020    2.2       11 mg daily  4/9/2020      2.3       11 mg daily  4/2/2020      3.0       11 mg daily  3/26/2020    1.9       11 mg daily  3/19/2020    1.8       11 mg daily  3/12/2020    2.8       11 mg daily  3/5/2020      2.4       11 mg daily  2/27/2020    2.1       11 mg daily  2/20/2020    2.2       11 mg daily  2/13/2020    2.3       11 mg daily (took 13 mg on 2/6/2020)  2/6/2020      1.8       11 mg daily (missed 2 doses)           Thank you,  Margaret Dakin. [place], BCPS                For Pharmacy Admin Tracking Only     CPA in place:  Yes   Recommendation Provided To: Patient/Caregiver: 1 via In person   Intervention Detail: Lab(s) Ordered   Gap Closed?: No   Intervention Accepted By: Patient/Caregiver: 1   Time Spent (min): 15

## 2021-12-01 DIAGNOSIS — E87.6 HYPOKALEMIA: ICD-10-CM

## 2021-12-01 DIAGNOSIS — I10 HTN (HYPERTENSION), BENIGN: ICD-10-CM

## 2021-12-01 DIAGNOSIS — Z79.01 WARFARIN ANTICOAGULATION: ICD-10-CM

## 2021-12-01 NOTE — TELEPHONE ENCOUNTER
Juan Antonio Chan is requesting a 90 day supply  Previous Rx's were sent to [facility]    Last Visit: 10/14/20 with MD Ellen Mon  Next Appointment: Advised to follow-up in 3 months    Requested Prescriptions     Pending Prescriptions Disp Refills    warfarin (COUMADIN) 1 mg tablet 90 Tablet 0     Sig: Take 1 tablet daily by mouth with 10 mg tablet for a total of 11 mg daily or as directed by healthcare provider.  atenoloL (TENORMIN) 100 mg tablet 90 Tablet 0     Sig: Take 1 Tablet by mouth daily.  warfarin (COUMADIN) 10 mg tablet 90 Tablet 0     Sig: Take 1 tablet by mouth as directed    lisinopril-hydroCHLOROthiazide (PRINZIDE, ZESTORETIC) 20-12.5 mg per tablet 180 Tablet 0     Sig: Take 2 Tablets by mouth daily.  cloNIDine HCL (CATAPRES) 0.1 mg tablet 180 Tablet 0     Sig: Take 1 Tablet by mouth two (2) times a day.  potassium chloride (K-DUR, KLOR-CON M20) 20 mEq tablet 90 Tablet 0     Sig: Take 1 Tablet by mouth daily.  amLODIPine (NORVASC) 10 mg tablet 90 Tablet 0     Sig: Take 1 Tablet by mouth daily.

## 2021-12-03 RX ORDER — ATENOLOL 100 MG/1
100 TABLET ORAL DAILY
Qty: 90 TABLET | Refills: 3 | Status: SHIPPED | OUTPATIENT
Start: 2021-12-03

## 2021-12-03 RX ORDER — POTASSIUM CHLORIDE 20 MEQ/1
20 TABLET, EXTENDED RELEASE ORAL DAILY
Qty: 90 TABLET | Refills: 3 | Status: SHIPPED | OUTPATIENT
Start: 2021-12-03

## 2021-12-03 RX ORDER — AMLODIPINE BESYLATE 10 MG/1
10 TABLET ORAL DAILY
Qty: 90 TABLET | Refills: 3 | Status: SHIPPED | OUTPATIENT
Start: 2021-12-03

## 2021-12-03 RX ORDER — WARFARIN 10 MG/1
TABLET ORAL
Qty: 90 TABLET | Refills: 3 | Status: SHIPPED | OUTPATIENT
Start: 2021-12-03

## 2021-12-03 RX ORDER — LISINOPRIL AND HYDROCHLOROTHIAZIDE 12.5; 2 MG/1; MG/1
2 TABLET ORAL DAILY
Qty: 180 TABLET | Refills: 3 | Status: SHIPPED | OUTPATIENT
Start: 2021-12-03 | End: 2022-08-09 | Stop reason: DRUGHIGH

## 2021-12-03 RX ORDER — CLONIDINE HYDROCHLORIDE 0.1 MG/1
0.1 TABLET ORAL 2 TIMES DAILY
Qty: 180 TABLET | Refills: 3 | Status: SHIPPED | OUTPATIENT
Start: 2021-12-03

## 2021-12-03 RX ORDER — WARFARIN 1 MG/1
TABLET ORAL
Qty: 90 TABLET | Refills: 3 | Status: SHIPPED | OUTPATIENT
Start: 2021-12-03

## 2021-12-07 ENCOUNTER — TELEPHONE (OUTPATIENT)
Dept: FAMILY MEDICINE CLINIC | Age: 51
End: 2021-12-07

## 2021-12-09 NOTE — TELEPHONE ENCOUNTER
Patient left message for PharmD notifying us that his most recent INR was 2.2. Will continue with 11 mg daily and follow up with patient again in 2 weeks.        Date                KAI       Dose/Comments                                    12/7/2021         2.2       11 mg daily   11/11/2021       1.9       11 mg daily (+increase in vitamin k)  11/04/2021       2.3       11 mg daily   10/21/2021       2.1       11 mg daily   10/07/2021       2.0       11 mg daily   09/23/2021       2.1       11 mg daily  09/16/2021       1.5       11 mg daily (increase in vitamin k)  08/26/2021       2.5       12 mg daily   08/20/2021       2.8       11 mg daily   07/28/2021       2.2       99 mg daily  07/01/2021       2.5       35 mg daily         06/18/2021  2.8       11 mg daily        06/03/2021  2.1       11 mg daily       04/09/2021  2.5       Hold x 1. 11 mg daily   04/01/2021  3.1        5 mg x 1, 11 mg daily   03/24/2021  3.4       11 mg daily    1/14/2021    2.1       11 mg daily   11/12/2020  2.1       11 mg daily  9/2/2020      2.8       11 mg daily  8/27/2020    2.4       11 mg daily  8/20/2020    2.2       11 mg daily  8/13/2020    1.9       11 mg daily  8/6/2020      2.6       11 mg daily  7/30/2020    2.6       11 mg daily  7/23/2020    2.7       11 mg daily  7/16/2020    2.1       11 mg daily        7/9/2020      2.1       11 mg daily  7/2/2020      2.2       11 mg daily  6/25/2020    2.1       11 mg daily  6/18/2020    2.6       11 mg daily  6/11/2020    2.8       11 mg daily  6/4/2020      1.7       11 mg daily  5/28/2020    2.8       11 mg daily  5/21/2020    2.8       11 mg daily  5/14/2020    1.3       11 mg daily  5/7/2020      3.0       11 mg daily  4/30/2020    2.6       11 mg daily  4/23/2020    2.9       11 mg daily  4/16/2020    2.2       11 mg daily  4/9/2020      2.3       11 mg daily  4/2/2020      3.0       11 mg daily  3/26/2020    1.9       11 mg daily  3/19/2020    1.8       11 mg daily  3/12/2020    2.8       11 mg daily  3/5/2020      2.4       11 mg daily  2/27/2020    2.1       11 mg daily  2/20/2020    2.2       11 mg daily  2/13/2020    2.3       11 mg daily (took 13 mg on 2/6/2020)  2/6/2020      1.8       11 mg daily (missed 2 doses)            Thank you,  Barby Mclaughlin. Imer Cordon, [facility]      For Pharmacy Admin Tracking Only     CPA in place:  Yes   Recommendation Provided To: Patient/Caregiver: 1 via Telephone   Intervention Detail: Lab(s) Ordered   Gap Closed?: No   Intervention Accepted By: Patient/Caregiver: 1   Time Spent (min): 10

## 2021-12-23 ENCOUNTER — DOCUMENTATION ONLY (OUTPATIENT)
Dept: FAMILY MEDICINE CLINIC | Age: 51
End: 2021-12-23

## 2021-12-23 NOTE — PROGRESS NOTES
Pharmacy Progress Note       INR faxes reviewed:    12/16/21: 2.2    Patient to continue 11 mg once daily. Thank you,  Saida Mcdonald. Christina Servin, [facility]              For Pharmacy Admin Tracking Only     CPA in place:  Yes   Recommendation Provided To: Patient/Caregiver: 0 via Telephone   Time Spent (min): 10

## 2022-01-20 ENCOUNTER — TELEPHONE (OUTPATIENT)
Dept: FAMILY MEDICINE CLINIC | Age: 52
End: 2022-01-20

## 2022-01-23 NOTE — TELEPHONE ENCOUNTER
Spoke with patient on 1/20/22. He states that his most recent INR was 1.9, due to eating a little more leafy green vegetables (Goal 2.0 - 3.0). Will continue with 11 mg daily and follow up with patient again in 2 weeks.        Date                TET       Dose/Comments                                    01/20/2022       1.9       11 mg daily   12/16/2021       2.2       11 mg daily   12/7/2021         2.2       11 mg daily   11/11/2021       1.9       11 mg daily (+increase in vitamin k)  11/04/2021       2.3       11 mg daily   10/21/2021       2.1       11 mg daily   10/07/2021       2.0       11 mg daily   09/23/2021       2.1       11 mg daily  09/16/2021       1.5       11 mg daily (increase in vitamin k)  08/26/2021       2.5       11 mg daily   08/20/2021       2.8       11 mg daily   07/28/2021       2.2       59 mg daily  07/01/2021       2.5       63 mg daily         06/18/2021  2.8       11 mg daily        06/03/2021  2.1       11 mg daily       04/09/2021  2.5       Hold x 1. 11 mg daily   04/01/2021  3.1        5 mg x 1, 11 mg daily   03/24/2021  3.4       11 mg daily    1/14/2021    2.1       11 mg daily   11/12/2020  2.1       11 mg daily  9/2/2020      2.8       11 mg daily  8/27/2020    2.4       11 mg daily  8/20/2020    2.2       11 mg daily  8/13/2020    1.9       11 mg daily  8/6/2020      2.6       11 mg daily  7/30/2020    2.6       11 mg daily  7/23/2020    2.7       11 mg daily  7/16/2020    2.1       11 mg daily        7/9/2020      2.1       11 mg daily  7/2/2020      2.2       11 mg daily  6/25/2020    2.1       11 mg daily  6/18/2020    2.6       11 mg daily  6/11/2020    2.8       11 mg daily  6/4/2020      1.7       11 mg daily  5/28/2020    2.8       11 mg daily  5/21/2020    2.8       11 mg daily  5/14/2020    1.3       11 mg daily  5/7/2020      3.0       11 mg daily  4/30/2020    2.6       11 mg daily  4/23/2020    2.9       11 mg daily  4/16/2020    2.2       11 mg daily  4/9/2020      2.3       11 mg daily  4/2/2020      3.0       11 mg daily  3/26/2020    1.9       11 mg daily  3/19/2020    1.8       11 mg daily  3/12/2020    2.8       11 mg daily  3/5/2020      2.4       11 mg daily  2/27/2020    2.1       11 mg daily  2/20/2020    2.2       11 mg daily  2/13/2020    2.3       11 mg daily (took 13 mg on 2/6/2020)  2/6/2020      1.8       11 mg daily (missed 2 doses)            Thank you,  Ángel Renodn. SHARON BradleyS      For Pharmacy Admin Tracking Only     CPA in place:  Yes   Recommendation Provided To: Patient/Caregiver: 1 via Telephone   Intervention Detail: Lab(s) Ordered   Gap Closed?: No   Intervention Accepted By: Patient/Caregiver: 1   Time Spent (min): 10

## 2022-02-10 ENCOUNTER — TELEPHONE (OUTPATIENT)
Dept: FAMILY MEDICINE CLINIC | Age: 52
End: 2022-02-10

## 2022-02-15 ENCOUNTER — TELEPHONE (OUTPATIENT)
Dept: FAMILY MEDICINE CLINIC | Age: 52
End: 2022-02-15

## 2022-02-15 NOTE — TELEPHONE ENCOUNTER
Pharmacy Progress Note - Telephone Call    Mr. Jaxson Huntley 50 y.o. was contacted via an outbound telephone call regarding anticoagulation today. A voicemail was left for patient to return my call. Thank you,  Danae Dominguez. ASHLEIGH Capps        For Pharmacy Admin Tracking Only     CPA in place:  Yes   Time Spent (min): 5

## 2022-02-17 NOTE — TELEPHONE ENCOUNTER
Spoke with patient on 2/15//22. He states that his most recent INR was 1.9 (Goal 2.0 - 3.0), due to changing his diet and going vegan. He states that he has been watching his leafy greens intake though and has tried to remain consistent with that. Will continue to monitor this and may consider increasing dose in the future if INR remains borderline low with new diet. For now, instructed patient to continue with 11 mg daily and follow up with patient again in 2 weeks.        Date                FMT       Dose/Comments                                    02/10/2022       1.9       11 mg daily   01/20/2022       1.9       11 mg daily   12/16/2021       2.2       11 mg daily   12/7/2021         2.2       11 mg daily   11/11/2021       1.9       11 mg daily (+increase in vitamin k)  11/04/2021       2.3       11 mg daily   10/21/2021       2.1       11 mg daily   10/07/2021       2.0       11 mg daily   09/23/2021       2.1       11 mg daily  09/16/2021       1.5       11 mg daily (increase in vitamin k)  08/26/2021       2.5       11 mg daily   08/20/2021       2.8       11 mg daily   07/28/2021       2.2       89 mg daily  07/01/2021       2.5       16 mg daily         06/18/2021  2.8       11 mg daily        06/03/2021  2.1       11 mg daily       04/09/2021  2.5       Hold x 1. 11 mg daily   04/01/2021  3.1        5 mg x 1, 11 mg daily   03/24/2021  3.4       11 mg daily    1/14/2021    2.1       11 mg daily   11/12/2020  2.1       11 mg daily  9/2/2020      2.8       11 mg daily  8/27/2020    2.4       11 mg daily  8/20/2020    2.2       11 mg daily  8/13/2020    1.9       11 mg daily  8/6/2020      2.6       11 mg daily  7/30/2020    2.6       11 mg daily  7/23/2020    2.7       11 mg daily  7/16/2020    2.1       11 mg daily        7/9/2020      2.1       11 mg daily  7/2/2020      2.2       11 mg daily  6/25/2020    2.1       11 mg daily  6/18/2020    2.6       11 mg daily  6/11/2020    2.8       11 mg daily  6/4/2020      1.7       11 mg daily  5/28/2020    2.8       11 mg daily  5/21/2020    2.8       11 mg daily  5/14/2020    1.3       11 mg daily  5/7/2020      3.0       11 mg daily  4/30/2020    2.6       11 mg daily  4/23/2020    2.9       11 mg daily  4/16/2020    2.2       11 mg daily  4/9/2020      2.3       11 mg daily  4/2/2020      3.0       11 mg daily  3/26/2020    1.9       11 mg daily  3/19/2020    1.8       11 mg daily  3/12/2020    2.8       11 mg daily  3/5/2020      2.4       11 mg daily  2/27/2020    2.1       11 mg daily  2/20/2020    2.2       11 mg daily  2/13/2020    2.3       11 mg daily (took 13 mg on 2/6/2020)  2/6/2020      1.8       11 mg daily (missed 2 doses)            Thank you,  Vanessa Jin. ASHLEIGH Izaguirre      For Pharmacy Admin Tracking Only     CPA in place:  Yes   Recommendation Provided To: Patient/Caregiver: 1 via Telephone   Intervention Detail: Lab(s) Ordered   Gap Closed?: No   Intervention Accepted By: Patient/Caregiver: 1   Time Spent (min): 10

## 2022-02-25 ENCOUNTER — TELEPHONE (OUTPATIENT)
Dept: FAMILY MEDICINE CLINIC | Age: 52
End: 2022-02-25

## 2022-02-28 NOTE — TELEPHONE ENCOUNTER
Spoke with patient on 2/25/22. He states that his most recent INR was 2.1 (Goal 2.0 - 3.0). New vegan diet has not impacted INR very much; therefore, we will continue with 11 mg daily and follow up with patient again in 2 weeks.        Date                VQN       Dose/Comments                                    02/24/2022       2.1       11 mg daily   02/10/2022       1.9       11 mg daily   01/20/2022       1.9       11 mg daily   12/16/2021       2.2       11 mg daily   12/7/2021         2.2       11 mg daily   11/11/2021       1.9       11 mg daily (+increase in vitamin k)  11/04/2021       2.3       11 mg daily   10/21/2021       2.1       11 mg daily   10/07/2021       2.0       11 mg daily   09/23/2021       2.1       11 mg daily  09/16/2021       1.5       11 mg daily (increase in vitamin k)  08/26/2021       2.5       11 mg daily   08/20/2021       2.8       11 mg daily   07/28/2021       2.2       82 mg daily  07/01/2021       2.5       11 mg daily         06/18/2021  2.8       11 mg daily        06/03/2021  2.1       11 mg daily       04/09/2021  2.5       Hold x 1. 11 mg daily   04/01/2021  3.1        5 mg x 1, 11 mg daily   03/24/2021  3.4       11 mg daily    1/14/2021    2.1       11 mg daily   11/12/2020  2.1       11 mg daily  9/2/2020      2.8       11 mg daily  8/27/2020    2.4       11 mg daily  8/20/2020    2.2       11 mg daily  8/13/2020    1.9       11 mg daily  8/6/2020      2.6       11 mg daily  7/30/2020    2.6       11 mg daily  7/23/2020    2.7       11 mg daily  7/16/2020    2.1       11 mg daily        7/9/2020      2.1       11 mg daily  7/2/2020      2.2       11 mg daily  6/25/2020    2.1       11 mg daily  6/18/2020    2.6       11 mg daily  6/11/2020    2.8       11 mg daily  6/4/2020      1.7       11 mg daily  5/28/2020    2.8       11 mg daily  5/21/2020    2.8       11 mg daily  5/14/2020    1.3       11 mg daily  5/7/2020      3.0       11 mg daily  4/30/2020    2.6       11 mg daily  4/23/2020    2.9       11 mg daily  4/16/2020    2.2       11 mg daily  4/9/2020      2.3       11 mg daily  4/2/2020      3.0       11 mg daily  3/26/2020    1.9       11 mg daily  3/19/2020    1.8       11 mg daily  3/12/2020    2.8       11 mg daily  3/5/2020      2.4       11 mg daily  2/27/2020    2.1       11 mg daily  2/20/2020    2.2       11 mg daily  2/13/2020    2.3       11 mg daily (took 13 mg on 2/6/2020)  2/6/2020      1.8       11 mg daily (missed 2 doses)            Thank you,  Vanessa Jin. ASHLEIGH Izaguirre      For Pharmacy Admin Tracking Only     CPA in place:  Yes   Recommendation Provided To: Patient/Caregiver: 1 via Telephone   Intervention Detail: Lab(s) Ordered   Gap Closed?: No   Intervention Accepted By: Patient/Caregiver: 1   Time Spent (min): 10

## 2022-03-15 ENCOUNTER — TELEPHONE (OUTPATIENT)
Dept: FAMILY MEDICINE CLINIC | Age: 52
End: 2022-03-15

## 2022-03-16 NOTE — TELEPHONE ENCOUNTER
Patient states that he has not been able to check his INR since he is out of test strips. He has been trying to contact [facility] without success. PharmD called INR company and confirmed that his new shipment of test strips is on the way. Also confirmed that patient's strips are on auto refill. Encouraged patient to call the company if he gets down to 3 strips so that he does not run out just in case the auto refill function does not work. Thank you,  Paramjit Mckeon. ASHLEIGH Sterling          For Pharmacy Admin Tracking Only     CPA in place:  Yes   Recommendation Provided To: Patient/Caregiver: 0 via Telephone   Time Spent (min): 20

## 2022-03-18 PROBLEM — G89.4 CHRONIC PAIN SYNDROME: Status: ACTIVE | Noted: 2019-05-17

## 2022-03-19 PROBLEM — E11.40 TYPE 2 DIABETES MELLITUS WITH DIABETIC NEUROPATHY (HCC): Status: ACTIVE | Noted: 2019-07-30

## 2022-03-19 PROBLEM — J81.1 CHRONIC PULMONARY EDEMA: Status: ACTIVE | Noted: 2017-01-10

## 2022-03-19 PROBLEM — Z79.01 ANTICOAGULANT LONG-TERM USE: Status: ACTIVE | Noted: 2018-11-09

## 2022-03-20 PROBLEM — E78.00 PURE HYPERCHOLESTEROLEMIA: Status: ACTIVE | Noted: 2017-01-10

## 2022-03-20 PROBLEM — N61.1 ABSCESS OF RIGHT BREAST: Status: ACTIVE | Noted: 2021-07-16

## 2022-04-01 ENCOUNTER — TELEPHONE (OUTPATIENT)
Dept: FAMILY MEDICINE CLINIC | Age: 52
End: 2022-04-01

## 2022-04-05 NOTE — TELEPHONE ENCOUNTER
Spoke with patient on 4/1/22. He states that his most recent INR was 2.5 (Goal 2.0 - 3.0) on 3/31/22. Patient to continue with 11 mg daily and follow up with patient again in 2 weeks.        Date                GXF       Dose/Comments                                    03/31/2022       2.5       11 mg daily   02/24/2022       2.1       11 mg daily   02/10/2022       1.9       11 mg daily   01/20/2022       1.9       11 mg daily   12/16/2021       2.2       11 mg daily   12/7/2021         2.2       11 mg daily   11/11/2021       1.9       11 mg daily (+increase in vitamin k)  11/04/2021       2.3       11 mg daily   10/21/2021       2.1       11 mg daily   10/07/2021       2.0       11 mg daily   09/23/2021       2.1       11 mg daily  09/16/2021       1.5       11 mg daily (increase in vitamin k)  08/26/2021       2.5       09 mg daily   08/20/2021       2.8       11 mg daily   07/28/2021       2.2       80 mg daily  07/01/2021       2.5       37 mg daily         06/18/2021  2.8       11 mg daily        06/03/2021  2.1       11 mg daily       04/09/2021  2.5       Hold x 1. 11 mg daily   04/01/2021  3.1        5 mg x 1, 11 mg daily   03/24/2021  3.4       11 mg daily    1/14/2021    2.1       11 mg daily   11/12/2020  2.1       11 mg daily  9/2/2020      2.8       11 mg daily  8/27/2020    2.4       11 mg daily  8/20/2020    2.2       11 mg daily  8/13/2020    1.9       11 mg daily  8/6/2020      2.6       11 mg daily  7/30/2020    2.6       11 mg daily  7/23/2020    2.7       11 mg daily  7/16/2020    2.1       11 mg daily        7/9/2020      2.1       11 mg daily  7/2/2020      2.2       11 mg daily  6/25/2020    2.1       11 mg daily  6/18/2020    2.6       11 mg daily  6/11/2020    2.8       11 mg daily  6/4/2020      1.7       11 mg daily  5/28/2020    2.8       11 mg daily  5/21/2020    2.8       11 mg daily  5/14/2020    1.3       11 mg daily  5/7/2020      3.0       11 mg daily  4/30/2020    2.6       11 mg daily  4/23/2020    2.9       11 mg daily  4/16/2020    2.2       11 mg daily  4/9/2020      2.3       11 mg daily  4/2/2020      3.0       11 mg daily  3/26/2020    1.9       11 mg daily  3/19/2020    1.8       11 mg daily  3/12/2020    2.8       11 mg daily  3/5/2020      2.4       11 mg daily  2/27/2020    2.1       11 mg daily  2/20/2020    2.2       11 mg daily  2/13/2020    2.3       11 mg daily (took 13 mg on 2/6/2020)  2/6/2020      1.8       11 mg daily (missed 2 doses)            Thank you,  Williams Boyle. ASHLEIGH Orta      For Pharmacy Admin Tracking Only     CPA in place:  Yes   Recommendation Provided To: Patient/Caregiver: 1 via Telephone   Intervention Detail: Lab(s) Ordered   Gap Closed?: No   Intervention Accepted By: Patient/Caregiver: 1   Time Spent (min): 10

## 2022-04-09 DIAGNOSIS — I10 HTN (HYPERTENSION), BENIGN: ICD-10-CM

## 2022-04-15 ENCOUNTER — TELEPHONE (OUTPATIENT)
Dept: FAMILY MEDICINE CLINIC | Age: 52
End: 2022-04-15

## 2022-04-15 RX ORDER — FUROSEMIDE 20 MG/1
TABLET ORAL
Qty: 90 TABLET | Refills: 1 | Status: SHIPPED | OUTPATIENT
Start: 2022-04-15 | End: 2022-04-29 | Stop reason: SDUPTHER

## 2022-04-15 NOTE — TELEPHONE ENCOUNTER
Spoke with patient on 4/15/22. He states that his most recent INR was 2.3 (Goal 2.0 - 3.0) on 4/14/22. Patient to continue with 11 mg daily and follow up with patient again in 2 weeks.        Date                PYP       Dose/Comments                                    04/14/2022       2.3       11 mg daily   03/31/2022       2.5       11 mg daily   02/24/2022       2.1       11 mg daily   02/10/2022       1.9       11 mg daily   01/20/2022       1.9       11 mg daily   12/16/2021       2.2       11 mg daily   12/7/2021         2.2       11 mg daily   11/11/2021       1.9       11 mg daily (+increase in vitamin k)  11/04/2021       2.3       11 mg daily   10/21/2021       2.1       11 mg daily   10/07/2021       2.0       11 mg daily   09/23/2021       2.1       11 mg daily  09/16/2021       1.5       11 mg daily (increase in vitamin k)  08/26/2021       2.5       18 mg daily   08/20/2021       2.8       11 mg daily   07/28/2021       2.2       06 mg daily  07/01/2021       2.5       80 mg daily         06/18/2021  2.8       11 mg daily        06/03/2021  2.1       11 mg daily       04/09/2021  2.5       Hold x 1. 11 mg daily   04/01/2021  3.1        5 mg x 1, 11 mg daily   03/24/2021  3.4       11 mg daily    1/14/2021    2.1       11 mg daily   11/12/2020  2.1       11 mg daily  9/2/2020      2.8       11 mg daily  8/27/2020    2.4       11 mg daily  8/20/2020    2.2       11 mg daily  8/13/2020    1.9       11 mg daily  8/6/2020      2.6       11 mg daily  7/30/2020    2.6       11 mg daily  7/23/2020    2.7       11 mg daily  7/16/2020    2.1       11 mg daily        7/9/2020      2.1       11 mg daily  7/2/2020      2.2       11 mg daily  6/25/2020    2.1       11 mg daily  6/18/2020    2.6       11 mg daily  6/11/2020    2.8       11 mg daily  6/4/2020      1.7       11 mg daily  5/28/2020    2.8       11 mg daily  5/21/2020    2.8       11 mg daily  5/14/2020    1.3       11 mg daily  5/7/2020      3.0       11 mg daily  4/30/2020    2.6       11 mg daily  4/23/2020    2.9       11 mg daily  4/16/2020    2.2       11 mg daily  4/9/2020      2.3       11 mg daily  4/2/2020      3.0       11 mg daily  3/26/2020    1.9       11 mg daily  3/19/2020    1.8       11 mg daily  3/12/2020    2.8       11 mg daily  3/5/2020      2.4       11 mg daily  2/27/2020    2.1       11 mg daily  2/20/2020    2.2       11 mg daily  2/13/2020    2.3       11 mg daily (took 13 mg on 2/6/2020)  2/6/2020      1.8       11 mg daily (missed 2 doses)            Thank you,  Armando Daniel. ASHLEIGH Hernandez      For Pharmacy Admin Tracking Only     CPA in place:  Yes   Recommendation Provided To: Patient/Caregiver: 1 via Telephone   Intervention Detail: Lab(s) Ordered   Gap Closed?: No   Intervention Accepted By: Patient/Caregiver: 1   Time Spent (min): 10

## 2022-04-29 ENCOUNTER — TELEPHONE (OUTPATIENT)
Dept: FAMILY MEDICINE CLINIC | Age: 52
End: 2022-04-29

## 2022-04-29 DIAGNOSIS — I10 HTN (HYPERTENSION), BENIGN: ICD-10-CM

## 2022-04-29 NOTE — TELEPHONE ENCOUNTER
Kristen Miller is requesting a 90 day supply  Previous Rx was sent to [place]    Last Visit: 10/14/20 with MD Jeff Valdovinos  Next Appointment: none    Requested Prescriptions     Pending Prescriptions Disp Refills    furosemide (LASIX) 20 mg tablet 90 Tablet 1     Sig: Take 1 Tablet by mouth daily.

## 2022-04-30 LAB — HBA1C MFR BLD HPLC: 8.1 %

## 2022-04-30 RX ORDER — FUROSEMIDE 20 MG/1
20 TABLET ORAL DAILY
Qty: 90 TABLET | Refills: 1 | Status: SHIPPED | OUTPATIENT
Start: 2022-04-30 | End: 2022-10-14

## 2022-05-01 NOTE — TELEPHONE ENCOUNTER
Spoke with patient on 4/29/22. He states that his most recent INR was 2.9 (Goal 2.0 - 3.0) on 4/28/22. Patient to continue with 11 mg daily and follow up with patient again in 2 weeks.        Date                HUG       Dose/Comments                                    04/28/2022       2.9       11 mg daily   04/14/2022       2.3       11 mg daily   03/31/2022       2.5       11 mg daily   02/24/2022       2.1       11 mg daily   02/10/2022       1.9       11 mg daily   01/20/2022       1.9       11 mg daily   12/16/2021       2.2       11 mg daily   12/7/2021         2.2       11 mg daily   11/11/2021       1.9       11 mg daily (+increase in vitamin k)  11/04/2021       2.3       11 mg daily   10/21/2021       2.1       11 mg daily   10/07/2021       2.0       11 mg daily   09/23/2021       2.1       11 mg daily  09/16/2021       1.5       11 mg daily (increase in vitamin k)  08/26/2021       2.5       42 mg daily   08/20/2021       2.8       11 mg daily   07/28/2021       2.2       73 mg daily  07/01/2021       2.5       61 mg daily         06/18/2021  2.8       11 mg daily        06/03/2021  2.1       11 mg daily       04/09/2021  2.5       Hold x 1. 11 mg daily   04/01/2021  3.1        5 mg x 1, 11 mg daily   03/24/2021  3.4       11 mg daily    1/14/2021    2.1       11 mg daily   11/12/2020  2.1       11 mg daily  9/2/2020      2.8       11 mg daily  8/27/2020    2.4       11 mg daily  8/20/2020    2.2       11 mg daily  8/13/2020    1.9       11 mg daily  8/6/2020      2.6       11 mg daily  7/30/2020    2.6       11 mg daily  7/23/2020    2.7       11 mg daily  7/16/2020    2.1       11 mg daily        7/9/2020      2.1       11 mg daily  7/2/2020      2.2       11 mg daily  6/25/2020    2.1       11 mg daily  6/18/2020    2.6       11 mg daily  6/11/2020    2.8       11 mg daily  6/4/2020      1.7       11 mg daily  5/28/2020    2.8       11 mg daily  5/21/2020    2.8       11 mg daily  5/14/2020    1.3       11 mg daily  5/7/2020      3.0       11 mg daily  4/30/2020    2.6       11 mg daily  4/23/2020    2.9       11 mg daily  4/16/2020    2.2       11 mg daily  4/9/2020      2.3       11 mg daily  4/2/2020      3.0       11 mg daily  3/26/2020    1.9       11 mg daily  3/19/2020    1.8       11 mg daily  3/12/2020    2.8       11 mg daily  3/5/2020      2.4       11 mg daily  2/27/2020    2.1       11 mg daily  2/20/2020    2.2       11 mg daily  2/13/2020    2.3       11 mg daily (took 13 mg on 2/6/2020)  2/6/2020      1.8       11 mg daily (missed 2 doses)            Thank you,  Amara Jean-Baptiste. ASHLEIGH Whitman      For Pharmacy Admin Tracking Only     CPA in place:  Yes   Recommendation Provided To: Patient/Caregiver: 1 via Telephone   Intervention Detail: Lab(s) Ordered   Gap Closed?: No   Intervention Accepted By: Patient/Caregiver: 1   Time Spent (min): 10

## 2022-05-04 ENCOUNTER — TELEPHONE (OUTPATIENT)
Dept: FAMILY MEDICINE CLINIC | Age: 52
End: 2022-05-04

## 2022-05-04 DIAGNOSIS — T78.40XA ALLERGIC REACTION, INITIAL ENCOUNTER: Primary | ICD-10-CM

## 2022-05-04 NOTE — TELEPHONE ENCOUNTER
Pt called provider directly and reported that he had an allergic reaction to probable shrimp over the weekend and presented to [facility]. .  Was advised to see an allergist. the patient states he throat started to feel itchy and his lips seemed like they were swelling. He also became sweaty. He took benadryl at home and was given prednisone at the ED. He was not admitted. Pt is better. He inquired about lisinopril being the cause but pt was bnot told he had angioedema at the ED. He has been on lisinopril for a long time and his generic pill has not changed. Will place referral for allergy for pt.   Pt sent a [employer] message to that effect

## 2022-05-11 ENCOUNTER — NURSE TRIAGE (OUTPATIENT)
Dept: OTHER | Facility: CLINIC | Age: 52
End: 2022-05-11

## 2022-05-11 NOTE — TELEPHONE ENCOUNTER
Received call from Niurka Gonzalez at [facility] with [employer] Complaint. Subjective: Caller states \"Im feeling weak\"     Current Symptoms: Weakness/Fatigue. Stiff walking. Almost passed out 4 days ago. Numbness in both legs and hands. Symptoms are improving. No trouble breathing. Onset: 4 days ago; unchanged    Associated Symptoms: NA    Pain Severity: 0/10; ;     Temperature: denies fever     What has been tried: B12 supplements    LMP: NA Pregnant: NA    Recommended disposition: Go to [facility]    Care advice provided, patient verbalizes understanding; denies any other questions or concerns; instructed to call back for any new or worsening symptoms. Patient/Caller agrees with recommended disposition; writer provided warm transfer to Sherie Aleman at [facility] for appointment scheduling    Attention Provider: Thank you for allowing me to participate in the care of your patient. The patient was connected to triage in response to information provided to the [facility]. Please do not respond through this encounter as the response is not directed to a shared pool.     Reason for Disposition   MODERATE weakness (i.e., interferes with work, school, normal activities) and cause unknown (Exceptions: weakness with acute minor illness, or weakness from poor fluid intake)    Protocols used: WEAKNESS (GENERALIZED) AND FATIGUE-ADULT-[place]

## 2022-05-13 ENCOUNTER — HOSPITAL ENCOUNTER (EMERGENCY)
Age: 52
Discharge: HOME | End: 2022-05-14
Attending: STUDENT IN AN ORGANIZED HEALTH CARE EDUCATION/TRAINING PROGRAM
Payer: COMMERCIAL

## 2022-05-13 ENCOUNTER — APPOINTMENT (OUTPATIENT)
Dept: GENERAL RADIOLOGY | Age: 52
End: 2022-05-13
Attending: STUDENT IN AN ORGANIZED HEALTH CARE EDUCATION/TRAINING PROGRAM
Payer: COMMERCIAL

## 2022-05-13 ENCOUNTER — APPOINTMENT (OUTPATIENT)
Dept: CT IMAGING | Age: 52
End: 2022-05-13
Attending: STUDENT IN AN ORGANIZED HEALTH CARE EDUCATION/TRAINING PROGRAM
Payer: COMMERCIAL

## 2022-05-13 ENCOUNTER — OFFICE VISIT (OUTPATIENT)
Dept: FAMILY MEDICINE CLINIC | Age: 52
End: 2022-05-13
Payer: COMMERCIAL

## 2022-05-13 VITALS
OXYGEN SATURATION: 97 % | RESPIRATION RATE: 20 BRPM | WEIGHT: 315 LBS | TEMPERATURE: 97.4 F | HEIGHT: 74 IN | BODY MASS INDEX: 40.43 KG/M2

## 2022-05-13 VITALS
TEMPERATURE: 97.8 F | DIASTOLIC BLOOD PRESSURE: 78 MMHG | HEART RATE: 81 BPM | SYSTOLIC BLOOD PRESSURE: 151 MMHG | OXYGEN SATURATION: 94 % | RESPIRATION RATE: 11 BRPM

## 2022-05-13 DIAGNOSIS — R53.1 GENERALIZED WEAKNESS: Primary | ICD-10-CM

## 2022-05-13 DIAGNOSIS — R55 POSTURAL DIZZINESS WITH PRESYNCOPE: ICD-10-CM

## 2022-05-13 DIAGNOSIS — R42 LIGHTHEADEDNESS: ICD-10-CM

## 2022-05-13 DIAGNOSIS — R53.83 FATIGUE, UNSPECIFIED TYPE: Primary | ICD-10-CM

## 2022-05-13 DIAGNOSIS — Z79.01 ANTICOAGULANT LONG-TERM USE: ICD-10-CM

## 2022-05-13 DIAGNOSIS — R51.9 PRESSURE IN HEAD: ICD-10-CM

## 2022-05-13 DIAGNOSIS — R55 NEAR SYNCOPE: ICD-10-CM

## 2022-05-13 DIAGNOSIS — R42 POSTURAL DIZZINESS WITH PRESYNCOPE: ICD-10-CM

## 2022-05-13 LAB
ALBUMIN SERPL-MCNC: 3.5 G/DL (ref 3.4–5)
ALBUMIN/GLOB SERPL: 0.9 {RATIO} (ref 0.8–1.7)
ALP SERPL-CCNC: 69 U/L (ref 45–117)
ALT SERPL-CCNC: 49 U/L (ref 16–61)
ANION GAP SERPL CALC-SCNC: 5 MMOL/L (ref 3–18)
APPEARANCE UR: CLEAR
AST SERPL-CCNC: 39 U/L (ref 10–38)
BACTERIA URNS QL MICRO: NEGATIVE /HPF
BASOPHILS # BLD: 0 K/UL (ref 0–0.1)
BASOPHILS NFR BLD: 1 % (ref 0–2)
BILIRUB SERPL-MCNC: 0.5 MG/DL (ref 0.2–1)
BILIRUB UR QL: NEGATIVE
BNP SERPL-MCNC: 15 PG/ML (ref 0–900)
BUN SERPL-MCNC: 17 MG/DL (ref 7–18)
BUN/CREAT SERPL: 23 (ref 12–20)
CALCIUM SERPL-MCNC: 8.9 MG/DL (ref 8.5–10.1)
CHLORIDE SERPL-SCNC: 103 MMOL/L (ref 100–111)
CO2 SERPL-SCNC: 29 MMOL/L (ref 21–32)
COLOR UR: YELLOW
CREAT SERPL-MCNC: 0.74 MG/DL (ref 0.6–1.3)
DIFFERENTIAL METHOD BLD: ABNORMAL
EOSINOPHIL # BLD: 0.2 K/UL (ref 0–0.4)
EOSINOPHIL NFR BLD: 3 % (ref 0–5)
EPITH CASTS URNS QL MICRO: NORMAL /LPF (ref 0–5)
ERYTHROCYTE [DISTWIDTH] IN BLOOD BY AUTOMATED COUNT: 15.4 % (ref 11.6–14.5)
ETHANOL SERPL-MCNC: <3 MG/DL (ref 0–3)
GLOBULIN SER CALC-MCNC: 4 G/DL (ref 2–4)
GLUCOSE SERPL-MCNC: 164 MG/DL (ref 74–99)
GLUCOSE UR STRIP.AUTO-MCNC: NEGATIVE MG/DL
HCT VFR BLD AUTO: 41.6 % (ref 36–48)
HGB BLD-MCNC: 13.4 G/DL (ref 13–16)
HGB UR QL STRIP: NEGATIVE
IMM GRANULOCYTES # BLD AUTO: 0 K/UL (ref 0–0.04)
IMM GRANULOCYTES NFR BLD AUTO: 0 % (ref 0–0.5)
INR PPP: 2 (ref 0.8–1.2)
KETONES UR QL STRIP.AUTO: NEGATIVE MG/DL
LEUKOCYTE ESTERASE UR QL STRIP.AUTO: NEGATIVE
LYMPHOCYTES # BLD: 2 K/UL (ref 0.9–3.6)
LYMPHOCYTES NFR BLD: 30 % (ref 21–52)
MAGNESIUM SERPL-MCNC: 1.8 MG/DL (ref 1.6–2.6)
MCH RBC QN AUTO: 26.6 PG (ref 24–34)
MCHC RBC AUTO-ENTMCNC: 32.2 G/DL (ref 31–37)
MCV RBC AUTO: 82.7 FL (ref 78–100)
MONOCYTES # BLD: 0.5 K/UL (ref 0.05–1.2)
MONOCYTES NFR BLD: 8 % (ref 3–10)
NEUTS SEG # BLD: 3.9 K/UL (ref 1.8–8)
NEUTS SEG NFR BLD: 59 % (ref 40–73)
NITRITE UR QL STRIP.AUTO: NEGATIVE
NRBC # BLD: 0 K/UL (ref 0–0.01)
NRBC BLD-RTO: 0 PER 100 WBC
PH UR STRIP: 7.5 [PH] (ref 5–8)
PLATELET # BLD AUTO: 227 K/UL (ref 135–420)
PMV BLD AUTO: 11.3 FL (ref 9.2–11.8)
POTASSIUM SERPL-SCNC: 3.3 MMOL/L (ref 3.5–5.5)
PROT SERPL-MCNC: 7.5 G/DL (ref 6.4–8.2)
PROT UR STRIP-MCNC: 30 MG/DL
PROTHROMBIN TIME: 23.1 SEC (ref 11.5–15.2)
RBC # BLD AUTO: 5.03 M/UL (ref 4.35–5.65)
RBC #/AREA URNS HPF: NEGATIVE /HPF (ref 0–5)
SODIUM SERPL-SCNC: 137 MMOL/L (ref 136–145)
SP GR UR REFRACTOMETRY: 1.01 (ref 1–1.03)
UROBILINOGEN UR QL STRIP.AUTO: 1 EU/DL (ref 0.2–1)
WBC # BLD AUTO: 6.7 K/UL (ref 4.6–13.2)
WBC URNS QL MICRO: NORMAL /HPF (ref 0–4)

## 2022-05-13 PROCEDURE — 82077 ASSAY SPEC XCP UR&BREATH IA: CPT

## 2022-05-13 PROCEDURE — 99285 EMERGENCY DEPT VISIT HI MDM: CPT

## 2022-05-13 PROCEDURE — 83880 ASSAY OF NATRIURETIC PEPTIDE: CPT

## 2022-05-13 PROCEDURE — 70450 CT HEAD/BRAIN W/O DYE: CPT

## 2022-05-13 PROCEDURE — 85025 COMPLETE CBC W/AUTO DIFF WBC: CPT

## 2022-05-13 PROCEDURE — 99215 OFFICE O/P EST HI 40 MIN: CPT | Performed by: NURSE PRACTITIONER

## 2022-05-13 PROCEDURE — 93005 ELECTROCARDIOGRAM TRACING: CPT

## 2022-05-13 PROCEDURE — 81001 URINALYSIS AUTO W/SCOPE: CPT

## 2022-05-13 PROCEDURE — 80053 COMPREHEN METABOLIC PANEL: CPT

## 2022-05-13 PROCEDURE — 71045 X-RAY EXAM CHEST 1 VIEW: CPT

## 2022-05-13 PROCEDURE — 85610 PROTHROMBIN TIME: CPT

## 2022-05-13 PROCEDURE — 83735 ASSAY OF MAGNESIUM: CPT

## 2022-05-13 PROCEDURE — 74011250636 HC RX REV CODE- 250/636: Performed by: STUDENT IN AN ORGANIZED HEALTH CARE EDUCATION/TRAINING PROGRAM

## 2022-05-13 RX ADMIN — SODIUM CHLORIDE 1000 ML: 9 INJECTION, SOLUTION INTRAVENOUS at 18:07

## 2022-05-13 NOTE — PROGRESS NOTES
General Office Visit Note        Assessment/Plan:     Diagnoses and all orders for this visit:    1. Fatigue, unspecified type  -     CBC WITH AUTOMATED DIFF; Future  -     CBC WITH AUTOMATED DIFF    2. Lightheadedness    3. Near syncope    4. Pressure in head          Subjective:     Niurka Mcdaniels is a 50 y.o. y.o. male who complains of   Chief Complaint   Patient presents with    Fatigue    Dizziness     Fatigue Review:    Patient complains of fatigue. Symptoms began 3 weeks ago. Sentinal symptom the patient feels fatigue began with: none. Symptoms of his fatigue have been general malaise, headaches, numbness and tingling in the tips of his left fingers. Patient describes the following psychologic symptoms: none. Patient denies fever, significant change in weight. The course has been gradually worsening. Severity has been moderate. Previous visits for this problem: none. Pt had a syncopal episode this past Saturday while walking from the kitchen to the bedroom. He says that he felt lightheaded and things started to go \"dark\". Pt states that while laying in bed he still felt like he was \"drifting\" . Pt started a vegan lifestyle in January, and started taking a multivitamin in April. Pt is not supplementing protein except for his meals. He is unsure about how much. BS have been running in the 130's-150's has had some hypoglycemic episodes, about 2-3 in the last month. BS have gotten as low as 63. Pt is managing the emergencies properly. During H&P pt stated that he began to feel really weak, lightheaded and he stated that he was having a pressure on the left side of his head, as well as he was having the \"drifting\" feeling again. Pt denies CP, SOB, or visual disturbances. I requested MA call for 911 at 258 PM. Pt BP at 3 PM was 138/72, and glucose was 197 via finger stick. Pt was laid back in a recumbent position until rescue arrived and assumed responsibility for patient.  Staff was able to notify pt's wife and inform her that patient was being transported to [facility].     Past Medical History:   Diagnosis Date    Diabetes [facility])     Erectile dysfunction due to diseases classified elsewhere     Hematuria     right renal cyst    Hypercholesterolemia     Hypertension     Liver disease     Fatty Liver    Long term current use of anticoagulant therapy     Pulmonary emboli ([address] 2003 & 2004    chronic coumadin therapy managed by Dr. Aron Hurley multiple PE    Renal failure acute     following surgery    Shingles     2020    Sleep apnea     does not use cpap machine     Past Surgical History:   Procedure Laterality Date    COLONOSCOPY N/A 11/14/2017    COLONOSCOPY performed [facility] Lilibeth [facility]is [facility] at [facility] APPENDECTOMY  2006    HX BREAST BIOPSY Right 7/16/2021    EXCISONAL BIOPSY RIGHT BREAST MASS performed by Dawit Garcia MD at [address] [place] [address]    HX [address] Right     HX HIP REPLACEMENT Right     HX HIP REPLACEMENT Right     HX KNEE REPLACEMENT Bilateral     HX ORTHOPAEDIC      Right toe amputation, pins and screws in foot    HX ORTHOPAEDIC      right hip replaqcement    VASCULAR SURGERY PROCEDURE UNLIST      [place] filter     Social History     Socioeconomic History    Marital status:    Tobacco Use    Smoking status: Current Every Day Smoker     Types: Cigars    Smokeless tobacco: Never Used   Substance and Sexual Activity    Alcohol use: Yes     Comment: rare    Drug use: No     Comment: cigars     Sexual activity: Yes     Partners: Female     Birth control/protection: None   Other Topics Concern     Service No    Caffeine Concern No    Occupational Exposure No    Hobby Hazards No    Sleep Concern No    Stress Concern No    Weight Concern Yes    Special Diet Yes    Exercise Yes     Current Outpatient Medications   Medication Sig Dispense Refill    furosemide (LASIX) 20 mg tablet Take 1 Tablet by mouth daily.  90 Tablet 1    warfarin (COUMADIN) 1 mg tablet Take 1 tablet daily by mouth with 10 mg tablet for a total of 11 mg daily or as directed by healthcare provider. 90 Tablet 3    atenoloL (TENORMIN) 100 mg tablet Take 1 Tablet by mouth daily. 90 Tablet 3    warfarin (COUMADIN) 10 mg tablet Take 1 tablet by mouth as directed 90 Tablet 3    lisinopril-hydroCHLOROthiazide (PRINZIDE, ZESTORETIC) 20-12.5 mg per tablet Take 2 Tablets by mouth daily. 180 Tablet 3    cloNIDine HCL (CATAPRES) 0.1 mg tablet Take 1 Tablet by mouth two (2) times a day. 180 Tablet 3    potassium chloride (K-DUR, KLOR-CON M20) 20 mEq tablet Take 1 Tablet by mouth daily. 90 Tablet 3    amLODIPine (NORVASC) 10 mg tablet Take 1 Tablet by mouth daily. 90 Tablet 3    HYDROcodone-acetaminophen (Norco) 5-325 mg per tablet Take  by mouth.  fluticasone propionate (FLONASE) 50 mcg/actuation nasal spray SHAKE LIQUID AND USE 2 SPRAYS IN EACH NOSTRIL DAILY AS NEEDED FOR RHINITIS 16 g 4    semaglutide (OZEMPIC SC) 0.5 mg by SubCUTAneous route. Taking once weekly on Tuesdays      gabapentin (NEURONTIN) 300 mg capsule Taking 2 tabs TID; from Dr. Tobin Forbes insulin glargine [facility]) 100 unit/mL (3 mL) inpn Takes 65 units twice a day.  BD INSULIN PEN NEEDLE UF SHORT 31 gauge x 5/16\" ndle USE 1 PEN UTD QID  3    NOVOLOG FLEXPEN 100 unit/mL inpn Takes 14-25 units per meal per sliding scale from endocrinology  3    insulin syringe-needle U-100 by Does Not Apply route. Dispense ultrafine 1 mL syringes with 29 gauge needle 100 Syringe 11    aspirin delayed-release 81 mg tablet Take 81 mg by mouth daily.  mupirocin calcium (BACTROBAN) 2 % topical cream Apply  to affected area two (2) times a day.  (Patient not taking: Reported on 7/16/2021) 15 g 0    tadalafil (CIALIS) 20 mg tablet Take 1 Tab by mouth as needed for Other (ED). (Patient not taking: Reported on 7/16/2021) 6 Tab 11     Facility-Administered Medications Ordered in Other Visits   Medication Dose Route Frequency Provider Last Rate Last Admin    sodium chloride 0.9 % bolus infusion 1,000 mL  1,000 mL IntraVENous ONCE Edwin Gillette MD         Allergies   Allergen Reactions    Shellfish Derived Anaphylaxis    Tape [Adhesive] Contact Dermatitis     ALLERGIC TO SILK TAPE ONLY         The patient has a family history of    REVIEW OF SYSTEMS  ROS    Objective:     Visit Vitals  Temp 97.4 °F (36.3 °C) (Temporal)   Resp 20   Ht 6' 2\" (1.88 m)   Wt (!) 352 lb (159.7 kg)   SpO2 97%   BMI 45.19 kg/m²       Current Outpatient Medications   Medication Instructions    amLODIPine (NORVASC) 10 mg, Oral, DAILY    aspirin delayed-release 81 mg, DAILY    atenoloL (TENORMIN) 100 mg, Oral, DAILY    BD INSULIN PEN NEEDLE UF SHORT 31 gauge x 5/16\" ndle USE 1 PEN UTD QID    cloNIDine HCL (CATAPRES) 0.1 mg, Oral, 2 TIMES DAILY    fluticasone propionate (FLONASE) 50 mcg/actuation nasal spray SHAKE LIQUID AND USE 2 SPRAYS IN EACH NOSTRIL DAILY AS NEEDED FOR RHINITIS    furosemide (LASIX) 20 mg, Oral, DAILY    gabapentin (NEURONTIN) 300 mg capsule Taking 2 tabs TID; from Dr. Martin Tamez HYDROcodone-acetaminophen (Norco) 5-325 mg per tablet Oral    insulin glargine (BASAGLAR KWIKPEN) 100 unit/mL (3 mL) inpn Takes 65 units twice a day.     insulin syringe-needle U-100 Does Not Apply, Dispense ultrafine 1 mL syringes with 29 gauge needle    lisinopril-hydroCHLOROthiazide (PRINZIDE, ZESTORETIC) 20-12.5 mg per tablet 2 Tablets, Oral, DAILY    mupirocin calcium (BACTROBAN) 2 % topical cream Topical, 2 TIMES DAILY    NOVOLOG FLEXPEN 100 unit/mL inpn Takes 14-25 units per meal per sliding scale from endocrinology    potassium chloride (K-DUR, KLOR-CON M20) 20 mEq tablet 20 mEq, Oral, DAILY    semaglutide (OZEMPIC SC) 0.5 mg, SubCUTAneous, Taking once weekly on Tuesdays    tadalafiL (CIALIS) 20 mg, Oral, AS NEEDED    warfarin (COUMADIN) 1 mg tablet Take 1 tablet daily by mouth with 10 mg tablet for a total of 11 mg daily or as directed by healthcare provider.  warfarin (COUMADIN) 10 mg tablet Take 1 tablet by mouth as directed        PHYSICAL EXAM  Physical Exam    Disclaimer:    I have discussed the diagnosis with the patient and the intended plan as seen above. The patient understands our medical plan. The risks, benefits and significant side effects of all medications have been reviewed. Anticipated time course and progression of condition reviewed. All questions have been addressed. He received an after visit summary, with information reviewed, and questions answered. Where appropriate, he is instructed to call the clinic if he has not been notified either by phone or through [id number] with the results of his tests or with an appointment plan for any referrals within 1 week(s). The patient  is to call if his condition worsens or fails to improve or if significant side effects are experienced.        Justine Garcia NP     5/13/2022

## 2022-05-13 NOTE — ED NOTES
Pt resting on ER stretcher. Bedside report received from Day shift RN. Pt denies dizziness at this time.

## 2022-05-13 NOTE — PROGRESS NOTES
Provider instructed to call 911 at 2:58p.m. Called fire and rescue at 2:58p.m. to request an ambulance to [address] [place] [address]. Informed dispatcher we had a 50year old male reporting weakness, lightheadedness dizziness and pressure on the left side of his head. Dispatcher stated an ambulance is in route and if the patient gets any worse to call back immediately. Called patient's wife Nikita Haynes at [phone] at 3:04p.m. to inform her that her  was being taken to the hospital by fire and rescue. Informed wife that Mr. Turcios was having weakness, lightheadedness, dizziness and pressure on the side of his left head. Mrs. Tyler Delarosa stated patient has been having these symptoms for 2 weeks after having an allergic reaction to shrimp at a restaurant. Mrs. Tyler Delarosa stated she needed to contact her sister to meet her  because he drove the car to his appointment and she does not have transportation. I informed Mrs. Turcios that I would call her back at [phone] to let her know what hospital fire and rescue would be taking Mr. Turcios to. Informed Mrs. Turcios at 3:41p.m. that fire and rescue stated that they would be taking Mr. Turcios to [facility].

## 2022-05-13 NOTE — ED PROVIDER NOTES
EMERGENCY DEPARTMENT HISTORY AND PHYSICAL EXAM    4:55 PM    Date: 5/13/2022  Patient Name: Zac Swartz    History of Presenting Illness     Chief Complaint   Patient presents with    Dizziness       History Provided By: Patient  Location/Duration/Severity/Modifying factors   HPI   Zac Swartz is a 50 y.o. male with past medical history of hypertension, hyperlipidemia, prior renal failure, prior PE on Coumadin presenting for generalized fatigue. He says that for the last 3 to 4 weeks he has had gradually worsening fatigue. He says that at times it feels like he gets lightheaded like he is going to pass out. This happened to him several times, sometimes during the day and sometimes at night. Nothing makes that symptoms better or worse it just resolves on its own. He went to his PCPs office today for follow-up, and was complaining of presyncopal symptoms such as lightheadedness and the feeling of faintness. His PCP called and referred him to the emergency department for further evaluation. Patient is not having any chest pain, difficulty breathing, bowel or bladder symptoms. Reports no headache or changes in vision, unilateral numbness or tingling. He is able to ambulate but says that his legs feel weak and this makes it hard for him to do this. No other medical complaints. Overall symptoms are moderate. Patient denies alcohol or illicit drug use but does smoke a lot of cigars    PCP: Gloria Pierre MD    Current Outpatient Medications   Medication Sig Dispense Refill    furosemide (LASIX) 20 mg tablet Take 1 Tablet by mouth daily. 90 Tablet 1    warfarin (COUMADIN) 1 mg tablet Take 1 tablet daily by mouth with 10 mg tablet for a total of 11 mg daily or as directed by healthcare provider. 90 Tablet 3    atenoloL (TENORMIN) 100 mg tablet Take 1 Tablet by mouth daily.  90 Tablet 3    warfarin (COUMADIN) 10 mg tablet Take 1 tablet by mouth as directed 90 Tablet 3    lisinopril-hydroCHLOROthiazide (PRINZIDE, ZESTORETIC) 20-12.5 mg per tablet Take 2 Tablets by mouth daily. 180 Tablet 3    cloNIDine HCL (CATAPRES) 0.1 mg tablet Take 1 Tablet by mouth two (2) times a day. 180 Tablet 3    potassium chloride (K-DUR, KLOR-CON M20) 20 mEq tablet Take 1 Tablet by mouth daily. 90 Tablet 3    amLODIPine (NORVASC) 10 mg tablet Take 1 Tablet by mouth daily. 90 Tablet 3    fluticasone propionate (FLONASE) 50 mcg/actuation nasal spray SHAKE LIQUID AND USE 2 SPRAYS IN EACH NOSTRIL DAILY AS NEEDED FOR RHINITIS 16 g 4    insulin syringe-needle U-100 by Does Not Apply route. Dispense ultrafine 1 mL syringes with 29 gauge needle 100 Syringe 11    HYDROcodone-acetaminophen (Norco) 5-325 mg per tablet Take  by mouth.  mupirocin calcium (BACTROBAN) 2 % topical cream Apply  to affected area two (2) times a day. (Patient not taking: Reported on 7/16/2021) 15 g 0    semaglutide (OZEMPIC SC) 0.5 mg by SubCUTAneous route. Taking once weekly on Tuesdays      gabapentin (NEURONTIN) 300 mg capsule Taking 2 tabs TID; from Dr. Alireza Fraire tadalafil (CIALIS) 20 mg tablet Take 1 Tab by mouth as needed for Other (ED). (Patient not taking: Reported on 7/16/2021) 6 Tab 11    insulin glargine (BASAGLAR KWIKPEN) 100 unit/mL (3 mL) inpn Takes 65 units twice a day.  BD INSULIN PEN NEEDLE UF SHORT 31 gauge x 5/16\" ndle USE 1 PEN UTD QID  3    NOVOLOG FLEXPEN 100 unit/mL inpn Takes 14-25 units per meal per sliding scale from endocrinology  3    aspirin delayed-release 81 mg tablet Take 81 mg by mouth daily.          Past History     Past Medical History:  Past Medical History:   Diagnosis Date    Diabetes ([facility] [address]     Erectile dysfunction due to diseases classified elsewhere     Hematuria     right renal cyst    Hypercholesterolemia     Hypertension     Liver disease     Fatty Liver    Long term current use of anticoagulant therapy     Pulmonary emboli ([facility] [address] 2003 & 2004    chronic coumadin therapy managed by Dr. Izzy Grover multiple PE    Renal failure acute     following surgery    Shingles     2020    Sleep apnea     does not use cpap machine       Past Surgical History:  Past Surgical History:   Procedure Laterality Date    COLONOSCOPY N/A 11/14/2017    COLONOS[facility] by Ryland Blizzard[facility] MD at [facility] APPENDECTOMY  2006    HX BREAST BIOPSY Right 7/16/2021    EXCISONAL BIOPSY RIGHT BREAST MASS performed by Radha Bartlett MD at [address] [place] [address] HX [address] Right     HX HIP REPLACEMENT Right     HX HIP REPLACEMENT Right     HX KNEE REPLACEMENT Bilateral     HX ORTHOPAEDIC      Right toe amputation, pins and screws in foot    HX ORTHOPAEDIC      right hip replaqcement    VASCULAR SURGERY PROCEDURE UNLIST      [place] filter       Family History:  Family History   Problem Relation Age of Onset    Diabetes Father     Hypertension Father     Heart Disease Maternal Grandmother     Cancer Maternal Grandmother     Depression Mother     Cancer Paternal Grandfather        Social History:  Social History     Tobacco Use    Smoking status: Current Every Day Smoker     Types: Cigars    Smokeless tobacco: Never Used   Substance Use Topics    Alcohol use: Yes     Comment: rare    Drug use: No     Comment: cigars        Allergies: Allergies   Allergen Reactions    Shellfish Derived Anaphylaxis    Tape [Adhesive] Contact Dermatitis     ALLERGIC TO SILK TAPE ONLY           I reviewed and confirmed the above information with patient and updated as necessary. Review of Systems     Review of Systems   Constitutional: Negative for diaphoresis and fever. HENT: Negative for ear pain and sore throat. Eyes:        No acute change in vision   Respiratory: Negative for cough and shortness of breath. Cardiovascular: Negative for chest pain and leg swelling. Gastrointestinal: Negative for abdominal pain and vomiting. Genitourinary: Negative for dysuria.    Musculoskeletal: Negative for neck pain. Skin: Negative for wound. Neurological: Positive for weakness and light-headedness. Negative for speech difficulty, numbness and headaches. Physical Exam     Visit Vitals  BP (!) 151/78   Pulse 81   Temp 97.8 °F (36.6 °C)   Resp 11   SpO2 94%       Physical Exam  Vitals and nursing note reviewed. Constitutional:       Appearance: Normal appearance. He is not ill-appearing. Comments: Adult male lying in a hospital stretcher, nondistressed   HENT:      Mouth/Throat:      Mouth: Mucous membranes are moist.   Eyes:      Pupils: Pupils are equal, round, and reactive to light. Cardiovascular:      Rate and Rhythm: Normal rate and regular rhythm. Pulses: Normal pulses. Heart sounds: Normal heart sounds. Pulmonary:      Effort: Pulmonary effort is normal.      Breath sounds: Normal breath sounds. Abdominal:      General: Abdomen is flat. Tenderness: There is no abdominal tenderness. Musculoskeletal:         General: No swelling. Normal range of motion. Cervical back: Normal range of motion. Skin:     General: Skin is warm. Neurological:      General: No focal deficit present. Mental Status: He is alert and oriented to person, place, and time. Cranial Nerves: No cranial nerve deficit. Sensory: No sensory deficit. Motor: No weakness. Coordination: Coordination normal.      Gait: Gait normal.      Comments:  Independently ambulatory in the emergency department         Diagnostic Study Results     Labs -  Recent Results (from the past 24 hour(s))   CBC WITH AUTOMATED DIFF    Collection Time: 05/13/22  4:15 PM   Result Value Ref Range    WBC 6.7 4.6 - 13.2 K/uL    RBC 5.03 4.35 - 5.65 M/uL    HGB 13.4 13.0 - 16.0 g/dL    HCT 41.6 36.0 - 48.0 %    MCV 82.7 78.0 - 100.0 FL    MCH 26.6 24.0 - 34.0 PG    MCHC 32.2 31.0 - 37.0 g/dL    RDW 15.4 (H) 11.6 - 14.5 %    PLATELET 892 850 - 691 K/uL    MPV 11.3 9.2 - 11.8 FL    NRBC 0.0 0  WBC ABSOLUTE NRBC 0.00 0.00 - 0.01 K/uL    NEUTROPHILS 59 40 - 73 %    LYMPHOCYTES 30 21 - 52 %    MONOCYTES 8 3 - 10 %    EOSINOPHILS 3 0 - 5 %    BASOPHILS 1 0 - 2 %    IMMATURE GRANULOCYTES 0 0.0 - 0.5 %    ABS. NEUTROPHILS 3.9 1.8 - 8.0 K/UL    ABS. LYMPHOCYTES 2.0 0.9 - 3.6 K/UL    ABS. MONOCYTES 0.5 0.05 - 1.2 K/UL    ABS. EOSINOPHILS 0.2 0.0 - 0.4 K/UL    ABS. BASOPHILS 0.0 0.0 - 0.1 K/UL    ABS. IMM. GRANS. 0.0 0.00 - 0.04 K/UL    DF AUTOMATED     METABOLIC PANEL, COMPREHENSIVE    Collection Time: 05/13/22  4:15 PM   Result Value Ref Range    Sodium 137 136 - 145 mmol/L    Potassium 3.3 (L) 3.5 - 5.5 mmol/L    Chloride 103 100 - 111 mmol/L    CO2 29 21 - 32 mmol/L    Anion gap 5 3.0 - 18 mmol/L    Glucose 164 (H) 74 - 99 mg/dL    BUN 17 7.0 - 18 MG/DL    Creatinine 0.74 0.6 - 1.3 MG/DL    BUN/Creatinine ratio 23 (H) 12 - 20      GFR est AA >60 >60 ml/min/1.73m2    GFR est non-AA >60 >60 ml/min/1.73m2    Calcium 8.9 8.5 - 10.1 MG/DL    Bilirubin, total 0.5 0.2 - 1.0 MG/DL    ALT (SGPT) 49 16 - 61 U/L    AST (SGOT) 39 (H) 10 - 38 U/L    Alk.  phosphatase 69 45 - 117 U/L    Protein, total 7.5 6.4 - 8.2 g/dL    Albumin 3.5 3.4 - 5.0 g/dL    Globulin 4.0 2.0 - 4.0 g/dL    A-G Ratio 0.9 0.8 - 1.7     MAGNESIUM    Collection Time: 05/13/22  4:15 PM   Result Value Ref Range    Magnesium 1.8 1.6 - 2.6 mg/dL   ETHYL ALCOHOL    Collection Time: 05/13/22  4:15 PM   Result Value Ref Range    ALCOHOL(ETHYL),SERUM <3 0 - 3 MG/DL   NT-PRO BNP    Collection Time: 05/13/22  4:15 PM   Result Value Ref Range    NT pro-BNP 15 0 - 900 PG/ML   PROTHROMBIN TIME + INR    Collection Time: 05/13/22  4:15 PM   Result Value Ref Range    Prothrombin time 23.1 (H) 11.5 - 15.2 sec    INR 2.0 (H) 0.8 - 1.2     EKG, 12 LEAD, INITIAL    Collection Time: 05/13/22  4:27 PM   Result Value Ref Range    Ventricular Rate 79 BPM    Atrial Rate 79 BPM    P-R Interval 196 ms    QRS Duration 94 ms    Q-T Interval 400 ms    QTC Calculation (Bezet) 458 ms Calculated P Axis 15 degrees    Calculated R Axis 7 degrees    Calculated T Axis -17 degrees    Diagnosis       Normal sinus rhythm  Minimal voltage criteria for LVH, may be normal variant  Nonspecific T wave abnormality  Abnormal ECG  No previous ECGs available     URINALYSIS W/ RFLX MICROSCOPIC    Collection Time: 05/13/22  6:30 PM   Result Value Ref Range    Color YELLOW      Appearance CLEAR      Specific gravity 1.015 1.005 - 1.030      pH (UA) 7.5 5.0 - 8.0      Protein 30 (A) NEG mg/dL    Glucose Negative NEG mg/dL    Ketone Negative NEG mg/dL    Bilirubin Negative NEG      Blood Negative NEG      Urobilinogen 1.0 0.2 - 1.0 EU/dL    Nitrites Negative NEG      Leukocyte Esterase Negative NEG     URINE MICROSCOPIC ONLY    Collection Time: 05/13/22  6:30 PM   Result Value Ref Range    WBC 0 to 1 0 - 4 /hpf    RBC Negative 0 - 5 /hpf    Epithelial cells FEW 0 - 5 /lpf    Bacteria Negative NEG /hpf         Radiologic Studies -   CT HEAD WO CONT   Final Result      1. No CT evidence for an acute intracranial process. XR CHEST PORT    (Results Pending)           Medical Decision Making   I am the first provider for this patient. I reviewed the vital signs, available nursing notes, past medical history, past surgical history, family history and social history. Vital Signs-Reviewed the patient's vital signs. EKG: Nondiagnostic for ischemia    Records Reviewed: Nursing Notes and Old Medical Records (Time of Review: 4:55 PM)    Provider Notes (Medical Decision Making):   MDM  51-year-old male here with generalized weakness consider renal failure, metabolic derangement, deconditioning, dietary deficiency, others. Overall doubt CVA    ED Course: Progress Notes, Reevaluation, and Consults:  Patient is afebrile and hemodynamically normal  Lying comfortably, nondistressed    We will screen labs, CT head and chest x-ray.     CBC and CMP are unremarkable  NT proBNP negative  Chest x-ray negative  Head CT negative for acute process    Patient feels well, ambulating around in the emergency department, all of his symptoms has resolved. He would like to be discharged home and I think this is reasonable. He has good outpatient follow-up. Signs and symptoms prompting return to the ED were discussed. She was discharged home in stable condition            Procedures    Diagnosis     Clinical Impression:   1. Generalized weakness    2. Anticoagulant long-term use    3. Postural dizziness with presyncope      Disposition: Home    Follow-up Information     Follow up With Specialties Details Why Contact Info    Marnette Angelucci, MD Family Medicine Schedule an appointment as soon as possible for a visit   [facility]  [address]      [facility] EMERGENCY DEPT Emergency Medicine  As needed, If symptoms worsen [address]  [phone]           Patient's Medications   Start Taking    No medications on file   Continue Taking    AMLODIPINE (NORVASC) 10 MG TABLET    Take 1 Tablet by mouth daily. ASPIRIN DELAYED-RELEASE 81 MG TABLET    Take 81 mg by mouth daily. ATENOLOL (TENORMIN) 100 MG TABLET    Take 1 Tablet by mouth daily. BD INSULIN PEN NEEDLE UF SHORT 31 GAUGE X 5/16\" NDLE    USE 1 PEN UTD QID    CLONIDINE HCL (CATAPRES) 0.1 MG TABLET    Take 1 Tablet by mouth two (2) times a day. FLUTICASONE PROPIONATE (FLONASE) 50 MCG/ACTUATION NASAL SPRAY    SHAKE LIQUID AND USE 2 SPRAYS IN EACH NOSTRIL DAILY AS NEEDED FOR RHINITIS    FUROSEMIDE (LASIX) 20 MG TABLET    Take 1 Tablet by mouth daily. GABAPENTIN (NEURONTIN) 300 MG CAPSULE    Taking 2 tabs TID; from Dr. Jennifer Lee ([address] [place] [address]) 5-325 MG PER TABLET    Take  by mouth. INSULIN GLARGINE (BASAGLAR KWIKPEN) 100 UNIT/ML (3 ML) INPN    Takes 65 units twice a day. INSULIN SYRINGE-NEEDLE U-100    by Does Not Apply route.  Dispense ultrafine 1 mL syringes with 29 gauge needle LISINOPRIL-HYDROCHLOROTHIAZIDE (PRINZIDE, ZESTORETIC) 20-12.5 MG PER TABLET    Take 2 Tablets by mouth daily. MUPIROCIN CALCIUM (BACTROBAN) 2 % TOPICAL CREAM    Apply  to affected area two (2) times a day. NOVOLOG FLEXPEN 100 UNIT/ML INPN    Takes 14-25 units per meal per sliding scale from endocrinology    POTASSIUM CHLORIDE (K-DUR, KLOR-CON M20) 20 MEQ TABLET    Take 1 Tablet by mouth daily. SEMAGLUTIDE (OZEMPIC SC)    0.5 mg by SubCUTAneous route. Taking once weekly on Tuesdays    TADALAFIL (CIALIS) 20 MG TABLET    Take 1 Tab by mouth as needed for Other (ED). WARFARIN (COUMADIN) 1 MG TABLET    Take 1 tablet daily by mouth with 10 mg tablet for a total of 11 mg daily or as directed by healthcare provider. WARFARIN (COUMADIN) 10 MG TABLET    Take 1 tablet by mouth as directed   These Medications have changed    No medications on file   Stop Taking    No medications on file       Shannon Hernández MD   Emergency Medicine   May 13, 2022, 4:55 PM     This note is dictated utilizing Dragon voice recognition software. Unfortunately this leads to occasional typographical errors using the voice recognition. I apologize in advance if the situation occurs. If questions occur please do not hesitate to contact me directly.     Corina Vargas MD

## 2022-05-13 NOTE — PROGRESS NOTES
Staff contacted 911 at  [phone]. At [phone] Provider requested for further assistance with the patient. Staff responded immediately and the patient was laying down on the bed. Provider asked for blood glucose test, results was 197, and to stand by with patient while the provider reassessed patient. Patient was lethargic, speaking quietly and slowly, voice was clear with no rasping, and requesting for someone to contact his spouse. Staff started to contacted spouse at 1500 by both patients personal cell phone and from the office telephone. Staff reached patient's spouse at 46 and informed spouse that the patient was being sent to the emergency department at [facility]. At  staff went down and met EMT/Fire Rescue at the front door and led them to patient's room where they started to take over care, while provider performed turn over with Lead EMT/Fire Rescue staff.

## 2022-05-13 NOTE — PROGRESS NOTES
1. \"Have you been to the ER, urgent care clinic since your last visit? Hospitalized since your last visit? 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 \"Have you seen or consulted any other health care providers outside of the [address] [place] [address] since your last visit? \" No     3. For patients aged 39-70: Has the patient had a colonoscopy / FIT/ Cologuard? Yes - no Care Gap present    Switched up his diet recently and is now completely vegan. He is taking vit B12, D3, and multi vitamin. It has helped with his symptoms of feeling weak, but he doesn't feel like he used to.

## 2022-05-14 LAB
ABSOLUTE LYMPHOCYTE COUNT, 10803: 2.3 K/UL (ref 1–4.8)
ATRIAL RATE: 79 BPM
BASOPHILS # BLD: 0.1 K/UL (ref 0–0.2)
BASOPHILS NFR BLD: 1 % (ref 0–2)
CALCULATED P AXIS, ECG09: 15 DEGREES
CALCULATED R AXIS, ECG10: 7 DEGREES
CALCULATED T AXIS, ECG11: -17 DEGREES
DIAGNOSIS, 93000: NORMAL
EOSINOPHIL # BLD: 0.2 K/UL (ref 0–0.5)
EOSINOPHIL NFR BLD: 2 % (ref 0–6)
ERYTHROCYTE [DISTWIDTH] IN BLOOD BY AUTOMATED COUNT: 16.3 % (ref 10–15.5)
GRANULOCYTES,GRANS: 59 % (ref 40–75)
HCT VFR BLD AUTO: 44.7 % (ref 39.3–51.6)
HGB BLD-MCNC: 13.5 G/DL (ref 13.1–17.2)
LYMPHOCYTES, LYMLT: 30 % (ref 20–45)
MCH RBC QN AUTO: 27 PG (ref 26–34)
MCHC RBC AUTO-ENTMCNC: 30 G/DL (ref 31–36)
MCV RBC AUTO: 89 FL (ref 80–95)
MONOCYTES # BLD: 0.6 K/UL (ref 0.1–1)
MONOCYTES NFR BLD: 8 % (ref 3–12)
NEUTROPHILS # BLD AUTO: 4.5 K/UL (ref 1.8–7.7)
P-R INTERVAL, ECG05: 196 MS
PLATELET # BLD AUTO: 234 K/UL (ref 140–440)
PMV BLD AUTO: 11.5 FL (ref 9–13)
Q-T INTERVAL, ECG07: 400 MS
QRS DURATION, ECG06: 94 MS
QTC CALCULATION (BEZET), ECG08: 458 MS
RBC # BLD AUTO: 5 M/UL (ref 3.8–5.8)
VENTRICULAR RATE, ECG03: 79 BPM
WBC # BLD AUTO: 7.7 K/UL (ref 4–11)

## 2022-05-14 NOTE — DISCHARGE INSTRUCTIONS
Please talk to your primary care doctor about supplementing the iron and protein in your diet can be missing when switching to a vegan diet.   Continue all your home medications as prescribed    Return to the emergency department as needed

## 2022-05-27 ENCOUNTER — TELEPHONE (OUTPATIENT)
Dept: FAMILY MEDICINE CLINIC | Age: 52
End: 2022-05-27

## 2022-05-31 NOTE — TELEPHONE ENCOUNTER
Spoke with patient on 5/27/22. He states that his most recent INR was 2.1 (Goal 2.0 - 3.0) on 5/27/22. Patient to continue with 11 mg daily and report INR to PCP's office until he has received further instructions on who will assist with management moving forward. Next INR due in 2 weeks.        Date                LCA       Dose/Comments                                    05/27/2022       2.1       11 mg daily   05/13/2022       2.0       11 mg daily  04/28/2022       2.9       11 mg daily   04/14/2022       2.3       11 mg daily   03/31/2022       2.5       11 mg daily   02/24/2022       2.1       11 mg daily   02/10/2022       1.9       11 mg daily   01/20/2022       1.9       11 mg daily   12/16/2021       2.2       11 mg daily   12/7/2021         2.2       11 mg daily   11/11/2021       1.9       11 mg daily (+increase in vitamin k)  11/04/2021       2.3       11 mg daily   10/21/2021       2.1       11 mg daily   10/07/2021       2.0       11 mg daily   09/23/2021       2.1       11 mg daily  09/16/2021       1.5       11 mg daily (increase in vitamin k)  08/26/2021       2.5       45 mg daily   08/20/2021       2.8       11 mg daily   07/28/2021       2.2       31 mg daily  07/01/2021       2.5       27 mg daily         06/18/2021  2.8       11 mg daily        06/03/2021  2.1       11 mg daily       04/09/2021  2.5       Hold x 1. 11 mg daily   04/01/2021  3.1        5 mg x 1, 11 mg daily   03/24/2021  3.4       11 mg daily    1/14/2021    2.1       11 mg daily   11/12/2020  2.1       11 mg daily  9/2/2020      2.8       11 mg daily  8/27/2020    2.4       11 mg daily  8/20/2020    2.2       11 mg daily  8/13/2020    1.9       11 mg daily  8/6/2020      2.6       11 mg daily  7/30/2020    2.6       11 mg daily  7/23/2020    2.7       11 mg daily  7/16/2020    2.1       11 mg daily        7/9/2020      2.1       11 mg daily  7/2/2020      2.2       11 mg daily  6/25/2020    2.1       11 mg daily  6/18/2020    2.6       11 mg daily  6/11/2020    2.8       11 mg daily  6/4/2020      1.7       11 mg daily  5/28/2020    2.8       11 mg daily  5/21/2020    2.8       11 mg daily  5/14/2020    1.3       11 mg daily  5/7/2020      3.0       11 mg daily  4/30/2020    2.6       11 mg daily  4/23/2020    2.9       11 mg daily  4/16/2020    2.2       11 mg daily  4/9/2020      2.3       11 mg daily  4/2/2020      3.0       11 mg daily  3/26/2020    1.9       11 mg daily  3/19/2020    1.8       11 mg daily  3/12/2020    2.8       11 mg daily  3/5/2020      2.4       11 mg daily  2/27/2020    2.1       11 mg daily  2/20/2020    2.2       11 mg daily  2/13/2020    2.3       11 mg daily (took 13 mg on 2/6/2020)  2/6/2020      1.8       11 mg daily (missed 2 doses)            Thank you,  Kirill Marrero. ASHLEIGH Cr      For Pharmacy Admin Tracking Only     CPA in place:  Yes   Recommendation Provided To: Patient/Caregiver: 1 via Telephone   Intervention Detail: Lab(s) Ordered   Gap Closed?: No   Intervention Accepted By: Patient/Caregiver: 1   Time Spent (min): 10

## 2022-06-07 ENCOUNTER — OFFICE VISIT (OUTPATIENT)
Dept: FAMILY MEDICINE CLINIC | Age: 52
End: 2022-06-07
Payer: COMMERCIAL

## 2022-06-07 VITALS
HEIGHT: 74 IN | SYSTOLIC BLOOD PRESSURE: 156 MMHG | BODY MASS INDEX: 40.43 KG/M2 | DIASTOLIC BLOOD PRESSURE: 70 MMHG | HEART RATE: 75 BPM | OXYGEN SATURATION: 96 % | TEMPERATURE: 97.7 F | WEIGHT: 315 LBS

## 2022-06-07 DIAGNOSIS — R42 POSTURAL DIZZINESS WITH PRESYNCOPE: ICD-10-CM

## 2022-06-07 DIAGNOSIS — Z79.4 TYPE 2 DIABETES MELLITUS WITH HYPERGLYCEMIA, WITH LONG-TERM CURRENT USE OF INSULIN (HCC): ICD-10-CM

## 2022-06-07 DIAGNOSIS — I10 HTN (HYPERTENSION), BENIGN: Primary | ICD-10-CM

## 2022-06-07 DIAGNOSIS — R55 POSTURAL DIZZINESS WITH PRESYNCOPE: ICD-10-CM

## 2022-06-07 DIAGNOSIS — E11.65 TYPE 2 DIABETES MELLITUS WITH HYPERGLYCEMIA, WITH LONG-TERM CURRENT USE OF INSULIN (HCC): ICD-10-CM

## 2022-06-07 LAB — HBA1C MFR BLD HPLC: 8 %

## 2022-06-07 PROCEDURE — 3052F HG A1C>EQUAL 8.0%<EQUAL 9.0%: CPT | Performed by: FAMILY MEDICINE

## 2022-06-07 PROCEDURE — 83036 HEMOGLOBIN GLYCOSYLATED A1C: CPT | Performed by: FAMILY MEDICINE

## 2022-06-07 PROCEDURE — 99214 OFFICE O/P EST MOD 30 MIN: CPT | Performed by: FAMILY MEDICINE

## 2022-06-07 NOTE — PROGRESS NOTES
HPI:  Marisol Palacio is a 50 y.o. male who presents today with   Chief Complaint   Patient presents with   [facility] ED Follow-up     dizziness and weakness. Pt was seen at [facility] in [facility] ED after an apparent allergic reaction to shrimp. Pt also was sent to the ED after his last visit here because he became presyncopal and began feeling weak with malaise and numbness  and tingling. He had changed his diet to Vegan. Saw his endocrinologist today and had his blood drawn. Has peripheral neuropathy of the hands ;  thinks this is worsening. Per pt , his endocrinologist recommended he try to stay on his vegan diet. Pt agrees to see a nutritionist to devise a more vegan like t with appropriate healthy carbs and healthy fats in appropriate distribution given his h/o diabetes as well as his use of long term anticoagulants ( coumadin)    He is on neurontin for his sciatica; Med may be able to be changed to assist with his peripheral neuropathy of the hands. His current A1c by POC is 8.0%      Work up in the ED was essentially stable both times; He feels better; he is less weak , less malaise. 3 most recent PHQ Screens 6/7/2022   PHQ Not Done -   Little interest or pleasure in doing things Not at all   Feeling down, depressed, irritable, or hopeless Not at all   Total Score PHQ 2 0               PMH,  Meds, Allergies, Family History, Social history reviewed      Current Outpatient Medications   Medication Sig Dispense Refill    furosemide (LASIX) 20 mg tablet Take 1 Tablet by mouth daily. 90 Tablet 1    warfarin (COUMADIN) 1 mg tablet Take 1 tablet daily by mouth with 10 mg tablet for a total of 11 mg daily or as directed by healthcare provider. 90 Tablet 3    atenoloL (TENORMIN) 100 mg tablet Take 1 Tablet by mouth daily.  90 Tablet 3    warfarin (COUMADIN) 10 mg tablet Take 1 tablet by mouth as directed 90 Tablet 3    lisinopril-hydroCHLOROthiazide (PRINZIDE, ZESTORETIC) 20-12.5 mg per tablet Take 2 Tablets by mouth daily. 180 Tablet 3    cloNIDine HCL (CATAPRES) 0.1 mg tablet Take 1 Tablet by mouth two (2) times a day. 180 Tablet 3    potassium chloride (K-DUR, KLOR-CON M20) 20 mEq tablet Take 1 Tablet by mouth daily. 90 Tablet 3    amLODIPine (NORVASC) 10 mg tablet Take 1 Tablet by mouth daily. 90 Tablet 3    HYDROcodone-acetaminophen (Norco) 5-325 mg per tablet Take  by mouth.  fluticasone propionate (FLONASE) 50 mcg/actuation nasal spray SHAKE LIQUID AND USE 2 SPRAYS IN EACH NOSTRIL DAILY AS NEEDED FOR RHINITIS 16 g 4    semaglutide (OZEMPIC SC) 0.5 mg by SubCUTAneous route. Taking once weekly on Tuesdays      gabapentin (NEURONTIN) 300 mg capsule Taking 2 tabs TID; from Dr. Virginie Farfan insulin glargine [facility]) 100 unit/mL (3 mL) inpn Takes 65 units twice a day.  BD INSULIN PEN NEEDLE UF SHORT 31 gauge x 5/16\" ndle USE 1 PEN UTD QID  3    NOVOLOG FLEXPEN 100 unit/mL inpn Takes 14-25 units per meal per sliding scale from endocrinology  3    insulin syringe-needle U-100 by Does Not Apply route. Dispense ultrafine 1 mL syringes with 29 gauge needle 100 Syringe 11    aspirin delayed-release 81 mg tablet Take 81 mg by mouth daily. Allergies   Allergen Reactions    Shellfish Derived Anaphylaxis    Tape [Adhesive] Contact Dermatitis     ALLERGIC TO SILK TAPE ONLY                      ROS  As per HPI    Visit Vitals  BP (!) 156/70   Pulse 75   Temp 97.7 °F (36.5 °C)   Ht 6' 2\" (1.88 m)   Wt (!) 358 lb (162.4 kg)   SpO2 96%   BMI 45.96 kg/m²     Physical Exam   General appearance: alert, cooperative, no distress, appears stated age    Lungs: clear to auscultation bilaterally  Heart: regular rate and rhythm, S1, S2 normal, no murmur, click, rub or gallop    Extremities: extremities normal, atraumatic, no cyanosis or edema      Assessment/Plan:  Diagnoses and all orders for this visit:    1. HTN (hypertension), benign    2.  Type 2 diabetes mellitus with hyperglycemia, with long-term current use of insulin (HCC)  -     AMB POC HEMOGLOBIN A1C  -     REFERRAL TO NUTRITION    3. Postural dizziness with presyncope- better now; Nutrition consult. treatment plan as listed below  Orders Placed This Encounter    REFERRAL TO NUTRITION    AMB POC HEMOGLOBIN A1C     An After Visit Summary was printed and given to the patient. This has been fully explained to the patient, who indicates understanding. Follow-up and Dispositions    · Return in about 8 weeks (around 8/2/2022) for diabetes.             Lima Velazco MD

## 2022-06-07 NOTE — PATIENT INSTRUCTIONS
Learning About Meal Planning for Diabetes  Why plan your meals? Meal planning can be a key part of managing diabetes. Planning meals and snacks with the right balance of carbohydrate, protein, and fat can help you keep your blood sugar at the target level you set with your doctor. You don't have to eat special foods. You can eat what your family eats, including sweets once in a while. But you do have to pay attention to how often you eat and how much you eat of certain foods. You may want to work with a dietitian or a diabetes educator. They can give you tips and meal ideas and can answer your questions about meal planning. This health professional can also help you reach a healthy weight if that is one of your goals. What plan is right for you? Your dietitian or diabetes educator may suggest that you start with the plate format or carbohydrate counting. The plate format  The plate format is a simple way to help you manage how you eat. You plan meals by learning how much space each food should take on a plate. Using the plate format helps you manage the amount of carbohydrate you eat. It can make it easier to keep your blood sugar level within your target range. It also helps you see if you're eating healthy portion sizes. To use the plate format, you put non-starchy vegetables on half your plate. Add lean protein foods, such as fish, lean meats and poultry, or soy products, on one-quarter of the plate. Put a grain or starchy vegetable (such as brown rice or a potato) on the final quarter of the plate. You can add a small piece of fruit and some low-fat or fat-free milk or yogurt, depending on your carbohydrate goal for each meal.  Here are some tips for using the plate format:  · Make sure that you are not using an oversized plate. A 9-inch plate is best. Many restaurants use larger plates. · Get used to using the plate format at home. Then you can use it when you eat out.   · Write down your questions about using the plate format. Talk to your doctor, a dietitian, or a diabetes educator about your concerns. Carbohydrate counting  With carbohydrate counting, you plan meals based on the amount of carbohydrate in each food. Carbohydrate raises blood sugar higher and more quickly than any other nutrient. It is found in desserts, breads and cereals, and fruit. It's also found in starchy vegetables such as potatoes and corn, grains such as rice and pasta, and milk and yogurt. You can help keep your blood sugar levels within your target range by planning how much carbohydrate to have at meals and snacks. The amount you need depends on several things. These include your weight, how active you are, which diabetes medicines you take, and what your goals are for your blood sugar levels. A registered dietitian or diabetes educator can help you plan how much carbohydrate to include in each meal and snack. An example of a carbohydrate counting plan is:  · 45 to 60 grams at each meal. That's about the same as 3 to 4 carbohydrate servings. · 15 to 20 grams at each snack. That's about the same as 1 carbohydrate serving. The Nutrition Facts label on packaged foods tells you how much carbohydrate is in a serving of the food. First, look at the serving size on the food label. Is that the amount you eat in a serving? All of the nutrition information on a food label is based on that serving size. So if you eat more or less than that, you'll need to adjust the other numbers. Total carbohydrate is the next thing you need to look for on the label. If you count carbohydrate servings, one serving of carbohydrate is 15 grams. For foods that don't come with labels, such as fresh fruits and vegetables, you'll need a guide that lists carbohydrate in these foods. Ask your doctor, dietitian, or diabetes educator about books or other nutrition guides you can use.   If you take insulin, you need to know how many grams of carbohydrate are in a meal. This lets you know how much rapid-acting insulin to take before you eat. If you use an insulin pump, you get a constant rate of insulin during the day. So the pump must be programmed at meals to give you extra insulin to cover the rise in blood sugar after meals. When you know how much carbohydrate you will eat, you can take the right amount of insulin. Or, if you always use the same amount of insulin, you need to make sure that you eat the same amount of carbohydrate at meals. If you need more help to understand carbohydrate counting and food labels, ask your doctor, dietitian, or diabetes educator. How can you plan healthy meals? Here are some tips to get started:  · Plan your meals a week at a time. Don't forget to include snacks too. · Use cookbooks or online recipes to plan several main meals. Plan some quick meals for busy nights. You also can double some recipes that freeze well. Then you can save half for other busy nights when you don't have time to cook. · Make sure you have the ingredients you need for your recipes. If you're running low on basic items, put these items on your shopping list too. · List foods that you use to make breakfasts, lunches, and snacks. List plenty of fruits and vegetables. · Post this list on the refrigerator. Add to it as you think of more things you need. · Take the list to the store to do your weekly shopping. Follow-up care is a key part of your treatment and safety. Be sure to make and go to all appointments, and call your doctor if you are having problems. It's also a good idea to know your test results and keep a list of the medicines you take. Where can you learn more? Go to http://www.gray.com/  Enter H350 in the search box to learn more about \"Learning About Meal Planning for Diabetes. \"  Current as of: September 8, 2021               Content Version: 13.2  © 6607-0197 Healthwise, [facility].    Care instructions adapted under license by [employer] (which disclaims liability or warranty for this information). If you have questions about a medical condition or this instruction, always ask your healthcare professional. [address] any warranty or liability for your use of this information.

## 2022-06-07 NOTE — PROGRESS NOTES
Aaron Mcintosh is a 50 y.o. male (: 1970) presenting to address:    Chief Complaint   Patient presents with    ED Follow-up       Vitals:    22 1430   Temp: 97.7 °F (36.5 °C)   Weight: (!) 358 lb (162.4 kg)   Height: 6' 2\" (1.88 m)   PainSc:   6       Hearing/Vision:   No exam data present    Learning Assessment:     Learning Assessment 2017   PRIMARY LEARNER Patient   PRIMARY LANGUAGE ENGLISH   LEARNER PREFERENCE PRIMARY LISTENING   ANSWERED BY patient   RELATIONSHIP SELF     Depression Screening:     3 most recent PHQ Screens 2022   PHQ Not Done -   Little interest or pleasure in doing things Not at all   Feeling down, depressed, irritable, or hopeless Not at all   Total Score PHQ 2 0     Fall Risk Assessment:   No flowsheet data found. Abuse Screening:     Abuse Screening Questionnaire 2022   Do you ever feel afraid of your partner? N   Are you in a relationship with someone who physically or mentally threatens you? N   Is it safe for you to go home? Y     ADL Assessment:   No flowsheet data found. Coordination of Care Questionaire:   1. \"Have you been to the ER, urgent care clinic since your last visit? Hospitalized since your last visit? \" Yes When: Last visit here    2. \"Have you seen or consulted any other health care providers outside of the [address] [place] [address] since your last visit? \" No     3. For patients aged 39-70: Has the patient had a colonoscopy / FIT/ Cologuard? Yes - no Care Gap present      If the patient is female:    4. For patients aged 41-77: Has the patient had a mammogram within the past 2 years? NA - based on age or sex  See top three    5. For patients aged 21-65: Has the patient had a pap smear? NA - based on age or sex    Advanced Directive:   1. Do you have an Advanced Directive? NO    2. Would you like information on Advanced Directives?  NO

## 2022-06-15 NOTE — TELEPHONE ENCOUNTER
Pharmacy Note - Anticoagulation 07/09/20 S/O:  Mr. Reuben Caro ,52 y.o. male, referred by Dr. Jean Carlos Simmons, was contacted via an outbound telephone call today for telephonic anticoagulation management for the diagnosis of CVA, DVT, and PE. Patient checks POC INR using home INR machine. The most recent INR was 2.1 today per patient's report. HPI:  
 
Interim History: · INR Goal:  2.0-3.0 · Current warfarin regimen: 11 mg daily · Warfarin tablet strength:   1 mg, 10 mg · Total weekly dose (mg): 77 mg Today's pertinent positives includes: No significant changes since last visit  
-Denies any bruising/bleeding, SOB, chest pain 
-Denies any medication changes or changes in greens or alcohol intake · Adherence: · Able to recall regimen? YES 
· Miss/extra dose? NO 
· Need refill? NO Upcoming procedure(s):  NO 
 
Past Medical History:  
Diagnosis Date  Diabetes ([facility] [address]  Erectile dysfunction due to diseases classified elsewhere  Hematuria   
 right renal cyst  
 Hypercholesterolemia  Hypertension  Liver disease Fatty Liver  Pulmonary emboli [facility]) 2003 & 2004  
 chronic coumadin therapy managed by Dr. Romero Adam  Renal failure acute   
 following surgery  Shingles 2020  Sleep apnea   
 does not use cpap machine Allergies Allergen Reactions  Tape [Adhesive] Contact Dermatitis ALLERGIC TO SILK TAPE ONLY Current Outpatient Medications Medication Sig  warfarin (COUMADIN) 10 mg tablet TAKE 1 TABLET BY MOUTH AS DIRECTED  warfarin (COUMADIN) 1 mg tablet Take 1 tablet daily by mouth with 10 mg tablet for a total of 11 mg daily or as directed by healthcare provider.  gabapentin (NEURONTIN) 300 mg capsule Taking 2 tabs TID; from Dr. Juan R Noriega  lisinopril-hydroCHLOROthiazide (PRINZIDE, ZESTORETIC) 20-12.5 mg per tablet TAKE 2 TABLETS BY MOUTH EVERY DAY.  fluticasone propionate (FLONASE) 50 mcg/actuation nasal spray 2 Sprays by Both Nostrils route daily as needed for Rhinitis.  amLODIPine (NORVASC) 10 mg tablet TAKE 1 TABLET BY MOUTH DAILY.  cloNIDine HCL (CATAPRES) 0.1 mg tablet TAKE 1 TABLET BY MOUTH TWICE DAILY.  furosemide (LASIX) 20 mg tablet TAKE 1 TABLET BY MOUTH EVERY DAY  potassium chloride (K-DUR, KLOR-CON) 20 mEq tablet TAKE 1 TABLET BY MOUTH DAILY.  atenoloL (TENORMIN) 100 mg tablet TAKE 1 TABLET BY MOUTH DAILY  HYDROcodone-acetaminophen (NORCO)  mg tablet Take 1 Tab by mouth every six (6) hours as needed for Pain.  tadalafil (CIALIS) 20 mg tablet Take 1 Tab by mouth as needed for Other (ED).  insulin glargine (BASAGLAR KWIKPEN) 100 unit/mL (3 mL) inpn Takes 50 units twice a day.  BD INSULIN PEN NEEDLE UF SHORT 31 gauge x 5/16\" ndle USE 1 PEN UTD QID  
 NOVOLOG FLEXPEN 100 unit/mL inpn Takes 14-25 units per meal per sliding scale from endocrinology  VICTOZA 3-ZANDRA 0.6 mg/0.1 mL (18 mg/3 mL) sub-q pen INJ 1.8 MG SC QD  
 insulin syringe-needle U-100 by Does Not Apply route. Dispense ultrafine 1 mL syringes with 29 gauge needle  aspirin delayed-release 81 mg tablet Take 81 mg by mouth daily. No current facility-administered medications for this visit. Wt Readings from Last 3 Encounters:  
02/26/20 347 lb (157.4 kg) 01/13/20 347 lb (157.4 kg) 10/04/19 336 lb 3.2 oz (152.5 kg) BP Readings from Last 3 Encounters:  
02/26/20 130/80  
01/13/20 130/68  
10/04/19 136/62 Lab Results Component Value Date/Time  Sodium 137 01/13/2020 11:35 AM  
 Potassium 4.1 01/13/2020 11:35 AM  
 Chloride 99 01/13/2020 11:35 AM  
 CO2 25 01/13/2020 11:35 AM  
 Anion gap 13.0 01/13/2020 11:35 AM  
 Glucose 261 (H) 01/13/2020 11:35 AM  
 BUN 22 01/13/2020 11:35 AM  
 Creatinine 0.9 01/13/2020 11:35 AM  
 GFR est AA >60 03/24/2014 01:56 PM  
 GFR est non-AA >60 03/24/2014 01:56 PM  
 Calcium 9.3 01/13/2020 11:35 AM  
 
 
Lab Results Component Value Date/Time WBC 5.2 01/13/2020 11:35 AM  
 HGB 12.6 (L) 01/13/2020 11:35 AM  
 HCT 41.5 01/13/2020 11:35 AM  
 PLATELET 080 06/66/3108 11:35 AM  
 MCV 87 01/13/2020 11:35 AM  
 
 
Lab Results Component Value Date/Time Protein, total 7.7 01/13/2020 11:35 AM  
 Albumin 4.3 01/13/2020 11:35 AM  
 
 
Estimated Creatinine Clearance: 159.4 mL/min (by C-G formula based on SCr of 0.9 mg/dL). INR History:   (normal INR range 0.8-1.2) Date   INR Dose/Comments 7/9/2020 2.1 11 mg daily 7/2/2020          2.2       11 mg daily 6/25/2020        2.1       11 mg daily 6/18/2020        2.6       11 mg daily 6/11/2020        2.8       11 mg daily 6/4/2020          1.7       11 mg daily 5/28/2020        2.8       11 mg daily 5/21/2020        2.8       11 mg daily 5/14/2020        1.3       11 mg daily 5/7/2020          3.0       11 mg daily 4/30/2020        2.6       11 mg daily 4/23/2020        2.9       11 mg daily 4/16/2020        2.2       11 mg daily 4/9/2020          2.3       11 mg daily 4/2/2020          3.0       11 mg daily 3/26/2020        1.9       11 mg daily 3/19/2020        1.8       11 mg daily 3/12/2020        2.8       11 mg daily 3/5/2020          2.4       11 mg daily 2/27/2020        2.1       11 mg daily 2/20/2020        2.2       11 mg daily 2/13/2020        2.3       11 mg daily (took 13 mg on 2/6/2020) 
2/6/2020          1.8       11 mg daily (missed 2 doses) A/P:    
 
Anticoagulation: 
Considering Mr. Turcios's past history, todays findings, and per Anticoagulation Collaborative Practice Agreement/Protocol: 1. POC INR (2.1) is Therapeutic for INR goal today. 2.  Continue warfarin - 11 mg daily 3. Next INR check will be in 1 week(s). A full discussion of the nature of anticoagulants has been carried out.   A full discussion of the need for frequent and regular monitoring, precise dosage adjustment and compliance was stressed. Side effects of potential bleeding were discussed and Mr. Shawna Agudelo was instructed to call [phone] if there are any signs of abnormal bleeding. Mr. Shawna Agudelo was instructed to avoid any OTC items containing aspirin or ibuprofen and prior to starting any new OTC products to consult with his physician or pharmacist to ensure no drug interactions are present. Mr. Shawna Agudelo was instructed to avoid any major changes in his general diet and to avoid alcohol consumption. Mr. Shawna Agudelo verbalized his understanding of all instructions and will call the office with any questions, concerns, or signs of abnormal bleeding or blood clot. Notifications of recommendations will be sent to Dr. Ayala Client for review. Thank you, Arcelia Dejesus, PharmD Clinical Pharmacist Specialist 
 
CLINICAL PHARMACY CONSULT: MED RECONCILIATION/REVIEW ADDENDUM For Pharmacy Admin Tracking Only PHSO: PHSO Patient?: No 
Total # of Interventions Recommended: Count: 1 
- Maintenance Safety Lab Monitoring #: 1 Total Interventions Accepted: 1 Time Spent (min): 15 WILLIE Tompkins JOELLEN [facility], PharmD no...

## 2022-08-09 ENCOUNTER — OFFICE VISIT (OUTPATIENT)
Dept: FAMILY MEDICINE CLINIC | Age: 52
End: 2022-08-09
Payer: COMMERCIAL

## 2022-08-09 VITALS
HEART RATE: 74 BPM | RESPIRATION RATE: 18 BRPM | TEMPERATURE: 97.7 F | BODY MASS INDEX: 40.43 KG/M2 | DIASTOLIC BLOOD PRESSURE: 70 MMHG | HEIGHT: 74 IN | WEIGHT: 315 LBS | OXYGEN SATURATION: 98 % | SYSTOLIC BLOOD PRESSURE: 140 MMHG

## 2022-08-09 DIAGNOSIS — I10 HTN (HYPERTENSION), BENIGN: Primary | ICD-10-CM

## 2022-08-09 DIAGNOSIS — G47.33 OBSTRUCTIVE SLEEP APNEA SYNDROME: ICD-10-CM

## 2022-08-09 PROCEDURE — 99214 OFFICE O/P EST MOD 30 MIN: CPT | Performed by: FAMILY MEDICINE

## 2022-08-09 RX ORDER — LISINOPRIL 40 MG/1
40 TABLET ORAL DAILY
Qty: 90 TABLET | Refills: 3 | Status: SHIPPED | OUTPATIENT
Start: 2022-08-09

## 2022-08-09 RX ORDER — AZELASTINE HYDROCHLORIDE, FLUTICASONE PROPIONATE 137; 50 UG/1; UG/1
SPRAY, METERED NASAL
COMMUNITY
Start: 2022-07-18

## 2022-08-09 RX ORDER — DIPHENHYDRAMINE HCL 25 MG
25 CAPSULE ORAL
COMMUNITY
Start: 2022-04-30

## 2022-08-09 RX ORDER — HYDROCHLOROTHIAZIDE 25 MG/1
25 TABLET ORAL DAILY
Qty: 90 TABLET | Refills: 3 | Status: SHIPPED | OUTPATIENT
Start: 2022-08-09

## 2022-08-09 NOTE — PROGRESS NOTES
1. \"Have you been to the ER, urgent care clinic since your last visit? Hospitalized since your last visit? \" No    2. \"Have you seen or consulted any other health care providers outside of the [address] [place] [address] since your last visit? \"  Yes, Dr. Annamaria Crouch for Endocrinology and Dr. Rhiannon Flores      3. For patients aged 39-70: Has the patient had a colonoscopy / FIT/ Cologuard?  Yes - no Care Gap present

## 2022-08-09 NOTE — PROGRESS NOTES
HPI:  Wesley Gary is a 50 y.o. male who presents today with   Chief Complaint   Patient presents with    Diabetes     8 wk f/u      HTN: Patient's blood pressure is elevated today. He has had elevated blood pressures over the last 2 encounters in the record. Patient has also gained about 14 pounds. This is likely contributory. Patient also has elevated blood pressures at home. Patient does report that he has had some recent dietary indiscretions that may have contributed to his blood pressure reading. Blood pressure does improve on subsequent check by this provider. Patient complains of having a cyst on his right work neck. It is nontender. It does not drain. He would like this taken off. Patient also needs referral to neurology for further evaluation of his sleep apnea. Patient does have diabetes. He is followed by endocrinology. He has had a recent A1c. His last A1c in this record was 8%. 3 most recent PHQ Screens 8/9/2022   PHQ Not Done Patient refuses   Little interest or pleasure in doing things Not at all   Feeling down, depressed, irritable, or hopeless Not at all   Total Score PHQ 2 0               PMH,  Meds, Allergies, Family History, Social history reviewed      Current Outpatient Medications   Medication Sig Dispense Refill    azelastine-fluticasone (DYMISTA) 137-50 mcg/spray USE 1 SPRAY PER NOSTRIL TWICE A DAY      insulin detemir U-100 (LEVEMIR FLEXTOUCH) 100 unit/mL (3 mL) inpn Inject 80 Units beneath the skin Twice daily before meals      diphenhydrAMINE (BENADRYL) 25 mg capsule Take 25 mg by mouth every six (6) hours as needed. lisinopriL (PRINIVIL, ZESTRIL) 40 mg tablet Take 1 Tablet by mouth in the morning. 90 Tablet 3    hydroCHLOROthiazide (HYDRODIURIL) 25 mg tablet Take 1 Tablet by mouth in the morning. 90 Tablet 3    furosemide (LASIX) 20 mg tablet Take 1 Tablet by mouth daily.  90 Tablet 1    warfarin (COUMADIN) 1 mg tablet Take 1 tablet daily by mouth with 10 mg tablet for a total of 11 mg daily or as directed by healthcare provider. 90 Tablet 3    atenoloL (TENORMIN) 100 mg tablet Take 1 Tablet by mouth daily. 90 Tablet 3    warfarin (COUMADIN) 10 mg tablet Take 1 tablet by mouth as directed 90 Tablet 3    cloNIDine HCL (CATAPRES) 0.1 mg tablet Take 1 Tablet by mouth two (2) times a day. 180 Tablet 3    potassium chloride (K-DUR, KLOR-CON M20) 20 mEq tablet Take 1 Tablet by mouth daily. 90 Tablet 3    amLODIPine (NORVASC) 10 mg tablet Take 1 Tablet by mouth daily. 90 Tablet 3    HYDROcodone-acetaminophen (NORCO) 5-325 mg per tablet Take  by mouth. semaglutide (OZEMPIC SC) 0.5 mg by SubCUTAneous route. Taking once weekly on Tuesdays      gabapentin (NEURONTIN) 300 mg capsule Taking 2 tabs TID; from Dr. Shirley Mary      insulin glargine (LANTUS,BASAGLAR) 100 unit/mL (3 mL) inpn Takes 65 units twice a day. BD INSULIN PEN NEEDLE UF SHORT 31 gauge x 5/16\" ndle USE 1 PEN UTD QID  3    NOVOLOG FLEXPEN 100 unit/mL inpn Takes 14-25 units per meal per sliding scale from endocrinology  3    insulin syringe-needle U-100 by Does Not Apply route. Dispense ultrafine 1 mL syringes with 29 gauge needle 100 Syringe 11    aspirin delayed-release 81 mg tablet Take 81 mg by mouth daily.       fluticasone propionate (FLONASE) 50 mcg/actuation nasal spray SHAKE LIQUID AND USE 2 SPRAYS IN EACH NOSTRIL DAILY AS NEEDED FOR RHINITIS (Patient not taking: Reported on 8/9/2022) 16 g 4        Allergies   Allergen Reactions    Shellfish Derived Anaphylaxis    Fish Containing Products Palpitations     sweating    Tape [Adhesive] Contact Dermatitis     ALLERGIC TO SILK TAPE ONLY                      ROS as per HPI      Visit Vitals  BP (!) 140/70   Pulse 74   Temp 97.7 °F (36.5 °C) (Temporal)   Resp 18   Ht 6' 2\" (1.88 m)   Wt (!) 372 lb 12.8 oz (169.1 kg)   SpO2 98%   BMI 47.86 kg/m²     Physical Exam    General appearance: alert, cooperative, no distress, appears stated age  Neck: supple, symmetrical, trachea midline, no adenopathy, thyroid: not enlarged, symmetric, no tenderness/mass/nodules, no carotid bruit and no JVD  Lungs: clear to auscultation bilaterally  Heart: regular rate and rhythm, S1, S2 normal, no murmur, click, rub or gallop  Abdomen: soft, non-tender. Bowel sounds normal. No masses,  no organomegaly  Extremities: extremities normal, atraumatic, no cyanosis or edema  There is a moderate sized inclusion cyst noted on the rightward neck around the area of the right ear. There is a pinpoint punctate opening appreciated. There is no palpable tenderness. There is no expression of sebum or any other material.      Assessment/Plan:    Diagnoses and all orders for this visit:    1. HTN (hypertension), benign  -     METABOLIC PANEL, COMPREHENSIVE; Future    2. Obstructive sleep apnea syndrome  -     REFERRAL TO NEUROLOGY    Other orders  -     lisinopriL (PRINIVIL, ZESTRIL) 40 mg tablet; Take 1 Tablet by mouth in the morning.  -     hydroCHLOROthiazide (HYDRODIURIL) 25 mg tablet; Take 1 Tablet by mouth in the morning. As above  Blood pressures not controlled  Will change patient's blood pressure regimen to lisinopril 40 mg once daily  And continue the HCTZ at 25 mg daily. Weight loss advised. Exercise as tolerated. Follow-up with consultants as recommended  Neurology referral for evaluation of DIETER. Follow-up and Dispositions    Return in about 8 weeks (around 10/4/2022) for htn. This has been fully explained to the patient, who indicates understanding. An After Visit Summary was printed and given to the patient. Follow-up and Dispositions    Return in about 8 weeks (around 10/4/2022) for htn.             Sena Rich MD

## 2022-10-05 LAB — HBA1C MFR BLD HPLC: 8.4 %

## 2022-10-13 DIAGNOSIS — I10 HTN (HYPERTENSION), BENIGN: ICD-10-CM

## 2022-10-14 RX ORDER — FUROSEMIDE 20 MG/1
TABLET ORAL
Qty: 90 TABLET | Refills: 1 | Status: SHIPPED | OUTPATIENT
Start: 2022-10-14

## 2022-11-25 RX ORDER — DOXYCYCLINE 100 MG/1
100 CAPSULE ORAL 2 TIMES DAILY
Qty: 20 CAPSULE | Refills: 0 | Status: SHIPPED | OUTPATIENT
Start: 2022-11-25 | End: 2022-12-05

## 2022-11-25 NOTE — PROGRESS NOTES
Pt with a persistent cough for the last 1.5 weeks. Has tested negative for covid. Still has a persistent deep congested cough. NO fever. Some nasal congestion. Doxycycline as ordered.

## 2022-11-30 DIAGNOSIS — Z79.01 WARFARIN ANTICOAGULATION: ICD-10-CM

## 2022-12-07 DIAGNOSIS — I10 HTN (HYPERTENSION), BENIGN: ICD-10-CM

## 2022-12-07 RX ORDER — WARFARIN 1 MG/1
TABLET ORAL
Qty: 90 TABLET | Refills: 3 | Status: SHIPPED | OUTPATIENT
Start: 2022-12-07

## 2022-12-09 RX ORDER — ATENOLOL 100 MG/1
TABLET ORAL
Qty: 90 TABLET | Refills: 3 | Status: SHIPPED | OUTPATIENT
Start: 2022-12-09

## 2022-12-14 ENCOUNTER — OFFICE VISIT (OUTPATIENT)
Dept: FAMILY MEDICINE CLINIC | Age: 52
End: 2022-12-14
Payer: COMMERCIAL

## 2022-12-14 VITALS
OXYGEN SATURATION: 95 % | SYSTOLIC BLOOD PRESSURE: 156 MMHG | HEIGHT: 74 IN | WEIGHT: 315 LBS | TEMPERATURE: 97.5 F | HEART RATE: 68 BPM | RESPIRATION RATE: 16 BRPM | DIASTOLIC BLOOD PRESSURE: 74 MMHG | BODY MASS INDEX: 40.43 KG/M2

## 2022-12-14 DIAGNOSIS — Z79.4 TYPE 2 DIABETES MELLITUS WITH HYPERGLYCEMIA, WITH LONG-TERM CURRENT USE OF INSULIN (HCC): ICD-10-CM

## 2022-12-14 DIAGNOSIS — I10 HTN (HYPERTENSION), BENIGN: Primary | ICD-10-CM

## 2022-12-14 DIAGNOSIS — Z72.0 TOBACCO ABUSE: ICD-10-CM

## 2022-12-14 DIAGNOSIS — Z23 ENCOUNTER FOR IMMUNIZATION: ICD-10-CM

## 2022-12-14 DIAGNOSIS — E11.65 TYPE 2 DIABETES MELLITUS WITH HYPERGLYCEMIA, WITH LONG-TERM CURRENT USE OF INSULIN (HCC): ICD-10-CM

## 2022-12-14 PROCEDURE — 3074F SYST BP LT 130 MM HG: CPT | Performed by: FAMILY MEDICINE

## 2022-12-14 PROCEDURE — 90471 IMMUNIZATION ADMIN: CPT | Performed by: FAMILY MEDICINE

## 2022-12-14 PROCEDURE — 90686 IIV4 VACC NO PRSV 0.5 ML IM: CPT | Performed by: FAMILY MEDICINE

## 2022-12-14 PROCEDURE — 99214 OFFICE O/P EST MOD 30 MIN: CPT | Performed by: FAMILY MEDICINE

## 2022-12-14 PROCEDURE — 3052F HG A1C>EQUAL 8.0%<EQUAL 9.0%: CPT | Performed by: FAMILY MEDICINE

## 2022-12-14 PROCEDURE — 3078F DIAST BP <80 MM HG: CPT | Performed by: FAMILY MEDICINE

## 2022-12-14 RX ORDER — ATORVASTATIN CALCIUM 20 MG/1
20 TABLET, FILM COATED ORAL DAILY
Qty: 90 TABLET | Refills: 3 | Status: SHIPPED | OUTPATIENT
Start: 2022-12-14

## 2022-12-14 RX ORDER — CLONIDINE HYDROCHLORIDE 0.2 MG/1
0.2 TABLET ORAL 2 TIMES DAILY
Qty: 180 TABLET | Refills: 3 | Status: SHIPPED | OUTPATIENT
Start: 2022-12-14

## 2022-12-14 NOTE — PROGRESS NOTES
HPI:  Linda Bejarano is a 50 y.o. male who presents today with   Chief Complaint   Patient presents with    Hypertension     Follow up         Patient is here to follow-up on high blood pressure; BP is elevated today. He is on meds as listed below;  he has been compliant with meds. Patient's weight however is up; he does report some salt loads ( pringles) ;  has been eating more \" junk food\" . Pt may have some wheeze at times; he smokes cigars about every 3 days. He states this is a decrease in his cigar use. Risks of chronic bronchitis reviewed with patient. Pt advised to stop cigars. He has sleep apnea. He will be getting a CPAP machine soon. Patient recently treated for bronchitis. He also reports some shortness of breath at times with exertion. Popped a \" zit \" on the right neck ; had a gray material that was  malodorous expressed from it ; it is better. Pt had previously been on statin therapy; states ha had not been on statin therapy in some time. Patient has diabetes mellitus which is followed by endocrinology. He has follow-up with Dr. Tabby Peterson next month. Patient is due for urine microalbumin. Patient states that this is not normally done at endocrine. We will obtain this today. 3 most recent PHQ Screens 12/14/2022   PHQ Not Done -   Little interest or pleasure in doing things Not at all   Feeling down, depressed, irritable, or hopeless Not at all   Total Score PHQ 2 0               PMH,  Meds, Allergies, Family History, Social history reviewed      Current Outpatient Medications   Medication Sig Dispense Refill    atorvastatin (LIPITOR) 20 mg tablet Take 1 Tablet by mouth daily. 90 Tablet 3    cloNIDine HCL (CATAPRES) 0.2 mg tablet Take 1 Tablet by mouth two (2) times a day.  180 Tablet 3    atenoloL (TENORMIN) 100 mg tablet TAKE 1 TABLET DAILY 90 Tablet 3    warfarin (COUMADIN) 1 mg tablet TAKE 1 TABLET BY MOUTH EVERY DAY ALONG WITH 11 MG 90 Tablet 3    furosemide (LASIX) 20 mg tablet TAKE 1 TABLET DAILY 90 Tablet 1    insulin detemir U-100 (LEVEMIR FLEXTOUCH) 100 unit/mL (3 mL) inpn Inject 80 Units beneath the skin Twice daily before meals      lisinopriL (PRINIVIL, ZESTRIL) 40 mg tablet Take 1 Tablet by mouth in the morning. 90 Tablet 3    hydroCHLOROthiazide (HYDRODIURIL) 25 mg tablet Take 1 Tablet by mouth in the morning. 90 Tablet 3    warfarin (COUMADIN) 10 mg tablet Take 1 tablet by mouth as directed 90 Tablet 3    potassium chloride (K-DUR, KLOR-CON M20) 20 mEq tablet Take 1 Tablet by mouth daily. 90 Tablet 3    amLODIPine (NORVASC) 10 mg tablet Take 1 Tablet by mouth daily. 90 Tablet 3    HYDROcodone-acetaminophen (NORCO) 5-325 mg per tablet Take  by mouth. fluticasone propionate (FLONASE) 50 mcg/actuation nasal spray SHAKE LIQUID AND USE 2 SPRAYS IN EACH NOSTRIL DAILY AS NEEDED FOR RHINITIS 16 g 4    semaglutide (OZEMPIC SC) 0.5 mg by SubCUTAneous route. Taking once weekly on Tuesdays      gabapentin (NEURONTIN) 300 mg capsule Taking 2 tabs TID; from Dr. Eli Tee      BD INSULIN PEN NEEDLE UF SHORT 31 gauge x 5/16\" ndle USE 1 PEN UTD QID  3    NOVOLOG FLEXPEN 100 unit/mL inpn Takes 14-25 units per meal per sliding scale from endocrinology  3    insulin syringe-needle U-100 by Does Not Apply route. Dispense ultrafine 1 mL syringes with 29 gauge needle 100 Syringe 11    aspirin delayed-release 81 mg tablet Take 81 mg by mouth daily.           Allergies   Allergen Reactions    Shellfish Derived Anaphylaxis    Fish Containing Products Palpitations     sweating    Tape [Adhesive] Contact Dermatitis     ALLERGIC TO SILK TAPE ONLY                      ROS as per HPI        Visit Vitals  BP (!) 156/74   Pulse 68   Temp 97.5 °F (36.4 °C) (Temporal)   Resp 16   Ht 6' 2\" (1.88 m)   Wt (!) 381 lb 6.4 oz (173 kg)   SpO2 95%   BMI 48.97 kg/m²     Physical Exam    General appearance: alert, cooperative, no distress, appears stated age  Neck: supple, symmetrical, trachea midline, no adenopathy, thyroid: not enlarged, symmetric, no tenderness/mass/nodules, no carotid bruit and no JVD  Lungs: clear to auscultation bilaterally    Extremities: extremities normal, atraumatic, no cyanosis or edema      Assessment/Plan:  Diagnoses and all orders for this visit:    1. HTN (hypertension), benign    2. Tobacco abuse    3. Encounter for immunization  -     INFLUENZA, FLUARIX, FLULAVAL, FLUZONE (AGE 6 MO+), AFLURIA(AGE 3Y+) IM, PF, 0.5 ML    4. Type 2 diabetes mellitus with hyperglycemia, with long-term current use of insulin (HCC)  -     MICROALBUMIN, UR, RAND W/ MICROALB/CREAT RATIO; Future    Other orders  -     atorvastatin (LIPITOR) 20 mg tablet; Take 1 Tablet by mouth daily. -     cloNIDine HCL (CATAPRES) 0.2 mg tablet; Take 1 Tablet by mouth two (2) times a day. As above  BP not controlled  Increase clonidine to 0.2 mg BID  Add lipitor 20 mg  ( SOC measure for DM)  Patient advised to stop smoking.;  Risks reviewed  Patient also advised to comply with diabetic, and low-sodium diet  Patient also given information on the on [facility] supervised medical weight loss program.  Flu shot today  Check urine microalbumin today  Follow-up and Dispositions    Return in about 8 weeks (around 2/8/2023) for htn. This has been fully explained to the patient, who indicates understanding. An After Visit Summary was printed and given to the patient. Risks of uncontrolled high blood pressure, diabetes, obesity, sleep apnea , tobacco use as well as cigar use has been reviewed with the patient. He has been advised of the increased cardiovascular risks due to these conditions. Weight loss has been advised. Smoking cessation has been advised. Diabetic and heart healthy dietary compliance has been advised. Regular physical activity with weight loss has also been advised. Stress reduction has also been advised. Patient voiced understanding  Close follow-up is advised.     Total time for visit 43 minutes      Follow-up and Dispositions    Return in about 8 weeks (around 2/8/2023) for htn.             Herbert Warner MD

## 2022-12-14 NOTE — PROGRESS NOTES
1. \"Have you been to the ER, urgent care clinic since your last visit? Hospitalized since your last visit? \" No    2. \"Have you seen or consulted any other health care providers outside of the [address] [place] [address] since your last visit? \" Yes Where: Endocrinology       3. For patients aged 39-70: Has the patient had a colonoscopy / FIT/ Cologuard? Yes - Care Gap present.  Most recent result on file

## 2022-12-14 NOTE — PATIENT INSTRUCTIONS
DASH Diet: Care Instructions  Your Care Instructions     The DASH diet is an eating plan that can help lower your blood pressure. DASH stands for Dietary Approaches to Stop Hypertension. Hypertension is high blood pressure. The DASH diet focuses on eating foods that are high in calcium, potassium, and magnesium. These nutrients can lower blood pressure. The foods that are highest in these nutrients are fruits, vegetables, low-fat dairy products, nuts, seeds, and legumes. But taking calcium, potassium, and magnesium supplements instead of eating foods that are high in those nutrients does not have the same effect. The DASH diet also includes whole grains, fish, and poultry. The DASH diet is one of several lifestyle changes your doctor may recommend to lower your high blood pressure. Your doctor may also want you to decrease the amount of sodium in your diet. Lowering sodium while following the DASH diet can lower blood pressure even further than just the DASH diet alone. Follow-up care is a key part of your treatment and safety. Be sure to make and go to all appointments, and call your doctor if you are having problems. It's also a good idea to know your test results and keep a list of the medicines you take. How can you care for yourself at home? Following the DASH diet  Eat 4 to 5 servings of fruit each day. A serving is 1 medium-sized piece of fruit, ½ cup chopped or canned fruit, 1/4 cup dried fruit, or 4 ounces (½ cup) of fruit juice. Choose fruit more often than fruit juice. Eat 4 to 5 servings of vegetables each day. A serving is 1 cup of lettuce or raw leafy vegetables, ½ cup of chopped or cooked vegetables, or 4 ounces (½ cup) of vegetable juice. Choose vegetables more often than vegetable juice. Get 2 to 3 servings of low-fat and fat-free dairy each day. A serving is 8 ounces of milk, 1 cup of yogurt, or 1 ½ ounces of cheese. Eat 6 to 8 servings of grains each day.  A serving is 1 slice of bread, 1 ounce of dry cereal, or ½ cup of cooked rice, pasta, or cooked cereal. Try to choose whole-grain products as much as possible. Limit lean meat, poultry, and fish to 2 servings each day. A serving is 3 ounces, about the size of a deck of cards. Eat 4 to 5 servings of nuts, seeds, and legumes (cooked dried beans, lentils, and split peas) each week. A serving is 1/3 cup of nuts, 2 tablespoons of seeds, or ½ cup of cooked beans or peas. Limit fats and oils to 2 to 3 servings each day. A serving is 1 teaspoon of vegetable oil or 2 tablespoons of salad dressing. Limit sweets and added sugars to 5 servings or less a week. A serving is 1 tablespoon jelly or jam, ½ cup sorbet, or 1 cup of lemonade. Eat less than 2,300 milligrams (mg) of sodium a day. If you limit your sodium to 1,500 mg a day, you can lower your blood pressure even more. Be aware that all of these are the suggested number of servings for people who eat 1,800 to 2,000 calories a day. Your recommended number of servings may be different if you need more or fewer calories. Tips for success  Start small. Do not try to make dramatic changes to your diet all at once. You might feel that you are missing out on your favorite foods and then be more likely to not follow the plan. Make small changes, and stick with them. Once those changes become habit, add a few more changes. Try some of the following:  Make it a goal to eat a fruit or vegetable at every meal and at snacks. This will make it easy to get the recommended amount of fruits and vegetables each day. Try yogurt topped with fruit and nuts for a snack or healthy dessert. Add lettuce, tomato, cucumber, and onion to sandwiches. Combine a ready-made pizza crust with low-fat mozzarella cheese and lots of vegetable toppings. Try using tomatoes, squash, spinach, broccoli, carrots, cauliflower, and onions.   Have a variety of cut-up vegetables with a low-fat dip as an appetizer instead of chips and dip.  Sprinkle sunflower seeds or chopped almonds over salads. Or try adding chopped walnuts or almonds to cooked vegetables. Try some vegetarian meals using beans and peas. Add garbanzo or kidney beans to salads. Make burritos and tacos with mashed delgado beans or black beans. Where can you learn more? Go to http://www.costa.com/  Enter H967 in the search box to learn more about \"DASH Diet: Care Instructions. \"  Current as of: January 10, 2022               Content Version: 13.4  © 4043-8503 [employer]. Care instructions adapted under license by [employer] (which disclaims liability or warranty for this information). If you have questions about a medical condition or this instruction, always ask your healthcare professional. [address] any warranty or liability for your use of this information.

## 2022-12-21 DIAGNOSIS — Z79.01 WARFARIN ANTICOAGULATION: ICD-10-CM

## 2022-12-23 RX ORDER — WARFARIN 10 MG/1
TABLET ORAL
Qty: 90 TABLET | Refills: 3 | Status: SHIPPED | OUTPATIENT
Start: 2022-12-23

## 2023-01-09 ENCOUNTER — TELEPHONE (OUTPATIENT)
Dept: FAMILY MEDICINE CLINIC | Age: 53
End: 2023-01-09

## 2023-01-09 NOTE — TELEPHONE ENCOUNTER
Called patient back who states placed the incorrect number for [facility], it was 1.9 not 1.0. spoke with Dr. Keon Frazier who states inr is going up from 1-2-23 will continue dose and recheck in 1 week. Patient verbalized understanding.

## 2023-01-09 NOTE — TELEPHONE ENCOUNTER
[employer] calling from [facility] calling to report patient's INR: 1.0 taking on January 2, 2023

## 2023-01-31 ENCOUNTER — OFFICE VISIT (OUTPATIENT)
Dept: FAMILY MEDICINE CLINIC | Age: 53
End: 2023-01-31
Payer: COMMERCIAL

## 2023-01-31 VITALS
RESPIRATION RATE: 18 BRPM | BODY MASS INDEX: 40.43 KG/M2 | WEIGHT: 315 LBS | TEMPERATURE: 97 F | SYSTOLIC BLOOD PRESSURE: 142 MMHG | HEART RATE: 78 BPM | HEIGHT: 74 IN | DIASTOLIC BLOOD PRESSURE: 80 MMHG | OXYGEN SATURATION: 98 %

## 2023-01-31 DIAGNOSIS — E11.65 TYPE 2 DIABETES MELLITUS WITH HYPERGLYCEMIA, WITH LONG-TERM CURRENT USE OF INSULIN (HCC): Primary | ICD-10-CM

## 2023-01-31 DIAGNOSIS — I10 HTN (HYPERTENSION), BENIGN: ICD-10-CM

## 2023-01-31 DIAGNOSIS — M79.641 PAIN OF RIGHT HAND: ICD-10-CM

## 2023-01-31 DIAGNOSIS — Z79.4 TYPE 2 DIABETES MELLITUS WITH HYPERGLYCEMIA, WITH LONG-TERM CURRENT USE OF INSULIN (HCC): Primary | ICD-10-CM

## 2023-01-31 DIAGNOSIS — I82.403 RECURRENT ACUTE DEEP VEIN THROMBOSIS (DVT) OF BOTH LOWER EXTREMITIES (HCC): ICD-10-CM

## 2023-01-31 LAB — HBA1C MFR BLD HPLC: 8.5 %

## 2023-01-31 RX ORDER — AMLODIPINE BESYLATE 10 MG/1
10 TABLET ORAL DAILY
Qty: 90 TABLET | Refills: 3 | Status: CANCELLED | OUTPATIENT
Start: 2023-01-31

## 2023-01-31 NOTE — PROGRESS NOTES
HPI:  Patti Baltazar is a 48 y.o. male who presents today with   Chief Complaint   Patient presents with    Hypertension     6 wk f/u    Cyst     X 2 weeks ago on right hand on knuckle     Diabetes          HTN: Pts BP is slightly elevated today. He has sleep apnea and is awaiting his CPAP machine. This may help his BP control    Pt has lost about 13 pounds; he is exercising; he has stopped smoking cigars. Pt also has diabetes and his A! C has improved today. Pt has developed a fullness/prominence on the knuckles of the right hand at the 5th knuckle; there is some mild pain. Pt has had ganglion cysts before. Pt needs an order for compression hose ;  he has a h/o dvt      3 most recent PHQ Screens 1/31/2023   PHQ Not Done -   Little interest or pleasure in doing things Not at all   Feeling down, depressed, irritable, or hopeless Not at all   Total Score PHQ 2 0               PMH,  Meds, Allergies, Family History, Social history reviewed      Current Outpatient Medications   Medication Sig Dispense Refill    warfarin (COUMADIN) 10 mg tablet TAKE 1 TABLET BY MOUTH EVERY DAY ( ALONG WITH 1 MG FOR TDD 11MG) 90 Tablet 3    Klor-Con M20 20 mEq tablet TAKE 1 TABLET BY MOUTH EVERY DAY 90 Tablet 3    amLODIPine (NORVASC) 10 mg tablet TAKE 1 TABLET BY MOUTH EVERY DAY 90 Tablet 3    atorvastatin (LIPITOR) 20 mg tablet Take 1 Tablet by mouth daily. 90 Tablet 3    cloNIDine HCL (CATAPRES) 0.2 mg tablet Take 1 Tablet by mouth two (2) times a day. 180 Tablet 3    atenoloL (TENORMIN) 100 mg tablet TAKE 1 TABLET DAILY 90 Tablet 3    warfarin (COUMADIN) 1 mg tablet TAKE 1 TABLET BY MOUTH EVERY DAY ALONG WITH 11 MG 90 Tablet 3    furosemide (LASIX) 20 mg tablet TAKE 1 TABLET DAILY 90 Tablet 1    insulin detemir U-100 (LEVEMIR FLEXTOUCH) 100 unit/mL (3 mL) inpn Inject 80 Units beneath the skin Twice daily before meals      lisinopriL (PRINIVIL, ZESTRIL) 40 mg tablet Take 1 Tablet by mouth in the morning.  90 Tablet 3 hydroCHLOROthiazide (HYDRODIURIL) 25 mg tablet Take 1 Tablet by mouth in the morning. 90 Tablet 3    HYDROcodone-acetaminophen (NORCO) 5-325 mg per tablet Take  by mouth. fluticasone propionate (FLONASE) 50 mcg/actuation nasal spray SHAKE LIQUID AND USE 2 SPRAYS IN EACH NOSTRIL DAILY AS NEEDED FOR RHINITIS 16 g 4    semaglutide (OZEMPIC SC) 0.5 mg by SubCUTAneous route. Taking once weekly on Tuesdays      gabapentin (NEURONTIN) 300 mg capsule Taking 2 tabs TID; from Dr. John Owens      BD INSULIN PEN NEEDLE UF SHORT 31 gauge x 5/16\" ndle USE 1 PEN UTD QID  3    NOVOLOG FLEXPEN 100 unit/mL inpn Takes 14-25 units per meal per sliding scale from endocrinology  3    insulin syringe-needle U-100 by Does Not Apply route. Dispense ultrafine 1 mL syringes with 29 gauge needle 100 Syringe 11    aspirin delayed-release 81 mg tablet Take 81 mg by mouth daily. Allergies   Allergen Reactions    Shellfish Derived Anaphylaxis    Fish Containing Products Palpitations     sweating    Tape [Adhesive] Contact Dermatitis     ALLERGIC TO SILK TAPE ONLY                      ROS  as per HPI      Visit Vitals  BP (!) 142/80   Pulse 78   Temp 97 °F (36.1 °C) (Temporal)   Resp 18   Ht 6' 2\" (1.88 m)   Wt (!) 364 lb (165.1 kg)   SpO2 98%   BMI 46.73 kg/m²     Physical Exam    General appearance: alert, cooperative, no distress, appears stated age  Neck: supple, symmetrical, trachea midline, no adenopathy, thyroid: not enlarged, symmetric, no tenderness/mass/nodules, no carotid bruit and no JVD  Lungs: clear to auscultation bilaterally  Heart: regular rate and rhythm, S1, S2 normal, no murmur, click, rub or gallop     Extremities: extremities normal, atraumatic, no cyanosis or edema  Right hand; there is some cystic like prominence at the 5th MTP joint.      Lab Results   Component Value Date/Time    Hemoglobin A1c 7.9 (H) 07/30/2019 09:53 AM    Hemoglobin A1c (POC) 8.5 01/31/2023 12:53 PM    Hemoglobin A1c, External 8.4 10/05/2022 12:00 AM           Assessment/Plan:  Diagnoses and all orders for this visit:    1. Type 2 diabetes mellitus with hyperglycemia, with long-term current use of insulin (HCC)  -     AMB POC HEMOGLOBIN A1C    2. HTN (hypertension), benign    3. Pain of right hand  -     XR HAND RT AP/LAT; Future    4. Recurrent acute deep vein thrombosis (DVT) of both lower extremities (HCC)  -     AMB SUPPLY ORDER        As above  Blood sugars are better - previous a1c 10.1%; has improved  Pt praised on his exercise and weight loss efforts   treatment plan as listed below  Orders Placed This Encounter    AMB SUPPLY ORDER    XR HAND RT AP/LAT    AMB POC HEMOGLOBIN A1C     Follow-up and Dispositions    Return in about 3 months (around 4/30/2023) for diabetes/ weight loss. This has been fully explained to the patient, who indicates understanding. An After Visit Summary was printed and given to the patient. Follow-up and Dispositions    Return in about 3 months (around 4/30/2023) for diabetes/ weight loss.             Vashti Wolf MD

## 2023-01-31 NOTE — PATIENT INSTRUCTIONS
DASH Diet: Care Instructions  Your Care Instructions     The DASH diet is an eating plan that can help lower your blood pressure. DASH stands for Dietary Approaches to Stop Hypertension. Hypertension is high blood pressure. The DASH diet focuses on eating foods that are high in calcium, potassium, and magnesium. These nutrients can lower blood pressure. The foods that are highest in these nutrients are fruits, vegetables, low-fat dairy products, nuts, seeds, and legumes. But taking calcium, potassium, and magnesium supplements instead of eating foods that are high in those nutrients does not have the same effect. The DASH diet also includes whole grains, fish, and poultry. The DASH diet is one of several lifestyle changes your doctor may recommend to lower your high blood pressure. Your doctor may also want you to decrease the amount of sodium in your diet. Lowering sodium while following the DASH diet can lower blood pressure even further than just the DASH diet alone. Follow-up care is a key part of your treatment and safety. Be sure to make and go to all appointments, and call your doctor if you are having problems. It's also a good idea to know your test results and keep a list of the medicines you take. How can you care for yourself at home? Following the DASH diet  Eat 4 to 5 servings of fruit each day. A serving is 1 medium-sized piece of fruit, ½ cup chopped or canned fruit, 1/4 cup dried fruit, or 4 ounces (½ cup) of fruit juice. Choose fruit more often than fruit juice. Eat 4 to 5 servings of vegetables each day. A serving is 1 cup of lettuce or raw leafy vegetables, ½ cup of chopped or cooked vegetables, or 4 ounces (½ cup) of vegetable juice. Choose vegetables more often than vegetable juice. Get 2 to 3 servings of low-fat and fat-free dairy each day. A serving is 8 ounces of milk, 1 cup of yogurt, or 1 ½ ounces of cheese. Eat 6 to 8 servings of grains each day.  A serving is 1 slice of bread, 1 ounce of dry cereal, or ½ cup of cooked rice, pasta, or cooked cereal. Try to choose whole-grain products as much as possible. Limit lean meat, poultry, and fish to 2 servings each day. A serving is 3 ounces, about the size of a deck of cards. Eat 4 to 5 servings of nuts, seeds, and legumes (cooked dried beans, lentils, and split peas) each week. A serving is 1/3 cup of nuts, 2 tablespoons of seeds, or ½ cup of cooked beans or peas. Limit fats and oils to 2 to 3 servings each day. A serving is 1 teaspoon of vegetable oil or 2 tablespoons of salad dressing. Limit sweets and added sugars to 5 servings or less a week. A serving is 1 tablespoon jelly or jam, ½ cup sorbet, or 1 cup of lemonade. Eat less than 2,300 milligrams (mg) of sodium a day. If you limit your sodium to 1,500 mg a day, you can lower your blood pressure even more. Be aware that all of these are the suggested number of servings for people who eat 1,800 to 2,000 calories a day. Your recommended number of servings may be different if you need more or fewer calories. Tips for success  Start small. Do not try to make dramatic changes to your diet all at once. You might feel that you are missing out on your favorite foods and then be more likely to not follow the plan. Make small changes, and stick with them. Once those changes become habit, add a few more changes. Try some of the following:  Make it a goal to eat a fruit or vegetable at every meal and at snacks. This will make it easy to get the recommended amount of fruits and vegetables each day. Try yogurt topped with fruit and nuts for a snack or healthy dessert. Add lettuce, tomato, cucumber, and onion to sandwiches. Combine a ready-made pizza crust with low-fat mozzarella cheese and lots of vegetable toppings. Try using tomatoes, squash, spinach, broccoli, carrots, cauliflower, and onions.   Have a variety of cut-up vegetables with a low-fat dip as an appetizer instead of chips and dip.  Sprinkle sunflower seeds or chopped almonds over salads. Or try adding chopped walnuts or almonds to cooked vegetables. Try some vegetarian meals using beans and peas. Add garbanzo or kidney beans to salads. Make burritos and tacos with mashed delgado beans or black beans. Where can you learn more? Go to http://www.costa.com/  Enter H967 in the search box to learn more about \"DASH Diet: Care Instructions. \"  Current as of: January 10, 2022               Content Version: 13.4  © 1318-3613 [employer]. Care instructions adapted under license by [employer] (which disclaims liability or warranty for this information). If you have questions about a medical condition or this instruction, always ask your healthcare professional. [address] any warranty or liability for your use of this information.

## 2023-01-31 NOTE — PROGRESS NOTES
1. \"Have you been to the ER, urgent care clinic since your last visit? Hospitalized since your last visit? \" No    2. \"Have you seen or consulted any other health care providers outside of the [address] [place] [address] since your last visit? \" No     3. For patients aged 39-70: Has the patient had a colonoscopy / FIT/ Cologuard?  Yes - no Care Gap present

## 2023-07-10 NOTE — TELEPHONE ENCOUNTER
Last Visit: 01- OV   Next Appointment: none  Previous Refill Encounter: lisinopril on 08- #90 tabs with 3 refills  HCTZ on 08- # 90 tabs with 3 refills     Requested Prescriptions     Pending Prescriptions Disp Refills    hydroCHLOROthiazide (HYDRODIURIL) 25 MG tablet [Pharmacy Med Name: HYDROCHLOROT TAB 25MG] 90 tablet 3     Sig: TAKE 1 TABLET EVERY MORNING    lisinopril (PRINIVIL;ZESTRIL) 40 MG tablet [Pharmacy Med Name: LISINOPRIL TAB 40MG] 90 tablet 3     Sig: TAKE 1 TABLET EVERY MORNING

## 2023-07-14 RX ORDER — LISINOPRIL 40 MG/1
TABLET ORAL
Qty: 90 TABLET | Refills: 3 | Status: SHIPPED | OUTPATIENT
Start: 2023-07-14

## 2023-07-14 RX ORDER — HYDROCHLOROTHIAZIDE 25 MG/1
TABLET ORAL
Qty: 90 TABLET | Refills: 3 | Status: SHIPPED | OUTPATIENT
Start: 2023-07-14

## 2023-07-21 RX ORDER — FUROSEMIDE 20 MG/1
TABLET ORAL
Qty: 90 TABLET | Refills: 1 | OUTPATIENT
Start: 2023-07-21

## 2023-07-24 RX ORDER — FUROSEMIDE 20 MG/1
TABLET ORAL
Qty: 90 TABLET | Refills: 1 | OUTPATIENT
Start: 2023-07-24

## 2023-08-01 RX ORDER — FUROSEMIDE 20 MG/1
TABLET ORAL
Qty: 90 TABLET | Refills: 1 | OUTPATIENT
Start: 2023-08-01

## 2023-08-25 DIAGNOSIS — Z79.01 LONG TERM (CURRENT) USE OF ANTICOAGULANTS: ICD-10-CM

## 2023-08-25 NOTE — TELEPHONE ENCOUNTER
Last Visit: 01- OV   Next Appointment: none    Requested Prescriptions     Pending Prescriptions Disp Refills    warfarin (COUMADIN) 1 MG tablet [Pharmacy Med Name: WARFARIN SODIUM 1 MG TABLET] 90 tablet 3     Sig: TAKE 1 TABLET BY MOUTH EVERY DAY ALONG WITH 10MG TAB FOR 11MG TOTAL DAILY DOSE OR AS DIRECTED

## 2023-09-01 RX ORDER — WARFARIN SODIUM 1 MG/1
TABLET ORAL
Qty: 90 TABLET | Refills: 3 | Status: SHIPPED | OUTPATIENT
Start: 2023-09-01

## 2023-09-05 RX ORDER — FUROSEMIDE 20 MG/1
TABLET ORAL
Qty: 90 TABLET | Refills: 1 | Status: SHIPPED | OUTPATIENT
Start: 2023-09-05

## 2023-10-19 LAB
CREATININE, EXTERNAL: 0.9
HBA1C MFR BLD HPLC: 9.3 %
LDL CHOLESTEROL, EXTERNAL: 23
TOTAL CHOLESTEROL, EXTERNAL: 83

## 2023-10-30 DIAGNOSIS — Z79.01 LONG TERM (CURRENT) USE OF ANTICOAGULANTS: ICD-10-CM

## 2023-10-30 DIAGNOSIS — E87.6 HYPOKALEMIA: ICD-10-CM

## 2023-10-30 NOTE — TELEPHONE ENCOUNTER
Last Visit: 01- OV   Next Appointment: none  Previous Refill Encounter: lipitor on 12- #90tabs with 3 refills  Potassium on 12- # 90 tabs with 3 refills  Clonidine on 12- # 90 tabs with 3 refills         Requested Prescriptions     Pending Prescriptions Disp Refills    warfarin (COUMADIN) 10 MG tablet [Pharmacy Med Name: WARFARIN SODIUM 10 MG TABLET] 90 tablet 3     Sig: TAKE 1 TABLET BY MOUTH EVERY DAY. TAKE ALONG WITH 1MG    cloNIDine (CATAPRES) 0.2 MG tablet [Pharmacy Med Name: CLONIDINE HCL 0.2 MG TABLET] 180 tablet 3     Sig: TAKE 1 TABLET BY MOUTH TWO TIMES A DAY.     KLOR-CON M20 20 MEQ extended release tablet [Pharmacy Med Name: KLOR-CON M20 TABLET] 90 tablet 3     Sig: TAKE 1 TABLET BY MOUTH EVERY DAY    atorvastatin (LIPITOR) 20 MG tablet [Pharmacy Med Name: ATORVASTATIN 20 MG TABLET] 90 tablet 3     Sig: TAKE 1 TABLET BY MOUTH EVERY DAY

## 2023-11-02 RX ORDER — POTASSIUM CHLORIDE 1500 MG/1
20 TABLET, EXTENDED RELEASE ORAL DAILY
Qty: 90 TABLET | Refills: 3 | Status: SHIPPED | OUTPATIENT
Start: 2023-11-02

## 2023-11-02 RX ORDER — WARFARIN SODIUM 10 MG/1
TABLET ORAL
Qty: 90 TABLET | Refills: 3 | Status: SHIPPED | OUTPATIENT
Start: 2023-11-02

## 2023-11-02 RX ORDER — CLONIDINE HYDROCHLORIDE 0.2 MG/1
0.2 TABLET ORAL 2 TIMES DAILY
Qty: 180 TABLET | Refills: 3 | Status: SHIPPED | OUTPATIENT
Start: 2023-11-02

## 2023-11-02 RX ORDER — ATORVASTATIN CALCIUM 20 MG/1
20 TABLET, FILM COATED ORAL DAILY
Qty: 90 TABLET | Refills: 3 | Status: SHIPPED | OUTPATIENT
Start: 2023-11-02

## 2023-11-13 ENCOUNTER — TELEPHONE (OUTPATIENT)
Age: 53
End: 2023-11-13

## 2023-11-13 NOTE — TELEPHONE ENCOUNTER
Spoke with the patient in regards to his INR 3.8. Per the provider take Warfarin 1 mg tonight, Resume Regular dose Warfarin 10 mg daily and Repeat INR on Friday. The patient acknowledged understanding and had no questions or concerns fir the provider, thank you.

## 2023-11-27 LAB — INR, EXTERNAL: 1.7

## 2023-11-27 NOTE — TELEPHONE ENCOUNTER
Last Visit: 01- OV   Next Appointment: none  Previous Refill Encounter: 12- #90 tabs with 3 refills      Requested Prescriptions     Pending Prescriptions Disp Refills    atenolol (TENORMIN) 100 MG tablet [Pharmacy Med Name: ATENOLOL TAB 100MG] 90 tablet 3     Sig: TAKE 1 TABLET DAILY

## 2023-11-29 ENCOUNTER — TELEPHONE (OUTPATIENT)
Age: 53
End: 2023-11-29

## 2023-12-03 LAB — INR, EXTERNAL: 1.7

## 2023-12-03 RX ORDER — ATENOLOL 100 MG/1
TABLET ORAL
Qty: 90 TABLET | Refills: 3 | Status: SHIPPED | OUTPATIENT
Start: 2023-12-03

## 2023-12-05 ENCOUNTER — TELEPHONE (OUTPATIENT)
Age: 53
End: 2023-12-05

## 2023-12-05 NOTE — TELEPHONE ENCOUNTER
Patient INR was 1.7 per the provider please have the patient take the Warfarin 11 mg (10 mg & 1mg) daily and repeat INR check in 1 week. The patient acknowledged understanding and had no questions or concerns, thank you.

## 2023-12-21 NOTE — TELEPHONE ENCOUNTER
Last Visit: 01/31/2023   Next Appointment: N/A    Requested Prescriptions     Pending Prescriptions Disp Refills    amLODIPine (NORVASC) 10 MG tablet [Pharmacy Med Name: AMLODIPINE BESYLATE 10MG TABLETS] 90 tablet      Sig: TAKE 1 TABLET BY MOUTH EVERY DAY

## 2023-12-26 RX ORDER — AMLODIPINE BESYLATE 10 MG/1
TABLET ORAL
Qty: 90 TABLET | Refills: 3 | Status: SHIPPED | OUTPATIENT
Start: 2023-12-26

## 2024-01-08 ENCOUNTER — TELEPHONE (OUTPATIENT)
Age: 54
End: 2024-01-08

## 2024-01-08 NOTE — TELEPHONE ENCOUNTER
Patient PT/INR 12/28/2023 was 2.0. And on 01/05/2024 the patients PT/INR was 1.7. The provider was made aware. Per the provider, please have patient continue medication regiment Warfarin 11 mg (10 mg & 1mg) daily and Repeat INR check in 2 weeks 01/19/2024. I Was unable to reach the patient or leave a detailed message due to mailbox was full. I will tray and call back at another time, thank you.

## 2024-01-09 NOTE — TELEPHONE ENCOUNTER
Spoke with the patient and informed him of message below to continue medication regiment Warfarin 11 mg (10 mg & 1mg) daily and Repeat INR check in 2 weeks 01/19/2024 . The patient acknowledged understanding and had no questions or concerns for the provider, thank you.

## 2024-01-11 ENCOUNTER — TELEPHONE (OUTPATIENT)
Age: 54
End: 2024-01-11

## 2024-01-12 NOTE — TELEPHONE ENCOUNTER
On 01/11/2024 the patients PT/INR was 1.9. The provider was made aware. Per the provider, patient was informed to continue medication regiment Warfarin 11 mg (10 mg & 1mg) daily and Repeat INR check in 1 weeks 01/19/2024, thank you.

## 2024-01-13 ENCOUNTER — CLINICAL DOCUMENTATION (OUTPATIENT)
Age: 54
End: 2024-01-13

## 2024-01-13 NOTE — PROGRESS NOTES
This provider was notified that pt was experiencing some tender varicosities; no redness or discolration. INR was 1.9 on Wednesday 1/ 10/2024.  Pt advised to keep feet elevated when able and use a warm compress to the areas.  Pt also advised to take 12 mg coumadin for 1/13/2024 and 1/14/2024 and recheck INR on 1/15/2024. Pt voiced understanding.

## 2024-01-23 ENCOUNTER — TELEPHONE (OUTPATIENT)
Age: 54
End: 2024-01-23

## 2024-01-23 NOTE — TELEPHONE ENCOUNTER
On 01/23/2024 the patients PT/INR was 2.0. The provider was made aware. Per the provider, patient was informed to continue medication regiment Warfarin 11 mg (10 mg & 1mg) daily and Repeat INR check in 1 Month 02/23/2024, thank you.

## 2024-01-29 ENCOUNTER — TELEPHONE (OUTPATIENT)
Age: 54
End: 2024-01-29

## 2024-01-29 NOTE — TELEPHONE ENCOUNTER
Abstract triage nurse: MIRZA [phone] Swelling in the lower legs and feet with severe pain. Best contact number [phone]

## 2024-01-29 NOTE — TELEPHONE ENCOUNTER
Spoke with the patient. States he has been experiencing lower leg swelling x 3 weeks. Has some pain. Denies any warmth.

## 2024-01-30 NOTE — TELEPHONE ENCOUNTER
January 29, 2024  Lesa Claire MD         1/29/24  4:58 PM  Note     Talk to patient this afternoon regarding his complaints of swelling in his feet legs and knees.  He states that his symptoms have been going on for the last several weeks.  He tries to wear compression hose and this will keep the swelling controlled will.  He states that he may feel a \"little\" shortness of breath but he does not feel like he is gasping and unable to do his regular activities.  He is followed by his kidney specialist and states that the kidney doctor stated his kidney test had increased some.  This was back in May 2023.  He followed  (endocrinology) on last Wednesday but only had A1c drawn.     It is uncertain the patient's edema bilaterally.  Patient advised that he would need to present to the emergency room for uncontrolled, worsening shortness of breath.  He can take an extra Lasix today.  And monitor the response of the swelling of his legs.  Will have patient follow-up in the office on January 31st 2024 at 1:20 PM for further evaluation.  Patient voiced understanding that he needed to present to the emergency room if his shortness of breath worsens

## 2024-01-31 ENCOUNTER — OFFICE VISIT (OUTPATIENT)
Age: 54
End: 2024-01-31
Payer: MEDICARE

## 2024-01-31 VITALS
RESPIRATION RATE: 18 BRPM | BODY MASS INDEX: 40.43 KG/M2 | HEIGHT: 74 IN | SYSTOLIC BLOOD PRESSURE: 121 MMHG | DIASTOLIC BLOOD PRESSURE: 75 MMHG | WEIGHT: 315 LBS | HEART RATE: 73 BPM | TEMPERATURE: 97.3 F | OXYGEN SATURATION: 95 %

## 2024-01-31 DIAGNOSIS — R60.9 PERIPHERAL EDEMA: Primary | ICD-10-CM

## 2024-01-31 DIAGNOSIS — I10 ESSENTIAL (PRIMARY) HYPERTENSION: ICD-10-CM

## 2024-01-31 PROCEDURE — 99214 OFFICE O/P EST MOD 30 MIN: CPT | Performed by: FAMILY MEDICINE

## 2024-01-31 PROCEDURE — 3078F DIAST BP <80 MM HG: CPT | Performed by: FAMILY MEDICINE

## 2024-01-31 PROCEDURE — 3074F SYST BP LT 130 MM HG: CPT | Performed by: FAMILY MEDICINE

## 2024-01-31 RX ORDER — COMPRESSION  PANTYHOSE, SMALL
EACH MISCELLANEOUS
Qty: 1 EACH | Refills: 0 | Status: SHIPPED | OUTPATIENT
Start: 2024-01-31

## 2024-01-31 RX ORDER — TIRZEPATIDE 10 MG/.5ML
INJECTION, SOLUTION SUBCUTANEOUS
COMMUNITY
Start: 2024-01-08 | End: 2024-01-31 | Stop reason: ALTCHOICE

## 2024-01-31 RX ORDER — HYDROCODONE BITARTRATE AND ACETAMINOPHEN 7.5; 325 MG/1; MG/1
1 TABLET ORAL 2 TIMES DAILY
COMMUNITY
Start: 2024-01-27

## 2024-01-31 RX ORDER — TIRZEPATIDE 15 MG/.5ML
INJECTION, SOLUTION SUBCUTANEOUS
COMMUNITY

## 2024-01-31 SDOH — ECONOMIC STABILITY: FOOD INSECURITY: WITHIN THE PAST 12 MONTHS, THE FOOD YOU BOUGHT JUST DIDN'T LAST AND YOU DIDN'T HAVE MONEY TO GET MORE.: NEVER TRUE

## 2024-01-31 SDOH — ECONOMIC STABILITY: FOOD INSECURITY: WITHIN THE PAST 12 MONTHS, YOU WORRIED THAT YOUR FOOD WOULD RUN OUT BEFORE YOU GOT MONEY TO BUY MORE.: NEVER TRUE

## 2024-01-31 SDOH — ECONOMIC STABILITY: HOUSING INSECURITY
IN THE LAST 12 MONTHS, WAS THERE A TIME WHEN YOU DID NOT HAVE A STEADY PLACE TO SLEEP OR SLEPT IN A SHELTER (INCLUDING NOW)?: NO

## 2024-01-31 SDOH — ECONOMIC STABILITY: INCOME INSECURITY: HOW HARD IS IT FOR YOU TO PAY FOR THE VERY BASICS LIKE FOOD, HOUSING, MEDICAL CARE, AND HEATING?: NOT HARD AT ALL

## 2024-01-31 ASSESSMENT — PATIENT HEALTH QUESTIONNAIRE - PHQ9
SUM OF ALL RESPONSES TO PHQ QUESTIONS 1-9: 0
SUM OF ALL RESPONSES TO PHQ9 QUESTIONS 1 & 2: 0
2. FEELING DOWN, DEPRESSED OR HOPELESS: 0
SUM OF ALL RESPONSES TO PHQ QUESTIONS 1-9: 0
1. LITTLE INTEREST OR PLEASURE IN DOING THINGS: 0
SUM OF ALL RESPONSES TO PHQ QUESTIONS 1-9: 0
SUM OF ALL RESPONSES TO PHQ QUESTIONS 1-9: 0

## 2024-01-31 ASSESSMENT — ANXIETY QUESTIONNAIRES
7. FEELING AFRAID AS IF SOMETHING AWFUL MIGHT HAPPEN: 0
6. BECOMING EASILY ANNOYED OR IRRITABLE: 0
5. BEING SO RESTLESS THAT IT IS HARD TO SIT STILL: 0
4. TROUBLE RELAXING: 0
IF YOU CHECKED OFF ANY PROBLEMS ON THIS QUESTIONNAIRE, HOW DIFFICULT HAVE THESE PROBLEMS MADE IT FOR YOU TO DO YOUR WORK, TAKE CARE OF THINGS AT HOME, OR GET ALONG WITH OTHER PEOPLE: NOT DIFFICULT AT ALL
GAD7 TOTAL SCORE: 0
3. WORRYING TOO MUCH ABOUT DIFFERENT THINGS: 0
2. NOT BEING ABLE TO STOP OR CONTROL WORRYING: 0
1. FEELING NERVOUS, ANXIOUS, OR ON EDGE: 0

## 2024-01-31 NOTE — PROGRESS NOTES
1. \"Have you been to the ER, urgent care clinic since your last visit?  Hospitalized since your last visit?\" No    2. \"Have you seen or consulted any other health care providers outside of the [facility] since your last visit?\"  Yes, Dr. REGINA Chapin- Endocrinology        3. For patients aged 45-75: Has the patient had a colonoscopy / FIT/ Cologuard? Yes - Care Gap present. Most recent result on file

## 2024-01-31 NOTE — PROGRESS NOTES
HPI:  Shun Gomes is a 54 y.o. male who presents today with   Chief Complaint   Patient presents with    Leg Swelling     Bilateral: Legs x2-3 weeks           Tenderness at the varicosities in the legs patient complained of a few weeks ago is better     The edema from two days ago has improved.  Patient took extra Lasix for 1 day on last Monday.  He also changed his compression hose and this helped.          Wt Readings from Last 3 Encounters:   02/07/24 (!) 166.9 kg (368 lb)   01/31/24 (!) 167 kg (368 lb 3.2 oz)   10/04/23 (!) 161.9 kg (357 lb)                  No data to display                        PMH,  Meds, Allergies, Family History, Social history reviewed      Current Outpatient Medications   Medication Sig Dispense Refill    MOUNJARO 15 MG/0.5ML SOPN SC injection INJECT 15 MG (1 PEN) BENEATH THE SKIN EVERY 7 DAYS      HYDROcodone-acetaminophen (NORCO) 7.5-325 MG per tablet Take 1 tablet by mouth 2 times daily.      Elastic Bandages & Supports (FUTURO FIRM COMPRESSION HOSE) MISC 20 - 30 mmHG 1 each 0    amLODIPine (NORVASC) 10 MG tablet TAKE 1 TABLET BY MOUTH EVERY DAY 90 tablet 3    atenolol (TENORMIN) 100 MG tablet TAKE 1 TABLET DAILY 90 tablet 3    warfarin (COUMADIN) 10 MG tablet TAKE 1 TABLET BY MOUTH EVERY DAY. TAKE ALONG WITH 1MG 90 tablet 3    cloNIDine (CATAPRES) 0.2 MG tablet TAKE 1 TABLET BY MOUTH TWO TIMES A DAY. 180 tablet 3    KLOR-CON M20 20 MEQ extended release tablet TAKE 1 TABLET BY MOUTH EVERY DAY 90 tablet 3    atorvastatin (LIPITOR) 20 MG tablet TAKE 1 TABLET BY MOUTH EVERY DAY 90 tablet 3    cetirizine (ZYRTEC) 5 MG tablet Take 1 tablet by mouth      diphenhydrAMINE (BENADRYL) 25 MG capsule Take 1 capsule by mouth every 6 hours as needed      BD ULTRA-FINE PEN NEEDLES 29G X 12.7MM MISC USE 6 TIMES A DAY      tadalafil (CIALIS) 5 MG tablet Take 1 tablet by mouth daily as needed for Erectile Dysfunction 90 tablet 1    tadalafil (CIALIS) 10 MG tablet Take 1 tablet by mouth as needed

## 2024-02-01 LAB
ANION GAP SERPL CALCULATED.3IONS-SCNC: 11 MMOL/L (ref 3–15)
BUN BLDV-MCNC: 15 MG/DL (ref 6–22)
CALCIUM SERPL-MCNC: 9.1 MG/DL (ref 8.4–10.5)
CHLORIDE BLD-SCNC: 98 MMOL/L (ref 98–110)
CO2: 30 MMOL/L (ref 20–32)
CREAT SERPL-MCNC: 0.9 MG/DL (ref 0.5–1.2)
GLOMERULAR FILTRATION RATE: >60 ML/MIN/1.73 SQ.M.
GLUCOSE: 133 MG/DL (ref 70–99)
POTASSIUM SERPL-SCNC: 3.6 MMOL/L (ref 3.5–5.5)
SODIUM BLD-SCNC: 139 MMOL/L (ref 133–145)

## 2024-02-07 ENCOUNTER — TELEPHONE (OUTPATIENT)
Age: 54
End: 2024-02-07

## 2024-02-07 NOTE — TELEPHONE ENCOUNTER
On 02/06/2024 the patients PT/INR was 2.3. The provider was made aware. Per the provider, patient was informed to continue medication regiment Warfarin 11 mg (10 mg & 1mg) daily and Repeat INR check in 1 Month 03/06/2024 or as needed for the at Home Vendor requirements, thank you   
Unable to reach the patient. I left a detailed message informing the patient of message listed below, thank you  
Chronic cholecystitis

## 2024-02-19 NOTE — TELEPHONE ENCOUNTER
Last Visit: 01/31/2024   Next Appointment: 06/03/2024    Requested Prescriptions     Pending Prescriptions Disp Refills    furosemide (LASIX) 20 MG tablet [Pharmacy Med Name: FUROSEMIDE 20 MG TABLET] 90 tablet 1     Sig: TAKE 1 TABLET BY MOUTH EVERY DAY

## 2024-02-21 RX ORDER — FUROSEMIDE 20 MG/1
20 TABLET ORAL DAILY
Qty: 90 TABLET | Refills: 1 | Status: SHIPPED | OUTPATIENT
Start: 2024-02-21

## 2024-03-07 ENCOUNTER — TELEPHONE (OUTPATIENT)
Age: 54
End: 2024-03-07

## 2024-03-08 NOTE — TELEPHONE ENCOUNTER
On 03/07/2024 the patients PT/INR was 2.7. The provider was made aware. Per the provider, patient was informed to continue medication regiment Warfarin 11 mg (10 mg & 1mg) daily and Repeat INR check in 1 Month 04/07/2024 or as needed for the at Home Vendor requirements, thank you.

## 2024-06-04 ENCOUNTER — TELEPHONE (OUTPATIENT)
Facility: CLINIC | Age: 54
End: 2024-06-04

## 2024-06-04 NOTE — TELEPHONE ENCOUNTER
Laura magana from [employer] to confirm if Dr. Claire will sign off on orders for supplies. She will be faxing over the orders today.

## 2024-06-14 NOTE — TELEPHONE ENCOUNTER
Follow Up Office Visit      Date: 2024   Patient Name: Hector Nieto  : 2021   MRN: [id number]     Chief Complaint:    Chief Complaint   Patient presents with    right eye gunky    right ear       History of Present Illness: Case Gerson is a 13 m.o. male who is here today for chronic intermittent cough more so at nighttime, associate yesterday with some right eyelid swelling and tearing, some tearing left without swelling, nasal congestion drainage, as noted, but not acting ill.  Mother suspects may have some otalgia on the right as he had been playing with his ear.  Several days ago had a transient fever, not currently.  Does have a history of RSV bronchiolitis and apparently allergies taking Zyrtec as needed.  Regular patient of Dr. Stas Gardner.    Subjective      Review of Systems:   Review of Systems    I have reviewed the patients family history, social history, past medical history, past surgical history and have updated it as appropriate.     Medications:     Current Outpatient Medications:     Cetirizine HCl (zyrTEC) 5 MG/5ML solution solution, Take 2.5 mL by mouth Daily., Disp: 75 mL, Rfl: 3    polyethylene glycol (MiraLax) 17 GM/SCOOP powder, Take 4.25 g by mouth Daily., Disp: 510 g, Rfl: 1    Spacer/Aero-Holding Chambers (AeroChamber MV) inhaler, Use as instructed., Disp: 1 each, Rfl: 0    albuterol sulfate  (90 Base) MCG/ACT inhaler, Inhale 2 puffs Every 4 (Four) Hours As Needed for Shortness of Air or Wheezing. Use with AeroChamber, Disp: 18 g, Rfl: 1    olopatadine (PATANOL) 0.1 % ophthalmic solution, Administer 1 drop to both eyes 2 (Two) Times a Day. As needed, Disp: 5 mL, Rfl: 1    Allergies:   No Known Allergies    Objective     Physical Exam: Please see above  Vital Signs:   Vitals:    24 1001   Temp: 98.4 °F (36.9 °C)   TempSrc: Temporal   Weight: 11 kg (24 lb 2.5 oz)     There is no height or weight on file to calculate BMI.  BMI is below normal parameters (malnutrition).  Patients regular dose is  11 mg daily. Per the provider see if he took Warfarin 10 mg daily or Warfarin 11 mg daily which is his true regular dose after he took the 1 mg that one night. If he took 11 mg daily thereafter  and the inr was 3.4 on 11/23/2023, I want him to start taking 10 mg daily and repeat the INR in 1 week. I spoke with the patient who stated yes regular does was Warfarin 11 mg and informed him per the provider to start taking Warfarin 10 mg daily and repeat INR check in 1 week. The patient acknowledged understanding and had no questions or concerns, thank you. "Recommendations: BMI 64th percentile for age       Physical Exam  Constitutional:       General: He is active. He is not in acute distress.     Appearance: He is well-developed. He is not toxic-appearing.      Comments: Generally healthy toddler who is slightly fussy and apprehensive, mildly uncooperative during the exam, NAD, hydrated   HENT:      Head: Normocephalic and atraumatic.      Right Ear: There is impacted cerumen. Tympanic membrane is not erythematous or bulging.      Left Ear: There is impacted cerumen. Tympanic membrane is not erythematous or bulging.      Ears:      Comments: Ear canals bilaterally occluded with cerumen, after attempted removal of wax with a scoop bilaterally and with some difficulty, small portion of the TMs visualized and appear to be clear     Nose: Congestion and rhinorrhea present.      Mouth/Throat:      Mouth: Mucous membranes are moist.      Pharynx: Oropharynx is clear. No oropharyngeal exudate or posterior oropharyngeal erythema.   Eyes:      Comments: Conjunctivae bilaterally noninjected but has clear tearing, mild erythema and puffiness of the right upper and lower eyelids, no periorbital edema   Cardiovascular:      Rate and Rhythm: Normal rate and regular rhythm.      Heart sounds: Normal heart sounds. No murmur heard.     No friction rub. No gallop.   Pulmonary:      Effort: Pulmonary effort is normal. No respiratory distress, nasal flaring or retractions.      Breath sounds: No stridor or decreased air movement. Rhonchi present. No wheezing or rales.      Comments: Occasional congested cough with scattered rhonchi bilaterally  Musculoskeletal:      Cervical back: No rigidity.   Lymphadenopathy:      Cervical: No cervical adenopathy.   Skin:     General: Skin is warm.   Neurological:      General: No focal deficit present.      Mental Status: He is alert.         Procedures    Results:   Labs:   No results found for: \"HGBA1C\", \"CMP\", \"CBCDIFFPANEL\", \"CREAT\", \"TSH\" "     POCT Results (if applicable):   Results for orders placed or performed in visit on 05/28/24   POCT SARS-CoV-2 + Flu Antigen QUYNH    Specimen: Swab   Result Value Ref Range    SARS Antigen Not Detected Not Detected, Presumptive Negative    Influenza A Antigen QUYNH Not Detected Not Detected    Influenza B Antigen QUYNH Not Detected Not Detected    Internal Control Passed Passed    Lot Number [id number]     Expiration Date 02/15/2025    POC Rapid Strep A    Specimen: Swab   Result Value Ref Range    Rapid Strep A Screen Negative Negative, VALID, INVALID, Not Performed    Internal Control Passed Passed    Lot Number [id number]     Expiration Date 10/27/2026        Assessment / Plan      Assessment/Plan:   Diagnoses and all orders for this visit:    1. Acute viral conjunctivitis of both eyes (Primary)  Assessment & Plan:  Clinical picture most consistent with a viral URI, right greater left, treating with warm moist compresses as needed, and Patanol drops for symptomatic relief.  Advise if not improving over several days or for any acute worsening in the interim.    Orders:  -     albuterol sulfate  (90 Base) MCG/ACT inhaler; Inhale 2 puffs Every 4 (Four) Hours As Needed for Shortness of Air or Wheezing. Use with AeroChamber  Dispense: 18 g; Refill: 1    2. Viral URI with cough  Assessment & Plan:  Nonspecific viral URI, nontoxic, secondary conjunctivitis and reactive airways.  Symptomatic treatment with Zyrtec 2.5 mg daily as needed from prior prescription and albuterol 2 puffs every 4 hours.  Utilizing previously prescribed AeroChamber with mask, proper dosing technique demonstrated.  Plenty fluids.  Motrin or Tylenol as needed.  Advise if not improving.      3. Mild intermittent asthma with acute exacerbation  Assessment & Plan:  Mild rhonchi with no tight wheezes, likely triggered by viral URI, simply treat with albuterol use with mask and spacer, proper dosing technique demonstrated.  At this stage not require  systemic steroids.  If becomes more congested then this would be a legitimate option.  Advise if not improving.      Orders:  -     albuterol sulfate  (90 Base) MCG/ACT inhaler; Inhale 2 puffs Every 4 (Four) Hours As Needed for Shortness of Air or Wheezing. Use with AeroChamber  Dispense: 18 g; Refill: 1    Other orders  -     olopatadine (PATANOL) 0.1 % ophthalmic solution; Administer 1 drop to both eyes 2 (Two) Times a Day. As needed  Dispense: 5 mL; Refill: 1        Follow Up:   No follow-ups on file.      At [facility], we believe that sharing information builds trust and better relationships. You are receiving this note because you recently visited [facility]. It is possible you will see health information before a provider has talked with you about it. This kind of information can be easy to misunderstand. To help you fully understand what it means for your health, we urge you to discuss this note with your provider.    Tyler Gardner MD  [facility]

## 2024-06-28 LAB
CREATININE, EXTERNAL: 1.2
HBA1C MFR BLD HPLC: 9.1 %
LDL CHOLESTEROL, EXTERNAL: 28
TOTAL CHOLESTEROL, EXTERNAL: 77

## 2024-07-02 ENCOUNTER — OFFICE VISIT (OUTPATIENT)
Facility: CLINIC | Age: 54
End: 2024-07-02
Payer: MEDICARE

## 2024-07-02 VITALS
RESPIRATION RATE: 18 BRPM | DIASTOLIC BLOOD PRESSURE: 70 MMHG | SYSTOLIC BLOOD PRESSURE: 111 MMHG | BODY MASS INDEX: 40.43 KG/M2 | HEART RATE: 61 BPM | HEIGHT: 74 IN | OXYGEN SATURATION: 92 % | WEIGHT: 315 LBS | TEMPERATURE: 98 F

## 2024-07-02 DIAGNOSIS — Z09 HOSPITAL DISCHARGE FOLLOW-UP: ICD-10-CM

## 2024-07-02 DIAGNOSIS — I50.33 ACUTE ON CHRONIC DIASTOLIC HEART FAILURE (HCC): Primary | ICD-10-CM

## 2024-07-02 DIAGNOSIS — E11.40 TYPE 2 DIABETES MELLITUS WITH DIABETIC NEUROPATHY, WITH LONG-TERM CURRENT USE OF INSULIN (HCC): ICD-10-CM

## 2024-07-02 DIAGNOSIS — Z79.4 TYPE 2 DIABETES MELLITUS WITH DIABETIC NEUROPATHY, WITH LONG-TERM CURRENT USE OF INSULIN (HCC): ICD-10-CM

## 2024-07-02 PROCEDURE — 3074F SYST BP LT 130 MM HG: CPT | Performed by: FAMILY MEDICINE

## 2024-07-02 PROCEDURE — 3078F DIAST BP <80 MM HG: CPT | Performed by: FAMILY MEDICINE

## 2024-07-02 PROCEDURE — 1111F DSCHRG MED/CURRENT MED MERGE: CPT | Performed by: FAMILY MEDICINE

## 2024-07-02 PROCEDURE — 99214 OFFICE O/P EST MOD 30 MIN: CPT | Performed by: FAMILY MEDICINE

## 2024-07-02 RX ORDER — DAPAGLIFLOZIN 10 MG/1
10 TABLET, FILM COATED ORAL DAILY
COMMUNITY
Start: 2024-07-01

## 2024-07-02 RX ORDER — HYDRALAZINE HYDROCHLORIDE 25 MG/1
25 TABLET, FILM COATED ORAL 3 TIMES DAILY
COMMUNITY
Start: 2024-06-30

## 2024-07-02 RX ORDER — FLUTICASONE PROPIONATE 50 MCG
SPRAY, SUSPENSION (ML) NASAL
Qty: 16 G | Refills: 1 | Status: SHIPPED | OUTPATIENT
Start: 2024-07-02

## 2024-07-02 RX ORDER — INSULIN GLARGINE 100 [IU]/ML
INJECTION, SOLUTION SUBCUTANEOUS
COMMUNITY

## 2024-07-02 RX ORDER — ISOSORBIDE MONONITRATE 30 MG/1
30 TABLET, EXTENDED RELEASE ORAL DAILY
COMMUNITY
Start: 2024-07-01

## 2024-07-02 RX ORDER — FERROUS SULFATE 325(65) MG
1 TABLET ORAL 2 TIMES DAILY
COMMUNITY
Start: 2024-06-30

## 2024-07-02 NOTE — PROGRESS NOTES
Post-Discharge Transitional Care Management Progress Note      Shun Gomes   YOB: 1970    Date of Office Visit:  7/2/2024  Date of Hospital Admission: 6/27/2024  Date of Hospital Discharge: 6/30/2024    Care management risk score Rising risk (score 2-5) and Complex Care (Scores >=6): No Risk Score On File     Non face to face  following discharge, date last encounter closed (first attempt may have been earlier): *No documented post hospital discharge outreach found in the last 14 days *No documented post hospital discharge outreach found in the last 14 days    Call initiated 2 business days of discharge: *No response recorded in the last 14 days    ASSESSMENT/PLAN:   Shun was seen today for follow-up from hospital.    Diagnoses and all orders for this visit:    Acute on chronic diastolic heart failure (HCC)    Type 2 diabetes mellitus with diabetic neuropathy, with long-term current use of insulin (HCC)    Hospital discharge follow-up  -     IA DISCHARGE MEDS RECONCILED W/ CURRENT OUTPATIENT MED LIST    Other orders  -     fluticasone (FLONASE) 50 MCG/ACT nasal spray; SHAKE LIQUID AND USE 2 SPRAYS IN EACH NOSTRIL DAILY AS NEEDED FOR RHINITIS      As above  Patient is clinically stable the will with some continued residual nests and decreased physical endurance; Patient advised that this is likely multifactorial  Cardiac rehab dilatation may be a reasonable treatment modality.  Patient advised to see Dr. Henry, cardiology next week as scheduled  And strict lower sodium diet has been advised  Refilled patient's Flonase as per his request  Other medications reconciled as well  Return in about 3 months (around 10/2/2024).  This has been fully explained to the patient, who indicates understanding.  [facility] and pt has been advised of same.          Subjective:   HPI:  Follow up of Hospital problems/diagnosis(es): acute  on chronic resp failure. Acute on chronic diastolic  heart

## 2024-07-02 NOTE — PROGRESS NOTES
1. \"Have you been to the ER, urgent care clinic since your last visit?  Hospitalized since your last visit?\" 6/27/2024 - 06/30/2024- [address]- Chest Pain & SOB    2. \"Have you seen or consulted any other health care providers outside of the [facility] since your last visit?\" [address] ER, Dr. CHAVEZ- Endocrinology, Dr. Aranda. Rosa [facility] Pain Management, Orthopedic- Dr. Marcum, Cardiology- Dr. Shantell Henry       3. For patients aged 45-75: Has the patient had a colonoscopy / FIT/ Cologuard? Yes - Care Gap present. Most recent result on file

## 2024-07-04 ENCOUNTER — PATIENT MESSAGE (OUTPATIENT)
Facility: CLINIC | Age: 54
End: 2024-07-04

## 2024-07-08 NOTE — TELEPHONE ENCOUNTER
General Call    Contacts       Contact Date/Time Type Contact Phone/Fax    07/08/2024 03:14 PM CDT Phone (Outgoing) Briceño Liana CORRALES (Self) [phone] (M)    Talked with Patient     07/08/2024 03:28 PM CDT Phone (Incoming) Jay Briceñoi L (Self) [phone] (M)          Reason for Call:  Covid Positive     What are your questions or concerns:  Please try to reach pt in regards to covid positive       Could we send this information to you in [facility] or would you prefer to receive a phone call?:   Patient would prefer a phone call   Okay to leave a detailed message?: Yes at Cell number on file:    Telephone Information:   Mobile [phone]      Talk to patient this afternoon regarding his complaints of swelling in his feet legs and knees.  He states that his symptoms have been going on for the last several weeks.  He tries to wear compression hose and this will keep the swelling controlled will.  He states that he may feel a \"little\" shortness of breath but he does not feel like he is gasping and unable to do his regular activities.  He is followed by his kidney specialist and states that the kidney doctor stated his kidney test had increased some.  This was back in May 2023.  He followed  (endocrinology) on last Wednesday but only had A1c drawn.    It is uncertain the patient's edema bilaterally.  Patient advised that he would need to present to the emergency room for uncontrolled, worsening shortness of breath.  He can take an extra Lasix today.  And monitor the response of the swelling of his legs.  Will have patient follow-up in the office on January 31st 2024 at 1:20 PM for further evaluation.  Patient voiced understanding that he needed to present to the emergency room if his shortness of breath worsens

## 2024-07-11 NOTE — TELEPHONE ENCOUNTER
Last Visit: 07/02/2024   Next Appointment: 10/02/2024    Requested Prescriptions     Pending Prescriptions Disp Refills    lisinopril (PRINIVIL;ZESTRIL) 40 MG tablet [Pharmacy Med Name: LISINOPRIL 40 MG TABLET] 90 tablet 2     Sig: TAKE 1 TABLET BY MOUTH IN THE MORNING    hydroCHLOROthiazide (HYDRODIURIL) 25 MG tablet [Pharmacy Med Name: HYDROCHLOROTHIAZIDE 25 MG TAB] 90 tablet 2     Sig: TAKE 1 TABLET BY MOUTH IN THE MORNING

## 2024-07-12 RX ORDER — EPINEPHRINE 0.3 MG/.3ML
0.3 INJECTION SUBCUTANEOUS ONCE
Qty: 0.6 ML | Refills: 1 | Status: SHIPPED | OUTPATIENT
Start: 2024-07-12 | End: 2024-07-12

## 2024-07-12 NOTE — TELEPHONE ENCOUNTER
Pt obtained epi pen from [facility] after a shrimp allergic reaction.  Medication will be refilled.

## 2024-07-13 RX ORDER — LISINOPRIL 40 MG/1
40 TABLET ORAL EVERY MORNING
Qty: 90 TABLET | Refills: 2 | Status: SHIPPED | OUTPATIENT
Start: 2024-07-13

## 2024-07-13 RX ORDER — HYDROCHLOROTHIAZIDE 25 MG/1
25 TABLET ORAL EVERY MORNING
Qty: 90 TABLET | Refills: 2 | Status: SHIPPED | OUTPATIENT
Start: 2024-07-13

## 2024-07-15 ENCOUNTER — TELEPHONE (OUTPATIENT)
Facility: CLINIC | Age: 54
End: 2024-07-15

## 2024-07-15 NOTE — TELEPHONE ENCOUNTER
The provider was made aware. I spoke with the patient and informed him per the provider, please continue medication regiment Warfarin 11 mg (10 mg & 1mg) daily and Repeat INR check in 1 Month 08/11/2024 or as needed for the at Home Vendor requirements. The patient acknowledged understanding and had no questions or concerns for the provider at this time, thank you.

## 2024-07-22 ENCOUNTER — TELEPHONE (OUTPATIENT)
Facility: CLINIC | Age: 54
End: 2024-07-22

## 2024-07-22 NOTE — TELEPHONE ENCOUNTER
I spoke with the patient and informed him per the provider please have the patient you can have him recheck INR in 2 weeks continue medication regiment, Warfarin 11 mg (10 mg & 1mg) daily and  08/05/2024 continue the same dose. The patient acknowledged understanding and had no questions or concerns for the provider at this time, thank you.

## 2024-07-25 NOTE — TELEPHONE ENCOUNTER
Requesting Qty Change, thank you.    Last Visit: 07/02/2024   Next Appointment: 10/02/2024    Requested Prescriptions     Pending Prescriptions Disp Refills    fluticasone (FLONASE) 50 MCG/ACT nasal spray [Pharmacy Med Name: FLUTICASONE PROP 50 MCG SPRAY]  1     Sig: SHAKE LIQUID AND USE 2 SPRAYS IN EACH NOSTRIL DAILY AS NEEDED FOR RHINITIS

## 2024-07-28 RX ORDER — FLUTICASONE PROPIONATE 50 MCG
SPRAY, SUSPENSION (ML) NASAL
Qty: 1 EACH | Refills: 3 | Status: SHIPPED | OUTPATIENT
Start: 2024-07-28

## 2024-08-04 RX ORDER — FERROUS SULFATE 325(65) MG
1 TABLET ORAL 2 TIMES DAILY
Qty: 60 TABLET | Refills: 3 | Status: SHIPPED | OUTPATIENT
Start: 2024-08-04

## 2024-08-07 DIAGNOSIS — E87.6 HYPOKALEMIA: ICD-10-CM

## 2024-08-11 RX ORDER — FUROSEMIDE 20 MG/1
20 TABLET ORAL DAILY
Qty: 90 TABLET | Refills: 1 | OUTPATIENT
Start: 2024-08-11

## 2024-08-11 RX ORDER — FUROSEMIDE 40 MG/1
40 TABLET ORAL 2 TIMES DAILY
Qty: 180 TABLET | Refills: 1 | Status: SHIPPED | OUTPATIENT
Start: 2024-08-11

## 2024-08-11 RX ORDER — POTASSIUM CHLORIDE 20 MEQ/1
40 TABLET, EXTENDED RELEASE ORAL DAILY
Qty: 180 TABLET | Refills: 1 | Status: SHIPPED | OUTPATIENT
Start: 2024-08-11

## 2024-08-14 ENCOUNTER — TELEPHONE (OUTPATIENT)
Facility: CLINIC | Age: 54
End: 2024-08-14

## 2024-08-14 NOTE — TELEPHONE ENCOUNTER
The provider was made aware. I was unable to speak with the patient so I left a detailed message informing the patient per the provider, to please continue medication regiment Warfarin 11 mg (10 mg & 1mg) daily and Repeat INR check in 1 Month 09/14/2024 or as needed for the at Home Vendor requirements. The patient acknowledged understanding and had no questions or concerns for the provider at this time, thank you.

## 2024-09-08 DIAGNOSIS — Z79.01 LONG TERM (CURRENT) USE OF ANTICOAGULANTS: ICD-10-CM

## 2024-09-09 ENCOUNTER — TELEPHONE (OUTPATIENT)
Facility: CLINIC | Age: 54
End: 2024-09-09

## 2024-09-16 RX ORDER — WARFARIN SODIUM 1 MG/1
TABLET ORAL
Qty: 90 TABLET | Refills: 3 | Status: SHIPPED | OUTPATIENT
Start: 2024-09-16

## 2024-09-23 ENCOUNTER — TELEPHONE (OUTPATIENT)
Facility: CLINIC | Age: 54
End: 2024-09-23

## 2024-10-03 ENCOUNTER — OFFICE VISIT (OUTPATIENT)
Facility: CLINIC | Age: 54
End: 2024-10-03

## 2024-10-03 VITALS
WEIGHT: 315 LBS | RESPIRATION RATE: 18 BRPM | SYSTOLIC BLOOD PRESSURE: 134 MMHG | HEART RATE: 70 BPM | BODY MASS INDEX: 40.43 KG/M2 | OXYGEN SATURATION: 97 % | HEIGHT: 74 IN | DIASTOLIC BLOOD PRESSURE: 71 MMHG | TEMPERATURE: 97.5 F

## 2024-10-03 DIAGNOSIS — K59.04 CHRONIC IDIOPATHIC CONSTIPATION: ICD-10-CM

## 2024-10-03 DIAGNOSIS — Z23 ENCOUNTER FOR IMMUNIZATION: ICD-10-CM

## 2024-10-03 DIAGNOSIS — I50.33 ACUTE ON CHRONIC DIASTOLIC HEART FAILURE (HCC): Primary | ICD-10-CM

## 2024-10-03 DIAGNOSIS — I10 PRIMARY HYPERTENSION: ICD-10-CM

## 2024-10-03 RX ORDER — TIRZEPATIDE 15 MG/.5ML
INJECTION, SOLUTION SUBCUTANEOUS
COMMUNITY
Start: 2024-09-28

## 2024-10-03 RX ORDER — DOCUSATE SODIUM 100 MG/1
100 CAPSULE, LIQUID FILLED ORAL 2 TIMES DAILY PRN
Qty: 60 CAPSULE | Refills: 2 | Status: SHIPPED | OUTPATIENT
Start: 2024-10-03

## 2024-10-03 RX ORDER — DOCUSATE SODIUM 100 MG/1
100 CAPSULE, LIQUID FILLED ORAL 2 TIMES DAILY
COMMUNITY
End: 2024-10-03 | Stop reason: SDUPTHER

## 2024-10-03 ASSESSMENT — PATIENT HEALTH QUESTIONNAIRE - PHQ9
SUM OF ALL RESPONSES TO PHQ QUESTIONS 1-9: 0
2. FEELING DOWN, DEPRESSED OR HOPELESS: NOT AT ALL
SUM OF ALL RESPONSES TO PHQ QUESTIONS 1-9: 0
SUM OF ALL RESPONSES TO PHQ QUESTIONS 1-9: 0
SUM OF ALL RESPONSES TO PHQ9 QUESTIONS 1 & 2: 0
SUM OF ALL RESPONSES TO PHQ QUESTIONS 1-9: 0
1. LITTLE INTEREST OR PLEASURE IN DOING THINGS: NOT AT ALL

## 2024-10-03 ASSESSMENT — ANXIETY QUESTIONNAIRES
GAD7 TOTAL SCORE: 0
4. TROUBLE RELAXING: NOT AT ALL
3. WORRYING TOO MUCH ABOUT DIFFERENT THINGS: NOT AT ALL
7. FEELING AFRAID AS IF SOMETHING AWFUL MIGHT HAPPEN: NOT AT ALL
6. BECOMING EASILY ANNOYED OR IRRITABLE: NOT AT ALL
IF YOU CHECKED OFF ANY PROBLEMS ON THIS QUESTIONNAIRE, HOW DIFFICULT HAVE THESE PROBLEMS MADE IT FOR YOU TO DO YOUR WORK, TAKE CARE OF THINGS AT HOME, OR GET ALONG WITH OTHER PEOPLE: NOT DIFFICULT AT ALL
1. FEELING NERVOUS, ANXIOUS, OR ON EDGE: NOT AT ALL
5. BEING SO RESTLESS THAT IT IS HARD TO SIT STILL: NOT AT ALL
2. NOT BEING ABLE TO STOP OR CONTROL WORRYING: NOT AT ALL

## 2024-10-03 NOTE — PROGRESS NOTES
The Patient  consents to receiving injection. Patient received the Flu injection which was administered at 1414 in the Left Deltoid successfully. Patient Declined to wait the 15 minutes to monitor for medication toleration without adverse reactions signs or symptoms  of distress noted around the injection site, thank you.     : Seqirus    Medication: Flucelvax    Lot#: [id number] (AMB HR  PRIMARY CARE VACCINE STOCK ([id number]))    NDC#: 01921-444-34    Dose: 0.5 ML   Exp: 06/30/2025   Site: Left Deltoid    Time: 1414         
\"Have you been to the ER, urgent care clinic since your last visit?  Hospitalized since your last visit?\"    NO    “Have you seen or consulted any other health care providers outside of [facility] since your last visit?”    This include any pap smears or colon screening.  Yes[facility] [address], Dr. CHAVEZ- Endocrinology, Dr. Aranda. Rosa M Pain Management, Orthopedic- Dr. Marcum, Cardiology- Dr. Shantell Henry    Click Here for Release of Records Request   
as per orders  Patient advised to monitor closely the area of skin on the left lower extremity.  He has been advised to keep the  pinpoint open area dry.  He has been advised that once the area heals that he is to moisturize the skin as needed.  Return in about 4 months (around 2/3/2025) for medicare well.  Patient to follow with cardiology, endocrinology and other specialist as recommended.        Lesa Claire MD

## 2024-10-07 ENCOUNTER — TELEPHONE (OUTPATIENT)
Facility: CLINIC | Age: 54
End: 2024-10-07

## 2024-10-07 NOTE — TELEPHONE ENCOUNTER
Patient INR for 09/23/2023 was 2.8. And on 10/04/2024 the patients INR was 2.6. The provider was made aware, thank you.

## 2024-10-14 ENCOUNTER — TELEPHONE (OUTPATIENT)
Facility: CLINIC | Age: 54
End: 2024-10-14

## 2024-10-14 NOTE — TELEPHONE ENCOUNTER
Patient INR for 10/13/2024 was 2.4. This request/message has been forwarded to the provider for review, thank you.

## 2024-10-25 NOTE — TELEPHONE ENCOUNTER
Last Visit: 10/03/2024   Next Appointment: 02/04/2025    Requested Prescriptions     Pending Prescriptions Disp Refills    fluticasone (FLONASE) 50 MCG/ACT nasal spray [Pharmacy Med Name: FLUTICASONE PROP 50 MCG SPRAY]  1     Sig: SHAKE LIQUID AND USE 2 SPRAYS IN EACH NOSTRIL DAILY AS NEEDED FOR RHINITIS

## 2024-10-29 RX ORDER — FLUTICASONE PROPIONATE 50 MCG
SPRAY, SUSPENSION (ML) NASAL
Qty: 1 EACH | Refills: 1 | Status: SHIPPED | OUTPATIENT
Start: 2024-10-29

## 2024-10-30 ENCOUNTER — TELEPHONE (OUTPATIENT)
Facility: CLINIC | Age: 54
End: 2024-10-30

## 2024-10-30 DIAGNOSIS — E87.6 HYPOKALEMIA: ICD-10-CM

## 2024-10-30 DIAGNOSIS — Z79.01 LONG TERM (CURRENT) USE OF ANTICOAGULANTS: ICD-10-CM

## 2024-10-30 NOTE — TELEPHONE ENCOUNTER
Unable to reach the patient. I left a detailed message okay per the patient informing him of the message listed below, thank you.

## 2024-10-30 NOTE — TELEPHONE ENCOUNTER
On 10/29/2024 the patients PT/INR was 1.6. Unable to reach the patient. I left a detailed message requesting the patient return the clinic call in regards to the matter there is a message left for them. I needed to know if the patient has had a change in new medications or have had a green food diet. Please advise, thank you.

## 2024-10-30 NOTE — TELEPHONE ENCOUNTER
Spoke with the patient who stated his PT/INR from his last check was 4.2 and so he skipped 2 doses and tried to eat broccoli and spinach to bring PT/INR Results down. The provider was informed and per the provider please continue medication regiment Warfarin 11 mg (10 mg & 1mg) daily and Repeat INR recheck in 2 weeks (11/13/2024) or as needed for the at Home Vendor requirements.

## 2024-10-31 NOTE — TELEPHONE ENCOUNTER
Last Visit: 10/03/2024   Next Appointment: 02/04/2025      Requested Prescriptions     Pending Prescriptions Disp Refills    ferrous sulfate (IRON 325) 325 (65 Fe) MG tablet [Pharmacy Med Name: FERROUS SULFATE 325 MG TABLET] 180 tablet 1     Sig: TAKE 1 TABLET BY MOUTH TWICE A DAY    warfarin (COUMADIN) 10 MG tablet [Pharmacy Med Name: WARFARIN SODIUM 10 MG TABLET] 90 tablet 3     Sig: TAKE 1 TABLET BY MOUTH EVERY DAY. TAKE ALONG WITH 1MG    KLOR-CON M20 20 MEQ extended release tablet [Pharmacy Med Name: KLOR-CON M20 TABLET] 90 tablet 3     Sig: TAKE 1 TABLET BY MOUTH EVERY DAY    atenolol (TENORMIN) 100 MG tablet [Pharmacy Med Name: ATENOLOL 100 MG TABLET] 90 tablet 3     Sig: TAKE 1 TABLET BY MOUTH EVERY DAY    atorvastatin (LIPITOR) 20 MG tablet [Pharmacy Med Name: ATORVASTATIN 20 MG TABLET] 90 tablet 3     Sig: TAKE 1 TABLET BY MOUTH EVERY DAY    cloNIDine (CATAPRES) 0.2 MG tablet [Pharmacy Med Name: CLONIDINE HCL 0.2 MG TABLET] 180 tablet 3     Sig: TAKE 1 TABLET BY MOUTH TWICE A DAY

## 2024-11-05 RX ORDER — WARFARIN SODIUM 10 MG/1
TABLET ORAL
Qty: 90 TABLET | Refills: 3 | Status: SHIPPED | OUTPATIENT
Start: 2024-11-05

## 2024-11-05 RX ORDER — POTASSIUM CHLORIDE 1500 MG/1
20 TABLET, EXTENDED RELEASE ORAL DAILY
Qty: 90 TABLET | Refills: 3 | Status: SHIPPED | OUTPATIENT
Start: 2024-11-05

## 2024-11-05 RX ORDER — ATORVASTATIN CALCIUM 20 MG/1
20 TABLET, FILM COATED ORAL DAILY
Qty: 90 TABLET | Refills: 3 | Status: SHIPPED | OUTPATIENT
Start: 2024-11-05

## 2024-11-05 RX ORDER — ATENOLOL 100 MG/1
100 TABLET ORAL DAILY
Qty: 90 TABLET | Refills: 3 | Status: SHIPPED | OUTPATIENT
Start: 2024-11-05

## 2024-11-05 RX ORDER — CLONIDINE HYDROCHLORIDE 0.2 MG/1
0.2 TABLET ORAL 2 TIMES DAILY
Qty: 180 TABLET | Refills: 3 | Status: SHIPPED | OUTPATIENT
Start: 2024-11-05

## 2024-11-05 RX ORDER — FERROUS SULFATE 325(65) MG
1 TABLET ORAL 2 TIMES DAILY
Qty: 180 TABLET | Refills: 1 | Status: SHIPPED | OUTPATIENT
Start: 2024-11-05

## 2024-11-07 LAB
ESTIMATED AVERAGE GLUCOSE: NORMAL
HBA1C MFR BLD: 7.8 %

## 2024-11-13 ENCOUNTER — TELEPHONE (OUTPATIENT)
Facility: CLINIC | Age: 54
End: 2024-11-13

## 2024-11-13 NOTE — TELEPHONE ENCOUNTER
Spoke with the patient and informed him his INR was 2.6 and per the provider, please continue medication regiment Warfarin 11 mg (10 mg & 1mg) daily and Repeat INR recheck in 1 month (12/13/2024) or as needed for the at Home Vendor requirements. The patient acknowledged understanding and had no other questions or concerns for the provider at this time, thank you.

## 2024-11-13 NOTE — TELEPHONE ENCOUNTER
On 11/13/2024 the Patients PT/INR was 2.6.  This request/message has been forwarded to the provider for review, thank you.

## 2024-11-19 ENCOUNTER — TELEPHONE (OUTPATIENT)
Facility: CLINIC | Age: 54
End: 2024-11-19

## 2024-12-03 RX ORDER — FLUTICASONE PROPIONATE 50 MCG
SPRAY, SUSPENSION (ML) NASAL
Qty: 3 EACH | Refills: 1 | Status: SHIPPED | OUTPATIENT
Start: 2024-12-03

## 2024-12-04 ENCOUNTER — TELEPHONE (OUTPATIENT)
Facility: CLINIC | Age: 54
End: 2024-12-04

## 2024-12-05 NOTE — TELEPHONE ENCOUNTER
Unable to reach the patient. I left a detailed message informing the patient his INR was 2.1 and per the provider, please continue medication regiment Warfarin 11 mg (10 mg & 1mg) daily and Repeat INR recheck in 1 month (01/05/2024) or as needed for the at Home Vendor requirements, thank you.

## 2024-12-05 NOTE — TELEPHONE ENCOUNTER
On 12/03/2024 the Patients PT/INR was 2.1. This message has been forwarded to the provider for review, thank you.

## 2024-12-11 ENCOUNTER — TELEPHONE (OUTPATIENT)
Facility: CLINIC | Age: 54
End: 2024-12-11

## 2024-12-11 NOTE — TELEPHONE ENCOUNTER
On 12/10/2024 the Patients PT/INR was 1.8.  Spoke to the patient and inquired if he had a change in new medications or have had a green food diet. Patient stated that there was a Pharmacy error and he had been waiting for his medication. He has now received medication and taking it. This request/message has been forwarded to the provider for review, thank you.

## 2024-12-16 NOTE — TELEPHONE ENCOUNTER
Spoke with the patient and informed them that per the provider, please continue medication regiment, Warfarin/Coumadin 11 mg (10 mg & 1mg) daily  and Repeat INR recheck in 2 weeks (12/25/2024) or as needed for the at Home Vendor requirements. The patient acknowledged understanding and had no questions or concerns for the provider at this time, thank you.

## 2024-12-24 ENCOUNTER — TELEPHONE (OUTPATIENT)
Facility: CLINIC | Age: 54
End: 2024-12-24

## 2024-12-24 NOTE — TELEPHONE ENCOUNTER
On 12/24/2024 the Patients PT/INR was 2.7. This request/message has been forwarded to the provider for review, thank you.

## 2025-01-04 DIAGNOSIS — E87.6 HYPOKALEMIA: ICD-10-CM

## 2025-01-05 RX ORDER — POTASSIUM CHLORIDE 1500 MG/1
40 TABLET, EXTENDED RELEASE ORAL DAILY
Qty: 180 TABLET | Refills: 1 | Status: SHIPPED | OUTPATIENT
Start: 2025-01-05

## 2025-01-07 ENCOUNTER — TELEPHONE (OUTPATIENT)
Facility: CLINIC | Age: 55
End: 2025-01-07

## 2025-01-08 NOTE — TELEPHONE ENCOUNTER
On 12/31/2024 the Patients PT/INR was 2.8. This request/message has been forwarded to the provider for review, thank you.

## 2025-01-09 NOTE — TELEPHONE ENCOUNTER
Unable to reach the patient or leave a detailed message informing the patient his INR was 2.8 and  per the provider, please continue medication regiment Warfarin 11 mg (10 mg & 1mg) daily and Repeat INR recheck in 1 month (02/08/2024) or as needed for the at Home Vendor requirements due to the patients voice mailbox was Full. I will try calling back at another time, thank you.

## 2025-01-22 ENCOUNTER — TELEPHONE (OUTPATIENT)
Facility: CLINIC | Age: 55
End: 2025-01-22

## 2025-01-22 NOTE — TELEPHONE ENCOUNTER
On 01/22/2025 the Patients PT/INR was 2.7., thank you. Patient to continue medication regiment Warfarin 11 mg (10 mg & 1mg) daily and Repeat INR recheck as scheduled around (02/08/2024) or as needed for the at Home Vendor requirements due to the patients voice mailbox was Full.

## 2025-01-30 ENCOUNTER — TELEPHONE (OUTPATIENT)
Facility: CLINIC | Age: 55
End: 2025-01-30

## 2025-01-30 NOTE — TELEPHONE ENCOUNTER
On 01/29/2025 the Patients PT/INR was 3.2.   Spoke with the patient who stated he didn't have enough greens and have been back and forth with the hospital and not really been eating like he should. This request/message has been forwarded to the provider for review, thank you.

## 2025-02-11 ENCOUNTER — TELEPHONE (OUTPATIENT)
Facility: CLINIC | Age: 55
End: 2025-02-11

## 2025-02-11 NOTE — TELEPHONE ENCOUNTER
On 02/11/2025 the Patients PT/INR was 2.9. This request/message has been forwarded to the provider for review, thank you.

## 2025-02-13 NOTE — TELEPHONE ENCOUNTER
Spoke with the patient in regards to the message listed below per the provider. The patient acknowledged understanding and had no questions or concerns for the provider at this time, thank you.

## 2025-03-07 ENCOUNTER — TELEPHONE (OUTPATIENT)
Facility: CLINIC | Age: 55
End: 2025-03-07

## 2025-03-07 NOTE — TELEPHONE ENCOUNTER
On 01/29/2025 the Patients PT/INR was 3.5   Spoke with the patient who stated he hasn't been eating enough greens and not really been eating like he should due to his father passing. This request/message has been forwarded to the provider for review. Please advise, thank you.

## 2025-03-17 ENCOUNTER — TELEPHONE (OUTPATIENT)
Facility: CLINIC | Age: 55
End: 2025-03-17

## 2025-03-17 NOTE — TELEPHONE ENCOUNTER
Unable to reach the patient. I left a detailed message informing the patient the message form the provider. Per the provider please have the patient to continue medication regiment Warfarin 11 mg (10 mg & 1mg) daily and Repeat INR recheck in 1 month (04/15/2025) or as needed for the At Home Vendor requirements.

## 2025-03-17 NOTE — TELEPHONE ENCOUNTER
On 03/15/2025 the Patients PT/INR was 2.3.  This request/message has been forwarded to the provider for review, thank you.

## 2025-03-31 ENCOUNTER — TELEPHONE (OUTPATIENT)
Facility: CLINIC | Age: 55
End: 2025-03-31

## 2025-03-31 NOTE — TELEPHONE ENCOUNTER
Spoke with the patient and informed them that their PT/INR was 3.6. I inquired if the patient had a change in medications or have had a change in diet. Per the patient he stated that he just did not get enough Vitamin K/greens of late. I informed him id make the provider aware and will follow-up in regards to the providers response. Per the patient its okay to leave a detailed message if I am unable to reach him. This message has been forwarded to the provider for review. Please advise, thank you.

## 2025-04-01 NOTE — TELEPHONE ENCOUNTER
Unable to reach the patient. I left a detailed message with the patient in regards to the message listed below per the provider, thank you.

## 2025-04-07 ENCOUNTER — TELEPHONE (OUTPATIENT)
Facility: CLINIC | Age: 55
End: 2025-04-07

## 2025-04-07 NOTE — TELEPHONE ENCOUNTER
On 03/31/2025  and 04/06/2025 the Patients PT/INR's were 2.1.  This request/message has been forwarded to the provider for review, thank you.

## 2025-04-08 NOTE — TELEPHONE ENCOUNTER
Unable to reach the patient so I left a detailed message informing the patient per the provider to continue medication regiment Warfarin 11 mg (10 mg & 1mg) daily and Repeat INR recheck in 2 weeks (04/22/2025), thank you.

## 2025-04-11 RX ORDER — FUROSEMIDE 40 MG/1
40 TABLET ORAL 2 TIMES DAILY
Qty: 180 TABLET | Refills: 1 | Status: SHIPPED | OUTPATIENT
Start: 2025-04-11

## 2025-04-14 ENCOUNTER — TELEPHONE (OUTPATIENT)
Facility: CLINIC | Age: 55
End: 2025-04-14

## 2025-04-14 NOTE — TELEPHONE ENCOUNTER
On 04/14/2025 the Patients PT/INR was 2.6.  This request/message has been forwarded to the provider for review, thank you.

## 2025-04-23 NOTE — TELEPHONE ENCOUNTER
Last Visit: 10/03/2024   Next Appointment: N/A    Requested Prescriptions     Pending Prescriptions Disp Refills    lisinopril (PRINIVIL;ZESTRIL) 40 MG tablet [Pharmacy Med Name: LISINOPRIL 40 MG TABLET] 90 tablet 2     Sig: TAKE 1 TABLET BY MOUTH EVERY DAY IN THE MORNING    ferrous sulfate (IRON 325) 325 (65 Fe) MG tablet [Pharmacy Med Name: FERROUS SULFATE 325 MG TABLET] 180 tablet 1     Sig: TAKE 1 TABLET BY MOUTH TWICE A DAY

## 2025-04-24 ENCOUNTER — TELEPHONE (OUTPATIENT)
Facility: CLINIC | Age: 55
End: 2025-04-24

## 2025-04-24 RX ORDER — FERROUS SULFATE 325(65) MG
1 TABLET ORAL 2 TIMES DAILY
Qty: 180 TABLET | Refills: 1 | Status: SHIPPED | OUTPATIENT
Start: 2025-04-24

## 2025-04-24 RX ORDER — LISINOPRIL 40 MG/1
40 TABLET ORAL EVERY MORNING
Qty: 90 TABLET | Refills: 2 | Status: SHIPPED | OUTPATIENT
Start: 2025-04-24

## 2025-04-24 NOTE — TELEPHONE ENCOUNTER
Unable to reach the patient. I left a detailed message informing the patient of INR Results and the medication directions per the provider as stated below, thank you.

## 2025-04-24 NOTE — TELEPHONE ENCOUNTER
On 04/24/2025 the Patients PT/INR was 4.0.  Per the provider have patient Hold medication regiment for 2 night and resume medication regiment Warfarin 11 mg (10 mg & 1mg) daily and Repeat INR recheck in 1 weeks (05/01/2025), thank you.

## 2025-05-13 ENCOUNTER — TELEPHONE (OUTPATIENT)
Facility: CLINIC | Age: 55
End: 2025-05-13

## 2025-05-13 NOTE — TELEPHONE ENCOUNTER
Spoke with the patient and informed him per the covering provider to Hold medication regiment for 2 night and resume medication regiment Warfarin 11 mg (10 mg & 1mg) daily and Repeat INR recheck in 1 weeks (05/20/2025). The patient acknowledged understanding and had no other questions or concerns for the provider at this time, thank you.   
Spoke with the patient and informed them On 05/05/2025 and 05/11/2025 the Patients PT/INR was 4.0. I inquired if the patient had a change in medications or have had a change in diet. Per the patient, he just hasn't been eating enough greens. I informed the patient that id make the provider aware and will follow-up in regards to the providers response. Per the patient its okay to leave a detailed message if I am unable to reach them. This message has been forwarded to the Cover provider for review. Please advise, thank you.   
(1) Female

## 2025-05-20 ENCOUNTER — TELEPHONE (OUTPATIENT)
Facility: CLINIC | Age: 55
End: 2025-05-20

## 2025-05-20 NOTE — TELEPHONE ENCOUNTER
On 05/19/2025 the Patients PT/INR was 2.7.  This message has been forwarded to the provider for review, thank you.

## 2025-05-28 ENCOUNTER — TELEPHONE (OUTPATIENT)
Facility: CLINIC | Age: 55
End: 2025-05-28

## 2025-05-28 NOTE — TELEPHONE ENCOUNTER
Spoke with the patient and informed them that their PT/INR was 3.7. I inquired if the patient had a change in medications or have had a change in diet. Per the patient, He stated that he just haven't had enough greens. I informed the patient that id make the provider aware and will follow-up in regards to the providers response. Per the patient its okay to leave a detailed message if I am unable to reach them. This message has been forwarded to the provider for review. Please advise, thank you.

## 2025-06-05 ENCOUNTER — OFFICE VISIT (OUTPATIENT)
Facility: CLINIC | Age: 55
End: 2025-06-05
Payer: COMMERCIAL

## 2025-06-05 ENCOUNTER — HOSPITAL ENCOUNTER (OUTPATIENT)
Facility: HOSPITAL | Age: 55
Setting detail: SPECIMEN
Discharge: HOME | End: 2025-06-08

## 2025-06-05 VITALS
HEART RATE: 66 BPM | BODY MASS INDEX: 38.48 KG/M2 | OXYGEN SATURATION: 94 % | RESPIRATION RATE: 20 BRPM | TEMPERATURE: 97.7 F | WEIGHT: 299.8 LBS | DIASTOLIC BLOOD PRESSURE: 70 MMHG | HEIGHT: 74 IN | SYSTOLIC BLOOD PRESSURE: 106 MMHG

## 2025-06-05 DIAGNOSIS — E11.40 TYPE 2 DIABETES MELLITUS WITH DIABETIC NEUROPATHY, WITH LONG-TERM CURRENT USE OF INSULIN (HCC): ICD-10-CM

## 2025-06-05 DIAGNOSIS — Z79.4 TYPE 2 DIABETES MELLITUS WITH DIABETIC NEUROPATHY, WITH LONG-TERM CURRENT USE OF INSULIN (HCC): ICD-10-CM

## 2025-06-05 DIAGNOSIS — Z12.5 SCREENING FOR PROSTATE CANCER: ICD-10-CM

## 2025-06-05 DIAGNOSIS — I50.33 ACUTE ON CHRONIC DIASTOLIC HEART FAILURE (HCC): ICD-10-CM

## 2025-06-05 DIAGNOSIS — Z00.00 WELL ADULT EXAM: Primary | ICD-10-CM

## 2025-06-05 PROCEDURE — 99396 PREV VISIT EST AGE 40-64: CPT | Performed by: FAMILY MEDICINE

## 2025-06-05 PROCEDURE — 3078F DIAST BP <80 MM HG: CPT | Performed by: FAMILY MEDICINE

## 2025-06-05 PROCEDURE — 3074F SYST BP LT 130 MM HG: CPT | Performed by: FAMILY MEDICINE

## 2025-06-05 PROCEDURE — 99001 SPECIMEN HANDLING PT-LAB: CPT

## 2025-06-05 RX ORDER — COMPRESSION  PANTYHOSE, SMALL
EACH MISCELLANEOUS
Qty: 1 EACH | Refills: 0 | Status: SHIPPED | OUTPATIENT
Start: 2025-06-05

## 2025-06-05 RX ORDER — TIRZEPATIDE 15 MG/.5ML
INJECTION, SOLUTION SUBCUTANEOUS
COMMUNITY
Start: 2025-05-21

## 2025-06-05 RX ORDER — LISINOPRIL 20 MG/1
20 TABLET ORAL DAILY
Qty: 90 TABLET | Refills: 3 | Status: SHIPPED | OUTPATIENT
Start: 2025-06-05

## 2025-06-05 SDOH — ECONOMIC STABILITY: FOOD INSECURITY: WITHIN THE PAST 12 MONTHS, YOU WORRIED THAT YOUR FOOD WOULD RUN OUT BEFORE YOU GOT MONEY TO BUY MORE.: NEVER TRUE

## 2025-06-05 SDOH — ECONOMIC STABILITY: FOOD INSECURITY: WITHIN THE PAST 12 MONTHS, THE FOOD YOU BOUGHT JUST DIDN'T LAST AND YOU DIDN'T HAVE MONEY TO GET MORE.: NEVER TRUE

## 2025-06-05 ASSESSMENT — PATIENT HEALTH QUESTIONNAIRE - PHQ9
6. FEELING BAD ABOUT YOURSELF - OR THAT YOU ARE A FAILURE OR HAVE LET YOURSELF OR YOUR FAMILY DOWN: NOT AT ALL
3. TROUBLE FALLING OR STAYING ASLEEP: MORE THAN HALF THE DAYS
SUM OF ALL RESPONSES TO PHQ QUESTIONS 1-9: 7
9. THOUGHTS THAT YOU WOULD BE BETTER OFF DEAD, OR OF HURTING YOURSELF: NOT AT ALL
4. FEELING TIRED OR HAVING LITTLE ENERGY: SEVERAL DAYS
SUM OF ALL RESPONSES TO PHQ QUESTIONS 1-9: 7
SUM OF ALL RESPONSES TO PHQ QUESTIONS 1-9: 7
1. LITTLE INTEREST OR PLEASURE IN DOING THINGS: SEVERAL DAYS
SUM OF ALL RESPONSES TO PHQ QUESTIONS 1-9: 7
8. MOVING OR SPEAKING SO SLOWLY THAT OTHER PEOPLE COULD HAVE NOTICED. OR THE OPPOSITE, BEING SO FIGETY OR RESTLESS THAT YOU HAVE BEEN MOVING AROUND A LOT MORE THAN USUAL: NOT AT ALL
5. POOR APPETITE OR OVEREATING: SEVERAL DAYS
7. TROUBLE CONCENTRATING ON THINGS, SUCH AS READING THE NEWSPAPER OR WATCHING TELEVISION: MORE THAN HALF THE DAYS
10. IF YOU CHECKED OFF ANY PROBLEMS, HOW DIFFICULT HAVE THESE PROBLEMS MADE IT FOR YOU TO DO YOUR WORK, TAKE CARE OF THINGS AT HOME, OR GET ALONG WITH OTHER PEOPLE: NOT DIFFICULT AT ALL
2. FEELING DOWN, DEPRESSED OR HOPELESS: NOT AT ALL

## 2025-06-05 NOTE — PROGRESS NOTES
Have you been to the ER, urgent care clinic since your last visit?  Hospitalized since your last visit?   NO    Have you seen or consulted any other health care providers outside our system since your last visit?   Yes, [facility]: Endocrinology- [facility] Dr. REGINA Ellison,     “Have you had a diabetic eye exam?”    YES - Where: 05/2025- Ophthalmology- Dr. Valentino Nurse/CMA to request most recent records if not in the chart     No diabetic eye exam on file

## 2025-06-05 NOTE — PATIENT INSTRUCTIONS
Patient Education        Learning About the Mediterranean Diet  What is the Mediterranean diet?     The Mediterranean diet is a style of eating rather than a diet plan. It features foods eaten in [place], [place], southern [place] and [place], and other countries along the [place] Sea. It emphasizes eating foods like fish, fruits, vegetables, beans, high-fiber breads and whole grains, nuts, and olive oil. This style of eating includes limited red meat, cheese, and sweets.  Why choose the Mediterranean diet?  A Mediterranean-style diet may improve heart health. It contains more fat than other heart-healthy diets. But the fats are mainly from nuts, unsaturated oils (such as fish oils and olive oil), and certain nut or seed oils (such as canola, soybean, or flaxseed oil). These fats may help protect the heart and blood vessels.  How can you get started on the Mediterranean diet?  Here are some things you can do to switch to a more Mediterranean way of eating.  What to eat  Eat a variety of fruits and vegetables each day, such as grapes, blueberries, tomatoes, broccoli, peppers, figs, olives, spinach, eggplant, beans, lentils, and chickpeas.  Eat a variety of whole-grain foods each day, such as oats, brown rice, and whole wheat bread, pasta, and couscous.  Eat fish at least 2 times a week. Try tuna, salmon, mackerel, lake trout, herring, or sardines.  Eat moderate amounts of low-fat dairy products, such as milk, cheese, or yogurt.  Eat moderate amounts of poultry and eggs.  Choose healthy (unsaturated) fats, such as nuts, olive oil, and certain nut or seed oils like canola, soybean, and flaxseed.  Limit unhealthy (saturated) fats, such as butter, palm oil, and coconut oil. And limit fats found in animal products, such as meat and dairy products made with whole milk. Try to eat red meat only a few times a month in very small amounts.  Limit sweets and desserts to only a few times a week. This includes sugar-sweetened

## 2025-06-05 NOTE — PROGRESS NOTES
Well Adult Note  Name: Shun Gomes Today’s Date: 2025   MRN: [id number] Sex: Male   Age: 55 y.o. Ethnicity: Declined   : 1970 Race: Black /       Shun Gomes is here for a well adult exam.          Assessment & Plan    Shun was seen today for hypertension and annual exam.    Diagnoses and all orders for this visit:    Well adult exam    Type 2 diabetes mellitus with diabetic neuropathy, with long-term current use of insulin (HCC)  -     Albumin/Creatinine Ratio, Urine; Future  -     Albumin/Creatinine Ratio, Urine    Screening for prostate cancer  -     PSA, Total and Free; Future  -     PSA, Total and Free    Acute on chronic diastolic heart failure (HCC)  -     [facility] - Suman Tate Sr, MD, Cardiology, [place] ()    Other orders  -     Elastic Bandages & Supports (FUTURO FIRM COMPRESSION HOSE) MISC; 20 - 30 mmHG  -     lisinopril (PRINIVIL) 20 MG tablet; Take 1 tablet by mouth daily        As above    above all stable unless otherwise noted    Patient stable  Labs as ordered  Refilled medications needed  AVS is accessible thru [facility] and pt has been advised of same.   This has been fully explained to the patient, who indicates understanding.      Additional discussion below:    1. Hypertension.  - Blood pressure readings have been consistently low, with today's reading at 106/70.  - Significant weight loss may have reduced the need for antihypertensive medication.  - Dosage of lisinopril will be reduced to 20 mg daily.  - Advised to monitor blood pressure at home and report any readings in the range of 110s to 120s over 70s. If blood pressure remains low, a further reduction in the dosage of hydralazine may be considered. He is to discuss this with the prescriber.    2. Hyperthyroidism.  - Thyroid function tests indicate mild hyperthyroidism, with TSH levels slightly above the upper limit of normal.  - Advised to consult with Dr. CHAVEZ regarding the initiation of therapy for

## 2025-06-06 LAB
% FREE PSA: 55 %
PROSTATE SPECIFIC ANTIGEN FREE: 0.13 NG/ML
PROSTATE SPECIFIC ANTIGEN: 0.23 NG/ML

## 2025-06-07 LAB
CREATININE, URINE  MG/DL: 45 MG/DL
MICROALBUMIN/CREAT 24H UR: 15.7 MG/L (ref 0.1–17)
MICROALBUMIN/CREAT UR-RTO: 34.9 (ref 0–30)

## 2025-06-08 ENCOUNTER — RESULTS FOLLOW-UP (OUTPATIENT)
Facility: CLINIC | Age: 55
End: 2025-06-08

## 2025-06-13 LAB — SENTARA SPECIMEN COLLECTION: NORMAL

## 2025-06-25 RX ORDER — FLUTICASONE PROPIONATE 50 MCG
SPRAY, SUSPENSION (ML) NASAL
Qty: 48 ML | Refills: 1 | Status: SHIPPED | OUTPATIENT
Start: 2025-06-25

## 2025-08-14 ENCOUNTER — TELEPHONE (OUTPATIENT)
Facility: CLINIC | Age: 55
End: 2025-08-14

## 2025-08-14 RX ORDER — METHIMAZOLE 5 MG/1
TABLET ORAL
COMMUNITY
Start: 2025-07-29

## 2025-08-29 DIAGNOSIS — E87.6 HYPOKALEMIA: ICD-10-CM

## 2025-08-30 RX ORDER — POTASSIUM CHLORIDE 1500 MG/1
40 TABLET, EXTENDED RELEASE ORAL DAILY
Qty: 180 TABLET | Refills: 3 | Status: SHIPPED | OUTPATIENT
Start: 2025-08-30

## (undated) DEVICE — KIT CLN UP BON SECOURS MARYV

## (undated) DEVICE — CATH IV SAFE STR 22GX1IN BLU -- PROTECTIV PLUS

## (undated) DEVICE — REM POLYHESIVE ADULT PATIENT RETURN ELECTRODE: Brand: VALLEYLAB

## (undated) DEVICE — FLEX ADVANTAGE 1500CC: Brand: FLEX ADVANTAGE

## (undated) DEVICE — COVER LT HNDL BLU STRL -- MEDICHOICE

## (undated) DEVICE — SMOKE EVACUATION PENCIL: Brand: VALLEYLAB

## (undated) DEVICE — SUTURE VCRL SZ 3-0 L27IN ABSRB UD L26MM SH 1/2 CIR J416H

## (undated) DEVICE — SUTURE PERMAHAND SZ 2-0 L18IN NONABSORBABLE BLK L26MM PS 1588H

## (undated) DEVICE — SUTURE MCRYL SZ 4-0 L27IN ABSRB UD L24MM PS-1 3/8 CIR PRIM Y935H

## (undated) DEVICE — GLOVE SURG SZ 7.5 L11.73IN FNGR THK7.5MIL STRW LTX POLYMER

## (undated) DEVICE — 3M(TM) MEDIPORE(TM) +PAD SOFT CLOTH ADHESIVE WOUND DRESSING 3566: Brand: 3M™ MEDIPORE™

## (undated) DEVICE — GARMENT,MEDLINE,DVT,INT,CALF,MED, GEN2: Brand: MEDLINE

## (undated) DEVICE — PACK PROCEDURE SURG MAJ W/ BASIN LF

## (undated) DEVICE — STERILE POLYISOPRENE POWDER-FREE SURGICAL GLOVES: Brand: PROTEXIS

## (undated) DEVICE — DRAPE TOWEL: Brand: CONVERTORS

## (undated) DEVICE — MEDI-VAC NON-CONDUCTIVE SUCTION TUBING: Brand: CARDINAL HEALTH

## (undated) DEVICE — PREP SKN CHLRAPRP APL 26ML STR --

## (undated) DEVICE — STRIP SKIN CLSR W0.25XL4IN WHT SPUNBOUND FBR NYL HI ADH

## (undated) DEVICE — Device

## (undated) DEVICE — BLANKET WRM AD W50XL85.8IN PACU FULL BODY FORC AIR

## (undated) DEVICE — AIRLIFE™ NASAL OXYGEN CANNULA CURVED, NONFLARED TIP WITH 14 FOOT (4.3 M) CRUSH-RESISTANT TUBING, OVER-THE-EAR STYLE: Brand: AIRLIFE™